# Patient Record
Sex: MALE | Race: BLACK OR AFRICAN AMERICAN | NOT HISPANIC OR LATINO | Employment: UNEMPLOYED | URBAN - METROPOLITAN AREA
[De-identification: names, ages, dates, MRNs, and addresses within clinical notes are randomized per-mention and may not be internally consistent; named-entity substitution may affect disease eponyms.]

---

## 2021-04-07 ENCOUNTER — OFFICE VISIT (OUTPATIENT)
Dept: UROLOGY | Facility: CLINIC | Age: 61
End: 2021-04-07
Payer: MEDICAID

## 2021-04-07 VITALS
BODY MASS INDEX: 30.98 KG/M2 | SYSTOLIC BLOOD PRESSURE: 156 MMHG | HEIGHT: 69 IN | HEART RATE: 92 BPM | WEIGHT: 209.19 LBS | DIASTOLIC BLOOD PRESSURE: 91 MMHG

## 2021-04-07 DIAGNOSIS — R97.20 ELEVATED PROSTATE SPECIFIC ANTIGEN (PSA): Primary | ICD-10-CM

## 2021-04-07 LAB
BILIRUB SERPL-MCNC: NORMAL MG/DL
BLOOD URINE, POC: NORMAL
CLARITY, POC UA: CLEAR
COLOR, POC UA: YELLOW
GLUCOSE UR QL STRIP: NORMAL
KETONES UR QL STRIP: NORMAL
LEUKOCYTE ESTERASE URINE, POC: NORMAL
NITRITE, POC UA: NORMAL
PH, POC UA: 5
POC RESIDUAL URINE VOLUME: 15 ML (ref 0–100)
PROTEIN, POC: NORMAL
SPECIFIC GRAVITY, POC UA: 1.01
UROBILINOGEN, POC UA: NORMAL

## 2021-04-07 PROCEDURE — 99204 OFFICE O/P NEW MOD 45 MIN: CPT | Mod: S$PBB,,, | Performed by: NURSE PRACTITIONER

## 2021-04-07 PROCEDURE — 99204 PR OFFICE/OUTPT VISIT, NEW, LEVL IV, 45-59 MIN: ICD-10-PCS | Mod: S$PBB,,, | Performed by: NURSE PRACTITIONER

## 2021-04-07 PROCEDURE — 87086 URINE CULTURE/COLONY COUNT: CPT | Performed by: NURSE PRACTITIONER

## 2021-04-07 PROCEDURE — 99999 PR PBB SHADOW E&M-NEW PATIENT-LVL III: ICD-10-PCS | Mod: PBBFAC,,, | Performed by: NURSE PRACTITIONER

## 2021-04-07 PROCEDURE — 99999 PR PBB SHADOW E&M-NEW PATIENT-LVL III: CPT | Mod: PBBFAC,,, | Performed by: NURSE PRACTITIONER

## 2021-04-07 PROCEDURE — 81002 URINALYSIS NONAUTO W/O SCOPE: CPT | Mod: PBBFAC,PN | Performed by: NURSE PRACTITIONER

## 2021-04-07 PROCEDURE — 51798 US URINE CAPACITY MEASURE: CPT | Mod: PBBFAC,PN | Performed by: NURSE PRACTITIONER

## 2021-04-07 PROCEDURE — 99203 OFFICE O/P NEW LOW 30 MIN: CPT | Mod: PBBFAC,PN | Performed by: NURSE PRACTITIONER

## 2021-04-07 RX ORDER — ATORVASTATIN CALCIUM 40 MG/1
40 TABLET, FILM COATED ORAL DAILY
COMMUNITY
Start: 2021-03-04

## 2021-04-07 RX ORDER — METFORMIN HYDROCHLORIDE 1000 MG/1
1000 TABLET ORAL
COMMUNITY

## 2021-04-09 LAB — BACTERIA UR CULT: NO GROWTH

## 2021-04-14 ENCOUNTER — OFFICE VISIT (OUTPATIENT)
Dept: UROLOGY | Facility: CLINIC | Age: 61
End: 2021-04-14
Payer: MEDICAID

## 2021-04-14 DIAGNOSIS — R97.20 ELEVATED PROSTATE SPECIFIC ANTIGEN (PSA): Primary | ICD-10-CM

## 2021-04-14 PROCEDURE — 99214 OFFICE O/P EST MOD 30 MIN: CPT | Mod: 95,,, | Performed by: NURSE PRACTITIONER

## 2021-04-14 PROCEDURE — 99214 PR OFFICE/OUTPT VISIT, EST, LEVL IV, 30-39 MIN: ICD-10-PCS | Mod: 95,,, | Performed by: NURSE PRACTITIONER

## 2021-09-15 ENCOUNTER — DOCUMENTATION ONLY (OUTPATIENT)
Dept: REHABILITATION | Facility: HOSPITAL | Age: 61
End: 2021-09-15

## 2022-01-23 ENCOUNTER — HOSPITAL ENCOUNTER (INPATIENT)
Facility: HOSPITAL | Age: 62
LOS: 10 days | Discharge: NURSING, REHAB OR LONG-TERM CARE | DRG: 025 | End: 2022-02-02
Attending: EMERGENCY MEDICINE | Admitting: PSYCHIATRY & NEUROLOGY
Payer: MEDICAID

## 2022-01-23 DIAGNOSIS — S06.5XAA SUBDURAL HEMATOMA, ACUTE: ICD-10-CM

## 2022-01-23 DIAGNOSIS — G93.6 CEREBRAL EDEMA: ICD-10-CM

## 2022-01-23 DIAGNOSIS — S06.5XAA SDH (SUBDURAL HEMATOMA): ICD-10-CM

## 2022-01-23 DIAGNOSIS — I63.50 CEREBROVASCULAR ACCIDENT (CVA) OF RIGHT PONTINE STRUCTURE: ICD-10-CM

## 2022-01-23 DIAGNOSIS — R51.9 HEADACHE IN FRONT OF HEAD: ICD-10-CM

## 2022-01-23 DIAGNOSIS — S06.5XAA SUBDURAL HEMATOMA: Primary | ICD-10-CM

## 2022-01-23 PROBLEM — E78.5 HLD (HYPERLIPIDEMIA): Status: ACTIVE | Noted: 2022-01-23

## 2022-01-23 PROBLEM — E11.9 TYPE 2 DIABETES MELLITUS: Status: ACTIVE | Noted: 2022-01-23

## 2022-01-23 LAB
ALBUMIN SERPL BCP-MCNC: 3.5 G/DL (ref 3.5–5.2)
ALP SERPL-CCNC: 67 U/L (ref 55–135)
ALT SERPL W/O P-5'-P-CCNC: 24 U/L (ref 10–44)
ANION GAP SERPL CALC-SCNC: 8 MMOL/L (ref 8–16)
APTT BLDCRRT: 26.2 SEC (ref 21–32)
AST SERPL-CCNC: 10 U/L (ref 10–40)
BASOPHILS # BLD AUTO: 0.02 K/UL (ref 0–0.2)
BASOPHILS NFR BLD: 0.2 % (ref 0–1.9)
BILIRUB SERPL-MCNC: 0.6 MG/DL (ref 0.1–1)
BUN SERPL-MCNC: 13 MG/DL (ref 6–20)
CALCIUM SERPL-MCNC: 8.5 MG/DL (ref 8.7–10.5)
CHLORIDE SERPL-SCNC: 107 MMOL/L (ref 95–110)
CO2 SERPL-SCNC: 21 MMOL/L (ref 23–29)
CREAT SERPL-MCNC: 1.1 MG/DL (ref 0.5–1.4)
CTP QC/QA: YES
DIFFERENTIAL METHOD: ABNORMAL
EOSINOPHIL # BLD AUTO: 0 K/UL (ref 0–0.5)
EOSINOPHIL NFR BLD: 0 % (ref 0–8)
ERYTHROCYTE [DISTWIDTH] IN BLOOD BY AUTOMATED COUNT: 12.4 % (ref 11.5–14.5)
EST. GFR  (AFRICAN AMERICAN): >60 ML/MIN/1.73 M^2
EST. GFR  (NON AFRICAN AMERICAN): >60 ML/MIN/1.73 M^2
GLUCOSE SERPL-MCNC: 305 MG/DL (ref 70–110)
HCT VFR BLD AUTO: 37.7 % (ref 40–54)
HGB BLD-MCNC: 13.5 G/DL (ref 14–18)
IMM GRANULOCYTES # BLD AUTO: 0.03 K/UL (ref 0–0.04)
IMM GRANULOCYTES NFR BLD AUTO: 0.3 % (ref 0–0.5)
INR PPP: 1.1 (ref 0.8–1.2)
LYMPHOCYTES # BLD AUTO: 1 K/UL (ref 1–4.8)
LYMPHOCYTES NFR BLD: 8.9 % (ref 18–48)
MCH RBC QN AUTO: 30.3 PG (ref 27–31)
MCHC RBC AUTO-ENTMCNC: 35.8 G/DL (ref 32–36)
MCV RBC AUTO: 85 FL (ref 82–98)
MONOCYTES # BLD AUTO: 0.5 K/UL (ref 0.3–1)
MONOCYTES NFR BLD: 4.2 % (ref 4–15)
NEUTROPHILS # BLD AUTO: 9.4 K/UL (ref 1.8–7.7)
NEUTROPHILS NFR BLD: 86.4 % (ref 38–73)
NRBC BLD-RTO: 0 /100 WBC
PLATELET # BLD AUTO: 147 K/UL (ref 150–450)
PMV BLD AUTO: 10.4 FL (ref 9.2–12.9)
POCT GLUCOSE: 139 MG/DL (ref 70–110)
POCT GLUCOSE: 228 MG/DL (ref 70–110)
POTASSIUM SERPL-SCNC: 3.9 MMOL/L (ref 3.5–5.1)
PROT SERPL-MCNC: 6.8 G/DL (ref 6–8.4)
PROTHROMBIN TIME: 11.5 SEC (ref 9–12.5)
RBC # BLD AUTO: 4.46 M/UL (ref 4.6–6.2)
SARS-COV-2 RDRP RESP QL NAA+PROBE: NEGATIVE
SODIUM SERPL-SCNC: 136 MMOL/L (ref 136–145)
WBC # BLD AUTO: 10.82 K/UL (ref 3.9–12.7)

## 2022-01-23 PROCEDURE — 93005 ELECTROCARDIOGRAM TRACING: CPT

## 2022-01-23 PROCEDURE — 85610 PROTHROMBIN TIME: CPT | Performed by: EMERGENCY MEDICINE

## 2022-01-23 PROCEDURE — 99291 CRITICAL CARE FIRST HOUR: CPT | Mod: ,,, | Performed by: NURSE PRACTITIONER

## 2022-01-23 PROCEDURE — 25000003 PHARM REV CODE 250: Performed by: NURSE PRACTITIONER

## 2022-01-23 PROCEDURE — 80053 COMPREHEN METABOLIC PANEL: CPT | Performed by: EMERGENCY MEDICINE

## 2022-01-23 PROCEDURE — 63600175 PHARM REV CODE 636 W HCPCS: Performed by: PHYSICIAN ASSISTANT

## 2022-01-23 PROCEDURE — 99291 PR CRITICAL CARE, E/M 30-74 MINUTES: ICD-10-PCS | Mod: ,,, | Performed by: NURSE PRACTITIONER

## 2022-01-23 PROCEDURE — 85025 COMPLETE CBC W/AUTO DIFF WBC: CPT | Performed by: EMERGENCY MEDICINE

## 2022-01-23 PROCEDURE — 96375 TX/PRO/DX INJ NEW DRUG ADDON: CPT

## 2022-01-23 PROCEDURE — 93010 ELECTROCARDIOGRAM REPORT: CPT | Mod: ,,, | Performed by: INTERNAL MEDICINE

## 2022-01-23 PROCEDURE — 94761 N-INVAS EAR/PLS OXIMETRY MLT: CPT

## 2022-01-23 PROCEDURE — 99223 1ST HOSP IP/OBS HIGH 75: CPT | Mod: ,,, | Performed by: NEUROLOGICAL SURGERY

## 2022-01-23 PROCEDURE — 25000003 PHARM REV CODE 250: Performed by: PHYSICIAN ASSISTANT

## 2022-01-23 PROCEDURE — 20000000 HC ICU ROOM

## 2022-01-23 PROCEDURE — 96374 THER/PROPH/DIAG INJ IV PUSH: CPT

## 2022-01-23 PROCEDURE — 99223 PR INITIAL HOSPITAL CARE,LEVL III: ICD-10-PCS | Mod: ,,, | Performed by: NEUROLOGICAL SURGERY

## 2022-01-23 PROCEDURE — 63600175 PHARM REV CODE 636 W HCPCS: Performed by: EMERGENCY MEDICINE

## 2022-01-23 PROCEDURE — 99291 CRITICAL CARE FIRST HOUR: CPT | Mod: 25

## 2022-01-23 PROCEDURE — 85730 THROMBOPLASTIN TIME PARTIAL: CPT | Performed by: EMERGENCY MEDICINE

## 2022-01-23 PROCEDURE — U0002 COVID-19 LAB TEST NON-CDC: HCPCS | Performed by: EMERGENCY MEDICINE

## 2022-01-23 PROCEDURE — 93010 EKG 12-LEAD: ICD-10-PCS | Mod: ,,, | Performed by: INTERNAL MEDICINE

## 2022-01-23 RX ORDER — ATORVASTATIN CALCIUM 20 MG/1
40 TABLET, FILM COATED ORAL DAILY
Status: DISCONTINUED | OUTPATIENT
Start: 2022-01-24 | End: 2022-01-28

## 2022-01-23 RX ORDER — GLUCAGON 1 MG
1 KIT INJECTION
Status: DISCONTINUED | OUTPATIENT
Start: 2022-01-23 | End: 2022-01-28

## 2022-01-23 RX ORDER — SODIUM CHLORIDE 0.9 % (FLUSH) 0.9 %
10 SYRINGE (ML) INJECTION
Status: DISCONTINUED | OUTPATIENT
Start: 2022-01-23 | End: 2022-02-02 | Stop reason: HOSPADM

## 2022-01-23 RX ORDER — BUTALBITAL, ACETAMINOPHEN AND CAFFEINE 50; 325; 40 MG/1; MG/1; MG/1
1 TABLET ORAL EVERY 4 HOURS PRN
COMMUNITY

## 2022-01-23 RX ORDER — KETOROLAC TROMETHAMINE 30 MG/ML
30 INJECTION, SOLUTION INTRAMUSCULAR; INTRAVENOUS
Status: COMPLETED | OUTPATIENT
Start: 2022-01-23 | End: 2022-01-23

## 2022-01-23 RX ORDER — SODIUM,POTASSIUM PHOSPHATES 280-250MG
2 POWDER IN PACKET (EA) ORAL
Status: DISCONTINUED | OUTPATIENT
Start: 2022-01-23 | End: 2022-02-01

## 2022-01-23 RX ORDER — MUPIROCIN 20 MG/G
OINTMENT TOPICAL 2 TIMES DAILY
Status: COMPLETED | OUTPATIENT
Start: 2022-01-23 | End: 2022-01-28

## 2022-01-23 RX ORDER — DIPHENHYDRAMINE HYDROCHLORIDE 50 MG/ML
50 INJECTION INTRAMUSCULAR; INTRAVENOUS
Status: COMPLETED | OUTPATIENT
Start: 2022-01-23 | End: 2022-01-23

## 2022-01-23 RX ORDER — LABETALOL HCL 20 MG/4 ML
10 SYRINGE (ML) INTRAVENOUS EVERY 4 HOURS PRN
Status: DISCONTINUED | OUTPATIENT
Start: 2022-01-23 | End: 2022-01-24

## 2022-01-23 RX ORDER — INSULIN ASPART 100 [IU]/ML
0-5 INJECTION, SOLUTION INTRAVENOUS; SUBCUTANEOUS EVERY 6 HOURS PRN
Status: DISCONTINUED | OUTPATIENT
Start: 2022-01-23 | End: 2022-01-24

## 2022-01-23 RX ORDER — LEVETIRACETAM 500 MG/5ML
500 INJECTION, SOLUTION, CONCENTRATE INTRAVENOUS EVERY 12 HOURS
Status: DISCONTINUED | OUTPATIENT
Start: 2022-01-23 | End: 2022-01-25

## 2022-01-23 RX ORDER — PROCHLORPERAZINE EDISYLATE 5 MG/ML
2.5 INJECTION INTRAMUSCULAR; INTRAVENOUS
Status: COMPLETED | OUTPATIENT
Start: 2022-01-23 | End: 2022-01-23

## 2022-01-23 RX ORDER — AMOXICILLIN 250 MG
1 CAPSULE ORAL 2 TIMES DAILY
Status: DISCONTINUED | OUTPATIENT
Start: 2022-01-23 | End: 2022-01-28

## 2022-01-23 RX ORDER — ONDANSETRON 2 MG/ML
4 INJECTION INTRAMUSCULAR; INTRAVENOUS EVERY 8 HOURS PRN
Status: DISCONTINUED | OUTPATIENT
Start: 2022-01-23 | End: 2022-02-02 | Stop reason: HOSPADM

## 2022-01-23 RX ORDER — LANOLIN ALCOHOL/MO/W.PET/CERES
800 CREAM (GRAM) TOPICAL
Status: DISCONTINUED | OUTPATIENT
Start: 2022-01-23 | End: 2022-01-30

## 2022-01-23 RX ORDER — METOCLOPRAMIDE HYDROCHLORIDE 5 MG/ML
5 INJECTION INTRAMUSCULAR; INTRAVENOUS ONCE
Status: COMPLETED | OUTPATIENT
Start: 2022-01-23 | End: 2022-01-23

## 2022-01-23 RX ADMIN — SENNOSIDES AND DOCUSATE SODIUM 1 TABLET: 50; 8.6 TABLET ORAL at 09:01

## 2022-01-23 RX ADMIN — MUPIROCIN: 20 OINTMENT TOPICAL at 10:01

## 2022-01-23 RX ADMIN — METOCLOPRAMIDE 5 MG: 5 INJECTION, SOLUTION INTRAMUSCULAR; INTRAVENOUS at 10:01

## 2022-01-23 RX ADMIN — KETOROLAC TROMETHAMINE 30 MG: 30 INJECTION, SOLUTION INTRAMUSCULAR at 02:01

## 2022-01-23 RX ADMIN — LEVETIRACETAM 500 MG: 100 INJECTION, SOLUTION, CONCENTRATE INTRAVENOUS at 10:01

## 2022-01-23 RX ADMIN — DIPHENHYDRAMINE HYDROCHLORIDE 50 MG: 50 INJECTION INTRAMUSCULAR; INTRAVENOUS at 02:01

## 2022-01-23 RX ADMIN — PROCHLORPERAZINE EDISYLATE 2.5 MG: 5 INJECTION INTRAMUSCULAR; INTRAVENOUS at 02:01

## 2022-01-23 NOTE — ED PROVIDER NOTES
Encounter Date: 1/23/2022       History     Chief Complaint   Patient presents with    Headache     Patient reports reports an intermittent frontal and parietal headache x 10 days. Patient states that he does get nausea, but has not vomited. He reports that he was recently seen in the ED for same symptoms and was prescribed medication  which is not working. Denies auras, vision changes, dizziness.      HPI   This 62-year-old white male presents to the emergency room complaining of a frontal headache over the course of the last 10 days.  The patient denies neck pain head trauma fever.  The patient does have nausea.  He was seen at [facility] yesterday and was prescribed Fioricet it is not working.  He denies neurologic deficits.  He is otherwise well he has never had a headache like this before.  There is no history of fever.  Review of patient's allergies indicates:  No Known Allergies  Past Medical History:   Diagnosis Date    Diabetes mellitus      History reviewed. No pertinent surgical history.  History reviewed. No pertinent family history.  Social History     Tobacco Use    Smoking status: Never Smoker    Smokeless tobacco: Never Used   Substance Use Topics    Alcohol use: Never     Review of Systems  The patient was questioned specifically with regard to the following.  General: Fever, chills, sweats. Neuro: Headache. Eyes: eye problems. ENT: Ear pain, sore throat. Cardiovascular: Chest pain. Respiratory: Cough, shortness of breath. Gastrointestinal: Abdominal pain, vomiting, diarrhea. Genitourinary: Painful urination.  Musculoskeletal: Arm and leg problems. Skin: Rash.  The review of systems was negative except for the following:  Headache and nausea  Physical Exam     Initial Vitals [01/23/22 1317]   BP Pulse Resp Temp SpO2   (!) 158/91 83 16 98.6 °F (37 °C) 98 %      MAP       --         Physical Exam  The patient was examined specifically for the following:   General:No significant distress,  Good color, Warm and dry. Head and neck:Scalp atraumatic, Neck supple. Neurological:Appropriate conversation, Gross motor deficits. Eyes:Conjugate gaze, Clear corneas. ENT: No epistaxis. Cardiac: Regular rate and rhythm, Grossly normal heart tones. Pulmonary: Wheezing, Rales. Gastrointestinal: Abdominal tenderness, Abdominal distention. Musculoskeletal: Extremity deformity, Apparent pain with range of motion of the joints. Skin: Rash.   The findings on examination were normal except for the following:  The neck is supple.  Mental status examination, cranial nerves, motor and sensory examination are normal.   ED Course   Critical Care    Date/Time: 1/23/2022 4:13 PM  Performed by: Gage Saunders MD  Authorized by: Gage Saunders MD   Direct patient critical care time: 22 minutes  Additional history critical care time: 11 minutes  Ordering / reviewing critical care time: 11 minutes  Documentation critical care time: 11 minutes  Consulting other physicians critical care time: 11 minutes  Total critical care time (exclusive of procedural time) : 66 minutes  Critical care time was exclusive of separately billable procedures and treating other patients and teaching time.  Critical care was time spent personally by me on the following activities: development of treatment plan with patient or surrogate, discussions with consultants, evaluation of patient's response to treatment, examination of patient, obtaining history from patient or surrogate, ordering and performing treatments and interventions, ordering and review of laboratory studies, ordering and review of radiographic studies, pulse oximetry, re-evaluation of patient's condition and review of old charts.        Labs Reviewed   COMPREHENSIVE METABOLIC PANEL   APTT   PROTIME-INR   CBC W/ AUTO DIFFERENTIAL   SARS-COV-2 RDRP GENE          Imaging Results          CT Head Without Contrast (Final result)  Result time 01/23/22 16:00:14    Final result by Eric Moran  MD (01/23/22 16:00:14)                 Impression:      Multiple bilateral extra-axial fluid collections overlying the cerebral convexities with the largest overlying the right frontal convexity measuring 1.4 cm.    Overall component of the collections are composed of both acute and subacute bilateral subdural hematomas.    Diffuse cerebral edema with associated 8 mm right to left midline shift and associated right to left subfalcine herniation.  Neurosurgical consultation is recommended.    Paranasal sinus disease.    Case discussed with Dr. Saunders on 01/23/2022 at 15:50      Electronically signed by: Eric Moran MD  Date:    01/23/2022  Time:    16:00             Narrative:    EXAMINATION:  CT HEAD WITHOUT CONTRAST    CLINICAL HISTORY:  Headache, new or worsening (Age >= 50y); Headache, unspecified    TECHNIQUE:  Low dose axial images were obtained through the head.  Coronal and sagittal reformations were also performed. Contrast was not administered.    COMPARISON:  None.    FINDINGS:  The subcutaneous tissues are unremarkable.  The bony calvarium is intact.  There is trace mucosal thickening within the ethmoid and maxillary sinuses.  There are probable small bilateral mastoid effusions.  There are postoperative changes in the globes.    The craniocervical junction is within normal limits.  There are bilateral extra-axial fluid collections of mixed attenuation along with scattered hyperdense components within the collections.  The collection on the right measures 1.4 cm along the right frontal convexity.  There is also a 5.2 mm collection overlying the left frontal convexity.  There is a left parafalcine subdural collection of mixed attenuation measuring 9.5 mm.  There also subdural collections overlying the tentorial leaflets.  There is high attenuation collection overlying the right aspect of the interhemispheric fissure measuring 6.3 mm.    There is diffuse cerebral edema with effacement of the CSF spaces.   There is compression of both lateral ventricles with an 8 mm right to left midline shift.  There is early right to left subfalcine herniation.  The cerebellum appears relatively preserved.                              Medical decision making:  Given the above this patient has acute on chronic subdural hematomas.  He is here for headache.  He is neurologically intact.  His blood pressures less than 150 at the time of this dictation.  The patient has been accepted at [facility] by neuro surgery.  He will be admitted to the neurocritical care unit.  I discussed the case with .       Medications   prochlorperazine injection Soln 2.5 mg (2.5 mg Intravenous Given 1/23/22 1410)   diphenhydrAMINE injection 50 mg (50 mg Intravenous Given 1/23/22 1408)   ketorolac injection 30 mg (30 mg Intravenous Given 1/23/22 1407)                          Clinical Impression:   Final diagnoses:  [R51.9] Headache in front of head  [S06.5X9A] Subdural hematoma (Primary)          ED Disposition Condition    Transfer to Another Facility Stable              Gage Saunders MD  01/23/22 1610       Gage Saunders MD  01/23/22 1610

## 2022-01-23 NOTE — ED TRIAGE NOTES
Pt reports frontal and parietal headaches x10 days with nausea. Reports takes fioricet as prescribed but not helping today.

## 2022-01-23 NOTE — HPI
Patient is a 62 year old male with PMHx T2DM and HLD who presented as a direct admit from OSH to [facility] unit for multiple acute on subacute subdural hematomas with R to L midline shift.     Per chart review, patient with ongoing frontal headache for the last 10 days prior to admission. No recent trauma reported. On 1/22, he was seen at [facility] for his headaches and was prescribed Fioricet without relief. Due to worsening headache, patient presented to [facility] on 1/23. He was neuro intact with complaint of nausea with no vomiting. Head CT w/o contrast revealed multiple bilateral acute and subacute SDHs overlying the cerebral convexities with the largest overlying the right frontal convexity measuring 1.4 cm. Also noted was diffuse cerebral edema with 8mm right to left midline shift and associated right to left subfalcine herniation.     He was admitted to [facility] for close neurological monitoring and Neurosurgery evaluation.

## 2022-01-24 LAB
ABO + RH BLD: NORMAL
ALBUMIN SERPL BCP-MCNC: 3.7 G/DL (ref 3.5–5.2)
ALP SERPL-CCNC: 70 U/L (ref 55–135)
ALT SERPL W/O P-5'-P-CCNC: 22 U/L (ref 10–44)
ANION GAP SERPL CALC-SCNC: 13 MMOL/L (ref 8–16)
AST SERPL-CCNC: 13 U/L (ref 10–40)
BACTERIA #/AREA URNS AUTO: NORMAL /HPF
BASOPHILS # BLD AUTO: 0.03 K/UL (ref 0–0.2)
BASOPHILS NFR BLD: 0.3 % (ref 0–1.9)
BILIRUB SERPL-MCNC: 0.9 MG/DL (ref 0.1–1)
BILIRUB UR QL STRIP: NEGATIVE
BLD GP AB SCN CELLS X3 SERPL QL: NORMAL
BUN SERPL-MCNC: 14 MG/DL (ref 6–20)
CALCIUM SERPL-MCNC: 9.5 MG/DL (ref 8.7–10.5)
CHLORIDE SERPL-SCNC: 107 MMOL/L (ref 95–110)
CHOLEST SERPL-MCNC: 137 MG/DL (ref 120–199)
CHOLEST/HDLC SERPL: 4.3 {RATIO} (ref 2–5)
CLARITY UR REFRACT.AUTO: CLEAR
CO2 SERPL-SCNC: 20 MMOL/L (ref 23–29)
COLOR UR AUTO: YELLOW
CREAT SERPL-MCNC: 0.9 MG/DL (ref 0.5–1.4)
DIFFERENTIAL METHOD: ABNORMAL
EOSINOPHIL # BLD AUTO: 0 K/UL (ref 0–0.5)
EOSINOPHIL NFR BLD: 0.2 % (ref 0–8)
ERYTHROCYTE [DISTWIDTH] IN BLOOD BY AUTOMATED COUNT: 12.6 % (ref 11.5–14.5)
EST. GFR  (AFRICAN AMERICAN): >60 ML/MIN/1.73 M^2
EST. GFR  (NON AFRICAN AMERICAN): >60 ML/MIN/1.73 M^2
ESTIMATED AVG GLUCOSE: 160 MG/DL (ref 68–131)
GLUCOSE SERPL-MCNC: 120 MG/DL (ref 70–110)
GLUCOSE UR QL STRIP: ABNORMAL
HBA1C MFR BLD: 7.2 % (ref 4–5.6)
HCT VFR BLD AUTO: 39.6 % (ref 40–54)
HDLC SERPL-MCNC: 32 MG/DL (ref 40–75)
HDLC SERPL: 23.4 % (ref 20–50)
HGB BLD-MCNC: 13.9 G/DL (ref 14–18)
HGB UR QL STRIP: ABNORMAL
IMM GRANULOCYTES # BLD AUTO: 0.03 K/UL (ref 0–0.04)
IMM GRANULOCYTES NFR BLD AUTO: 0.3 % (ref 0–0.5)
KETONES UR QL STRIP: ABNORMAL
LDLC SERPL CALC-MCNC: 84.8 MG/DL (ref 63–159)
LEUKOCYTE ESTERASE UR QL STRIP: NEGATIVE
LYMPHOCYTES # BLD AUTO: 2.1 K/UL (ref 1–4.8)
LYMPHOCYTES NFR BLD: 18.4 % (ref 18–48)
MAGNESIUM SERPL-MCNC: 2 MG/DL (ref 1.6–2.6)
MCH RBC QN AUTO: 29.8 PG (ref 27–31)
MCHC RBC AUTO-ENTMCNC: 35.1 G/DL (ref 32–36)
MCV RBC AUTO: 85 FL (ref 82–98)
MICROSCOPIC COMMENT: NORMAL
MONOCYTES # BLD AUTO: 1 K/UL (ref 0.3–1)
MONOCYTES NFR BLD: 8.9 % (ref 4–15)
NEUTROPHILS # BLD AUTO: 8.3 K/UL (ref 1.8–7.7)
NEUTROPHILS NFR BLD: 71.9 % (ref 38–73)
NITRITE UR QL STRIP: NEGATIVE
NONHDLC SERPL-MCNC: 105 MG/DL
NRBC BLD-RTO: 0 /100 WBC
PH UR STRIP: 6 [PH] (ref 5–8)
PHOSPHATE SERPL-MCNC: 2.8 MG/DL (ref 2.7–4.5)
PLATELET # BLD AUTO: 157 K/UL (ref 150–450)
PMV BLD AUTO: 10.2 FL (ref 9.2–12.9)
POCT GLUCOSE: 127 MG/DL (ref 70–110)
POCT GLUCOSE: 175 MG/DL (ref 70–110)
POCT GLUCOSE: 207 MG/DL (ref 70–110)
POCT GLUCOSE: 208 MG/DL (ref 70–110)
POTASSIUM SERPL-SCNC: 3.7 MMOL/L (ref 3.5–5.1)
PROT SERPL-MCNC: 7.5 G/DL (ref 6–8.4)
PROT UR QL STRIP: NEGATIVE
RBC # BLD AUTO: 4.66 M/UL (ref 4.6–6.2)
RBC #/AREA URNS AUTO: 4 /HPF (ref 0–4)
SODIUM SERPL-SCNC: 140 MMOL/L (ref 136–145)
SP GR UR STRIP: 1.01 (ref 1–1.03)
TRIGL SERPL-MCNC: 101 MG/DL (ref 30–150)
URN SPEC COLLECT METH UR: ABNORMAL
WBC # BLD AUTO: 11.54 K/UL (ref 3.9–12.7)
WBC #/AREA URNS AUTO: 1 /HPF (ref 0–5)
YEAST UR QL AUTO: NORMAL

## 2022-01-24 PROCEDURE — 63600175 PHARM REV CODE 636 W HCPCS: Performed by: PHYSICIAN ASSISTANT

## 2022-01-24 PROCEDURE — 20000000 HC ICU ROOM

## 2022-01-24 PROCEDURE — 63600175 PHARM REV CODE 636 W HCPCS: Performed by: NURSE PRACTITIONER

## 2022-01-24 PROCEDURE — 25500020 PHARM REV CODE 255: Performed by: NURSE PRACTITIONER

## 2022-01-24 PROCEDURE — 25000003 PHARM REV CODE 250: Performed by: NURSE PRACTITIONER

## 2022-01-24 PROCEDURE — 83735 ASSAY OF MAGNESIUM: CPT | Performed by: NURSE PRACTITIONER

## 2022-01-24 PROCEDURE — 86901 BLOOD TYPING SEROLOGIC RH(D): CPT | Performed by: NURSE PRACTITIONER

## 2022-01-24 PROCEDURE — 99233 PR SUBSEQUENT HOSPITAL CARE,LEVL III: ICD-10-PCS | Mod: ,,, | Performed by: NEUROLOGICAL SURGERY

## 2022-01-24 PROCEDURE — 36415 COLL VENOUS BLD VENIPUNCTURE: CPT | Performed by: EMERGENCY MEDICINE

## 2022-01-24 PROCEDURE — 63600175 PHARM REV CODE 636 W HCPCS: Performed by: PSYCHIATRY & NEUROLOGY

## 2022-01-24 PROCEDURE — 25000003 PHARM REV CODE 250: Performed by: PHYSICIAN ASSISTANT

## 2022-01-24 PROCEDURE — 99233 SBSQ HOSP IP/OBS HIGH 50: CPT | Mod: ,,, | Performed by: NEUROLOGICAL SURGERY

## 2022-01-24 PROCEDURE — 80053 COMPREHEN METABOLIC PANEL: CPT | Performed by: NURSE PRACTITIONER

## 2022-01-24 PROCEDURE — 84100 ASSAY OF PHOSPHORUS: CPT | Performed by: NURSE PRACTITIONER

## 2022-01-24 PROCEDURE — 80061 LIPID PANEL: CPT | Performed by: NURSE PRACTITIONER

## 2022-01-24 PROCEDURE — 81001 URINALYSIS AUTO W/SCOPE: CPT | Performed by: NURSE PRACTITIONER

## 2022-01-24 PROCEDURE — 99233 SBSQ HOSP IP/OBS HIGH 50: CPT | Mod: ,,, | Performed by: PSYCHIATRY & NEUROLOGY

## 2022-01-24 PROCEDURE — 94761 N-INVAS EAR/PLS OXIMETRY MLT: CPT

## 2022-01-24 PROCEDURE — 85025 COMPLETE CBC W/AUTO DIFF WBC: CPT | Performed by: NURSE PRACTITIONER

## 2022-01-24 PROCEDURE — 99233 PR SUBSEQUENT HOSPITAL CARE,LEVL III: ICD-10-PCS | Mod: ,,, | Performed by: PSYCHIATRY & NEUROLOGY

## 2022-01-24 PROCEDURE — 83036 HEMOGLOBIN GLYCOSYLATED A1C: CPT | Performed by: NURSE PRACTITIONER

## 2022-01-24 RX ORDER — FENTANYL CITRATE 50 UG/ML
25 INJECTION, SOLUTION INTRAMUSCULAR; INTRAVENOUS ONCE
Status: COMPLETED | OUTPATIENT
Start: 2022-01-24 | End: 2022-01-24

## 2022-01-24 RX ORDER — OXYCODONE HYDROCHLORIDE 5 MG/1
5 TABLET ORAL EVERY 6 HOURS PRN
Status: DISCONTINUED | OUTPATIENT
Start: 2022-01-24 | End: 2022-02-02 | Stop reason: HOSPADM

## 2022-01-24 RX ORDER — HEPARIN SODIUM 5000 [USP'U]/ML
5000 INJECTION, SOLUTION INTRAVENOUS; SUBCUTANEOUS EVERY 8 HOURS
Status: DISCONTINUED | OUTPATIENT
Start: 2022-01-24 | End: 2022-01-27

## 2022-01-24 RX ORDER — AMLODIPINE BESYLATE 10 MG/1
10 TABLET ORAL DAILY
Status: DISCONTINUED | OUTPATIENT
Start: 2022-01-24 | End: 2022-01-27

## 2022-01-24 RX ORDER — LABETALOL HCL 20 MG/4 ML
10 SYRINGE (ML) INTRAVENOUS EVERY 4 HOURS PRN
Status: DISCONTINUED | OUTPATIENT
Start: 2022-01-24 | End: 2022-02-02 | Stop reason: HOSPADM

## 2022-01-24 RX ORDER — SILODOSIN 4 MG/1
4 CAPSULE ORAL DAILY
Status: DISCONTINUED | OUTPATIENT
Start: 2022-01-24 | End: 2022-01-28

## 2022-01-24 RX ORDER — OXYCODONE HYDROCHLORIDE 5 MG/1
5 TABLET ORAL ONCE
Status: COMPLETED | OUTPATIENT
Start: 2022-01-24 | End: 2022-01-24

## 2022-01-24 RX ORDER — HYDRALAZINE HYDROCHLORIDE 20 MG/ML
10 INJECTION INTRAMUSCULAR; INTRAVENOUS EVERY 4 HOURS PRN
Status: DISCONTINUED | OUTPATIENT
Start: 2022-01-24 | End: 2022-02-02 | Stop reason: HOSPADM

## 2022-01-24 RX ORDER — LEVETIRACETAM 500 MG/1
500 TABLET ORAL 2 TIMES DAILY
Status: CANCELLED | OUTPATIENT
Start: 2022-01-24 | End: 2022-01-30

## 2022-01-24 RX ORDER — ACETAMINOPHEN 325 MG/1
650 TABLET ORAL EVERY 6 HOURS PRN
Status: DISCONTINUED | OUTPATIENT
Start: 2022-01-24 | End: 2022-02-02 | Stop reason: HOSPADM

## 2022-01-24 RX ORDER — INSULIN ASPART 100 [IU]/ML
1-10 INJECTION, SOLUTION INTRAVENOUS; SUBCUTANEOUS EVERY 6 HOURS PRN
Status: DISCONTINUED | OUTPATIENT
Start: 2022-01-24 | End: 2022-01-25

## 2022-01-24 RX ORDER — DEXAMETHASONE SODIUM PHOSPHATE 100 MG/10ML
10 INJECTION INTRAMUSCULAR; INTRAVENOUS ONCE
Status: COMPLETED | OUTPATIENT
Start: 2022-01-24 | End: 2022-01-24

## 2022-01-24 RX ORDER — POLYETHYLENE GLYCOL 3350 17 G/17G
17 POWDER, FOR SOLUTION ORAL DAILY
Status: DISCONTINUED | OUTPATIENT
Start: 2022-01-25 | End: 2022-01-28

## 2022-01-24 RX ADMIN — SENNOSIDES AND DOCUSATE SODIUM 1 TABLET: 50; 8.6 TABLET ORAL at 08:01

## 2022-01-24 RX ADMIN — OXYCODONE 5 MG: 5 TABLET ORAL at 05:01

## 2022-01-24 RX ADMIN — HEPARIN SODIUM 5000 UNITS: 5000 INJECTION INTRAVENOUS; SUBCUTANEOUS at 03:01

## 2022-01-24 RX ADMIN — FENTANYL CITRATE 25 MCG: 50 INJECTION INTRAMUSCULAR; INTRAVENOUS at 11:01

## 2022-01-24 RX ADMIN — LABETALOL HYDROCHLORIDE 10 MG: 5 INJECTION, SOLUTION INTRAVENOUS at 02:01

## 2022-01-24 RX ADMIN — OXYCODONE 5 MG: 5 TABLET ORAL at 09:01

## 2022-01-24 RX ADMIN — INSULIN ASPART 2 UNITS: 100 INJECTION, SOLUTION INTRAVENOUS; SUBCUTANEOUS at 11:01

## 2022-01-24 RX ADMIN — MUPIROCIN: 20 OINTMENT TOPICAL at 08:01

## 2022-01-24 RX ADMIN — SODIUM CHLORIDE 500 ML: 0.9 INJECTION, SOLUTION INTRAVENOUS at 08:01

## 2022-01-24 RX ADMIN — INSULIN ASPART 4 UNITS: 100 INJECTION, SOLUTION INTRAVENOUS; SUBCUTANEOUS at 06:01

## 2022-01-24 RX ADMIN — DEXAMETHASONE SODIUM PHOSPHATE 10 MG: 10 INJECTION INTRAMUSCULAR; INTRAVENOUS at 06:01

## 2022-01-24 RX ADMIN — HEPARIN SODIUM 5000 UNITS: 5000 INJECTION INTRAVENOUS; SUBCUTANEOUS at 09:01

## 2022-01-24 RX ADMIN — IOHEXOL 100 ML: 350 INJECTION, SOLUTION INTRAVENOUS at 12:01

## 2022-01-24 RX ADMIN — SILODOSIN 4 MG: 4 CAPSULE ORAL at 11:01

## 2022-01-24 RX ADMIN — LABETALOL HYDROCHLORIDE 10 MG: 5 INJECTION, SOLUTION INTRAVENOUS at 01:01

## 2022-01-24 RX ADMIN — LABETALOL HYDROCHLORIDE 10 MG: 5 INJECTION, SOLUTION INTRAVENOUS at 04:01

## 2022-01-24 RX ADMIN — LEVETIRACETAM 500 MG: 100 INJECTION, SOLUTION, CONCENTRATE INTRAVENOUS at 08:01

## 2022-01-24 RX ADMIN — HYDRALAZINE HYDROCHLORIDE 10 MG: 20 INJECTION INTRAMUSCULAR; INTRAVENOUS at 03:01

## 2022-01-24 RX ADMIN — ACETAMINOPHEN 650 MG: 325 TABLET ORAL at 01:01

## 2022-01-24 RX ADMIN — ONDANSETRON 4 MG: 2 INJECTION INTRAMUSCULAR; INTRAVENOUS at 03:01

## 2022-01-24 RX ADMIN — ATORVASTATIN CALCIUM 40 MG: 20 TABLET, FILM COATED ORAL at 08:01

## 2022-01-24 RX ADMIN — AMLODIPINE BESYLATE 10 MG: 10 TABLET ORAL at 03:01

## 2022-01-24 NOTE — SUBJECTIVE & OBJECTIVE
Past Medical History:   Diagnosis Date    Diabetes mellitus      History reviewed. No pertinent surgical history.   No current facility-administered medications on file prior to encounter.     Current Outpatient Medications on File Prior to Encounter   Medication Sig Dispense Refill    atorvastatin (LIPITOR) 40 MG tablet Take 40 mg by mouth once daily.      butalbital-acetaminophen-caffeine -40 mg (FIORICET, ESGIC) -40 mg per tablet Take 1 tablet by mouth every 4 (four) hours as needed for Pain.      metFORMIN (GLUCOPHAGE) 1000 MG tablet Take 1,000 mg by mouth.        Allergies: Patient has no known allergies.    History reviewed. No pertinent family history.  Social History     Tobacco Use    Smoking status: Never Smoker    Smokeless tobacco: Never Used   Substance Use Topics    Alcohol use: Never     Review of Systems   All other systems reviewed and are negative.    Objective:     Vitals:    Temp: 98.7 °F (37.1 °C)  Pulse: 71  Rhythm: normal sinus rhythm  BP: (!) 145/74  Resp: 20  SpO2: 100 %  O2 Device (Oxygen Therapy): room air    Temp  Min: 98.6 °F (37 °C)  Max: 98.7 °F (37.1 °C)  Pulse  Min: 71  Max: 84  BP  Min: 128/71  Max: 158/91  Resp  Min: 16  Max: 20  SpO2  Min: 97 %  Max: 100 %    No intake/output data recorded.           Physical Exam  Vitals and nursing note reviewed.   Constitutional:       Appearance: Normal appearance.   HENT:      Head: Normocephalic.      Right Ear: External ear normal.      Left Ear: External ear normal.      Nose: Nose normal.      Mouth/Throat:      Mouth: Mucous membranes are moist.   Eyes:      Extraocular Movements: Extraocular movements intact.      Conjunctiva/sclera: Conjunctivae normal.      Pupils: Pupils are equal, round, and reactive to light.   Cardiovascular:      Rate and Rhythm: Normal rate and regular rhythm.      Pulses: Normal pulses.      Heart sounds: Normal heart sounds.   Pulmonary:      Effort: Pulmonary effort is normal.      Breath  sounds: Normal breath sounds.   Abdominal:      General: Bowel sounds are normal.      Palpations: Abdomen is soft.   Musculoskeletal:         General: Normal range of motion.      Cervical back: Normal range of motion.   Skin:     General: Skin is warm and dry.   Neurological:      Mental Status: He is alert.      Comments: Alert, awake. Regards. Follows all commands. Speech fluent. Oriented x4 (person, place, time, situation). PERRL, EOMI, visual fields intact, face symmetric, no dysarthria, spontaneous cough. Should shrug intact. BUE & BLE 5/5. Unable to assess gait.    Psychiatric:         Mood and Affect: Mood normal.         Behavior: Behavior normal.         Thought Content: Thought content normal.         Judgment: Judgment normal.       Today I personally reviewed pertinent medications, lines/drains/airways, imaging, laboratory results, notably:    CT Head w/o contrast 1/23/22:  Multiple bilateral extra-axial fluid collections overlying the cerebral convexities with the largest overlying the right frontal convexity measuring 1.4 cm. Overall component of the collections are composed of both acute and subacute bilateral subdural hematomas. Diffuse cerebral edema with associated 8 mm right to left midline shift and associated right to left subfalcine herniation. Neurosurgical consultation is recommended. Paranasal sinus disease.

## 2022-01-24 NOTE — ASSESSMENT & PLAN NOTE
Pt is 62M pmh DM who presents to outside hospital with cc of constant headaches for 10 days. Denies trauma, denies blood thin use, seizure, AMS, visual disturbances. CTH on workup showed bilateral R>L  subacute subdural hematomas. NSGY consulted for evaluation.    Plan:  Admit to NCC service, Q1h neurochecks  SBP<140, HOB>30, Na 135-145  CBC, CMP, coags, T&S, COVID, NPO midnight  Followup repeat CTH 6 hour interval  Keppra/AED per NCC if cocnern for seizure  Further recs pending, Please contact NSGY on call provider for any further questions.

## 2022-01-24 NOTE — ASSESSMENT & PLAN NOTE
63 yo M with headache x10 days. CT at OSH with multiple bilateral acute & subacute SDHs overlying the cerebral convexities (largest overlying the right frontal convexity measuring 1.4 cm). Also noted was diffuse cerebral edema with 8mm right to left midline shift and associated right to left subfalcine herniation. No trauma noted per patient.     -Admit to NCC  -NSGY consult -> no surgical intervention tonight, but make NPO at midnight for possible elective evacuation on 1/24 (coags obtained, T&S to be sent)  -repeat head CT at 6 hour syl from last scan (at 2100) to assess for stability   -SBP <140, PRN labetalol; utilize nicardipine if needed   -q1h neuro checks with vital signs  -hold SQH for now   -PT/OT

## 2022-01-24 NOTE — H&P
[facility]  Neurocritical Care  History & Physical    Admit Date: 1/23/2022  Service Date: 01/23/2022  Length of Stay: 0    Subjective:     Chief Complaint: Subdural hematoma, acute    History of Present Illness: Patient is a 62 year old male with PMHx T2DM and HLD who presented as a direct admit from OSH to [facility] unit for multiple acute on subacute subdural hematomas with R to L midline shift.     Per chart review, patient with ongoing frontal headache for the last 10 days prior to admission. No recent trauma reported. On 1/22, he was seen at [facility] for his headaches and was prescribed Fioricet without relief. Due to worsening headache, patient presented to [facility] on 1/23. He was neuro intact with complaint of nausea with no vomiting. Head CT w/o contrast revealed multiple bilateral acute and subacute SDHs overlying the cerebral convexities with the largest overlying the right frontal convexity measuring 1.4 cm. Also noted was diffuse cerebral edema with 8mm right to left midline shift and associated right to left subfalcine herniation.     He was admitted to [facility] for close neurological monitoring and Neurosurgery evaluation.       Past Medical History:   Diagnosis Date    Diabetes mellitus      History reviewed. No pertinent surgical history.   No current facility-administered medications on file prior to encounter.     Current Outpatient Medications on File Prior to Encounter   Medication Sig Dispense Refill    atorvastatin (LIPITOR) 40 MG tablet Take 40 mg by mouth once daily.      butalbital-acetaminophen-caffeine -40 mg (FIORICET, ESGIC) -40 mg per tablet Take 1 tablet by mouth every 4 (four) hours as needed for Pain.      metFORMIN (GLUCOPHAGE) 1000 MG tablet Take 1,000 mg by mouth.        Allergies: Patient has no known allergies.    History reviewed. No pertinent family history.  Social History     Tobacco Use    Smoking status: Never Smoker     Smokeless tobacco: Never Used   Substance Use Topics    Alcohol use: Never     Review of Systems   All other systems reviewed and are negative.    Objective:     Vitals:    Temp: 98.7 °F (37.1 °C)  Pulse: 71  Rhythm: normal sinus rhythm  BP: (!) 145/74  Resp: 20  SpO2: 100 %  O2 Device (Oxygen Therapy): room air    Temp  Min: 98.6 °F (37 °C)  Max: 98.7 °F (37.1 °C)  Pulse  Min: 71  Max: 84  BP  Min: 128/71  Max: 158/91  Resp  Min: 16  Max: 20  SpO2  Min: 97 %  Max: 100 %    No intake/output data recorded.           Physical Exam  Vitals and nursing note reviewed.   Constitutional:       Appearance: Normal appearance.   HENT:      Head: Normocephalic.      Right Ear: External ear normal.      Left Ear: External ear normal.      Nose: Nose normal.      Mouth/Throat:      Mouth: Mucous membranes are moist.   Eyes:      Extraocular Movements: Extraocular movements intact.      Conjunctiva/sclera: Conjunctivae normal.      Pupils: Pupils are equal, round, and reactive to light.   Cardiovascular:      Rate and Rhythm: Normal rate and regular rhythm.      Pulses: Normal pulses.      Heart sounds: Normal heart sounds.   Pulmonary:      Effort: Pulmonary effort is normal.      Breath sounds: Normal breath sounds.   Abdominal:      General: Bowel sounds are normal.      Palpations: Abdomen is soft.   Musculoskeletal:         General: Normal range of motion.      Cervical back: Normal range of motion.   Skin:     General: Skin is warm and dry.   Neurological:      Mental Status: He is alert.      Comments: Alert, awake. Regards. Follows all commands. Speech fluent. Oriented x4 (person, place, time, situation). PERRL, EOMI, visual fields intact, face symmetric, no dysarthria, spontaneous cough. Should shrug intact. BUE & BLE 5/5. Unable to assess gait.    Psychiatric:         Mood and Affect: Mood normal.         Behavior: Behavior normal.         Thought Content: Thought content normal.         Judgment: Judgment normal.  "      Today I personally reviewed pertinent medications, lines/drains/airways, imaging, laboratory results, notably:    CT Head w/o contrast 1/23/22:  Multiple bilateral extra-axial fluid collections overlying the cerebral convexities with the largest overlying the right frontal convexity measuring 1.4 cm. Overall component of the collections are composed of both acute and subacute bilateral subdural hematomas. Diffuse cerebral edema with associated 8 mm right to left midline shift and associated right to left subfalcine herniation. Neurosurgical consultation is recommended. Paranasal sinus disease.    Assessment/Plan:     Neuro  * Subdural hematoma, acute  63 yo M with headache x10 days. CT at OSH with multiple bilateral acute & subacute SDHs overlying the cerebral convexities (largest overlying the right frontal convexity measuring 1.4 cm). Also noted was diffuse cerebral edema with 8mm right to left midline shift and associated right to left subfalcine herniation. No trauma noted per patient.     -Admit to NCC  -NSGY consult -> no surgical intervention tonight, but make NPO at midnight for possible elective evacuation on 1/24 (coags obtained, T&S to be sent)  -repeat head CT at 6 hour syl from last scan (at 2100) to assess for stability   -SBP <140, PRN labetalol; utilize nicardipine if needed   -q1h neuro checks with vital signs  -hold SQH for now   -PT/OT      Cerebral edema  Imaging at OSH revealed diffuse cerebral edema in setting of known SDHs    -close neuro monitoring with frequent neuro checks  -see "Subdural hematoma, acute" for further plan     Cardiac/Vascular  HLD (hyperlipidemia)  -continue home atorvastatin 40 mg daily     Endocrine  Type 2 diabetes mellitus  Takes metformin at home    -f/u A1c   -while NPO, accu checks with PRN SSI q6h           The patient is being Prophylaxed for:  Venous Thromboembolism with: Mechanical  Stress Ulcer with: Not Applicable   Ventilator Pneumonia with: not " applicable    Activity Orders          Turn patient starting at 01/23 2000    Diet NPO Except for: Sips with Medication: NPO starting at 01/23 1848    Elevate HOB starting at 01/23 1821        Full Code     Critical condition in that Patient has a condition that poses threat to life and bodily function: Multiple acute & subacute SDHs     30 minutes of Critical care time was spent personally by me on the following activities: development of treatment plan with patient or surrogate and bedside caregivers, discussions with consultants, evaluation of patient's response to treatment, examination of patient, ordering and performing treatments and interventions, ordering and review of laboratory studies, ordering and review of radiographic studies, pulse oximetry, antibiotic titration if applicable, vasopressor titration if applicable, re-evaluation of patient's condition. This critical care time did not overlap with that of any other provider or involve time for any procedures. There is high probability for acute neurological change leading to clinical and possibly life-threatening deterioration requiring highest level of physician preparedness for urgent intervention.    Tere Jordan, NP  Neurocritical Care  [facility]

## 2022-01-24 NOTE — PROGRESS NOTES
[facility]  Neurosurgery  Progress Note    Subjective:     History of Present Illness: Pt is 62M pmh DM who presents to outside hospital with cc of constant headaches for 10 days. Denies trauma, denies blood thin use, seizure, AMS, visual disturbances. CTH on workup showed bilateral R>L  subacute subdural hematomas. NSGY consulted for evaluation.      Post-Op Info:  Procedure(s) (LRB):  CRANIOTOMY, FOR SUBDURAL HEMATOMA EVACUATION (Left)         Medications Prior to Admission   Medication Sig Dispense Refill Last Dose    atorvastatin (LIPITOR) 40 MG tablet Take 40 mg by mouth once daily.   1/22/2022    butalbital-acetaminophen-caffeine -40 mg (FIORICET, ESGIC) -40 mg per tablet Take 1 tablet by mouth every 4 (four) hours as needed for Pain.   1/23/2022    metFORMIN (GLUCOPHAGE) 1000 MG tablet Take 1,000 mg by mouth.   1/22/2022       Review of patient's allergies indicates:  No Known Allergies    Past Medical History:   Diagnosis Date    Diabetes mellitus      History reviewed. No pertinent surgical history.  Family History    None       Tobacco Use    Smoking status: Never Smoker    Smokeless tobacco: Never Used   Substance and Sexual Activity    Alcohol use: Never    Drug use: Not on file    Sexual activity: Yes     Partners: Female     Review of Systems   Constitutional: Negative for activity change and fatigue.   HENT: Negative for hearing loss and trouble swallowing.    Eyes: Negative for photophobia and visual disturbance.   Respiratory: Negative for shortness of breath.    Cardiovascular: Negative for chest pain.   Gastrointestinal: Negative for nausea and vomiting.   Genitourinary: Negative for difficulty urinating.   Musculoskeletal: Negative for back pain, neck pain and neck stiffness.   Neurological: Positive for headaches.   Psychiatric/Behavioral: Negative for confusion.     Objective:     Weight: 93 kg (205 lb 0.4 oz)  Body mass index is 30.28 kg/m².  Vital  Signs (Most Recent):  Temp: 98.5 °F (36.9 °C) (01/24/22 0305)  Pulse: 80 (01/24/22 0505)  Resp: 15 (01/24/22 0505)  BP: (!) 141/63 (01/24/22 0505)  SpO2: 97 % (01/24/22 0505) Vital Signs (24h Range):  Temp:  [98.1 °F (36.7 °C)-100.4 °F (38 °C)] 98.5 °F (36.9 °C)  Pulse:  [71-96] 80  Resp:  [13-23] 15  SpO2:  [97 %-100 %] 97 %  BP: (120-160)/(63-91) 141/63                 Neurosurgery Physical Exam    General: well developed, well nourished, no distress.   Head: normocephalic, atraumatic  CV: RRR, pulses equal bilateral  Pulmonary: normal respirations, no signs of respiratory distress  Abdomen: soft, non-distended  Skin: Skin is warm, dry and intact.    Neuro:  E4V5M6  Awake, alert, Ox4  PERRL, EOMI  CNII-XII grossly intact  Motor: Follows commands full strength x4  SILT    Significant Labs:  Recent Labs   Lab 01/23/22  1616 01/24/22  0124   * 120*    140   K 3.9 3.7    107   CO2 21* 20*   BUN 13 14   CREATININE 1.1 0.9   CALCIUM 8.5* 9.5   MG  --  2.0     Recent Labs   Lab 01/23/22  1616 01/24/22  0124   WBC 10.82 11.54   HGB 13.5* 13.9*   HCT 37.7* 39.6*   * 157     Recent Labs   Lab 01/23/22  1616   INR 1.1   APTT 26.2     Microbiology Results (last 7 days)     ** No results found for the last 168 hours. **        All pertinent labs from the last 24 hours have been reviewed.    Significant Diagnostics:  I have reviewed all pertinent imaging results/findings within the past 24 hours.  I have reviewed and interpreted all pertinent imaging results/findings within the past 24 hours.    Assessment/Plan:     * Subdural hematoma, acute  Pt is 62M pmh DM who presents to outside hospital with cc of constant headaches for 10 days. Denies trauma, denies blood thin use, seizure, AMS, visual disturbances. CTH on workup showed bilateral R>L  subacute subdural hematomas. NSGY consulted for evaluation.    Plan:  Admit to NCC service, Q1h neurochecks  SBP<140, HOB>30, Na 135-145  CBC, CMP, coags, T&S,  COVID,   Keppra/AED per NCC if cocnern for seizure  Repeat CTH stable.     Further recs pending, Please contact NSGY on call provider for any further questions.        Sami Gomez MD  Neurosurgery  [facility]

## 2022-01-24 NOTE — HOSPITAL COURSE
01/24/2022 NAEO  01/25/2022 still complaining of headache. Having N/V  01/26/2022 NAEO. H/A is under better control   01/27/2022 Fluctuating mental status, getting EEG  01/28/2022 s/p R craniotomy for aSDH evacuation (increased MLS o/n); [facility] vent: orally intubated into nsicu postop; sedation on hold; postop CTH reviewed w/ expected surgical decompression and reduction of MLS; increased hemovac drainage immediately postop; replete lytes; ok for TF via ogt;  01/29/2022 propofol switched to fent drip o/n for low bp; plan extubation today; ivf bolus o/n  01/30/2022 extubated to room yesterday; subdural drain in place  01/31/2022 EOM impaired (right eye adduction and superior vertical gaze, pupils equal and reactive). Per family this is new since his hemicrani. Will do a MRI brain to investigate. He also has hiccups. Given constipation will avoid reglan.

## 2022-01-24 NOTE — CONSULTS
[facility]  Adult Nutrition  Consult Note    SUMMARY     Recommendations    1. ADAT and medically able to Diabetic, texture per SLP     2. If PO intake <50%, add Boost Glucose Control     3. RD to monitor    Goals: Pt will receive nutrition by RD f/u  Nutrition Goal Status: new    Communication of RD Recs:  (POC)    Assessment and Plan    Nutrition Problem  Inadequate energy intake    Related to (etiology):   Inability to consume sufficient energy    Signs and Symptoms (as evidenced by):   NPO with no alternate means of nutrition     Interventions (treatment strategy):  Collaboration with other providers  CHO-modified diet    Nutrition Diagnosis Status:   New    Malnutrition Assessment       Orbital Region (Subcutaneous Fat Loss): well nourished  Upper Arm Region (Subcutaneous Fat Loss): well nourished   [place] Region (Muscle Loss): well nourished  Clavicle Bone Region (Muscle Loss): well nourished  Clavicle and Acromion Bone Region (Muscle Loss): well nourished  Scapular Bone Region (Muscle Loss): well nourished       Subcutaneous Fat Loss (Final Summary): well nourished  Muscle Loss Evaluation (Final Summary): well nourished         Reason for Assessment    Reason For Assessment: consult (Assess dietary needs)  Diagnosis:  (SDH)  Relevant Medical History: DM  Interdisciplinary Rounds: did not attend    General Information Comments: Pt presents with SDH and HA x 10 days. Pt soundly sleeping at time of visit with no family at bedside. Unsure of appetite or intake PTA. NPO this am. Unsure of UBW; per chart, pt weighed 209# x 1 year ago; no significant wt changes. Visual NFPE completed 1/24; pt appears nourished with no physical s/s of malnutrition.    Nutrition Discharge Planning: Pending clinical course    Nutrition Risk Screen    Nutrition Risk Screen: no indicators present    Nutrition/Diet History    Patient Reported Diet/Restrictions/Preferences:  (JARAD)  Spiritual, Cultural Beliefs,  "Jew Practices, Values that Affect Care: no  Food Allergies: NKFA  Factors Affecting Nutritional Intake: NPO    Anthropometrics    Temp: 98 °F (36.7 °C)  Height Method: Stated  Height: 5' 9" (175.3 cm)  Height (inches): 69 in  Weight Method: Bed Scale  Weight: 93 kg (205 lb 0.4 oz)  Weight (lb): 205.03 lb  Ideal Body Weight (IBW), Male: 160 lb  % Ideal Body Weight, Male (lb): 128.14 %  BMI (Calculated): 30.3  BMI Grade: 30 - 34.9- obesity - grade I       Lab/Procedures/Meds    Pertinent Labs Reviewed: reviewed  Pertinent Labs Comments: Labs pending  Pertinent Medications Reviewed: reviewed  Pertinent Medications Comments: senna    Estimated/Assessed Needs    Weight Used For Calorie Calculations: 93 kg (205 lb)  Energy Calorie Requirements (kcal): 1730 kcal/day  Energy Need Method: [place]-St Jeor (No PAL)  Protein Requirements:  g/day (1.0-1.2 g/kg)  Weight Used For Protein Calculations: 93 kg (205 lb)  Fluid Requirements (mL): 1 mL/kcal or per MD  Estimated Fluid Requirement Method: RDA Method  RDA Method (mL): 1730  CHO Requirement: 215 g/day    Nutrition Prescription Ordered    Current Diet Order: NPO    Evaluation of Received Nutrient/Fluid Intake    I/O: -180 mL since admit  Energy Calories Required: not meeting needs  Protein Required: not meeting needs  Comments: LBM 1/23  Tolerance:  (NPO)  % Intake of Estimated Energy Needs: 0 - 25 %  % Meal Intake: NPO    Nutrition Risk    Level of Risk/Frequency of Follow-up: low     Monitor and Evaluation    Food and Nutrient Intake: energy intake,food and beverage intake  Food and Nutrient Adminstration: diet order  Knowledge/Beliefs/Attitudes: food and nutrition knowledge/skill  Physical Activity and Function: nutrition-related ADLs and IADLs  Anthropometric Measurements: weight,weight change  Biochemical Data, Medical Tests and Procedures: gastrointestinal profile,glucose/endocrine profile,electrolyte and renal panel,inflammatory profile,lipid " profile  Nutrition-Focused Physical Findings: overall appearance,extremities, muscles and bones     Nutrition Follow-Up    RD Follow-up?: Yes

## 2022-01-24 NOTE — PT/OT/SLP PROGRESS
Physical Therapy      Patient Name:  Priyank Whittington   MRN:  [id number]    Patient not seen today secondary to Pain (RN holding 2/2 10/10 HA pain. PT to follow up tomorrow as appropriate.).

## 2022-01-24 NOTE — PROGRESS NOTES
[facility]  Neurocritical Care  Progress Note    Admit Date: 1/23/2022  Service Date: 01/24/2022  Length of Stay: 1    Subjective:     Chief Complaint: Subdural hematoma, acute    History of Present Illness: Patient is a 62 year old male with PMHx T2DM and HLD who presented as a direct admit from OSH to NCC unit for multiple acute on subacute subdural hematomas with R to L midline shift.     Per chart review, patient with ongoing frontal headache for the last 10 days prior to admission. No recent trauma reported. On 1/22, he was seen at [facility] for his headaches and was prescribed Fioricet without relief. Due to worsening headache, patient presented to [facility] on 1/23. He was neuro intact with complaint of nausea with no vomiting. Head CT w/o contrast revealed multiple bilateral acute and subacute SDHs overlying the cerebral convexities with the largest overlying the right frontal convexity measuring 1.4 cm. Also noted was diffuse cerebral edema with 8mm right to left midline shift and associated right to left subfalcine herniation.     He was admitted to [facility] for close neurological monitoring and Neurosurgery evaluation.       Hospital Course: 01/24/2022 NAEO      Interval History:  >4 elements OR status of 3 inpatient conditions    Review of Systems   Constitutional: Positive for activity change.   HENT: Negative.    Eyes: Negative.    Respiratory: Negative.    Cardiovascular: Negative.    Gastrointestinal: Negative.    Endocrine: Negative.    Genitourinary: Positive for difficulty urinating.   Musculoskeletal: Negative.    Skin: Negative.    Allergic/Immunologic: Negative.    Neurological: Positive for headaches.   Hematological: Negative.    Psychiatric/Behavioral: The patient is nervous/anxious.      2 systems OR Unable to obtain a complete ROS due to level of consciousness.  Objective:     Vitals:  Temp: 98.1 °F (36.7 °C)  Pulse: 73  Rhythm: normal sinus rhythm  BP:  (!) 140/71  MAP (mmHg): 98  Resp: 19  SpO2: 98 %  O2 Device (Oxygen Therapy): room air    Temp  Min: 98.1 °F (36.7 °C)  Max: 100.4 °F (38 °C)  Pulse  Min: 71  Max: 96  BP  Min: 120/67  Max: 187/79  MAP (mmHg)  Min: 88  Max: 124  Resp  Min: 13  Max: 23  SpO2  Min: 97 %  Max: 100 %    01/23 0701 - 01/24 0700  In: 120 [P.O.:120]  Out: 300 [Urine:300]           Physical Exam  Constitutional: No apparent distress.   Eyes: Conjunctiva clear, anicteric. Lids no lesions.  Head/Ears/Nose/Mouth/Throat/Neck: Moist mucous membranes. External ears, nose atraumatic.   Cardiovascular: Regular rhythm. No murmurs. No leg edema.  Respiratory: Comfortable respirations. Clear to auscultation.  Gastrointestinal: No hernia. Soft, nondistended, nontender. + bowel sounds.  Skin: No rashes, ulcers, lesions. On palpation no induration, nodules, tightening.    Neurologic Examination:    -Mental Status: Alert. Oriented to person, place, and time. Speech fluent. Follows commands.   -Cranial nerves:  Pupils equal, round, and reactive bilateral. Extraocular movements intact. Facial sensation intact. Facial strength symmetric. Hears finger rub bilateral. Palate elevation symmetric. Uvula midline. Shoulder shrug symmetric. Tongue protrusion midline.  -Motor: 5/5 and symmetric in arms, legs. Tone normal.   -Reflexes: 2 and symmetric at biceps, brachioradialis, knees. Plantar responses down.  -Sensation: Intact to touch in arms, legs.  -Coordination: Finger-nose-finger, rapid alternating movements, heel-knee-shin normal.      Medications:  Continuous Scheduledatorvastatin, 40 mg, Daily  levetiracetam IV, 500 mg, Q12H  mupirocin, , BID  senna-docusate 8.6-50 mg, 1 tablet, BID  silodosin, 4 mg, Daily    PRNacetaminophen, 650 mg, Q6H PRN  dextrose 50%, 12.5 g, PRN  glucagon (human recombinant), 1 mg, PRN  insulin aspart U-100, 0-5 Units, Q6H PRN  labetalol, 10 mg, Q4H PRN  magnesium oxide, 800 mg, PRN  magnesium oxide, 800 mg, PRN  ondansetron, 4 mg,  Q8H PRN  oxyCODONE, 5 mg, Q6H PRN  potassium bicarbonate, 35 mEq, PRN  potassium bicarbonate, 50 mEq, PRN  potassium bicarbonate, 60 mEq, PRN  potassium, sodium phosphates, 2 packet, PRN  potassium, sodium phosphates, 2 packet, PRN  potassium, sodium phosphates, 2 packet, PRN  sodium chloride 0.9%, 10 mL, PRN      Today I personally reviewed pertinent medications, imaging, laboratory results, notably:    Diet  Diet NPO Except for: Sips with Medication  Diet NPO Except for: Sips with Medication        Assessment/Plan:     Neuro  * Subdural hematoma, acute  Neurological exam is unchanged  Con keppra prophylaxis  Denying hx of trauma, will get a CTA head to rule out vascula malformations  - OR plans per nsy    Cardiac/Vascular  HLD (hyperlipidemia)  -continue home atorvastatin 40 mg daily     Endocrine  Type 2 diabetes mellitus  SSI          The patient is being Prophylaxed for:  Venous Thromboembolism with: Mechanical  Stress Ulcer with: Not Applicable   Ventilator Pneumonia with: not applicable    Activity Orders          Turn patient starting at 01/23 2000    Diet NPO Except for: Sips with Medication: NPO starting at 01/23 1848    Elevate HOB starting at 01/23 1821        Full Code    Level 3    Danelle Arguelles MD  Neurocritical Care  [facility]

## 2022-01-24 NOTE — PLAN OF CARE
[facility] Neuro Critical Care  Initial Discharge Assessment       Primary Care Provider: Atul Peter MD    Admission Diagnosis: Subdural hematoma [S06.5X9A]  Headache in front of head [R51.9]    Admission Date: 1/23/2022  Expected Discharge Date: 1/31/2022    Discharge Barriers Identified: None    Payor: MEDICAID / Plan: [facility] COMMUNITY PLAN [facility] [employer] ([place] MEDICAID) / Product Type: Managed Medicaid /     Extended Emergency Contact Information  Primary Emergency Contact: Christie Payne  Address: [address] [place] [address] [facility]  Home Phone: [phone]  Mobile Phone: [phone]  Relation: Spouse  Preferred language: Spanish   needed? Yes  Secondary Emergency Contact: Ale, (Cousin)  Mobile Phone: [phone]  Relation: Relative    Discharge Plan A: Rehab  Discharge Plan B: Home with family      [id number] - [place], [place] - [address] [facility]  [address] [place] [address]  Phone: [phone] Fax: [phone]      Transferred from:  home    Past Medical History:   Diagnosis Date    Diabetes mellitus        12:50 pm     CM attempted to meet with patient in room for Discharge Planning Assessment.  Patient is in CT Scan and unable to answer questions.  Christie Payne (Wife) [phone] is at bedside.   Per wife, the patient lives with wife in a single story house with 0 step(s) to enter.   Per wife, the patient was independent with ADLS and used no dme for ambulation.  Patient will have assistance from his wife upon discharge.   Discharge Planning Booklet given to patient/family and discussed.  All questions addressed.  CM will follow for needs.      Initial Assessment (most recent)     Adult Discharge Assessment - 01/24/22 1454        Discharge Assessment    Assessment Type Discharge Planning Assessment     Confirmed/corrected address, phone number and insurance Yes     Confirmed Demographics Correct on Facesheet     Source of  Information family     If unable to respond/provide information was family/caregiver contacted? Yes     Contact Name/Number Christie Payne (Wife) [phone] (at bedside)     Communicated LAN with patient/caregiver Date not available/Unable to determine     Reason For Admission SDH     Lives With spouse     Facility Arrived From: Home     Do you expect to return to your current living situation? Yes     Do you have help at home or someone to help you manage your care at home? Yes     Who are your caregiver(s) and their phone number(s)? Christie Payne (Wife) [phone]     Prior to hospitilization cognitive status: Alert/Oriented     Current cognitive status: Unable to Assess     Walking or Climbing Stairs Difficulty none     Dressing/Bathing Difficulty none     Home Accessibility stairs to enter home     Number of Stairs, Main Entrance none     Home Layout Able to live on 1st floor     Equipment Currently Used at Home none     Readmission within 30 days? Yes     Patient currently being followed by outpatient case management? No     Do you currently have service(s) that help you manage your care at home? No     Do you take prescription medications? Yes     Do you have prescription coverage? Yes     Coverage [facility] Medicaid     Do you have any problems affording any of your prescribed medications? No     Is the patient taking medications as prescribed? yes     Who is going to help you get home at discharge? Christie Payne (Wife) [phone]     How do you get to doctors appointments? family or friend will provide     Are you on dialysis? No     Do you take coumadin? No     Discharge Plan A Rehab     Discharge Plan B Home with family     DME Needed Upon Discharge  none     Discharge Plan discussed with: Spouse/sig other     Name(s) and Number(s) Christie Payne (Wife) [phone] (at bedside     Discharge Barriers Identified None        Relationship/Environment    Name(s) of Who Lives With Patient Christie Payne (Wife)  [phone]                  Odette Mckinney RN, CCRN-K, [facility]  Neuro-Critical Care   [phone]

## 2022-01-24 NOTE — ASSESSMENT & PLAN NOTE
Neurological exam is unchanged  Con keppra prophylaxis  Denying hx of trauma, will get a CTA head to rule out vascula malformations  - OR plans per nsy

## 2022-01-24 NOTE — PT/OT/SLP PROGRESS
Occupational Therapy      Patient Name:  Priyank Whittington   MRN:  [id number]    Patient not seen today secondary to nurse requesting hold during AM and PM attempts, patient rating headache pain 10/10. Will follow-up on next available date as medically appropriate.    1/24/2022

## 2022-01-24 NOTE — SUBJECTIVE & OBJECTIVE
Interval History:  >4 elements OR status of 3 inpatient conditions    Review of Systems   Constitutional: Positive for activity change.   HENT: Negative.    Eyes: Negative.    Respiratory: Negative.    Cardiovascular: Negative.    Gastrointestinal: Negative.    Endocrine: Negative.    Genitourinary: Positive for difficulty urinating.   Musculoskeletal: Negative.    Skin: Negative.    Allergic/Immunologic: Negative.    Neurological: Positive for headaches.   Hematological: Negative.    Psychiatric/Behavioral: The patient is nervous/anxious.      2 systems OR Unable to obtain a complete ROS due to level of consciousness.  Objective:     Vitals:  Temp: 98.1 °F (36.7 °C)  Pulse: 73  Rhythm: normal sinus rhythm  BP: (!) 140/71  MAP (mmHg): 98  Resp: 19  SpO2: 98 %  O2 Device (Oxygen Therapy): room air    Temp  Min: 98.1 °F (36.7 °C)  Max: 100.4 °F (38 °C)  Pulse  Min: 71  Max: 96  BP  Min: 120/67  Max: 187/79  MAP (mmHg)  Min: 88  Max: 124  Resp  Min: 13  Max: 23  SpO2  Min: 97 %  Max: 100 %    01/23 0701 - 01/24 0700  In: 120 [P.O.:120]  Out: 300 [Urine:300]           Physical Exam  Constitutional: No apparent distress.   Eyes: Conjunctiva clear, anicteric. Lids no lesions.  Head/Ears/Nose/Mouth/Throat/Neck: Moist mucous membranes. External ears, nose atraumatic.   Cardiovascular: Regular rhythm. No murmurs. No leg edema.  Respiratory: Comfortable respirations. Clear to auscultation.  Gastrointestinal: No hernia. Soft, nondistended, nontender. + bowel sounds.  Skin: No rashes, ulcers, lesions. On palpation no induration, nodules, tightening.    Neurologic Examination:    -Mental Status: Alert. Oriented to person, place, and time. Speech fluent. Follows commands.   -Cranial nerves:  Pupils equal, round, and reactive bilateral. Extraocular movements intact. Facial sensation intact. Facial strength symmetric. Hears finger rub bilateral. Palate elevation symmetric. Uvula midline. Shoulder shrug symmetric. Tongue protrusion  midline.  -Motor: 5/5 and symmetric in arms, legs. Tone normal.   -Reflexes: 2 and symmetric at biceps, brachioradialis, knees. Plantar responses down.  -Sensation: Intact to touch in arms, legs.  -Coordination: Finger-nose-finger, rapid alternating movements, heel-knee-shin normal.      Medications:  Continuous Scheduledatorvastatin, 40 mg, Daily  levetiracetam IV, 500 mg, Q12H  mupirocin, , BID  senna-docusate 8.6-50 mg, 1 tablet, BID  silodosin, 4 mg, Daily    PRNacetaminophen, 650 mg, Q6H PRN  dextrose 50%, 12.5 g, PRN  glucagon (human recombinant), 1 mg, PRN  insulin aspart U-100, 0-5 Units, Q6H PRN  labetalol, 10 mg, Q4H PRN  magnesium oxide, 800 mg, PRN  magnesium oxide, 800 mg, PRN  ondansetron, 4 mg, Q8H PRN  oxyCODONE, 5 mg, Q6H PRN  potassium bicarbonate, 35 mEq, PRN  potassium bicarbonate, 50 mEq, PRN  potassium bicarbonate, 60 mEq, PRN  potassium, sodium phosphates, 2 packet, PRN  potassium, sodium phosphates, 2 packet, PRN  potassium, sodium phosphates, 2 packet, PRN  sodium chloride 0.9%, 10 mL, PRN      Today I personally reviewed pertinent medications, imaging, laboratory results, notably:    Diet  Diet NPO Except for: Sips with Medication  Diet NPO Except for: Sips with Medication

## 2022-01-24 NOTE — PLAN OF CARE
Recommendations    1. ADAT and medically able to Diabetic, texture per SLP     2. If PO intake <50%, add Boost Glucose Control     3. RD to monitor    Goals: Pt will receive nutrition by RD f/u  Nutrition Goal Status: new    Communication of RD Recs:  (POC)    Lola Amador MS, RD, LDN  Ext: [phone]

## 2022-01-24 NOTE — PROGRESS NOTES
Patient arrived to [facility] from [facility] by [address]    Type of stroke/diagnosis: frontal SDH    Current symptoms: headache    Skin assessment done: Y    Wounds noted: none    [place] Completed? Y    Patient Belongings on Admit: sweatpants, sweatshirt, brown shoes    NCC notified: Alaina SOSA

## 2022-01-24 NOTE — NURSING
Pt came back to the room.  Neuro status  And VS were stabled. Bed in lowest position and locked. Call light with in reach. Continue to monitor

## 2022-01-24 NOTE — ASSESSMENT & PLAN NOTE
Pt is 62M pmh DM who presents to outside hospital with cc of constant headaches for 10 days. Denies trauma, denies blood thin use, seizure, AMS, visual disturbances. CTH on workup showed bilateral R>L  subacute subdural hematomas. NSGY consulted for evaluation.    Plan:  Admit to NCC service, Q1h neurochecks  SBP<140, HOB>30, Na 135-145  CBC, CMP, coags, T&S, COVID,   Keppra/AED per NCC if cocnern for seizure  Repeat CTH stable.     Further recs pending, Please contact NSGY on call provider for any further questions.

## 2022-01-24 NOTE — SUBJECTIVE & OBJECTIVE
Medications Prior to Admission   Medication Sig Dispense Refill Last Dose    atorvastatin (LIPITOR) 40 MG tablet Take 40 mg by mouth once daily.   1/22/2022    butalbital-acetaminophen-caffeine -40 mg (FIORICET, ESGIC) -40 mg per tablet Take 1 tablet by mouth every 4 (four) hours as needed for Pain.   1/23/2022    metFORMIN (GLUCOPHAGE) 1000 MG tablet Take 1,000 mg by mouth.   1/22/2022       Review of patient's allergies indicates:  No Known Allergies    Past Medical History:   Diagnosis Date    Diabetes mellitus      History reviewed. No pertinent surgical history.  Family History    None       Tobacco Use    Smoking status: Never Smoker    Smokeless tobacco: Never Used   Substance and Sexual Activity    Alcohol use: Never    Drug use: Not on file    Sexual activity: Yes     Partners: Female     Review of Systems   Constitutional: Negative for activity change and fatigue.   HENT: Negative for hearing loss and trouble swallowing.    Eyes: Negative for photophobia and visual disturbance.   Respiratory: Negative for shortness of breath.    Cardiovascular: Negative for chest pain.   Gastrointestinal: Negative for nausea and vomiting.   Genitourinary: Negative for difficulty urinating.   Musculoskeletal: Negative for back pain, neck pain and neck stiffness.   Neurological: Positive for headaches.   Psychiatric/Behavioral: Negative for confusion.     Objective:     Weight: 93 kg (205 lb 0.4 oz)  Body mass index is 30.28 kg/m².  Vital Signs (Most Recent):  Temp: 98.1 °F (36.7 °C) (01/23/22 1905)  Pulse: 75 (01/23/22 2005)  Resp: 20 (01/23/22 2005)  BP: 124/64 (01/23/22 2005)  SpO2: 99 % (01/23/22 2005) Vital Signs (24h Range):  Temp:  [98.1 °F (36.7 °C)-98.7 °F (37.1 °C)] 98.1 °F (36.7 °C)  Pulse:  [71-84] 75  Resp:  [16-20] 20  SpO2:  [97 %-100 %] 99 %  BP: (124-158)/(64-91) 124/64     Date 01/23/22 0700 - 01/24/22 0659   Shift 5903-2984 6903-5846 5254-3180 24 Hour Total   INTAKE   P.O.  60  60    Shift Total(mL/kg)  60(0.6)  60(0.6)   OUTPUT   Shift Total(mL/kg)       Weight (kg) 90.7 93 93 93                        Neurosurgery Physical Exam  General: well developed, well nourished, no distress.   Head: normocephalic, atraumatic  CV: RRR, pulses equal bilateral  Pulmonary: normal respirations, no signs of respiratory distress  Abdomen: soft, non-distended  Skin: Skin is warm, dry and intact.    Neuro:  E4V5M6  Awake, alert, Ox4  PERRL, EOMI  CNII-XII grossly intact  Motor: Follows commands full strength x4  SILT          Significant Labs:  Recent Labs   Lab 01/23/22  1616   *      K 3.9      CO2 21*   BUN 13   CREATININE 1.1   CALCIUM 8.5*     Recent Labs   Lab 01/23/22  1616   WBC 10.82   HGB 13.5*   HCT 37.7*   *     Recent Labs   Lab 01/23/22  1616   INR 1.1   APTT 26.2     Microbiology Results (last 7 days)     ** No results found for the last 168 hours. **        All pertinent labs from the last 24 hours have been reviewed.    Significant Diagnostics:  I have reviewed all pertinent imaging results/findings within the past 24 hours.  I have reviewed and interpreted all pertinent imaging results/findings within the past 24 hours.

## 2022-01-24 NOTE — HPI
Pt is 62M pmh DM who presents to outside hospital with cc of constant headaches for 10 days. Denies trauma, denies blood thin use, seizure, AMS, visual disturbances. CTH on workup showed bilateral R>L  subacute subdural hematomas. NSGY consulted for evaluation.

## 2022-01-24 NOTE — PLAN OF CARE
[facility] Care Plan    POC reviewed with Priyank Whittington  at 0300. Pt verbalized understanding. Questions and concerns addressed. Neuro status was stabled. Gave labetalol 10 mg  IVP twice to maintain SBP goals. Gave PRN pain med. As need for headache. Pt progressing toward goals. Will continue to monitor. See below and flowsheets for full assessment and VS info.       Neuro:  Kaylah Coma Scale  Best Eye Response: 4-->(E4) spontaneous  Best Motor Response: 6-->(M6) obeys commands  Best Verbal Response: 5-->(V5) oriented  [place] Coma Scale Score: 15  Assessment Qualifiers: no eye obstruction present,patient not sedated/intubated  Pupil PERRLA: yes     24hr Temp:  [98.1 °F (36.7 °C)-100.4 °F (38 °C)]     CV:   Rhythm: normal sinus rhythm  BP goals:   SBP < 160  MAP > 65    Resp:   O2 Device (Oxygen Therapy): room air         GI/:     Diet/Nutrition Received: NPO  Last Bowel Movement: 01/23/22       Intake/Output Summary (Last 24 hours) at 1/24/2022 0757  Last data filed at 1/24/2022 0305  Gross per 24 hour   Intake 120 ml   Output 300 ml   Net -180 ml          Labs/Accuchecks:  Recent Labs   Lab 01/24/22  0124   WBC 11.54   RBC 4.66   HGB 13.9*   HCT 39.6*         Recent Labs   Lab 01/24/22  0124      K 3.7   CO2 20*      BUN 14   CREATININE 0.9   ALKPHOS 70   ALT 22   AST 13   BILITOT 0.9      Recent Labs   Lab 01/23/22  1616   INR 1.1   APTT 26.2    No results for input(s): CPK, CPKMB, TROPONINI, MB in the last 168 hours.    Electrolytes: N/A - electrolytes WDL  Accuchecks: Q6H    Gtts:       LDA/Wounds:  Lines/Drains/Airways       Peripheral Intravenous Line                   Peripheral IV - Single Lumen 01/23/22 1403 20 G Left Hand <1 day         Peripheral IV - Single Lumen 01/23/22 1831 20 G Right Wrist <1 day                  Wounds: No  Wound care consulted: No

## 2022-01-24 NOTE — SUBJECTIVE & OBJECTIVE
Medications Prior to Admission   Medication Sig Dispense Refill Last Dose    atorvastatin (LIPITOR) 40 MG tablet Take 40 mg by mouth once daily.   1/22/2022    butalbital-acetaminophen-caffeine -40 mg (FIORICET, ESGIC) -40 mg per tablet Take 1 tablet by mouth every 4 (four) hours as needed for Pain.   1/23/2022    metFORMIN (GLUCOPHAGE) 1000 MG tablet Take 1,000 mg by mouth.   1/22/2022       Review of patient's allergies indicates:  No Known Allergies    Past Medical History:   Diagnosis Date    Diabetes mellitus      History reviewed. No pertinent surgical history.  Family History    None       Tobacco Use    Smoking status: Never Smoker    Smokeless tobacco: Never Used   Substance and Sexual Activity    Alcohol use: Never    Drug use: Not on file    Sexual activity: Yes     Partners: Female     Review of Systems   Constitutional: Negative for activity change and fatigue.   HENT: Negative for hearing loss and trouble swallowing.    Eyes: Negative for photophobia and visual disturbance.   Respiratory: Negative for shortness of breath.    Cardiovascular: Negative for chest pain.   Gastrointestinal: Negative for nausea and vomiting.   Genitourinary: Negative for difficulty urinating.   Musculoskeletal: Negative for back pain, neck pain and neck stiffness.   Neurological: Positive for headaches.   Psychiatric/Behavioral: Negative for confusion.     Objective:     Weight: 93 kg (205 lb 0.4 oz)  Body mass index is 30.28 kg/m².  Vital Signs (Most Recent):  Temp: 98.5 °F (36.9 °C) (01/24/22 0305)  Pulse: 80 (01/24/22 0505)  Resp: 15 (01/24/22 0505)  BP: (!) 141/63 (01/24/22 0505)  SpO2: 97 % (01/24/22 0505) Vital Signs (24h Range):  Temp:  [98.1 °F (36.7 °C)-100.4 °F (38 °C)] 98.5 °F (36.9 °C)  Pulse:  [71-96] 80  Resp:  [13-23] 15  SpO2:  [97 %-100 %] 97 %  BP: (120-160)/(63-91) 141/63                 Neurosurgery Physical Exam    General: well developed, well nourished, no distress.   Head:  normocephalic, atraumatic  CV: RRR, pulses equal bilateral  Pulmonary: normal respirations, no signs of respiratory distress  Abdomen: soft, non-distended  Skin: Skin is warm, dry and intact.    Neuro:  E4V5M6  Awake, alert, Ox4  PERRL, EOMI  CNII-XII grossly intact  Motor: Follows commands full strength x4  SILT    Significant Labs:  Recent Labs   Lab 01/23/22  1616 01/24/22  0124   * 120*    140   K 3.9 3.7    107   CO2 21* 20*   BUN 13 14   CREATININE 1.1 0.9   CALCIUM 8.5* 9.5   MG  --  2.0     Recent Labs   Lab 01/23/22  1616 01/24/22  0124   WBC 10.82 11.54   HGB 13.5* 13.9*   HCT 37.7* 39.6*   * 157     Recent Labs   Lab 01/23/22  1616   INR 1.1   APTT 26.2     Microbiology Results (last 7 days)     ** No results found for the last 168 hours. **        All pertinent labs from the last 24 hours have been reviewed.    Significant Diagnostics:  I have reviewed all pertinent imaging results/findings within the past 24 hours.  I have reviewed and interpreted all pertinent imaging results/findings within the past 24 hours.

## 2022-01-24 NOTE — NURSING
Pt transported to CT via bed, on room air. Ambu bag at the bedside. On continues/portable monitor.   Transported by RN VQ.

## 2022-01-24 NOTE — CONSULTS
[facility]  Neurosurgery  Consult Note    Consults  Subjective:     Chief Complaint/Reason for Admission: headache    History of Present Illness: Pt is 62M pmh DM who presents to outside hospital with cc of constant headaches for 10 days. Denies trauma, denies blood thin use, seizure, AMS, visual disturbances. CTH on workup showed bilateral R>L  subacute subdural hematomas. NSGY consulted for evaluation.      Medications Prior to Admission   Medication Sig Dispense Refill Last Dose    atorvastatin (LIPITOR) 40 MG tablet Take 40 mg by mouth once daily.   1/22/2022    butalbital-acetaminophen-caffeine -40 mg (FIORICET, ESGIC) -40 mg per tablet Take 1 tablet by mouth every 4 (four) hours as needed for Pain.   1/23/2022    metFORMIN (GLUCOPHAGE) 1000 MG tablet Take 1,000 mg by mouth.   1/22/2022       Review of patient's allergies indicates:  No Known Allergies    Past Medical History:   Diagnosis Date    Diabetes mellitus      History reviewed. No pertinent surgical history.  Family History    None       Tobacco Use    Smoking status: Never Smoker    Smokeless tobacco: Never Used   Substance and Sexual Activity    Alcohol use: Never    Drug use: Not on file    Sexual activity: Yes     Partners: Female     Review of Systems   Constitutional: Negative for activity change and fatigue.   HENT: Negative for hearing loss and trouble swallowing.    Eyes: Negative for photophobia and visual disturbance.   Respiratory: Negative for shortness of breath.    Cardiovascular: Negative for chest pain.   Gastrointestinal: Negative for nausea and vomiting.   Genitourinary: Negative for difficulty urinating.   Musculoskeletal: Negative for back pain, neck pain and neck stiffness.   Neurological: Positive for headaches.   Psychiatric/Behavioral: Negative for confusion.     Objective:     Weight: 93 kg (205 lb 0.4 oz)  Body mass index is 30.28 kg/m².  Vital Signs (Most Recent):  Temp: 98.1 °F (36.7  °C) (01/23/22 1905)  Pulse: 75 (01/23/22 2005)  Resp: 20 (01/23/22 2005)  BP: 124/64 (01/23/22 2005)  SpO2: 99 % (01/23/22 2005) Vital Signs (24h Range):  Temp:  [98.1 °F (36.7 °C)-98.7 °F (37.1 °C)] 98.1 °F (36.7 °C)  Pulse:  [71-84] 75  Resp:  [16-20] 20  SpO2:  [97 %-100 %] 99 %  BP: (124-158)/(64-91) 124/64     Date 01/23/22 0700 - 01/24/22 0659   Shift 4818-4819 1037-5798 0785-5159 24 Hour Total   INTAKE   P.O.  60  60   Shift Total(mL/kg)  60(0.6)  60(0.6)   OUTPUT   Shift Total(mL/kg)       Weight (kg) 90.7 93 93 93                        Neurosurgery Physical Exam  General: well developed, well nourished, no distress.   Head: normocephalic, atraumatic  CV: RRR, pulses equal bilateral  Pulmonary: normal respirations, no signs of respiratory distress  Abdomen: soft, non-distended  Skin: Skin is warm, dry and intact.    Neuro:  E4V5M6  Awake, alert, Ox4  PERRL, EOMI  CNII-XII grossly intact  Motor: Follows commands full strength x4  SILT          Significant Labs:  Recent Labs   Lab 01/23/22  1616   *      K 3.9      CO2 21*   BUN 13   CREATININE 1.1   CALCIUM 8.5*     Recent Labs   Lab 01/23/22  1616   WBC 10.82   HGB 13.5*   HCT 37.7*   *     Recent Labs   Lab 01/23/22  1616   INR 1.1   APTT 26.2     Microbiology Results (last 7 days)     ** No results found for the last 168 hours. **        All pertinent labs from the last 24 hours have been reviewed.    Significant Diagnostics:  I have reviewed all pertinent imaging results/findings within the past 24 hours.  I have reviewed and interpreted all pertinent imaging results/findings within the past 24 hours.    Assessment/Plan:     * Subdural hematoma, acute  Pt is 62M pmh DM who presents to outside hospital with cc of constant headaches for 10 days. Denies trauma, denies blood thin use, seizure, AMS, visual disturbances. CTH on workup showed bilateral R>L  subacute subdural hematomas. NSGY consulted for evaluation.    Plan:  Admit to  NCC service, Q1h neurochecks  SBP<140, HOB>30, Na 135-145  CBC, CMP, coags, T&S, COVID, NPO midnight  Followup repeat CTH 6 hour interval  Keppra/AED per NCC if cocnern for seizure  Further recs pending, Please contact NSGY on call provider for any further questions.        Thank you for your consult. I will follow-up with patient. Please contact us if you have any additional questions.    Rakan Brown MD  Neurosurgery  [facility]

## 2022-01-24 NOTE — ASSESSMENT & PLAN NOTE
"Imaging at OSH revealed diffuse cerebral edema in setting of known SDHs    -close neuro monitoring with frequent neuro checks  -see "Subdural hematoma, acute" for further plan   "

## 2022-01-25 PROBLEM — R11.10 VOMITING: Status: ACTIVE | Noted: 2022-01-25

## 2022-01-25 PROBLEM — I10 HTN (HYPERTENSION): Status: ACTIVE | Noted: 2022-01-25

## 2022-01-25 LAB
ALBUMIN SERPL BCP-MCNC: 3.5 G/DL (ref 3.5–5.2)
ALP SERPL-CCNC: 72 U/L (ref 55–135)
ALT SERPL W/O P-5'-P-CCNC: 18 U/L (ref 10–44)
AMYLASE SERPL-CCNC: 30 U/L (ref 20–110)
ANION GAP SERPL CALC-SCNC: 11 MMOL/L (ref 8–16)
AST SERPL-CCNC: 10 U/L (ref 10–40)
BILIRUB SERPL-MCNC: 0.6 MG/DL (ref 0.1–1)
BUN SERPL-MCNC: 17 MG/DL (ref 6–20)
CALCIUM SERPL-MCNC: 9.7 MG/DL (ref 8.7–10.5)
CHLORIDE SERPL-SCNC: 108 MMOL/L (ref 95–110)
CO2 SERPL-SCNC: 21 MMOL/L (ref 23–29)
CREAT SERPL-MCNC: 0.9 MG/DL (ref 0.5–1.4)
EST. GFR  (AFRICAN AMERICAN): >60 ML/MIN/1.73 M^2
EST. GFR  (NON AFRICAN AMERICAN): >60 ML/MIN/1.73 M^2
GLUCOSE SERPL-MCNC: 208 MG/DL (ref 70–110)
LIPASE SERPL-CCNC: 9 U/L (ref 4–60)
MAGNESIUM SERPL-MCNC: 2.2 MG/DL (ref 1.6–2.6)
PHOSPHATE SERPL-MCNC: 2.8 MG/DL (ref 2.7–4.5)
POCT GLUCOSE: 150 MG/DL (ref 70–110)
POCT GLUCOSE: 167 MG/DL (ref 70–110)
POCT GLUCOSE: 175 MG/DL (ref 70–110)
POCT GLUCOSE: 206 MG/DL (ref 70–110)
POCT GLUCOSE: 208 MG/DL (ref 70–110)
POTASSIUM SERPL-SCNC: 4.2 MMOL/L (ref 3.5–5.1)
PROT SERPL-MCNC: 7.6 G/DL (ref 6–8.4)
SODIUM SERPL-SCNC: 140 MMOL/L (ref 136–145)

## 2022-01-25 PROCEDURE — 94761 N-INVAS EAR/PLS OXIMETRY MLT: CPT

## 2022-01-25 PROCEDURE — 51798 US URINE CAPACITY MEASURE: CPT

## 2022-01-25 PROCEDURE — 25000003 PHARM REV CODE 250: Performed by: NURSE PRACTITIONER

## 2022-01-25 PROCEDURE — 84100 ASSAY OF PHOSPHORUS: CPT | Performed by: PHYSICIAN ASSISTANT

## 2022-01-25 PROCEDURE — 80053 COMPREHEN METABOLIC PANEL: CPT | Performed by: NURSE PRACTITIONER

## 2022-01-25 PROCEDURE — 99291 PR CRITICAL CARE, E/M 30-74 MINUTES: ICD-10-PCS | Mod: ,,, | Performed by: PSYCHIATRY & NEUROLOGY

## 2022-01-25 PROCEDURE — 99223 1ST HOSP IP/OBS HIGH 75: CPT | Mod: ,,, | Performed by: NEUROLOGICAL SURGERY

## 2022-01-25 PROCEDURE — 25000003 PHARM REV CODE 250: Performed by: PHYSICIAN ASSISTANT

## 2022-01-25 PROCEDURE — 25000003 PHARM REV CODE 250: Performed by: PSYCHIATRY & NEUROLOGY

## 2022-01-25 PROCEDURE — 99291 CRITICAL CARE FIRST HOUR: CPT | Mod: ,,, | Performed by: PSYCHIATRY & NEUROLOGY

## 2022-01-25 PROCEDURE — 82150 ASSAY OF AMYLASE: CPT | Performed by: NURSE PRACTITIONER

## 2022-01-25 PROCEDURE — 83690 ASSAY OF LIPASE: CPT | Performed by: NURSE PRACTITIONER

## 2022-01-25 PROCEDURE — 25000003 PHARM REV CODE 250

## 2022-01-25 PROCEDURE — 83735 ASSAY OF MAGNESIUM: CPT | Performed by: PHYSICIAN ASSISTANT

## 2022-01-25 PROCEDURE — 63600175 PHARM REV CODE 636 W HCPCS: Performed by: NURSE PRACTITIONER

## 2022-01-25 PROCEDURE — 99223 PR INITIAL HOSPITAL CARE,LEVL III: ICD-10-PCS | Mod: ,,, | Performed by: NEUROLOGICAL SURGERY

## 2022-01-25 PROCEDURE — 63600175 PHARM REV CODE 636 W HCPCS: Performed by: PHYSICIAN ASSISTANT

## 2022-01-25 PROCEDURE — 63600175 PHARM REV CODE 636 W HCPCS: Performed by: PSYCHIATRY & NEUROLOGY

## 2022-01-25 PROCEDURE — 20000000 HC ICU ROOM

## 2022-01-25 RX ORDER — HYDROMORPHONE HYDROCHLORIDE 1 MG/ML
0.5 INJECTION, SOLUTION INTRAMUSCULAR; INTRAVENOUS; SUBCUTANEOUS ONCE
Status: DISCONTINUED | OUTPATIENT
Start: 2022-01-25 | End: 2022-01-25

## 2022-01-25 RX ORDER — LEVETIRACETAM 500 MG/1
500 TABLET ORAL 2 TIMES DAILY
Status: DISCONTINUED | OUTPATIENT
Start: 2022-01-25 | End: 2022-01-28

## 2022-01-25 RX ORDER — SODIUM CHLORIDE 9 MG/ML
INJECTION, SOLUTION INTRAVENOUS CONTINUOUS
Status: DISCONTINUED | OUTPATIENT
Start: 2022-01-25 | End: 2022-01-27

## 2022-01-25 RX ORDER — HYDROMORPHONE HYDROCHLORIDE 1 MG/ML
0.5 INJECTION, SOLUTION INTRAMUSCULAR; INTRAVENOUS; SUBCUTANEOUS ONCE AS NEEDED
Status: COMPLETED | OUTPATIENT
Start: 2022-01-25 | End: 2022-01-25

## 2022-01-25 RX ORDER — DEXAMETHASONE SODIUM PHOSPHATE 4 MG/ML
4 INJECTION, SOLUTION INTRA-ARTICULAR; INTRALESIONAL; INTRAMUSCULAR; INTRAVENOUS; SOFT TISSUE ONCE
Status: COMPLETED | OUTPATIENT
Start: 2022-01-25 | End: 2022-01-25

## 2022-01-25 RX ORDER — INSULIN ASPART 100 [IU]/ML
1-10 INJECTION, SOLUTION INTRAVENOUS; SUBCUTANEOUS
Status: DISCONTINUED | OUTPATIENT
Start: 2022-01-25 | End: 2022-01-28

## 2022-01-25 RX ORDER — DEXAMETHASONE SODIUM PHOSPHATE 4 MG/ML
4 INJECTION, SOLUTION INTRA-ARTICULAR; INTRALESIONAL; INTRAMUSCULAR; INTRAVENOUS; SOFT TISSUE 2 TIMES DAILY PRN
Status: DISCONTINUED | OUTPATIENT
Start: 2022-01-25 | End: 2022-01-25

## 2022-01-25 RX ORDER — DEXAMETHASONE SODIUM PHOSPHATE 4 MG/ML
10 INJECTION, SOLUTION INTRA-ARTICULAR; INTRALESIONAL; INTRAMUSCULAR; INTRAVENOUS; SOFT TISSUE 2 TIMES DAILY PRN
Status: DISCONTINUED | OUTPATIENT
Start: 2022-01-25 | End: 2022-01-29

## 2022-01-25 RX ADMIN — SILODOSIN 4 MG: 4 CAPSULE ORAL at 08:01

## 2022-01-25 RX ADMIN — OXYCODONE 5 MG: 5 TABLET ORAL at 05:01

## 2022-01-25 RX ADMIN — HEPARIN SODIUM 5000 UNITS: 5000 INJECTION INTRAVENOUS; SUBCUTANEOUS at 01:01

## 2022-01-25 RX ADMIN — DEXAMETHASONE SODIUM PHOSPHATE 10 MG: 4 INJECTION INTRA-ARTICULAR; INTRALESIONAL; INTRAMUSCULAR; INTRAVENOUS; SOFT TISSUE at 10:01

## 2022-01-25 RX ADMIN — SENNOSIDES AND DOCUSATE SODIUM 1 TABLET: 50; 8.6 TABLET ORAL at 08:01

## 2022-01-25 RX ADMIN — DEXAMETHASONE SODIUM PHOSPHATE 4 MG: 4 INJECTION INTRA-ARTICULAR; INTRALESIONAL; INTRAMUSCULAR; INTRAVENOUS; SOFT TISSUE at 10:01

## 2022-01-25 RX ADMIN — ATORVASTATIN CALCIUM 40 MG: 20 TABLET, FILM COATED ORAL at 08:01

## 2022-01-25 RX ADMIN — ACETAMINOPHEN 650 MG: 325 TABLET ORAL at 09:01

## 2022-01-25 RX ADMIN — SODIUM CHLORIDE: 0.9 INJECTION, SOLUTION INTRAVENOUS at 05:01

## 2022-01-25 RX ADMIN — AMLODIPINE BESYLATE 10 MG: 10 TABLET ORAL at 08:01

## 2022-01-25 RX ADMIN — HEPARIN SODIUM 5000 UNITS: 5000 INJECTION INTRAVENOUS; SUBCUTANEOUS at 05:01

## 2022-01-25 RX ADMIN — MUPIROCIN: 20 OINTMENT TOPICAL at 08:01

## 2022-01-25 RX ADMIN — INSULIN ASPART 2 UNITS: 100 INJECTION, SOLUTION INTRAVENOUS; SUBCUTANEOUS at 12:01

## 2022-01-25 RX ADMIN — INSULIN ASPART 1 UNITS: 100 INJECTION, SOLUTION INTRAVENOUS; SUBCUTANEOUS at 09:01

## 2022-01-25 RX ADMIN — ONDANSETRON 4 MG: 2 INJECTION INTRAMUSCULAR; INTRAVENOUS at 10:01

## 2022-01-25 RX ADMIN — SODIUM CHLORIDE: 0.9 INJECTION, SOLUTION INTRAVENOUS at 10:01

## 2022-01-25 RX ADMIN — INSULIN ASPART 2 UNITS: 100 INJECTION, SOLUTION INTRAVENOUS; SUBCUTANEOUS at 06:01

## 2022-01-25 RX ADMIN — DEXAMETHASONE SODIUM PHOSPHATE 4 MG: 4 INJECTION INTRA-ARTICULAR; INTRALESIONAL; INTRAMUSCULAR; INTRAVENOUS; SOFT TISSUE at 01:01

## 2022-01-25 RX ADMIN — LEVETIRACETAM 500 MG: 100 INJECTION, SOLUTION, CONCENTRATE INTRAVENOUS at 08:01

## 2022-01-25 RX ADMIN — HYDROMORPHONE HYDROCHLORIDE 0.5 MG: 1 INJECTION, SOLUTION INTRAMUSCULAR; INTRAVENOUS; SUBCUTANEOUS at 10:01

## 2022-01-25 RX ADMIN — LABETALOL HYDROCHLORIDE 10 MG: 5 INJECTION, SOLUTION INTRAVENOUS at 09:01

## 2022-01-25 RX ADMIN — ONDANSETRON 4 MG: 2 INJECTION INTRAMUSCULAR; INTRAVENOUS at 08:01

## 2022-01-25 RX ADMIN — INSULIN ASPART 4 UNITS: 100 INJECTION, SOLUTION INTRAVENOUS; SUBCUTANEOUS at 12:01

## 2022-01-25 RX ADMIN — HYDRALAZINE HYDROCHLORIDE 10 MG: 20 INJECTION INTRAMUSCULAR; INTRAVENOUS at 10:01

## 2022-01-25 RX ADMIN — POLYETHYLENE GLYCOL 3350 17 G: 17 POWDER, FOR SOLUTION ORAL at 08:01

## 2022-01-25 RX ADMIN — HEPARIN SODIUM 5000 UNITS: 5000 INJECTION INTRAVENOUS; SUBCUTANEOUS at 09:01

## 2022-01-25 RX ADMIN — LEVETIRACETAM 500 MG: 500 TABLET, FILM COATED ORAL at 08:01

## 2022-01-25 NOTE — PLAN OF CARE
[facility] Care Plan    POC reviewed with Priyank Whittington and family at 0330. Pt verbalized understanding. Questions and concerns addressed. No acute events overnight. Pt progressing toward goals.     500 mL bolus given due to pt vomiting yesterday afternoon.  KUB obtained.   Straight cath x1.   SBP maintained <160 w/o intervention.   Oxy x1    Will continue to monitor. See below and flowsheets for full assessment and VS info.       Neuro:  Kaylah Coma Scale  Best Eye Response: 4-->(E4) spontaneous  Best Motor Response: 6-->(M6) obeys commands  Best Verbal Response: 5-->(V5) oriented  [place] Coma Scale Score: 15  Assessment Qualifiers: patient not sedated/intubated,no eye obstruction present  Pupil PERRLA: yes     24hr Temp:  [98 °F (36.7 °C)-98.6 °F (37 °C)]     CV:   Rhythm: normal sinus rhythm  BP goals:   SBP < 160  MAP > 65    Resp:   O2 Device (Oxygen Therapy): room air       Plan: N/A    GI/:     Diet/Nutrition Received: regular  Last Bowel Movement: 01/23/22  Voiding Characteristics: unable to void    Intake/Output Summary (Last 24 hours) at 1/25/2022 0342  Last data filed at 1/25/2022 0202  Gross per 24 hour   Intake 951.67 ml   Output 1725 ml   Net -773.33 ml     Unmeasured Output  Urine Occurrence: 0  Stool Occurrence: 0    Labs/Accuchecks:  Recent Labs   Lab 01/24/22  0124   WBC 11.54   RBC 4.66   HGB 13.9*   HCT 39.6*         Recent Labs   Lab 01/25/22  0019      K 4.2   CO2 21*      BUN 17   CREATININE 0.9   ALKPHOS 72   ALT 18   AST 10   BILITOT 0.6      Recent Labs   Lab 01/23/22  1616   INR 1.1   APTT 26.2    No results for input(s): CPK, CPKMB, TROPONINI, MB in the last 168 hours.    Electrolytes: N/A - electrolytes WDL  Accuchecks: Q6H    Gtts:       LDA/Wounds:  Lines/Drains/Airways       Peripheral Intravenous Line                   Peripheral IV - Single Lumen 01/23/22 1403 20 G Left Hand 1 day         Peripheral IV - Single Lumen 01/23/22 1831 20 G Right Wrist 1 day                   Wounds: No  Wound care consulted: No

## 2022-01-25 NOTE — ASSESSMENT & PLAN NOTE
Neurological exam is unchanged  Con keppra prophylaxis  Repeating CTH due to worsening headache   - OR plans per nsy

## 2022-01-25 NOTE — PROGRESS NOTES
[facility]  Neurocritical Care  Progress Note    Admit Date: 1/23/2022  Service Date: 01/25/2022  Length of Stay: 2    Subjective:     Chief Complaint: Subdural hematoma, acute    History of Present Illness: Patient is a 62 year old male with PMHx T2DM and HLD who presented as a direct admit from OSH to NCC unit for multiple acute on subacute subdural hematomas with R to L midline shift.     Per chart review, patient with ongoing frontal headache for the last 10 days prior to admission. No recent trauma reported. On 1/22, he was seen at [facility] for his headaches and was prescribed Fioricet without relief. Due to worsening headache, patient presented to [facility] on 1/23. He was neuro intact with complaint of nausea with no vomiting. Head CT w/o contrast revealed multiple bilateral acute and subacute SDHs overlying the cerebral convexities with the largest overlying the right frontal convexity measuring 1.4 cm. Also noted was diffuse cerebral edema with 8mm right to left midline shift and associated right to left subfalcine herniation.     He was admitted to [facility] for close neurological monitoring and Neurosurgery evaluation.       Hospital Course: 01/24/2022 NAEO  01/25/2022 still complaining of headache. Having N/V      Interval History:  >4 elements OR status of 3 inpatient conditions    Review of Systems   Constitutional: Positive for activity change.   HENT: Negative.    Eyes: Negative.    Respiratory: Negative.    Cardiovascular: Negative.    Gastrointestinal: Positive for nausea and vomiting.   Endocrine: Negative.    Genitourinary: Positive for difficulty urinating.   Musculoskeletal: Negative.    Skin: Negative.    Allergic/Immunologic: Negative.    Neurological: Positive for headaches.   Hematological: Negative.    Psychiatric/Behavioral: The patient is nervous/anxious.      2 systems OR Unable to obtain a complete ROS due to level of consciousness.  Objective:      Vitals:  Temp: 98.2 °F (36.8 °C)  Pulse: 67  Rhythm: normal sinus rhythm  BP: 138/65  MAP (mmHg): 93  Resp: 20  SpO2: 99 %  O2 Device (Oxygen Therapy): room air    Temp  Min: 98 °F (36.7 °C)  Max: 98.6 °F (37 °C)  Pulse  Min: 64  Max: 93  BP  Min: 111/67  Max: 169/78  MAP (mmHg)  Min: 78  Max: 123  Resp  Min: 11  Max: 29  SpO2  Min: 97 %  Max: 100 %    01/24 0701 - 01/25 0700  In: 1056.7 [P.O.:105; I.V.:560]  Out: 1725 [Urine:1725]   Unmeasured Output  Urine Occurrence: 0  Stool Occurrence: 0       Physical Exam    Constitutional: No apparent distress.   Eyes: Conjunctiva clear, anicteric. Lids no lesions.  Head/Ears/Nose/Mouth/Throat/Neck: Moist mucous membranes. External ears, nose atraumatic.   Cardiovascular: Regular rhythm. No murmurs. No leg edema.  Respiratory: Comfortable respirations. Clear to auscultation.  Gastrointestinal: No hernia. Soft, nondistended, nontender. + bowel sounds.  Skin: No rashes, ulcers, lesions. On palpation no induration, nodules, tightening.    Neurologic Examination:    -Mental Status: Alert. Oriented to person, place, and time. Speech fluent. Follows commands.   -Cranial nerves:  Pupils equal, round, and reactive bilateral. Extraocular movements intact. Facial sensation intact. Facial strength symmetric. Hears finger rub bilateral. Palate elevation symmetric. Uvula midline. Shoulder shrug symmetric. Tongue protrusion midline.  -Motor: 5/5 and symmetric in arms, legs. Tone normal.   -Reflexes: 2 and symmetric at biceps, brachioradialis, knees. Plantar responses down.  -Sensation: Intact to touch in arms, legs.  -Coordination: Finger-nose-finger, rapid alternating movements, heel-knee-shin normal.      Medications:  Continuoussodium chloride 0.9%, Last Rate: 75 mL/hr at 01/25/22 1013    ScheduledamLODIPine, 10 mg, Daily  atorvastatin, 40 mg, Daily  heparin (porcine), 5,000 Units, Q8H  levETIRAcetam, 500 mg, BID  mupirocin, , BID  polyethylene glycol, 17 g, Daily  senna-docusate  8.6-50 mg, 1 tablet, BID  silodosin, 4 mg, Daily    PRNacetaminophen, 650 mg, Q6H PRN  dexamethasone, 4 mg, BID PRN  dextrose 50%, 12.5 g, PRN  glucagon (human recombinant), 1 mg, PRN  hydrALAZINE, 10 mg, Q4H PRN  insulin aspart U-100, 1-10 Units, QID (AC + HS) PRN  labetalol, 10 mg, Q4H PRN  magnesium oxide, 800 mg, PRN  magnesium oxide, 800 mg, PRN  ondansetron, 4 mg, Q8H PRN  oxyCODONE, 5 mg, Q6H PRN  potassium bicarbonate, 35 mEq, PRN  potassium bicarbonate, 50 mEq, PRN  potassium bicarbonate, 60 mEq, PRN  potassium, sodium phosphates, 2 packet, PRN  potassium, sodium phosphates, 2 packet, PRN  potassium, sodium phosphates, 2 packet, PRN  promethazine (PHENERGAN) IVPB, 12.5 mg, Q6H PRN  sodium chloride 0.9%, 10 mL, PRN      Today I personally reviewed pertinent medications, imaging, laboratory results, notably:    Diet  Diet diabetic [facility]; 2000 Calorie  Diet diabetic [facility]; 2000 Calorie        Assessment/Plan:     Neuro  * Subdural hematoma, acute  Neurological exam is unchanged  Con keppra prophylaxis  Repeating CTH due to worsening headache   - OR plans per nsy    Cardiac/Vascular  HTN (hypertension)  Sbp<160  Likely related to pain  Con current regimen     HLD (hyperlipidemia)  -continue home atorvastatin 40 mg daily     Endocrine  Type 2 diabetes mellitus  SSI    GI  Vomiting  Likely sec to pain vs meds. Less likely to be sec to elevated ICP since the rest of his exam is unchanged. F/u CTH  F/U amylase and lipase  reglan           The patient is being Prophylaxed for:  Venous Thromboembolism with: Mechanical or Chemical  Stress Ulcer with: None  Ventilator Pneumonia with: not applicable    Activity Orders          Diet diabetic [facility]; 2000 Calorie: Diabetic starting at 01/24 1420    Turn patient starting at 01/23 2000    Elevate HOB starting at 01/23 1821        Full Code    Uninterrupted Critical Care/Counseling Time (not including procedures): 35 minutes     Danelle MAGAÑA  MD [place]  Neurocritical Care  [facility]

## 2022-01-25 NOTE — SUBJECTIVE & OBJECTIVE
Interval History: vomited x1 yesterday, headache 10/10 refractory to opiates, but improving with dexamethasone. HA tolerable this morning, neuro intact, CTH was bleed stable.     Medications:  Continuous Infusions:  Scheduled Meds:   amLODIPine  10 mg Oral Daily    atorvastatin  40 mg Oral Daily    heparin (porcine)  5,000 Units Subcutaneous Q8H    levetiracetam IV  500 mg Intravenous Q12H    mupirocin   Nasal BID    polyethylene glycol  17 g Oral Daily    senna-docusate 8.6-50 mg  1 tablet Oral BID    silodosin  4 mg Oral Daily     PRN Meds:acetaminophen, dextrose 50%, glucagon (human recombinant), hydrALAZINE, insulin aspart U-100, labetalol, magnesium oxide, magnesium oxide, ondansetron, oxyCODONE, potassium bicarbonate, potassium bicarbonate, potassium bicarbonate, potassium, sodium phosphates, potassium, sodium phosphates, potassium, sodium phosphates, sodium chloride 0.9%     Review of Systems  Objective:     Weight: 93 kg (205 lb 0.4 oz)  Body mass index is 30.28 kg/m².  Vital Signs (Most Recent):  Temp: 98.2 °F (36.8 °C) (01/25/22 0701)  Pulse: 67 (01/25/22 0801)  Resp: 19 (01/25/22 0801)  BP: 138/65 (01/25/22 0801)  SpO2: 99 % (01/25/22 0801) Vital Signs (24h Range):  Temp:  [98 °F (36.7 °C)-98.6 °F (37 °C)] 98.2 °F (36.8 °C)  Pulse:  [64-93] 67  Resp:  [11-29] 19  SpO2:  [97 %-100 %] 99 %  BP: (111-169)/(56-92) 138/65                          Physical Exam:    Constitutional: No distress.     Eyes: Pupils are equal, round, and reactive to light. EOM are normal.     Cardiovascular: Normal rate and regular rhythm.     Abdominal: Soft.     Psych/Behavior: He is alert.     E4V5M6  AOx3  PERRL  EOMI  Face Symmetric  Tongue midline  BUE 5/5  BLE 5/5  No drift      Significant Labs:  Recent Labs   Lab 01/23/22  1616 01/24/22  0124 01/25/22  0019   * 120* 208*    140 140   K 3.9 3.7 4.2    107 108   CO2 21* 20* 21*   BUN 13 14 17   CREATININE 1.1 0.9 0.9   CALCIUM 8.5* 9.5 9.7   MG  --   2.0 2.2     Recent Labs   Lab 01/23/22  1616 01/24/22  0124   WBC 10.82 11.54   HGB 13.5* 13.9*   HCT 37.7* 39.6*   * 157     Recent Labs   Lab 01/23/22  1616   INR 1.1   APTT 26.2     Microbiology Results (last 7 days)     ** No results found for the last 168 hours. **        All pertinent labs from the last 24 hours have been reviewed.    Significant Diagnostics:  I have reviewed and interpreted all pertinent imaging results/findings within the past 24 hours.

## 2022-01-25 NOTE — PLAN OF CARE
[facility] Care Plan    POC reviewed with Priyank Whittington and family at 1400. Pt verbalized understanding. Questions and concerns addressed. No acute events today. Pt progressing toward goals. Will continue to monitor. See below and flowsheets for full assessment and VS info.     -Family updated at the bedside.   -Pt C/O HA 10/10. Oxy PRN admin per order with mild to no relief.   -Colette, NP notified concerning pts projectile vomiting, zofran 4mg admin per order.   -CHG bath completed.          Neuro:  [place] Coma Scale  Best Eye Response: 3-->(E3) to speech  Best Motor Response: 6-->(M6) obeys commands  Best Verbal Response: 5-->(V5) oriented  [place] Coma Scale Score: 14  Assessment Qualifiers: no eye obstruction present,patient not sedated/intubated  Pupil PERRLA: yes     24 hr Temp:  [98 °F (36.7 °C)-100.4 °F (38 °C)]     CV:   Rhythm: normal sinus rhythm  BP goals:   SBP < 160  MAP > 65    Resp:   O2 Device (Oxygen Therapy): room air       Plan: N/A    GI/:     Diet/Nutrition Received: regular  Last Bowel Movement: 01/23/22  Voiding Characteristics: oliguria,unable to void,urgency    Intake/Output Summary (Last 24 hours) at 1/24/2022 1907  Last data filed at 1/24/2022 1501  Gross per 24 hour   Intake 60 ml   Output 1575 ml   Net -1515 ml          Labs/Accuchecks:  Recent Labs   Lab 01/24/22  0124   WBC 11.54   RBC 4.66   HGB 13.9*   HCT 39.6*         Recent Labs   Lab 01/24/22  0124      K 3.7   CO2 20*      BUN 14   CREATININE 0.9   ALKPHOS 70   ALT 22   AST 13   BILITOT 0.9      Recent Labs   Lab 01/23/22  1616   INR 1.1   APTT 26.2    No results for input(s): CPK, CPKMB, TROPONINI, MB in the last 168 hours.    Electrolytes: N/A - electrolytes WDL  Accuchecks: Q6H    Gtts:       LDA/Wounds:  Lines/Drains/Airways       Peripheral Intravenous Line                   Peripheral IV - Single Lumen 01/23/22 1403 20 G Left Hand 1 day         Peripheral IV - Single Lumen 01/23/22 1831 20 G Right  Wrist 1 day                  Wounds: No  Wound care consulted: No

## 2022-01-25 NOTE — PROGRESS NOTES
[facility]  Neurosurgery  Progress Note    Subjective:     History of Present Illness: Pt is 62M pmh DM who presents to outside hospital with cc of constant headaches for 10 days. Denies trauma, denies blood thin use, seizure, AMS, visual disturbances. CTH on workup showed bilateral R>L  subacute subdural hematomas. NSGY consulted for evaluation.      Post-Op Info:  Procedure(s) (LRB):  CRANIOTOMY, FOR SUBDURAL HEMATOMA EVACUATION (Left)         Interval History: vomited x1 yesterday, headache 10/10 refractory to opiates, but improving with dexamethasone. HA tolerable this morning, neuro intact, CTH was bleed stable.     Medications:  Continuous Infusions:  Scheduled Meds:   amLODIPine  10 mg Oral Daily    atorvastatin  40 mg Oral Daily    heparin (porcine)  5,000 Units Subcutaneous Q8H    levetiracetam IV  500 mg Intravenous Q12H    mupirocin   Nasal BID    polyethylene glycol  17 g Oral Daily    senna-docusate 8.6-50 mg  1 tablet Oral BID    silodosin  4 mg Oral Daily     PRN Meds:acetaminophen, dextrose 50%, glucagon (human recombinant), hydrALAZINE, insulin aspart U-100, labetalol, magnesium oxide, magnesium oxide, ondansetron, oxyCODONE, potassium bicarbonate, potassium bicarbonate, potassium bicarbonate, potassium, sodium phosphates, potassium, sodium phosphates, potassium, sodium phosphates, sodium chloride 0.9%     Review of Systems  Objective:     Weight: 93 kg (205 lb 0.4 oz)  Body mass index is 30.28 kg/m².  Vital Signs (Most Recent):  Temp: 98.2 °F (36.8 °C) (01/25/22 0701)  Pulse: 67 (01/25/22 0801)  Resp: 19 (01/25/22 0801)  BP: 138/65 (01/25/22 0801)  SpO2: 99 % (01/25/22 0801) Vital Signs (24h Range):  Temp:  [98 °F (36.7 °C)-98.6 °F (37 °C)] 98.2 °F (36.8 °C)  Pulse:  [64-93] 67  Resp:  [11-29] 19  SpO2:  [97 %-100 %] 99 %  BP: (111-169)/(56-92) 138/65                          Physical Exam:    Constitutional: No distress.     Eyes: Pupils are equal, round, and reactive  to light. EOM are normal.     Cardiovascular: Normal rate and regular rhythm.     Abdominal: Soft.     Psych/Behavior: He is alert.     E4V5M6  AOx3  PERRL  EOMI  Face Symmetric  Tongue midline  BUE 5/5  BLE 5/5  No drift      Significant Labs:  Recent Labs   Lab 01/23/22  1616 01/24/22  0124 01/25/22  0019   * 120* 208*    140 140   K 3.9 3.7 4.2    107 108   CO2 21* 20* 21*   BUN 13 14 17   CREATININE 1.1 0.9 0.9   CALCIUM 8.5* 9.5 9.7   MG  --  2.0 2.2     Recent Labs   Lab 01/23/22  1616 01/24/22  0124   WBC 10.82 11.54   HGB 13.5* 13.9*   HCT 37.7* 39.6*   * 157     Recent Labs   Lab 01/23/22  1616   INR 1.1   APTT 26.2     Microbiology Results (last 7 days)     ** No results found for the last 168 hours. **        All pertinent labs from the last 24 hours have been reviewed.    Significant Diagnostics:  I have reviewed and interpreted all pertinent imaging results/findings within the past 24 hours.    Assessment/Plan:     * Subdural hematoma, acute  Pt is 62M pmh DM who presents to outside hospital with cc of constant headaches for 10 days. Denies trauma, denies blood thin use, seizure, AMS, visual disturbances. CTH on workup showed bilateral R>L  subacute subdural hematomas. NSGY consulted for evaluation.    Plan:  Admit to NCC service, Q1h neurochecks  SBP<160, HOB>30, Na 135-145  Keppra  Recommend dex 4mg BID for headaches  All imaging reviewed:    -- CTH 1/23: bilat R> L SDH, worst at R convexity at 1.4 cm, 8 mm MLS   -- CTH 1/24: stable   -- CTA 1/24: no underlying vascular lesion, 2 cm planum sphenoidale mass, likely meningioma  -- No surgical intervention at this time as pt remains neurologically stable and bleed is stable on scan, continue to monitor        Anjelica Donato MD  Neurosurgery  [facility]

## 2022-01-25 NOTE — ASSESSMENT & PLAN NOTE
Pt is 62M pmh DM who presents to outside hospital with cc of constant headaches for 10 days. Denies trauma, denies blood thin use, seizure, AMS, visual disturbances. CTH on workup showed bilateral R>L  subacute subdural hematomas. NSGY consulted for evaluation.    Plan:  Admit to NCC service, Q1h neurochecks  SBP<160, HOB>30, Na 135-145  Keppra  Recommend dex 4mg BID for headaches  All imaging reviewed:    -- CTH 1/23: bilat R> L SDH, worst at R convexity at 1.4 cm, 8 mm MLS   -- CTH 1/24: stable   -- CTA 1/24: no underlying vascular lesion, 2 cm planum sphenoidale mass, likely meningioma  -- No surgical intervention at this time as pt remains neurologically stable and bleed is stable on scan, continue to monitor

## 2022-01-25 NOTE — SUBJECTIVE & OBJECTIVE
Interval History:  >4 elements OR status of 3 inpatient conditions    Review of Systems   Constitutional: Positive for activity change.   HENT: Negative.    Eyes: Negative.    Respiratory: Negative.    Cardiovascular: Negative.    Gastrointestinal: Positive for nausea and vomiting.   Endocrine: Negative.    Genitourinary: Positive for difficulty urinating.   Musculoskeletal: Negative.    Skin: Negative.    Allergic/Immunologic: Negative.    Neurological: Positive for headaches.   Hematological: Negative.    Psychiatric/Behavioral: The patient is nervous/anxious.      2 systems OR Unable to obtain a complete ROS due to level of consciousness.  Objective:     Vitals:  Temp: 98.2 °F (36.8 °C)  Pulse: 67  Rhythm: normal sinus rhythm  BP: 138/65  MAP (mmHg): 93  Resp: 20  SpO2: 99 %  O2 Device (Oxygen Therapy): room air    Temp  Min: 98 °F (36.7 °C)  Max: 98.6 °F (37 °C)  Pulse  Min: 64  Max: 93  BP  Min: 111/67  Max: 169/78  MAP (mmHg)  Min: 78  Max: 123  Resp  Min: 11  Max: 29  SpO2  Min: 97 %  Max: 100 %    01/24 0701 - 01/25 0700  In: 1056.7 [P.O.:105; I.V.:560]  Out: 1725 [Urine:1725]   Unmeasured Output  Urine Occurrence: 0  Stool Occurrence: 0       Physical Exam    Constitutional: No apparent distress.   Eyes: Conjunctiva clear, anicteric. Lids no lesions.  Head/Ears/Nose/Mouth/Throat/Neck: Moist mucous membranes. External ears, nose atraumatic.   Cardiovascular: Regular rhythm. No murmurs. No leg edema.  Respiratory: Comfortable respirations. Clear to auscultation.  Gastrointestinal: No hernia. Soft, nondistended, nontender. + bowel sounds.  Skin: No rashes, ulcers, lesions. On palpation no induration, nodules, tightening.    Neurologic Examination:    -Mental Status: Alert. Oriented to person, place, and time. Speech fluent. Follows commands.   -Cranial nerves:  Pupils equal, round, and reactive bilateral. Extraocular movements intact. Facial sensation intact. Facial strength symmetric. Hears finger rub  bilateral. Palate elevation symmetric. Uvula midline. Shoulder shrug symmetric. Tongue protrusion midline.  -Motor: 5/5 and symmetric in arms, legs. Tone normal.   -Reflexes: 2 and symmetric at biceps, brachioradialis, knees. Plantar responses down.  -Sensation: Intact to touch in arms, legs.  -Coordination: Finger-nose-finger, rapid alternating movements, heel-knee-shin normal.      Medications:  Continuoussodium chloride 0.9%, Last Rate: 75 mL/hr at 01/25/22 1013    ScheduledamLODIPine, 10 mg, Daily  atorvastatin, 40 mg, Daily  heparin (porcine), 5,000 Units, Q8H  levETIRAcetam, 500 mg, BID  mupirocin, , BID  polyethylene glycol, 17 g, Daily  senna-docusate 8.6-50 mg, 1 tablet, BID  silodosin, 4 mg, Daily    PRNacetaminophen, 650 mg, Q6H PRN  dexamethasone, 4 mg, BID PRN  dextrose 50%, 12.5 g, PRN  glucagon (human recombinant), 1 mg, PRN  hydrALAZINE, 10 mg, Q4H PRN  insulin aspart U-100, 1-10 Units, QID (AC + HS) PRN  labetalol, 10 mg, Q4H PRN  magnesium oxide, 800 mg, PRN  magnesium oxide, 800 mg, PRN  ondansetron, 4 mg, Q8H PRN  oxyCODONE, 5 mg, Q6H PRN  potassium bicarbonate, 35 mEq, PRN  potassium bicarbonate, 50 mEq, PRN  potassium bicarbonate, 60 mEq, PRN  potassium, sodium phosphates, 2 packet, PRN  potassium, sodium phosphates, 2 packet, PRN  potassium, sodium phosphates, 2 packet, PRN  promethazine (PHENERGAN) IVPB, 12.5 mg, Q6H PRN  sodium chloride 0.9%, 10 mL, PRN      Today I personally reviewed pertinent medications, imaging, laboratory results, notably:    Diet  Diet diabetic [facility]; 2000 Calorie  Diet diabetic [facility]; 2000 Calorie

## 2022-01-25 NOTE — ASSESSMENT & PLAN NOTE
Likely sec to pain vs meds. Less likely to be sec to elevated ICP since the rest of his exam is unchanged. F/u CTH  F/U amylase and lipase  reglan

## 2022-01-25 NOTE — PT/OT/SLP PROGRESS
Occupational Therapy      Patient Name:  Priyank Whittington   MRN:  [id number]    Patient not seen today secondary to  (MD hold for today due to severe nausea & vomiting.). Will follow-up 1/26/2022.    1/25/2022

## 2022-01-25 NOTE — PT/OT/SLP PROGRESS
Physical Therapy      Patient Name:  Priyank Whittington   MRN:  [id number]    Patient not seen today secondary to  (Hold per MD 2* severe nausea and vomiting). Will follow-up for PT evaluation and treatment at next scheduled session as pt appropriate.    1/25/2022

## 2022-01-26 LAB
POCT GLUCOSE: 156 MG/DL (ref 70–110)
POCT GLUCOSE: 176 MG/DL (ref 70–110)
POCT GLUCOSE: 211 MG/DL (ref 70–110)
POCT GLUCOSE: 222 MG/DL (ref 70–110)
POCT GLUCOSE: 254 MG/DL (ref 70–110)

## 2022-01-26 PROCEDURE — 20000000 HC ICU ROOM

## 2022-01-26 PROCEDURE — 25000003 PHARM REV CODE 250: Performed by: NURSE PRACTITIONER

## 2022-01-26 PROCEDURE — 97161 PT EVAL LOW COMPLEX 20 MIN: CPT

## 2022-01-26 PROCEDURE — 94761 N-INVAS EAR/PLS OXIMETRY MLT: CPT

## 2022-01-26 PROCEDURE — 25000003 PHARM REV CODE 250

## 2022-01-26 PROCEDURE — 99233 SBSQ HOSP IP/OBS HIGH 50: CPT | Mod: ,,, | Performed by: PSYCHIATRY & NEUROLOGY

## 2022-01-26 PROCEDURE — 63600175 PHARM REV CODE 636 W HCPCS: Performed by: NURSE PRACTITIONER

## 2022-01-26 PROCEDURE — 99233 PR SUBSEQUENT HOSPITAL CARE,LEVL III: ICD-10-PCS | Mod: ,,, | Performed by: PSYCHIATRY & NEUROLOGY

## 2022-01-26 PROCEDURE — 51798 US URINE CAPACITY MEASURE: CPT

## 2022-01-26 PROCEDURE — 25000003 PHARM REV CODE 250: Performed by: PHYSICIAN ASSISTANT

## 2022-01-26 PROCEDURE — 51701 INSERT BLADDER CATHETER: CPT

## 2022-01-26 PROCEDURE — 25000003 PHARM REV CODE 250: Performed by: PSYCHIATRY & NEUROLOGY

## 2022-01-26 RX ORDER — GABAPENTIN 100 MG/1
100 CAPSULE ORAL 3 TIMES DAILY
Status: DISCONTINUED | OUTPATIENT
Start: 2022-01-26 | End: 2022-01-26

## 2022-01-26 RX ORDER — GABAPENTIN 100 MG/1
100 CAPSULE ORAL 2 TIMES DAILY
Status: DISCONTINUED | OUTPATIENT
Start: 2022-01-27 | End: 2022-01-27

## 2022-01-26 RX ADMIN — ONDANSETRON 4 MG: 2 INJECTION INTRAMUSCULAR; INTRAVENOUS at 08:01

## 2022-01-26 RX ADMIN — SILODOSIN 4 MG: 4 CAPSULE ORAL at 09:01

## 2022-01-26 RX ADMIN — MUPIROCIN: 20 OINTMENT TOPICAL at 09:01

## 2022-01-26 RX ADMIN — HEPARIN SODIUM 5000 UNITS: 5000 INJECTION INTRAVENOUS; SUBCUTANEOUS at 05:01

## 2022-01-26 RX ADMIN — ATORVASTATIN CALCIUM 40 MG: 20 TABLET, FILM COATED ORAL at 09:01

## 2022-01-26 RX ADMIN — SODIUM CHLORIDE: 0.9 INJECTION, SOLUTION INTRAVENOUS at 07:01

## 2022-01-26 RX ADMIN — DEXAMETHASONE SODIUM PHOSPHATE 10 MG: 4 INJECTION INTRA-ARTICULAR; INTRALESIONAL; INTRAMUSCULAR; INTRAVENOUS; SOFT TISSUE at 07:01

## 2022-01-26 RX ADMIN — ACETAMINOPHEN 650 MG: 325 TABLET ORAL at 08:01

## 2022-01-26 RX ADMIN — SENNOSIDES AND DOCUSATE SODIUM 1 TABLET: 50; 8.6 TABLET ORAL at 09:01

## 2022-01-26 RX ADMIN — LEVETIRACETAM 500 MG: 500 TABLET, FILM COATED ORAL at 09:01

## 2022-01-26 RX ADMIN — AMLODIPINE BESYLATE 10 MG: 10 TABLET ORAL at 09:01

## 2022-01-26 RX ADMIN — INSULIN ASPART 2 UNITS: 100 INJECTION, SOLUTION INTRAVENOUS; SUBCUTANEOUS at 09:01

## 2022-01-26 RX ADMIN — POLYETHYLENE GLYCOL 3350 17 G: 17 POWDER, FOR SOLUTION ORAL at 09:01

## 2022-01-26 RX ADMIN — INSULIN ASPART 6 UNITS: 100 INJECTION, SOLUTION INTRAVENOUS; SUBCUTANEOUS at 05:01

## 2022-01-26 RX ADMIN — Medication 1 TABLET: at 10:01

## 2022-01-26 RX ADMIN — INSULIN ASPART 4 UNITS: 100 INJECTION, SOLUTION INTRAVENOUS; SUBCUTANEOUS at 12:01

## 2022-01-26 RX ADMIN — GABAPENTIN 100 MG: 100 CAPSULE ORAL at 10:01

## 2022-01-26 RX ADMIN — HEPARIN SODIUM 5000 UNITS: 5000 INJECTION INTRAVENOUS; SUBCUTANEOUS at 09:01

## 2022-01-26 RX ADMIN — HEPARIN SODIUM 5000 UNITS: 5000 INJECTION INTRAVENOUS; SUBCUTANEOUS at 02:01

## 2022-01-26 RX ADMIN — DEXAMETHASONE SODIUM PHOSPHATE 10 MG: 4 INJECTION INTRA-ARTICULAR; INTRALESIONAL; INTRAMUSCULAR; INTRAVENOUS; SOFT TISSUE at 08:01

## 2022-01-26 RX ADMIN — SODIUM CHLORIDE: 0.9 INJECTION, SOLUTION INTRAVENOUS at 08:01

## 2022-01-26 RX ADMIN — INSULIN ASPART 2 UNITS: 100 INJECTION, SOLUTION INTRAVENOUS; SUBCUTANEOUS at 08:01

## 2022-01-26 NOTE — PLAN OF CARE
Problem: Physical Therapy Goal  Goal: Physical Therapy Goal  Description: Goals to be met by: 22     Patient will increase functional independence with mobility by performin. Supine to sit with Modified [place]  2. Sit to supine with Modified [place]  3. Rolling to Left and Right with Modified [place].  4. Sit to stand transfer with Stand-by Assistance  5. Bed to chair transfer with Contact Guard Assistance using LRAD  6. Gait  x 50 feet with Contact Guard Assistance using LRAD.   7. Lower extremity exercise program x15 reps per instruction, with supervision    PT Eval: 2022

## 2022-01-26 NOTE — PLAN OF CARE
[facility] Care Plan    POC reviewed with Priyank Whittington and family at 1400. Pt verbalized understanding. Questions and concerns addressed. No acute events today. Pt progressing toward goals. Will continue to monitor. See below and flowsheets for full assessment and VS info.       Neuro:  [place] Coma Scale  Best Eye Response: 3-->(E3) to speech  Best Motor Response: 6-->(M6) obeys commands  Best Verbal Response: 5-->(V5) oriented  [place] Coma Scale Score: 14  Assessment Qualifiers: no eye obstruction present,patient not sedated/intubated  Pupil PERRLA: yes     24 hr Temp:  [97.6 °F (36.4 °C)-98.6 °F (37 °C)]     CV:   Rhythm: normal sinus rhythm  BP goals:   SBP < 160  MAP > 65    Resp:   O2 Device (Oxygen Therapy): room air       Plan: N/A    GI/:     Diet/Nutrition Received: regular  Last Bowel Movement: 01/23/22  Voiding Characteristics: unable to void,due to void    Intake/Output Summary (Last 24 hours) at 1/25/2022 1853  Last data filed at 1/25/2022 1501  Gross per 24 hour   Intake 1359.79 ml   Output 800 ml   Net 559.79 ml     Unmeasured Output  Urine Occurrence: 0  Stool Occurrence: 0    Labs/Accuchecks:  Recent Labs   Lab 01/24/22  0124   WBC 11.54   RBC 4.66   HGB 13.9*   HCT 39.6*         Recent Labs   Lab 01/25/22  0019      K 4.2   CO2 21*      BUN 17   CREATININE 0.9   ALKPHOS 72   ALT 18   AST 10   BILITOT 0.6      Recent Labs   Lab 01/23/22  1616   INR 1.1   APTT 26.2    No results for input(s): CPK, CPKMB, TROPONINI, MB in the last 168 hours.    Electrolytes: N/A - electrolytes WDL  Accuchecks: Q4H    Gtts:   sodium chloride 0.9% 75 mL/hr at 01/25/22 1752       LDA/Wounds:  Lines/Drains/Airways       Peripheral Intravenous Line                   Peripheral IV - Single Lumen 01/23/22 1403 20 G Left Hand 2 days         Peripheral IV - Single Lumen 01/23/22 1831 20 G Right Wrist 2 days                  Wounds: No  Wound care consulted: No

## 2022-01-26 NOTE — SUBJECTIVE & OBJECTIVE
Interval History:  >4 elements OR status of 3 inpatient conditions    Review of Systems   Constitutional: Positive for activity change.   HENT: Negative.    Eyes: Negative.    Respiratory: Negative.    Cardiovascular: Negative.    Gastrointestinal: Positive for nausea.   Endocrine: Negative.    Genitourinary: Positive for difficulty urinating.   Musculoskeletal: Negative.    Skin: Negative.    Allergic/Immunologic: Negative.    Neurological: Positive for headaches.   Hematological: Negative.    Psychiatric/Behavioral: Negative.      2 systems OR Unable to obtain a complete ROS due to level of consciousness.  Objective:     Vitals:  Temp: 97.6 °F (36.4 °C)  Pulse: 82  Rhythm: normal sinus rhythm  BP: (!) 151/70  MAP (mmHg): 100  Resp: 18  SpO2: 98 %  O2 Device (Oxygen Therapy): room air    Temp  Min: 97.6 °F (36.4 °C)  Max: 98.7 °F (37.1 °C)  Pulse  Min: 58  Max: 98  BP  Min: 102/55  Max: 152/66  MAP (mmHg)  Min: 73  Max: 108  Resp  Min: 11  Max: 39  SpO2  Min: 95 %  Max: 100 %    01/25 0701 - 01/26 0700  In: 1665.3 [P.O.:160; I.V.:1505.3]  Out: 1175 [Urine:1175]   Unmeasured Output  Urine Occurrence: 0  Stool Occurrence: 0       Physical Exam    Constitutional: No apparent distress.   Eyes: Conjunctiva clear, anicteric. Lids no lesions.  Head/Ears/Nose/Mouth/Throat/Neck: Moist mucous membranes. External ears, nose atraumatic.   Cardiovascular: Regular rhythm. No murmurs. No leg edema.  Respiratory: Comfortable respirations. Clear to auscultation.  Gastrointestinal: No hernia. Soft, nondistended, nontender. + bowel sounds.  Skin: No rashes, ulcers, lesions. On palpation no induration, nodules, tightening.    Neurologic Examination:    -Mental Status: Alert. Oriented to person, place, and time. Speech fluent. Follows commands.   -Cranial nerves:  Pupils equal, round, and reactive bilateral. Extraocular movements intact. Facial sensation intact. Facial strength symmetric. Hears finger rub bilateral. Palate elevation  symmetric. Uvula midline. Shoulder shrug symmetric. Tongue protrusion midline.  -Motor: 5/5 and symmetric in arms, legs. Tone normal.   -Reflexes: 2 and symmetric at biceps, brachioradialis, knees. Plantar responses down.  -Sensation: Intact to touch in arms, legs.  -Coordination: Finger-nose-finger, rapid alternating movements, heel-knee-shin normal.      Medications:  Continuoussodium chloride 0.9%, Last Rate: 75 mL/hr at 01/26/22 0900    ScheduledamLODIPine, 10 mg, Daily  atorvastatin, 40 mg, Daily  gabapentin, 100 mg, TID  heparin (porcine), 5,000 Units, Q8H  levETIRAcetam, 500 mg, BID  multivitamin, 1 tablet, Daily  mupirocin, , BID  polyethylene glycol, 17 g, Daily  senna-docusate 8.6-50 mg, 1 tablet, BID  silodosin, 4 mg, Daily    PRNacetaminophen, 650 mg, Q6H PRN  dexamethasone, 10 mg, BID PRN  dextrose 50%, 12.5 g, PRN  glucagon (human recombinant), 1 mg, PRN  hydrALAZINE, 10 mg, Q4H PRN  insulin aspart U-100, 1-10 Units, QID (AC + HS) PRN  labetalol, 10 mg, Q4H PRN  magnesium oxide, 800 mg, PRN  magnesium oxide, 800 mg, PRN  ondansetron, 4 mg, Q8H PRN  oxyCODONE, 5 mg, Q6H PRN  potassium bicarbonate, 35 mEq, PRN  potassium bicarbonate, 50 mEq, PRN  potassium bicarbonate, 60 mEq, PRN  potassium, sodium phosphates, 2 packet, PRN  potassium, sodium phosphates, 2 packet, PRN  potassium, sodium phosphates, 2 packet, PRN  promethazine (PHENERGAN) IVPB, 12.5 mg, Q6H PRN  sodium chloride 0.9%, 10 mL, PRN      Today I personally reviewed pertinent medications, imaging, laboratory results, notably:    Diet  Diet diabetic [facility]; 2000 Calorie  Diet diabetic [facility]; 2000 Calorie

## 2022-01-26 NOTE — PLAN OF CARE
01/26/22 1732   Post-Acute Status   Post-Acute Authorization Placement   Post-Acute Placement Status Referrals Sent  (SNF)     SW observed therapy recs for SNF. Pt has Medicaid and therefore not a lot of options for SNF, if any. Sent blast referrals to [address] [place] [address] options to see if any can accept before presenting options to family.    SW received call from Rosaura with [facility] ([phone]) regarding this Pt referral. They do not have contracts for SNF but would be interested in him for senior living care.     Raina Perez, QUAN  Neurocritical Care   [facility]  [id number]

## 2022-01-26 NOTE — PLAN OF CARE
[facility] Care Plan    POC reviewed with Priyank Whittington and family at 0315. Pt verbalized understanding. Questions and concerns addressed. No acute events overnight.     Dexamethasone x1 for HA.  Zofran x1 for vomiting.  Straight cath x2.   NS cont. @ 75 mL/hr.     Pt progressing toward goals. Will continue to monitor. See below and flowsheets for full assessment and VS info.       Neuro:  [place] Coma Scale  Best Eye Response: 4-->(E4) spontaneous  Best Motor Response: 6-->(M6) obeys commands  Best Verbal Response: 5-->(V5) oriented  [place] Coma Scale Score: 15  Assessment Qualifiers: patient not sedated/intubated,no eye obstruction present  Pupil PERRLA: yes     24hr Temp:  [97.6 °F (36.4 °C)-98.7 °F (37.1 °C)]     CV:   Rhythm: normal sinus rhythm  BP goals:   SBP < 160  MAP > 65    Resp:   O2 Device (Oxygen Therapy): room air       Plan: N/A    GI/:     Diet/Nutrition Received: consistent carb/diabetic diet  Last Bowel Movement: 01/23/22  Voiding Characteristics: unable to void    Intake/Output Summary (Last 24 hours) at 1/26/2022 0311  Last data filed at 1/26/2022 0302  Gross per 24 hour   Intake 1496.92 ml   Output 825 ml   Net 671.92 ml     Unmeasured Output  Urine Occurrence: 0  Stool Occurrence: 0    Labs/Accuchecks:  Recent Labs   Lab 01/24/22  0124   WBC 11.54   RBC 4.66   HGB 13.9*   HCT 39.6*         Recent Labs   Lab 01/25/22  0019      K 4.2   CO2 21*      BUN 17   CREATININE 0.9   ALKPHOS 72   ALT 18   AST 10   BILITOT 0.6      Recent Labs   Lab 01/23/22  1616   INR 1.1   APTT 26.2    No results for input(s): CPK, CPKMB, TROPONINI, MB in the last 168 hours.    Electrolytes: N/A - electrolytes WDL  Accuchecks: ACHS    Gtts:   sodium chloride 0.9% 75 mL/hr at 01/26/22 0302       LDA/Wounds:  Lines/Drains/Airways       Peripheral Intravenous Line                   Peripheral IV - Single Lumen 01/23/22 1831 20 G Right Wrist 2 days         Peripheral IV - Single Lumen 01/25/22  1400 20 G Right Antecubital <1 day                  Wounds: No  Wound care consulted: No   Problem: Adult Inpatient Plan of Care  Goal: Patient-Specific Goal (Individualized)  Description: Admit Date: 1/23/2022    Admit Dx: Subdural     Past Medical History:  No date: Diabetes mellitus    History reviewed. No pertinent surgical history.    Individualization:   1. Keep family updated     Restraints: N/A          Outcome: Ongoing, Progressing     Problem: Adult Inpatient Plan of Care  Goal: Readiness for Transition of Care  Outcome: Ongoing, Progressing     Problem: Pain (Stroke, Hemorrhagic)  Goal: Acceptable Pain Control  Outcome: Ongoing, Progressing     Problem: Urinary Elimination Impaired (Stroke, Hemorrhagic)  Goal: Effective Urinary Elimination  Outcome: Ongoing, Progressing

## 2022-01-26 NOTE — PLAN OF CARE
[facility] Care Plan    POC reviewed with Priyank Whittington and family at 1400. Pt verbalizes understanding. Questions and concerns addressed. No acute events today. Pt progressing toward goals. Will continue to monitor. See below and flowsheets for full assessment and VS info.       Neuro:  [place] Coma Scale  Best Eye Response: 4-->(E4) spontaneous  Best Motor Response: 6-->(M6) obeys commands  Best Verbal Response: 5-->(V5) oriented  [place] Coma Scale Score: 15  Assessment Qualifiers: patient not sedated/intubated,no eye obstruction present  Pupil PERRLA: yes     24 hr Temp:  [97.6 °F (36.4 °C)-98.7 °F (37.1 °C)]     CV:   Rhythm: sinus bradycardia  BP goals:   SBP < 160  MAP > 65    Resp:   O2 Device (Oxygen Therapy): room air       Plan: N/A    GI/:     Diet/Nutrition Received: consistent carb/diabetic diet  Last Bowel Movement: 01/23/22  Voiding Characteristics: unable to void    Intake/Output Summary (Last 24 hours) at 1/26/2022 1714  Last data filed at 1/26/2022 1600  Gross per 24 hour   Intake 2888.07 ml   Output 1697 ml   Net 1191.07 ml     Unmeasured Output  Urine Occurrence: 0  Stool Occurrence: 0    Labs/Accuchecks:  Recent Labs   Lab 01/24/22  0124   WBC 11.54   RBC 4.66   HGB 13.9*   HCT 39.6*         Recent Labs   Lab 01/25/22  0019      K 4.2   CO2 21*      BUN 17   CREATININE 0.9   ALKPHOS 72   ALT 18   AST 10   BILITOT 0.6      Recent Labs   Lab 01/23/22  1616   INR 1.1   APTT 26.2    No results for input(s): CPK, CPKMB, TROPONINI, MB in the last 168 hours.    Electrolytes: N/A - electrolytes WDL  Accuchecks: AC & HS    Gtts:   sodium chloride 0.9% 75 mL/hr at 01/26/22 1600       LDA/Wounds:  Lines/Drains/Airways       Peripheral Intravenous Line                   Peripheral IV - Single Lumen 01/23/22 1831 20 G Right Wrist 2 days         Peripheral IV - Single Lumen 01/25/22 1400 20 G Right Antecubital 1 day                  Wounds: No  Wound care consulted: No

## 2022-01-26 NOTE — PT/OT/SLP EVAL
Physical Therapy Co-Evaluation  Co-evaluation with OT for maximal pt participation, safety, and activity tolerance      Patient Name:  Priyank Whittington   MRN:  [id number]  Admit Date: 1/23/2022  Admitting Diagnosis:  Subdural hematoma, acute   Length of Stay: 3 days  Recent Surgery: Procedure(s) (LRB):  CRANIOTOMY, FOR SUBDURAL HEMATOMA EVACUATION (Left)      Recommendations:     Discharge Recommendations:  nursing facility, skilled   Discharge Equipment Recommendations:  (TBD)   Barriers to discharge: Evolving Clinical Presentation    Assessment:     Priyank Whittington is a 62 y.o. male admitted with a medical diagnosis of Subdural hematoma, acute.  He presents with the following impairments/functional limitations: weakness,impaired balance,impaired self care skills,impaired endurance,impaired functional mobilty,gait instability,impaired cognition,decreased lower extremity function,pain,decreased safety awareness,decreased coordination,decreased ROM,impaired cardiopulmonary response to activity. Pt very lethargic today, nurse reports he has been receiving varying levels of pain medication to try and control severe headache pain. Pt very minimally responsive to commands, moved LEs to commands <50% of the time. Deferred functional mobility with pt this tx due to severe lethargy.    Rehab Prognosis: Fair; patient would benefit from acute skilled PT services to address these deficits and reach maximum level of function.      Plan:     During this hospitalization, patient to be seen 4 x/week to address the identified rehab impairments via gait training,therapeutic exercises,therapeutic activities,neuromuscular re-education and progress towards the established goals.    · Plan of Care Expires:  02/25/22    Subjective     ROBB Melvin notified prior to session. Pt's wife and friend present upon PT entrance into room.    Chief Complaint: none verbalized  Patient/Family Comments/goals: family wants to see him move  "more  Pain/Comfort:  · Pain Rating 1:  (unable to verbalize)    Social History:  Residence: lives with their family (wife and 2 teenage children) in a 1-story house with 1 MAGI and 0 HR.   Support available: family and friend  Equipment Used: none  Equipment Owned (not using): None  Prior level of function: independent  Work: Employed.   Drive: yes.   Managing Medicines/Managing Home: yes.     Patient will have assistance from wife and children upon discharge.    Objective:     Additional staff present: CJ Torrez    Patient found supine with: telemetry,peripheral IV,loja catheter     General Precautions: Standard, Cardiac fall,aspiration   Orthopedic Precautions:N/A   Braces: N/A   Body mass index is 30.28 kg/m².  Oxygen Device: Room Air      Vitals: BP (!) 107/52   Pulse 60   Temp 98.2 °F (36.8 °C) (Oral)   Resp 20   Ht 5' 9" (1.753 m)   Wt 93 kg (205 lb 0.4 oz)   SpO2 100%   BMI 30.28 kg/m²     Exams:  · Cognition:   · Lethargic  · Command following: occasionally following repeated 1-step commands  · Fluency: non-vocal  · Skin Integrity: Visible skin intact    Physical Exam:   · Edema - none noted  · ROM - LEs able to passively move through full range  · Strength - when following commands patient displays at leas 3+/5 LE strength bilaterally     Outcome Measures:    AM-PAC 6 CLICK MOBILITY  Turning over in bed (including adjusting bedclothes, sheets and blankets)?: 1  Sitting down on and standing up from a chair with arms (e.g., wheelchair, bedside commode, etc.): 1  Moving from lying on back to sitting on the side of the bed?: 1  Moving to and from a bed to a chair (including a wheelchair)?: 1  Need to walk in hospital room?: 1  Climbing 3-5 steps with a railing?: 1  Basic Mobility Total Score: 6     Therapeutic Exercises:    Supine: Knee and ankle ROM passively x 5 repetitions in all planes through available ROM. Exercises performed to develop and maintain pt's ROM and flexibility. "     Education:   Time provided for education, counseling and discussion of health disposition in regards to patient's current status   PT role in POC to address current functional deficits    Patient left supine with all lines intact, call button in reach, RN Ngozi notified and wife and friend present.    GOALS:   Multidisciplinary Problems     Physical Therapy Goals        Problem: Physical Therapy Goal    Goal Priority Disciplines Outcome Goal Variances Interventions   Physical Therapy Goal     PT, PT/OT Ongoing, Progressing     Description: Goals to be met by: 22     Patient will increase functional independence with mobility by performin. Supine to sit with Modified [place]  2. Sit to supine with Modified [place]  3. Rolling to Left and Right with Modified [place].  4. Sit to stand transfer with Stand-by Assistance  5. Bed to chair transfer with Contact Guard Assistance using LRAD  6. Gait  x 50 feet with Contact Guard Assistance using LRAD.   7. Lower extremity exercise program x15 reps per instruction, with supervision                     History:     Past Medical History:   Diagnosis Date    Diabetes mellitus        History reviewed. No pertinent surgical history.    Time Tracking:     PT Received On: 22  PT Start Time: 1424     PT Stop Time: 1436  PT Total Time (min): 12 min     Billable Minutes: Evaluation 10 min    Kana Riley, PT, DPT  2022

## 2022-01-26 NOTE — PROGRESS NOTES
[facility]  Neurocritical Care  Progress Note    Admit Date: 1/23/2022  Service Date: 01/26/2022  Length of Stay: 3    Subjective:     Chief Complaint: Subdural hematoma, acute    History of Present Illness: Patient is a 62 year old male with PMHx T2DM and HLD who presented as a direct admit from OSH to NCC unit for multiple acute on subacute subdural hematomas with R to L midline shift.     Per chart review, patient with ongoing frontal headache for the last 10 days prior to admission. No recent trauma reported. On 1/22, he was seen at [facility] for his headaches and was prescribed Fioricet without relief. Due to worsening headache, patient presented to [facility] on 1/23. He was neuro intact with complaint of nausea with no vomiting. Head CT w/o contrast revealed multiple bilateral acute and subacute SDHs overlying the cerebral convexities with the largest overlying the right frontal convexity measuring 1.4 cm. Also noted was diffuse cerebral edema with 8mm right to left midline shift and associated right to left subfalcine herniation.     He was admitted to [facility] for close neurological monitoring and Neurosurgery evaluation.       Hospital Course: 01/24/2022 NAEO  01/25/2022 still complaining of headache. Having N/V  01/26/2022 NAEO. H/A is under better control       Interval History:  >4 elements OR status of 3 inpatient conditions    Review of Systems   Constitutional: Positive for activity change.   HENT: Negative.    Eyes: Negative.    Respiratory: Negative.    Cardiovascular: Negative.    Gastrointestinal: Positive for nausea.   Endocrine: Negative.    Genitourinary: Positive for difficulty urinating.   Musculoskeletal: Negative.    Skin: Negative.    Allergic/Immunologic: Negative.    Neurological: Positive for headaches.   Hematological: Negative.    Psychiatric/Behavioral: Negative.      2 systems OR Unable to obtain a complete ROS due to level of  consciousness.  Objective:     Vitals:  Temp: 97.6 °F (36.4 °C)  Pulse: 82  Rhythm: normal sinus rhythm  BP: (!) 151/70  MAP (mmHg): 100  Resp: 18  SpO2: 98 %  O2 Device (Oxygen Therapy): room air    Temp  Min: 97.6 °F (36.4 °C)  Max: 98.7 °F (37.1 °C)  Pulse  Min: 58  Max: 98  BP  Min: 102/55  Max: 152/66  MAP (mmHg)  Min: 73  Max: 108  Resp  Min: 11  Max: 39  SpO2  Min: 95 %  Max: 100 %    01/25 0701 - 01/26 0700  In: 1665.3 [P.O.:160; I.V.:1505.3]  Out: 1175 [Urine:1175]   Unmeasured Output  Urine Occurrence: 0  Stool Occurrence: 0       Physical Exam    Constitutional: No apparent distress.   Eyes: Conjunctiva clear, anicteric. Lids no lesions.  Head/Ears/Nose/Mouth/Throat/Neck: Moist mucous membranes. External ears, nose atraumatic.   Cardiovascular: Regular rhythm. No murmurs. No leg edema.  Respiratory: Comfortable respirations. Clear to auscultation.  Gastrointestinal: No hernia. Soft, nondistended, nontender. + bowel sounds.  Skin: No rashes, ulcers, lesions. On palpation no induration, nodules, tightening.    Neurologic Examination:    -Mental Status: Alert. Oriented to person, place, and time. Speech fluent. Follows commands.   -Cranial nerves:  Pupils equal, round, and reactive bilateral. Extraocular movements intact. Facial sensation intact. Facial strength symmetric. Hears finger rub bilateral. Palate elevation symmetric. Uvula midline. Shoulder shrug symmetric. Tongue protrusion midline.  -Motor: 5/5 and symmetric in arms, legs. Tone normal.   -Reflexes: 2 and symmetric at biceps, brachioradialis, knees. Plantar responses down.  -Sensation: Intact to touch in arms, legs.  -Coordination: Finger-nose-finger, rapid alternating movements, heel-knee-shin normal.      Medications:  Continuoussodium chloride 0.9%, Last Rate: 75 mL/hr at 01/26/22 0900    ScheduledamLODIPine, 10 mg, Daily  atorvastatin, 40 mg, Daily  gabapentin, 100 mg, TID  heparin (porcine), 5,000 Units, Q8H  levETIRAcetam, 500 mg,  BID  multivitamin, 1 tablet, Daily  mupirocin, , BID  polyethylene glycol, 17 g, Daily  senna-docusate 8.6-50 mg, 1 tablet, BID  silodosin, 4 mg, Daily    PRNacetaminophen, 650 mg, Q6H PRN  dexamethasone, 10 mg, BID PRN  dextrose 50%, 12.5 g, PRN  glucagon (human recombinant), 1 mg, PRN  hydrALAZINE, 10 mg, Q4H PRN  insulin aspart U-100, 1-10 Units, QID (AC + HS) PRN  labetalol, 10 mg, Q4H PRN  magnesium oxide, 800 mg, PRN  magnesium oxide, 800 mg, PRN  ondansetron, 4 mg, Q8H PRN  oxyCODONE, 5 mg, Q6H PRN  potassium bicarbonate, 35 mEq, PRN  potassium bicarbonate, 50 mEq, PRN  potassium bicarbonate, 60 mEq, PRN  potassium, sodium phosphates, 2 packet, PRN  potassium, sodium phosphates, 2 packet, PRN  potassium, sodium phosphates, 2 packet, PRN  promethazine (PHENERGAN) IVPB, 12.5 mg, Q6H PRN  sodium chloride 0.9%, 10 mL, PRN      Today I personally reviewed pertinent medications, imaging, laboratory results, notably:    Diet  Diet diabetic [facility]; 2000 Calorie  Diet diabetic [facility]; 2000 Calorie        Assessment/Plan:     Neuro  * Subdural hematoma, acute  Neurological exam is unchanged  Con keppra prophylaxis  OR plans per nsy  H/A is under better control. Con current regimen     Cardiac/Vascular  HTN (hypertension)  Sbp<160  Con current regimen     HLD (hyperlipidemia)  -continue home atorvastatin 40 mg daily     Endocrine  Type 2 diabetes mellitus  SSI          The patient is being Prophylaxed for:  Venous Thromboembolism with: Mechanical or Chemical  Stress Ulcer with: Not Applicable   Ventilator Pneumonia with: not applicable    Activity Orders          Straight Cath starting at 01/26 1200    Diet diabetic [facility]; 2000 Calorie: Diabetic starting at 01/24 1420    Turn patient starting at 01/23 2000    Elevate HOB starting at 01/23 1821        Full Code    Level 3    Danelle Arguelles MD  Neurocritical Care  [facility] - Neuro Critical Care

## 2022-01-26 NOTE — ASSESSMENT & PLAN NOTE
Neurological exam is unchanged  Con keppra prophylaxis  OR plans per nsy  H/A is under better control. Con current regimen

## 2022-01-26 NOTE — SUBJECTIVE & OBJECTIVE
Interval History: HA overnight responsive to steroids .    Medications:  Continuous Infusions:   sodium chloride 0.9% 75 mL/hr at 01/26/22 0721     Scheduled Meds:   amLODIPine  10 mg Oral Daily    atorvastatin  40 mg Oral Daily    heparin (porcine)  5,000 Units Subcutaneous Q8H    levETIRAcetam  500 mg Oral BID    mupirocin   Nasal BID    polyethylene glycol  17 g Oral Daily    senna-docusate 8.6-50 mg  1 tablet Oral BID    silodosin  4 mg Oral Daily     PRN Meds:acetaminophen, dexamethasone, dextrose 50%, glucagon (human recombinant), hydrALAZINE, insulin aspart U-100, labetalol, magnesium oxide, magnesium oxide, ondansetron, oxyCODONE, potassium bicarbonate, potassium bicarbonate, potassium bicarbonate, potassium, sodium phosphates, potassium, sodium phosphates, potassium, sodium phosphates, promethazine (PHENERGAN) IVPB, sodium chloride 0.9%     Review of Systems    Objective:     Weight: 93 kg (205 lb 0.4 oz)  Body mass index is 30.28 kg/m².  Vital Signs (Most Recent):  Temp: 98.2 °F (36.8 °C) (01/26/22 0302)  Pulse: 66 (01/26/22 0602)  Resp: (!) 22 (01/26/22 0602)  BP: 135/85 (01/26/22 0602)  SpO2: 97 % (01/26/22 0602) Vital Signs (24h Range):  Temp:  [97.6 °F (36.4 °C)-98.7 °F (37.1 °C)] 98.2 °F (36.8 °C)  Pulse:  [58-98] 66  Resp:  [16-39] 22  SpO2:  [95 %-100 %] 97 %  BP: (107-178)/(57-85) 135/85                          Physical Exam:    Constitutional: No distress.     Eyes: Pupils are equal, round, and reactive to light. EOM are normal.     Cardiovascular: Normal rate and regular rhythm.     Abdominal: Soft.     Psych/Behavior: He is alert.     E4V5M6  AOx3  PERRL  EOMI  Face Symmetric  Tongue midline  BUE 5/5  BLE 5/5  No drift      Significant Labs:  Recent Labs   Lab 01/25/22  0019   *      K 4.2      CO2 21*   BUN 17   CREATININE 0.9   CALCIUM 9.7   MG 2.2     No results for input(s): WBC, HGB, HCT, PLT in the last 48 hours.  No results for input(s): LABPT, INR, APTT in  the last 48 hours.  Microbiology Results (last 7 days)     ** No results found for the last 168 hours. **        All pertinent labs from the last 24 hours have been reviewed.    Significant Diagnostics:  I have reviewed and interpreted all pertinent imaging results/findings within the past 24 hours.

## 2022-01-27 LAB
ANION GAP SERPL CALC-SCNC: 5 MMOL/L (ref 8–16)
BASOPHILS # BLD AUTO: 0.01 K/UL (ref 0–0.2)
BASOPHILS NFR BLD: 0.1 % (ref 0–1.9)
BUN SERPL-MCNC: 33 MG/DL (ref 6–20)
CALCIUM SERPL-MCNC: 8.9 MG/DL (ref 8.7–10.5)
CHLORIDE SERPL-SCNC: 110 MMOL/L (ref 95–110)
CO2 SERPL-SCNC: 23 MMOL/L (ref 23–29)
CREAT SERPL-MCNC: 1 MG/DL (ref 0.5–1.4)
DIFFERENTIAL METHOD: ABNORMAL
EOSINOPHIL # BLD AUTO: 0 K/UL (ref 0–0.5)
EOSINOPHIL NFR BLD: 0 % (ref 0–8)
ERYTHROCYTE [DISTWIDTH] IN BLOOD BY AUTOMATED COUNT: 12.6 % (ref 11.5–14.5)
EST. GFR  (AFRICAN AMERICAN): >60 ML/MIN/1.73 M^2
EST. GFR  (NON AFRICAN AMERICAN): >60 ML/MIN/1.73 M^2
GLUCOSE SERPL-MCNC: 235 MG/DL (ref 70–110)
HCT VFR BLD AUTO: 35.6 % (ref 40–54)
HGB BLD-MCNC: 12.4 G/DL (ref 14–18)
IMM GRANULOCYTES # BLD AUTO: 0.08 K/UL (ref 0–0.04)
IMM GRANULOCYTES NFR BLD AUTO: 0.8 % (ref 0–0.5)
LYMPHOCYTES # BLD AUTO: 1.3 K/UL (ref 1–4.8)
LYMPHOCYTES NFR BLD: 12.1 % (ref 18–48)
MAGNESIUM SERPL-MCNC: 2.2 MG/DL (ref 1.6–2.6)
MCH RBC QN AUTO: 30 PG (ref 27–31)
MCHC RBC AUTO-ENTMCNC: 34.8 G/DL (ref 32–36)
MCV RBC AUTO: 86 FL (ref 82–98)
MONOCYTES # BLD AUTO: 0.8 K/UL (ref 0.3–1)
MONOCYTES NFR BLD: 7.5 % (ref 4–15)
NEUTROPHILS # BLD AUTO: 8.4 K/UL (ref 1.8–7.7)
NEUTROPHILS NFR BLD: 79.5 % (ref 38–73)
NRBC BLD-RTO: 0 /100 WBC
PHOSPHATE SERPL-MCNC: 3.4 MG/DL (ref 2.7–4.5)
PLATELET # BLD AUTO: 196 K/UL (ref 150–450)
PMV BLD AUTO: 10.4 FL (ref 9.2–12.9)
POCT GLUCOSE: 197 MG/DL (ref 70–110)
POCT GLUCOSE: 233 MG/DL (ref 70–110)
POCT GLUCOSE: 246 MG/DL (ref 70–110)
POCT GLUCOSE: 256 MG/DL (ref 70–110)
POTASSIUM SERPL-SCNC: 4.3 MMOL/L (ref 3.5–5.1)
RBC # BLD AUTO: 4.13 M/UL (ref 4.6–6.2)
SODIUM SERPL-SCNC: 138 MMOL/L (ref 136–145)
WBC # BLD AUTO: 10.52 K/UL (ref 3.9–12.7)

## 2022-01-27 PROCEDURE — 25000003 PHARM REV CODE 250: Performed by: NURSE PRACTITIONER

## 2022-01-27 PROCEDURE — 25000003 PHARM REV CODE 250: Performed by: PHYSICIAN ASSISTANT

## 2022-01-27 PROCEDURE — 94761 N-INVAS EAR/PLS OXIMETRY MLT: CPT

## 2022-01-27 PROCEDURE — 51798 US URINE CAPACITY MEASURE: CPT

## 2022-01-27 PROCEDURE — 25000003 PHARM REV CODE 250: Performed by: PSYCHIATRY & NEUROLOGY

## 2022-01-27 PROCEDURE — 20000000 HC ICU ROOM

## 2022-01-27 PROCEDURE — 95700 EEG CONT REC W/VID EEG TECH: CPT

## 2022-01-27 PROCEDURE — 83735 ASSAY OF MAGNESIUM: CPT | Performed by: PSYCHIATRY & NEUROLOGY

## 2022-01-27 PROCEDURE — 97530 THERAPEUTIC ACTIVITIES: CPT

## 2022-01-27 PROCEDURE — 95714 VEEG EA 12-26 HR UNMNTR: CPT

## 2022-01-27 PROCEDURE — 25000003 PHARM REV CODE 250

## 2022-01-27 PROCEDURE — 84100 ASSAY OF PHOSPHORUS: CPT | Performed by: PSYCHIATRY & NEUROLOGY

## 2022-01-27 PROCEDURE — 80048 BASIC METABOLIC PNL TOTAL CA: CPT | Performed by: PSYCHIATRY & NEUROLOGY

## 2022-01-27 PROCEDURE — 97166 OT EVAL MOD COMPLEX 45 MIN: CPT

## 2022-01-27 PROCEDURE — 63600175 PHARM REV CODE 636 W HCPCS: Performed by: NURSE PRACTITIONER

## 2022-01-27 PROCEDURE — 99233 PR SUBSEQUENT HOSPITAL CARE,LEVL III: ICD-10-PCS | Mod: ,,, | Performed by: PSYCHIATRY & NEUROLOGY

## 2022-01-27 PROCEDURE — 85025 COMPLETE CBC W/AUTO DIFF WBC: CPT | Performed by: PSYCHIATRY & NEUROLOGY

## 2022-01-27 PROCEDURE — 95720 EEG PHY/QHP EA INCR W/VEEG: CPT | Mod: ,,, | Performed by: PSYCHIATRY & NEUROLOGY

## 2022-01-27 PROCEDURE — 99233 SBSQ HOSP IP/OBS HIGH 50: CPT | Mod: ,,, | Performed by: PSYCHIATRY & NEUROLOGY

## 2022-01-27 PROCEDURE — 51701 INSERT BLADDER CATHETER: CPT

## 2022-01-27 PROCEDURE — 95720 PR EEG, W/VIDEO, CONT RECORD, I&R, >12<26 HRS: ICD-10-PCS | Mod: ,,, | Performed by: PSYCHIATRY & NEUROLOGY

## 2022-01-27 RX ORDER — SODIUM CHLORIDE 9 MG/ML
INJECTION, SOLUTION INTRAVENOUS CONTINUOUS
Status: DISCONTINUED | OUTPATIENT
Start: 2022-01-27 | End: 2022-01-31

## 2022-01-27 RX ORDER — INSULIN ASPART 100 [IU]/ML
3 INJECTION, SOLUTION INTRAVENOUS; SUBCUTANEOUS
Status: DISCONTINUED | OUTPATIENT
Start: 2022-01-27 | End: 2022-01-28

## 2022-01-27 RX ADMIN — HEPARIN SODIUM 5000 UNITS: 5000 INJECTION INTRAVENOUS; SUBCUTANEOUS at 06:01

## 2022-01-27 RX ADMIN — INSULIN ASPART 2 UNITS: 100 INJECTION, SOLUTION INTRAVENOUS; SUBCUTANEOUS at 09:01

## 2022-01-27 RX ADMIN — MUPIROCIN: 20 OINTMENT TOPICAL at 10:01

## 2022-01-27 RX ADMIN — SODIUM CHLORIDE: 0.9 INJECTION, SOLUTION INTRAVENOUS at 12:01

## 2022-01-27 RX ADMIN — SODIUM CHLORIDE: 0.9 INJECTION, SOLUTION INTRAVENOUS at 10:01

## 2022-01-27 RX ADMIN — ATORVASTATIN CALCIUM 40 MG: 20 TABLET, FILM COATED ORAL at 10:01

## 2022-01-27 RX ADMIN — SILODOSIN 4 MG: 4 CAPSULE ORAL at 10:01

## 2022-01-27 RX ADMIN — DEXAMETHASONE SODIUM PHOSPHATE 10 MG: 4 INJECTION INTRA-ARTICULAR; INTRALESIONAL; INTRAMUSCULAR; INTRAVENOUS; SOFT TISSUE at 10:01

## 2022-01-27 RX ADMIN — LEVETIRACETAM 500 MG: 500 TABLET, FILM COATED ORAL at 09:01

## 2022-01-27 RX ADMIN — INSULIN ASPART 6 UNITS: 100 INJECTION, SOLUTION INTRAVENOUS; SUBCUTANEOUS at 04:01

## 2022-01-27 RX ADMIN — INSULIN ASPART 2 UNITS: 100 INJECTION, SOLUTION INTRAVENOUS; SUBCUTANEOUS at 08:01

## 2022-01-27 RX ADMIN — SENNOSIDES AND DOCUSATE SODIUM 1 TABLET: 50; 8.6 TABLET ORAL at 10:01

## 2022-01-27 RX ADMIN — ACETAMINOPHEN 650 MG: 325 TABLET ORAL at 10:01

## 2022-01-27 RX ADMIN — Medication 1 TABLET: at 10:01

## 2022-01-27 RX ADMIN — MUPIROCIN: 20 OINTMENT TOPICAL at 09:01

## 2022-01-27 RX ADMIN — LEVETIRACETAM 500 MG: 500 TABLET, FILM COATED ORAL at 10:01

## 2022-01-27 RX ADMIN — INSULIN ASPART 3 UNITS: 100 INJECTION, SOLUTION INTRAVENOUS; SUBCUTANEOUS at 09:01

## 2022-01-27 RX ADMIN — INSULIN ASPART 4 UNITS: 100 INJECTION, SOLUTION INTRAVENOUS; SUBCUTANEOUS at 11:01

## 2022-01-27 NOTE — PLAN OF CARE
[facility] Care Plan    POC reviewed with Priyank Whittington and family at 0300. Pt verbalized understanding. Questions and concerns addressed. No acute events overnight. Pt progressing toward goals. Will continue to monitor. See below and flowsheets for full assessment and VS info.     Pt more somnolent. Head Ct obtained.   Dexamethazone administered x 1 for headache.   Straight cath x 2 1400 mL out.       Neuro:  [place] Coma Scale  Best Eye Response: 2-->(E2) to pain  Best Motor Response: 6-->(M6) obeys commands  Best Verbal Response: 5-->(V5) oriented  [place] Coma Scale Score: 13  Assessment Qualifiers: patient not sedated/intubated  Pupil PERRLA: yes     24hr Temp:  [98 °F (36.7 °C)-99.5 °F (37.5 °C)]     CV:   Rhythm: normal sinus rhythm  BP goals:   SBP < 160  MAP > 65    Resp:   O2 Device (Oxygen Therapy): room air       Plan: N/A    GI/:     Diet/Nutrition Received: consistent carb/diabetic diet  Last Bowel Movement: 01/23/22  Voiding Characteristics: unable to void    Intake/Output Summary (Last 24 hours) at 1/27/2022 0800  Last data filed at 1/27/2022 0602  Gross per 24 hour   Intake 2333.48 ml   Output 2300 ml   Net 33.48 ml     Unmeasured Output  Urine Occurrence: 0  Stool Occurrence: 0    Labs/Accuchecks:  Recent Labs   Lab 01/27/22  0457   WBC 10.52   RBC 4.13*   HGB 12.4*   HCT 35.6*         Recent Labs   Lab 01/25/22  0019 01/25/22  0019 01/27/22  0457      < > 138   K 4.2   < > 4.3   CO2 21*   < > 23      < > 110   BUN 17   < > 33*   CREATININE 0.9   < > 1.0   ALKPHOS 72  --   --    ALT 18  --   --    AST 10  --   --    BILITOT 0.6  --   --     < > = values in this interval not displayed.      Recent Labs   Lab 01/23/22  1616   INR 1.1   APTT 26.2    No results for input(s): CPK, CPKMB, TROPONINI, MB in the last 168 hours.    Electrolytes: N/A - electrolytes WDL  Accuchecks: ACHS    Gtts:   sodium chloride 0.9% 75 mL/hr at 01/26/22 8964       LDA/Wounds:  Lines/Drains/Airways        Peripheral Intravenous Line                   Peripheral IV - Single Lumen 01/23/22 1831 20 G Right Wrist 3 days         Peripheral IV - Single Lumen 01/25/22 1400 20 G Right Antecubital 1 day                  Wounds: No  Wound care consulted: No

## 2022-01-27 NOTE — PROGRESS NOTES
[facility]  Neurosurgery  Progress Note    Subjective:     History of Present Illness: Pt is 62M pmh DM who presents to outside hospital with cc of constant headaches for 10 days. Denies trauma, denies blood thin use, seizure, AMS, visual disturbances. CTH on workup showed bilateral R>L  subacute subdural hematomas. NSGY consulted for evaluation.      Post-Op Info:  Procedure(s) (LRB):  CRANIOTOMY, FOR SUBDURAL HEMATOMA EVACUATION (Left)         Interval History: No acute overnight events     Medications:  Continuous Infusions:   sodium chloride 0.9% 75 mL/hr at 01/26/22 2349     Scheduled Meds:   amLODIPine  10 mg Oral Daily    atorvastatin  40 mg Oral Daily    gabapentin  100 mg Oral BID    heparin (porcine)  5,000 Units Subcutaneous Q8H    levETIRAcetam  500 mg Oral BID    multivitamin  1 tablet Oral Daily    mupirocin   Nasal BID    polyethylene glycol  17 g Oral Daily    senna-docusate 8.6-50 mg  1 tablet Oral BID    silodosin  4 mg Oral Daily     PRN Meds:acetaminophen, dexamethasone, dextrose 50%, glucagon (human recombinant), hydrALAZINE, insulin aspart U-100, labetalol, magnesium oxide, magnesium oxide, ondansetron, oxyCODONE, potassium bicarbonate, potassium bicarbonate, potassium bicarbonate, potassium, sodium phosphates, potassium, sodium phosphates, potassium, sodium phosphates, promethazine (PHENERGAN) IVPB, sodium chloride 0.9%     Review of Systems    Objective:     Weight: 93 kg (205 lb 0.4 oz)  Body mass index is 30.28 kg/m².  Vital Signs (Most Recent):  Temp: 99.1 °F (37.3 °C) (01/27/22 0302)  Pulse: 76 (01/27/22 0812)  Resp: (!) 21 (01/27/22 0812)  BP: (!) 117/56 (01/27/22 0602)  SpO2: 100 % (01/27/22 0812) Vital Signs (24h Range):  Temp:  [98 °F (36.7 °C)-99.5 °F (37.5 °C)] 99.1 °F (37.3 °C)  Pulse:  [51-87] 76  Resp:  [10-31] 21  SpO2:  [93 %-100 %] 100 %  BP: (104-151)/(52-72) 117/56                 Physical Exam:    Constitutional: No distress.     Eyes: Pupils  are equal, round, and reactive to light. EOM are normal.     Cardiovascular: Normal rate and regular rhythm.     Abdominal: Soft.     Psych/Behavior: He is alert.     E4V5M6  AOx3  PERRL  EOMI  Face Symmetric  Tongue midline  BUE 5/5  BLE 5/5  No drift    Significant Labs:  Recent Labs   Lab 01/27/22  0457   *      K 4.3      CO2 23   BUN 33*   CREATININE 1.0   CALCIUM 8.9   MG 2.2     Recent Labs   Lab 01/27/22  0457   WBC 10.52   HGB 12.4*   HCT 35.6*        No results for input(s): LABPT, INR, APTT in the last 48 hours.  Microbiology Results (last 7 days)     ** No results found for the last 168 hours. **        All pertinent labs from the last 24 hours have been reviewed.    Significant Diagnostics:  I have reviewed and interpreted all pertinent imaging results/findings within the past 24 hours.    Assessment/Plan:     * Subdural hematoma, acute  Pt is 62M pmh DM who presents to outside hospital with cc of constant headaches for 10 days. Denies trauma, denies blood thin use, seizure, AMS, visual disturbances. CTH on workup showed bilateral R>L  subacute subdural hematomas. NSGY consulted for evaluation.    Plan:  Admit to NCC service, Q1h neurochecks  SBP<160, HOB>30, Na 135-145  Keppra  Recommend dex 4mg BID for headaches  All imaging reviewed:    -- CTH 1/23: bilat R> L SDH, worst at R convexity at 1.4 cm, 8 mm MLS   -- CTH 1/24: stable   -- CTA 1/24: no underlying vascular lesion, 2 cm planum sphenoidale mass, likely meningioma  -- No surgical intervention at this time as pt remains neurologically stable and bleed is stable on scan, continue to monitor.     Will discuss possibility of MMA embo.             Sami Gomez MD  Neurosurgery  [facility]

## 2022-01-27 NOTE — PT/OT/SLP PROGRESS
Physical Therapy Treatment    Patient Name:  Priyank Whittington   MRN:  [id number]  Admitting Diagnosis:  Subdural hematoma, acute   Recent Surgery: Procedure(s) (LRB):  CRANIOTOMY, FOR SUBDURAL HEMATOMA EVACUATION (Left)    Admit Date: 1/23/2022  Length of Stay: 4 days    Recommendations:     Discharge Recommendations:  rehabilitation facility   Discharge Equipment Recommendations:  (TBD)   Barriers to discharge: Evolving Clinical Presentation    Assessment:     Priyank Whittington is a 62 y.o. male admitted with a medical diagnosis of Subdural hematoma, acute.  He presents with the following impairments/functional limitations:  weakness,impaired self care skills,impaired endurance,impaired functional mobilty,gait instability,impaired cognition,decreased lower extremity function,impaired balance,decreased safety awareness,impaired coordination,decreased ROM. Pt much more alert today, responds appropriately to questions and commands and is agreeable to therapy. Pt sits EOB with moderate assistance and stands EOB mod A. Deferred further mobility due to fatigue. Pt tolerated well.    Rehab Prognosis: Good; patient would benefit from acute skilled PT services to address these deficits and reach maximum level of function.    Recent Surgery: Procedure(s) (LRB):  CRANIOTOMY, FOR SUBDURAL HEMATOMA EVACUATION (Left)      Plan:     During this hospitalization, patient to be seen 4 x/week to address the identified rehab impairments via gait training,therapeutic exercises,therapeutic activities,neuromuscular re-education and progress toward the following goals:    · Plan of Care Expires:  02/25/22    Subjective     RN notified prior to session. Pt's wife present upon PT entrance into room.    Chief Complaint: fatigue  Patient/Family Comments/goals: sit up  Pain/Comfort:  · Pain Rating 1: 0/10      Objective:     Additional staff present: RN into room to assist with boost    Patient found supine with: telemetry,peripheral  "IV,EEG   Cognition:   · Oriented X 4, Alert and Cooperative  · Command following: Follows two-step verbal commands  · Fluency: clear/fluent  General Precautions: Standard, Cardiac fall,aspiration   Orthopedic Precautions:N/A   Braces: N/A   Body mass index is 30.28 kg/m².  Oxygen Device: Room Air    Vitals: BP (!) 116/58   Pulse 85   Temp 97.9 °F (36.6 °C) (Oral)   Resp (!) 28   Ht 5' 9" (1.753 m)   Wt 93 kg (205 lb 0.4 oz)   SpO2 98%   BMI 30.28 kg/m²     Outcome Measures:  AM-PAC 6 CLICK MOBILITY  Turning over in bed (including adjusting bedclothes, sheets and blankets)?: 3  Sitting down on and standing up from a chair with arms (e.g., wheelchair, bedside commode, etc.): 2  Moving from lying on back to sitting on the side of the bed?: 3  Moving to and from a bed to a chair (including a wheelchair)?: 1  Need to walk in hospital room?: 1  Climbing 3-5 steps with a railing?: 1  Basic Mobility Total Score: 11     Functional Mobility:    Bed Mobility:   · Rolling/Turning to Left: moderate assistance  · Rolling/Turning to Right: moderate assistance  · Scooting to HOB via supine bridge: moderate assistance  · Supine to Sit: moderate assistance; from L side of bed  · Scooting anteriorly to EOB to have both feet planted on floor: moderate assistance  · Sit to Supine: moderate assistance; to L side of bed    Sitting Balance at Edge of Bed:   Static Sitting Balance: Good : able to maintain balance against moderate resistance   Dynamic Sitting Balance: Good- : able to sit unsupported and weight shift across midline minimally   Assistance Level Required: Contact Guard Assistance   Time: 10 min   Postural deviations noted: rounded shoulders   Encouraged: upright sitting    Transfers:   · Sit <> Stand Transfer: moderate assistance with no assistive device   · Stand <> Sit Transfer: minimum assistance with no assistive device   · x8ulbrne from EOB    Standing Balance:   Static Standing Balance: Fair- : requires " minimal assistance or UE support in order to stand without LOB   Assistance Level Required: Minimal Assistance   Patient used: no assistive device    Time: 3 min   Encouraged: knee extension    Education:   Time provided for education, counseling and discussion of health disposition in regards to patient's current status   All questions answered within PT scope of practice and to patient's satisfaction   PT role in POC to address current functional deficits   Pt educated on proper body mechanics, safety techniques, and energy conservation with PT facilitation and cueing throughout session    Patient left supine with all lines intact, call button in reach and RN notified.    GOALS:   Multidisciplinary Problems     Physical Therapy Goals        Problem: Physical Therapy Goal    Goal Priority Disciplines Outcome Goal Variances Interventions   Physical Therapy Goal     PT, PT/OT Ongoing, Progressing     Description: Goals to be met by: 22     Patient will increase functional independence with mobility by performin. Supine to sit with Modified [place]  2. Sit to supine with Modified [place]  3. Rolling to Left and Right with Modified [place].  4. Sit to stand transfer with Stand-by Assistance  5. Bed to chair transfer with Contact Guard Assistance using LRAD  6. Gait  x 50 feet with Contact Guard Assistance using LRAD.   7. Lower extremity exercise program x15 reps per instruction, with supervision                   Time Tracking:     PT Received On: 22  PT Start Time: 1346     PT Stop Time: 1406  PT Total Time (min): 20 min       Billable Minutes:   · Therapeutic Activity 15 min    Treatment Type: Treatment  PT/PTA: PT       Kana Riley, PT, DPT  2022

## 2022-01-27 NOTE — SUBJECTIVE & OBJECTIVE
Interval History:  >4 elements OR status of 3 inpatient conditions    Review of Systems   Constitutional: Positive for activity change.   HENT: Negative.    Eyes: Negative.    Respiratory: Negative.    Cardiovascular: Negative.    Gastrointestinal: Positive for nausea.   Endocrine: Negative.    Genitourinary: Positive for difficulty urinating.   Musculoskeletal: Negative.    Skin: Negative.    Allergic/Immunologic: Negative.    Neurological: Positive for headaches.   Hematological: Negative.    Psychiatric/Behavioral: Negative.      2 systems OR Unable to obtain a complete ROS due to level of consciousness.  Objective:     Vitals:  Temp: 97.9 °F (36.6 °C)  Pulse: 85  Rhythm: normal sinus rhythm  BP: (!) 116/58  MAP (mmHg): 79  Resp: (!) 28  SpO2: 98 %  O2 Device (Oxygen Therapy): room air    Temp  Min: 97.9 °F (36.6 °C)  Max: 99.5 °F (37.5 °C)  Pulse  Min: 54  Max: 87  BP  Min: 104/54  Max: 151/70  MAP (mmHg)  Min: 75  Max: 103  Resp  Min: 10  Max: 31  SpO2  Min: 93 %  Max: 100 %    01/26 0701 - 01/27 0700  In: 2426 [P.O.:1190; I.V.:1236]  Out: 2300 [Urine:2300]   Unmeasured Output  Urine Occurrence: 0  Stool Occurrence: 0       Physical Exam    Constitutional: No apparent distress.   Eyes: Conjunctiva clear, anicteric. Lids no lesions.  Head/Ears/Nose/Mouth/Throat/Neck: Moist mucous membranes. External ears, nose atraumatic.   Cardiovascular: Regular rhythm. No murmurs. No leg edema.  Respiratory: Comfortable respirations. Clear to auscultation.  Gastrointestinal: No hernia. Soft, nondistended, nontender. + bowel sounds.  Skin: No rashes, ulcers, lesions. On palpation no induration, nodules, tightening.    Neurologic Examination:    -Mental Status: Open eyes to voice. Oriented to person, place, and time. Speech fluent. Follows commands.   -Cranial nerves:  Pupils equal, round, and reactive bilateral. Extraocular movements intact. Facial sensation intact. Facial strength symmetric. Hears finger rub bilateral. Palate  elevation symmetric. Uvula midline. Shoulder shrug symmetric. Tongue protrusion midline.  -Motor: 5/5 and symmetric in arms, legs. Tone normal.   -Reflexes: 2 and symmetric at biceps, brachioradialis, knees. Plantar responses down.  -Sensation: Intact to touch in arms, legs.  -Coordination: Finger-nose-finger, rapid alternating movements, heel-knee-shin normal.      Medications:  Continuoussodium chloride 0.9%, Last Rate: 75 mL/hr at 01/27/22 1006    Scheduledatorvastatin, 40 mg, Daily  insulin aspart U-100, 3 Units, QID (AC & HS)  levETIRAcetam, 500 mg, BID  multivitamin, 1 tablet, Daily  mupirocin, , BID  polyethylene glycol, 17 g, Daily  senna-docusate 8.6-50 mg, 1 tablet, BID  silodosin, 4 mg, Daily    PRNacetaminophen, 650 mg, Q6H PRN  dexamethasone, 10 mg, BID PRN  dextrose 50%, 12.5 g, PRN  glucagon (human recombinant), 1 mg, PRN  hydrALAZINE, 10 mg, Q4H PRN  insulin aspart U-100, 1-10 Units, QID (AC + HS) PRN  labetalol, 10 mg, Q4H PRN  magnesium oxide, 800 mg, PRN  magnesium oxide, 800 mg, PRN  ondansetron, 4 mg, Q8H PRN  oxyCODONE, 5 mg, Q6H PRN  potassium bicarbonate, 35 mEq, PRN  potassium bicarbonate, 50 mEq, PRN  potassium bicarbonate, 60 mEq, PRN  potassium, sodium phosphates, 2 packet, PRN  potassium, sodium phosphates, 2 packet, PRN  potassium, sodium phosphates, 2 packet, PRN  promethazine (PHENERGAN) IVPB, 12.5 mg, Q6H PRN  sodium chloride 0.9%, 10 mL, PRN      Today I personally reviewed pertinent medications, imaging, laboratory results, notably:    Diet  Diet NPO Except for: Sips with Medication  Diet NPO Except for: Sips with Medication

## 2022-01-27 NOTE — ASSESSMENT & PLAN NOTE
Neurological exam is fluctuating, get an EEG  Con keppra prophylaxis  OR vs MMA embolization plans per nsy

## 2022-01-27 NOTE — PROGRESS NOTES
[facility] - [facility] Critical [facility]  Neurocritical Care  Progress Note    Admit Date: 1/23/2022  Service Date: 01/27/2022  Length of Stay: 4    Subjective:     Chief Complaint: Subdural hematoma, acute    History of Present Illness: Patient is a 62 year old male with PMHx T2DM and HLD who presented as a direct admit from OSH to NCC unit for multiple acute on subacute subdural hematomas with R to L midline shift.     Per chart review, patient with ongoing frontal headache for the last 10 days prior to admission. No recent trauma reported. On 1/22, he was seen at [facility] for his headaches and was prescribed Fioricet without relief. Due to worsening headache, patient presented to [facility] on 1/23. He was neuro intact with complaint of nausea with no vomiting. Head CT w/o contrast revealed multiple bilateral acute and subacute SDHs overlying the cerebral convexities with the largest overlying the right frontal convexity measuring 1.4 cm. Also noted was diffuse cerebral edema with 8mm right to left midline shift and associated right to left subfalcine herniation.     He was admitted to [facility] for close neurological monitoring and Neurosurgery evaluation.       Hospital Course: 01/24/2022 NAEO  01/25/2022 still complaining of headache. Having N/V  01/26/2022 NAEO. H/A is under better control   01/27/2022 Fluctuating mental status, getting EEG      Interval History:  >4 elements OR status of 3 inpatient conditions    Review of Systems   Constitutional: Positive for activity change.   HENT: Negative.    Eyes: Negative.    Respiratory: Negative.    Cardiovascular: Negative.    Gastrointestinal: Positive for nausea.   Endocrine: Negative.    Genitourinary: Positive for difficulty urinating.   Musculoskeletal: Negative.    Skin: Negative.    Allergic/Immunologic: Negative.    Neurological: Positive for headaches.   Hematological: Negative.    Psychiatric/Behavioral: Negative.      2 systems OR Unable to obtain a  complete ROS due to level of consciousness.  Objective:     Vitals:  Temp: 97.9 °F (36.6 °C)  Pulse: 85  Rhythm: normal sinus rhythm  BP: (!) 116/58  MAP (mmHg): 79  Resp: (!) 28  SpO2: 98 %  O2 Device (Oxygen Therapy): room air    Temp  Min: 97.9 °F (36.6 °C)  Max: 99.5 °F (37.5 °C)  Pulse  Min: 54  Max: 87  BP  Min: 104/54  Max: 151/70  MAP (mmHg)  Min: 75  Max: 103  Resp  Min: 10  Max: 31  SpO2  Min: 93 %  Max: 100 %    01/26 0701 - 01/27 0700  In: 2426 [P.O.:1190; I.V.:1236]  Out: 2300 [Urine:2300]   Unmeasured Output  Urine Occurrence: 0  Stool Occurrence: 0       Physical Exam    Constitutional: No apparent distress.   Eyes: Conjunctiva clear, anicteric. Lids no lesions.  Head/Ears/Nose/Mouth/Throat/Neck: Moist mucous membranes. External ears, nose atraumatic.   Cardiovascular: Regular rhythm. No murmurs. No leg edema.  Respiratory: Comfortable respirations. Clear to auscultation.  Gastrointestinal: No hernia. Soft, nondistended, nontender. + bowel sounds.  Skin: No rashes, ulcers, lesions. On palpation no induration, nodules, tightening.    Neurologic Examination:    -Mental Status: Open eyes to voice. Oriented to person, place, and time. Speech fluent. Follows commands.   -Cranial nerves:  Pupils equal, round, and reactive bilateral. Extraocular movements intact. Facial sensation intact. Facial strength symmetric. Hears finger rub bilateral. Palate elevation symmetric. Uvula midline. Shoulder shrug symmetric. Tongue protrusion midline.  -Motor: 5/5 and symmetric in arms, legs. Tone normal.   -Reflexes: 2 and symmetric at biceps, brachioradialis, knees. Plantar responses down.  -Sensation: Intact to touch in arms, legs.  -Coordination: Finger-nose-finger, rapid alternating movements, heel-knee-shin normal.      Medications:  Continuoussodium chloride 0.9%, Last Rate: 75 mL/hr at 01/27/22 1006    Scheduledatorvastatin, 40 mg, Daily  insulin aspart U-100, 3 Units, QID (AC & HS)  levETIRAcetam, 500 mg,  BID  multivitamin, 1 tablet, Daily  mupirocin, , BID  polyethylene glycol, 17 g, Daily  senna-docusate 8.6-50 mg, 1 tablet, BID  silodosin, 4 mg, Daily    PRNacetaminophen, 650 mg, Q6H PRN  dexamethasone, 10 mg, BID PRN  dextrose 50%, 12.5 g, PRN  glucagon (human recombinant), 1 mg, PRN  hydrALAZINE, 10 mg, Q4H PRN  insulin aspart U-100, 1-10 Units, QID (AC + HS) PRN  labetalol, 10 mg, Q4H PRN  magnesium oxide, 800 mg, PRN  magnesium oxide, 800 mg, PRN  ondansetron, 4 mg, Q8H PRN  oxyCODONE, 5 mg, Q6H PRN  potassium bicarbonate, 35 mEq, PRN  potassium bicarbonate, 50 mEq, PRN  potassium bicarbonate, 60 mEq, PRN  potassium, sodium phosphates, 2 packet, PRN  potassium, sodium phosphates, 2 packet, PRN  potassium, sodium phosphates, 2 packet, PRN  promethazine (PHENERGAN) IVPB, 12.5 mg, Q6H PRN  sodium chloride 0.9%, 10 mL, PRN      Today I personally reviewed pertinent medications, imaging, laboratory results, notably:    Diet  Diet NPO Except for: Sips with Medication  Diet NPO Except for: Sips with Medication        Assessment/Plan:     Neuro  * Subdural hematoma, acute  Neurological exam is fluctuating, get an EEG  Con keppra prophylaxis  OR vs MMA embolization plans per nsy      Cardiac/Vascular  HTN (hypertension)  Sbp<160  Dc amlodipine     HLD (hyperlipidemia)  -continue home atorvastatin 40 mg daily     Endocrine  Type 2 diabetes mellitus  SSI          The patient is being Prophylaxed for:  Venous Thromboembolism with: Mechanical or Chemical  Stress Ulcer with: Not Applicable   Ventilator Pneumonia with: not applicable    Activity Orders          Diet NPO Except for: Sips with Medication: NPO starting at 01/27 1002    Straight Cath starting at 01/26 1200    Elevate HOB starting at 01/23 1821        Full Code    Level 3    Danelle Arguelles MD  Neurocritical Care  [facility] - Neuro Critical Care

## 2022-01-27 NOTE — PLAN OF CARE
Problem: Occupational Therapy Goal  Goal: Occupational Therapy Goal  Description: Goals to be met by: 2/17     Patient will increase functional independence with ADLs by performing:    UE Dressing with Minimal Assistance.  LE Dressing with Minimal Assistance.  Grooming while standing with Stand-by Assistance.  Toileting from bedside commode with Minimal Assistance for hygiene and clothing management.   Supine to sit with Minimal Assistance.  Step transfer with Stand-by Assistance    Outcome: Ongoing, Progressing     OT eval completed.

## 2022-01-27 NOTE — PT/OT/SLP EVAL
Occupational Therapy   Evaluation    Name: Priyank Whittington  MRN: [id number]  Admitting Diagnosis:  Subdural hematoma, acute  Recent Surgery: Procedure(s) (LRB):  CRANIOTOMY, FOR SUBDURAL HEMATOMA EVACUATION (Left)      Recommendations:     Discharge Recommendations: rehabilitation facility  Discharge Equipment Recommendations:   (TBD at rehab)  Barriers to discharge:   (increased (A) required)    Assessment:     Priyank Whittington is a 62 y.o. male with a medical diagnosis of Subdural hematoma, acute.  He presents with decreased independence with ADL's.  Pt with significant balance deficits during all activities & is at very high fall risk during activities. Performance deficits affecting function: weakness,impaired endurance,impaired self care skills,impaired functional mobilty,impaired balance,decreased upper extremity function,decreased lower extremity function,decreased safety awareness,impaired coordination,abnormal tone,decreased coordination,gait instability.      Rehab Prognosis: Fair; patient would benefit from acute skilled OT services to address these deficits and reach maximum level of function.       Plan:     Patient to be seen 4 x/week to address the above listed problems via self-care/home management,therapeutic activities,therapeutic exercises,neuromuscular re-education  · Plan of Care Expires: 02/26/22  · Plan of Care Reviewed with: patient    Subjective   Pt was agreeable to therapy evaluation on this date.  Chief Complaint: none stated  Patient/Family Comments/goals: none stated    Occupational Profile:  Living Environment: Pt resides with spouse & 2 teenage children in 1 story house with no steps to enter.  Pt was independent with ADL's & ambulation PTA.  Pt has a bathtub for bathing.  Pt is an active  & works as a .  Pt has no reported hobbies.  Equipment Used at Home:  none  Assistance upon Discharge: unknown    Pain/Comfort:  · Pain Rating 1: 0/10  · Pain Rating  Post-Intervention 1: 0/10    Patients cultural, spiritual, Taoist conflicts given the current situation: no    Objective:     Communicated with: RN prior to session.  Patient found supine with telemetry,blood pressure cuff,peripheral IV,pulse ox (continuous) (no family present) upon OT entry to room.    General Precautions: Standard, fall,aspiration,seizure   Orthopedic Precautions:N/A   Braces: N/A    Occupational Performance:    Bed Mobility:    · Patient completed Supine to Sit with maximal assistance  · Patient completed Sit to Supine with moderate assistance    Functional Mobility/Transfers:  · Patient completed Sit <> Stand Transfer with minimum assistance  with  no assistive device from EOB  · Functional Mobility: Min (A) with taking steps to the left along EOB    Activities of Daily Living:  · Upper Body Dressing: maximal assistance with donning gown around back while seated EOB with (A) for parts of activity as well as balance  · Lower Body Dressing: total assistance with donning socks seated EOB    Cognitive/Visual Perceptual:  Cognitive/Psychosocial Skills:     -       Oriented to: Person, Place, Time and Situation   -       Follows Commands/attention:Follows multistep  commands  -       Communication: clear/fluent  -       Safety awareness/insight to disability: intact     Physical Exam:  Sensation:    -       Intact  Dominant hand:    -       right  Upper Extremity Range of Motion:  BUE WFL  Upper Extremity Strength: BUE WFL   Strength: BUE WFL    AMPAC 6 Click ADL:  AMPAC Total Score: 12    Treatment & Education:  Pt required CGA-Max (A) for postural control while seated EOB with increased (A) during activities due to LOB/sway in all directions depending on which part of body was moving (for example when his left arm was in movement pt would have LOB to the left & posterior).  Provided verbal & physical cues to facilitate postural control. Pt required Min (A) with postural control while  standing.  Provided education on safety & need for (A) from staff for all OOB activities.  Provided education regarding role of OT, POC, & discharge recommendations with pt verbalizing understanding.  Pt had no further questions & when asked whether there were any concerns pt reported none.        Education:    Patient left supine with all lines intact, call button in reach, bed alarm on and RN notified    GOALS:   Multidisciplinary Problems     Occupational Therapy Goals        Problem: Occupational Therapy Goal    Goal Priority Disciplines Outcome Interventions   Occupational Therapy Goal     OT, PT/OT Ongoing, Progressing    Description: Goals to be met by: 2/17     Patient will increase functional independence with ADLs by performing:    UE Dressing with Minimal Assistance.  LE Dressing with Minimal Assistance.  Grooming while standing with Stand-by Assistance.  Toileting from bedside commode with Minimal Assistance for hygiene and clothing management.   Supine to sit with Minimal Assistance.  Step transfer with Stand-by Assistance                     History:     Past Medical History:   Diagnosis Date    Diabetes mellitus        History reviewed. No pertinent surgical history.    Time Tracking:     OT Date of Treatment: 01/27/22  OT Start Time: 0904  OT Stop Time: 0920  OT Total Time (min): 16 min    Billable Minutes:Evaluation 8  Therapeutic Activity 8    1/27/2022

## 2022-01-27 NOTE — ASSESSMENT & PLAN NOTE
Pt is 62M pmh DM who presents to outside hospital with cc of constant headaches for 10 days. Denies trauma, denies blood thin use, seizure, AMS, visual disturbances. CTH on workup showed bilateral R>L  subacute subdural hematomas. NSGY consulted for evaluation.    Plan:  Admit to NCC service, Q1h neurochecks  SBP<160, HOB>30, Na 135-145  Keppra  Recommend dex 4mg BID for headaches  All imaging reviewed:    -- CTH 1/23: bilat R> L SDH, worst at R convexity at 1.4 cm, 8 mm MLS   -- CTH 1/24: stable   -- CTA 1/24: no underlying vascular lesion, 2 cm planum sphenoidale mass, likely meningioma  -- No surgical intervention at this time as pt remains neurologically stable and bleed is stable on scan, continue to monitor.     Will discuss possibility of MMA embo.

## 2022-01-27 NOTE — NURSING
Pt noted to be more drowsy, trouble swallowing water and awakening only to vigorous repeated stimuli. CARLOS mobley, NP at bedside to assess. Able to get pt to swallow pills with repeated stimuli with MARK Mobley. STAT head CT obtained. LES, JEANETTE

## 2022-01-27 NOTE — PLAN OF CARE
01/27/22 1207   Post-Acute Status   Post-Acute Authorization Placement   Post-Acute Placement Status Referrals Sent  (rehab)     PRUDENCE advised by OT that Pt did well in his session today, recs are being changed to rehab.    PRUDENCE met with Pt, Pt wife, and Pt cousin at bedside. Discussed therapy recs for rehab and provided list. Addressed questions and reported need to send referrals to obtain accepting options. Pt agreeable and will review the list for preferences asap.     PRUDENCE sent referrals via [facility] to [facility], [facility], and [facility].    PRUDENCE met with Raina with R rehab at [facility]. She will review the referral and introduce herself to the Pt.     Raina Perez, QUAN  Neurocritical Care   [facility]  [id number]

## 2022-01-27 NOTE — PLAN OF CARE
[facility] - Neuro Critical Care  Discharge Reassessment    Primary Care Provider: Atul Peter MD    Expected Discharge Date: 2/1/2022     Per Neurosurgery: possibility of MMA embo.     Not medically ready for discharge.     Reassessment (most recent)     Discharge Reassessment - 01/27/22 1248        Discharge Reassessment    Assessment Type Discharge Planning Reassessment     Did the patient's condition or plan change since previous assessment? No     Communicated LAN with patient/caregiver Date not available/Unable to determine     Discharge Plan A Rehab     Discharge Plan B Home with family     DME Needed Upon Discharge  none     Discharge Barriers Identified None     Why the patient remains in the hospital Requires continued medical care               Odette Mckinney RN, CCRN-K, [facility]  Neuro-Critical Care   X [id number]

## 2022-01-28 ENCOUNTER — ANESTHESIA (OUTPATIENT)
Dept: NEUROLOGY | Facility: HOSPITAL | Age: 62
DRG: 025 | End: 2022-01-28
Payer: MEDICAID

## 2022-01-28 ENCOUNTER — ANESTHESIA (OUTPATIENT)
Dept: SURGERY | Facility: HOSPITAL | Age: 62
DRG: 025 | End: 2022-01-28
Payer: MEDICAID

## 2022-01-28 ENCOUNTER — ANESTHESIA EVENT (OUTPATIENT)
Dept: NEUROLOGY | Facility: HOSPITAL | Age: 62
DRG: 025 | End: 2022-01-28
Payer: MEDICAID

## 2022-01-28 ENCOUNTER — ANESTHESIA EVENT (OUTPATIENT)
Dept: SURGERY | Facility: HOSPITAL | Age: 62
DRG: 025 | End: 2022-01-28
Payer: MEDICAID

## 2022-01-28 LAB
ABO + RH BLD: NORMAL
APTT BLDCRRT: 24.8 SEC (ref 21–32)
APTT BLDCRRT: 24.8 SEC (ref 21–32)
BASOPHILS # BLD AUTO: 0.03 K/UL (ref 0–0.2)
BASOPHILS NFR BLD: 0.3 % (ref 0–1.9)
BLD GP AB SCN CELLS X3 SERPL QL: NORMAL
DIFFERENTIAL METHOD: ABNORMAL
EOSINOPHIL # BLD AUTO: 0 K/UL (ref 0–0.5)
EOSINOPHIL NFR BLD: 0 % (ref 0–8)
ERYTHROCYTE [DISTWIDTH] IN BLOOD BY AUTOMATED COUNT: 12.4 % (ref 11.5–14.5)
HCT VFR BLD AUTO: 40 % (ref 40–54)
HGB BLD-MCNC: 13.7 G/DL (ref 14–18)
IMM GRANULOCYTES # BLD AUTO: 0.18 K/UL (ref 0–0.04)
IMM GRANULOCYTES NFR BLD AUTO: 1.5 % (ref 0–0.5)
INR PPP: 1 (ref 0.8–1.2)
INR PPP: 1 (ref 0.8–1.2)
LYMPHOCYTES # BLD AUTO: 0.9 K/UL (ref 1–4.8)
LYMPHOCYTES NFR BLD: 7.8 % (ref 18–48)
MCH RBC QN AUTO: 29.3 PG (ref 27–31)
MCHC RBC AUTO-ENTMCNC: 34.3 G/DL (ref 32–36)
MCV RBC AUTO: 86 FL (ref 82–98)
MONOCYTES # BLD AUTO: 1 K/UL (ref 0.3–1)
MONOCYTES NFR BLD: 8.3 % (ref 4–15)
NEUTROPHILS # BLD AUTO: 9.7 K/UL (ref 1.8–7.7)
NEUTROPHILS NFR BLD: 82.1 % (ref 38–73)
NRBC BLD-RTO: 0 /100 WBC
PLATELET # BLD AUTO: 178 K/UL (ref 150–450)
PMV BLD AUTO: 10.5 FL (ref 9.2–12.9)
POCT GLUCOSE: 194 MG/DL (ref 70–110)
POCT GLUCOSE: 199 MG/DL (ref 70–110)
POCT GLUCOSE: 215 MG/DL (ref 70–110)
POCT GLUCOSE: 247 MG/DL (ref 70–110)
PROTHROMBIN TIME: 11.4 SEC (ref 9–12.5)
PROTHROMBIN TIME: 11.4 SEC (ref 9–12.5)
RBC # BLD AUTO: 4.67 M/UL (ref 4.6–6.2)
SARS-COV-2 RDRP RESP QL NAA+PROBE: NEGATIVE
WBC # BLD AUTO: 11.85 K/UL (ref 3.9–12.7)

## 2022-01-28 PROCEDURE — 31500 INSERT EMERGENCY AIRWAY: CPT | Mod: ,,, | Performed by: ANESTHESIOLOGY

## 2022-01-28 PROCEDURE — 94761 N-INVAS EAR/PLS OXIMETRY MLT: CPT

## 2022-01-28 PROCEDURE — 85025 COMPLETE CBC W/AUTO DIFF WBC: CPT | Performed by: NURSE PRACTITIONER

## 2022-01-28 PROCEDURE — 63600175 PHARM REV CODE 636 W HCPCS

## 2022-01-28 PROCEDURE — 31500 AD HOC INTUBATION: ICD-10-PCS | Mod: ,,, | Performed by: ANESTHESIOLOGY

## 2022-01-28 PROCEDURE — 25000003 PHARM REV CODE 250: Performed by: NURSE PRACTITIONER

## 2022-01-28 PROCEDURE — 36000710: Performed by: NEUROLOGICAL SURGERY

## 2022-01-28 PROCEDURE — 27201423 OPTIME MED/SURG SUP & DEVICES STERILE SUPPLY: Performed by: NEUROLOGICAL SURGERY

## 2022-01-28 PROCEDURE — 25000003 PHARM REV CODE 250: Performed by: NEUROLOGICAL SURGERY

## 2022-01-28 PROCEDURE — 94003 VENT MGMT INPAT SUBQ DAY: CPT

## 2022-01-28 PROCEDURE — 25000003 PHARM REV CODE 250

## 2022-01-28 PROCEDURE — U0002 COVID-19 LAB TEST NON-CDC: HCPCS | Performed by: STUDENT IN AN ORGANIZED HEALTH CARE EDUCATION/TRAINING PROGRAM

## 2022-01-28 PROCEDURE — 25000003 PHARM REV CODE 250: Performed by: PHYSICIAN ASSISTANT

## 2022-01-28 PROCEDURE — 61312 PR OPEN SKULL EVAC HEMATOMA, SUPRATENTORIAL, SUB/ EXTRADURAL: ICD-10-PCS | Mod: ,,, | Performed by: NEUROLOGICAL SURGERY

## 2022-01-28 PROCEDURE — 99900035 HC TECH TIME PER 15 MIN (STAT)

## 2022-01-28 PROCEDURE — 63600175 PHARM REV CODE 636 W HCPCS: Performed by: PHYSICIAN ASSISTANT

## 2022-01-28 PROCEDURE — 85610 PROTHROMBIN TIME: CPT | Performed by: STUDENT IN AN ORGANIZED HEALTH CARE EDUCATION/TRAINING PROGRAM

## 2022-01-28 PROCEDURE — 25000003 PHARM REV CODE 250: Performed by: INTERNAL MEDICINE

## 2022-01-28 PROCEDURE — 27201037 HC PRESSURE MONITORING SET UP

## 2022-01-28 PROCEDURE — 63600175 PHARM REV CODE 636 W HCPCS: Performed by: NURSE ANESTHETIST, CERTIFIED REGISTERED

## 2022-01-28 PROCEDURE — 61312 CRNEC/CRNOT STTL XDRL/SDRL: CPT | Mod: ,,, | Performed by: NEUROLOGICAL SURGERY

## 2022-01-28 PROCEDURE — C1713 ANCHOR/SCREW BN/BN,TIS/BN: HCPCS | Performed by: NEUROLOGICAL SURGERY

## 2022-01-28 PROCEDURE — 85730 THROMBOPLASTIN TIME PARTIAL: CPT | Performed by: STUDENT IN AN ORGANIZED HEALTH CARE EDUCATION/TRAINING PROGRAM

## 2022-01-28 PROCEDURE — 94002 VENT MGMT INPAT INIT DAY: CPT

## 2022-01-28 PROCEDURE — C1729 CATH, DRAINAGE: HCPCS

## 2022-01-28 PROCEDURE — 99900026 HC AIRWAY MAINTENANCE (STAT)

## 2022-01-28 PROCEDURE — D9220A PRA ANESTHESIA: Mod: CRNA,,, | Performed by: NURSE ANESTHETIST, CERTIFIED REGISTERED

## 2022-01-28 PROCEDURE — 51702 INSERT TEMP BLADDER CATH: CPT

## 2022-01-28 PROCEDURE — 99291 PR CRITICAL CARE, E/M 30-74 MINUTES: ICD-10-PCS | Mod: ,,, | Performed by: INTERNAL MEDICINE

## 2022-01-28 PROCEDURE — D9220A PRA ANESTHESIA: Mod: ANES,,, | Performed by: ANESTHESIOLOGY

## 2022-01-28 PROCEDURE — 37000008 HC ANESTHESIA 1ST 15 MINUTES: Performed by: NEUROLOGICAL SURGERY

## 2022-01-28 PROCEDURE — 86920 COMPATIBILITY TEST SPIN: CPT | Performed by: PSYCHIATRY & NEUROLOGY

## 2022-01-28 PROCEDURE — 51798 US URINE CAPACITY MEASURE: CPT

## 2022-01-28 PROCEDURE — 86901 BLOOD TYPING SEROLOGIC RH(D): CPT | Performed by: NURSE PRACTITIONER

## 2022-01-28 PROCEDURE — 86900 BLOOD TYPING SEROLOGIC ABO: CPT | Performed by: NURSE PRACTITIONER

## 2022-01-28 PROCEDURE — 25000003 PHARM REV CODE 250: Performed by: NURSE ANESTHETIST, CERTIFIED REGISTERED

## 2022-01-28 PROCEDURE — D9220A PRA ANESTHESIA: ICD-10-PCS | Mod: ANES,,, | Performed by: ANESTHESIOLOGY

## 2022-01-28 PROCEDURE — 63600175 PHARM REV CODE 636 W HCPCS: Performed by: NURSE PRACTITIONER

## 2022-01-28 PROCEDURE — 20000000 HC ICU ROOM

## 2022-01-28 PROCEDURE — 27200966 HC CLOSED SUCTION SYSTEM

## 2022-01-28 PROCEDURE — 31500 INSERT EMERGENCY AIRWAY: CPT

## 2022-01-28 PROCEDURE — C1729 CATH, DRAINAGE: HCPCS | Performed by: NEUROLOGICAL SURGERY

## 2022-01-28 PROCEDURE — 27000221 HC OXYGEN, UP TO 24 HOURS

## 2022-01-28 PROCEDURE — 99291 CRITICAL CARE FIRST HOUR: CPT | Mod: ,,, | Performed by: INTERNAL MEDICINE

## 2022-01-28 PROCEDURE — 51701 INSERT BLADDER CATHETER: CPT

## 2022-01-28 PROCEDURE — D9220A PRA ANESTHESIA: ICD-10-PCS | Mod: CRNA,,, | Performed by: NURSE ANESTHETIST, CERTIFIED REGISTERED

## 2022-01-28 PROCEDURE — C1751 CATH, INF, PER/CENT/MIDLINE: HCPCS

## 2022-01-28 PROCEDURE — 36000711: Performed by: NEUROLOGICAL SURGERY

## 2022-01-28 PROCEDURE — 37000009 HC ANESTHESIA EA ADD 15 MINS: Performed by: NEUROLOGICAL SURGERY

## 2022-01-28 DEVICE — IMPLANTABLE DEVICE: Type: IMPLANTABLE DEVICE | Site: CRANIAL | Status: FUNCTIONAL

## 2022-01-28 DEVICE — PLATE BONE 2 HOLE TAB: Type: IMPLANTABLE DEVICE | Site: CRANIAL | Status: FUNCTIONAL

## 2022-01-28 RX ORDER — POLYETHYLENE GLYCOL 3350 17 G/17G
17 POWDER, FOR SOLUTION ORAL DAILY
Status: DISCONTINUED | OUTPATIENT
Start: 2022-01-28 | End: 2022-01-30

## 2022-01-28 RX ORDER — SILODOSIN 4 MG/1
4 CAPSULE ORAL DAILY
Status: DISCONTINUED | OUTPATIENT
Start: 2022-01-29 | End: 2022-01-30

## 2022-01-28 RX ORDER — GLUCAGON 1 MG
1 KIT INJECTION
Status: DISCONTINUED | OUTPATIENT
Start: 2022-01-28 | End: 2022-02-02 | Stop reason: HOSPADM

## 2022-01-28 RX ORDER — MANNITOL 250 MG/ML
100 INJECTION, SOLUTION INTRAVENOUS ONCE
Status: DISCONTINUED | OUTPATIENT
Start: 2022-01-28 | End: 2022-01-28

## 2022-01-28 RX ORDER — LIDOCAINE HYDROCHLORIDE AND EPINEPHRINE 10; 10 MG/ML; UG/ML
INJECTION, SOLUTION INFILTRATION; PERINEURAL
Status: DISCONTINUED | OUTPATIENT
Start: 2022-01-28 | End: 2022-01-28 | Stop reason: HOSPADM

## 2022-01-28 RX ORDER — CEFAZOLIN SODIUM 1 G/3ML
INJECTION, POWDER, FOR SOLUTION INTRAMUSCULAR; INTRAVENOUS
Status: DISCONTINUED | OUTPATIENT
Start: 2022-01-28 | End: 2022-01-28

## 2022-01-28 RX ORDER — VASOPRESSIN 20 [USP'U]/ML
INJECTION, SOLUTION INTRAMUSCULAR; SUBCUTANEOUS
Status: DISCONTINUED | OUTPATIENT
Start: 2022-01-28 | End: 2022-01-28

## 2022-01-28 RX ORDER — INSULIN ASPART 100 [IU]/ML
1-10 INJECTION, SOLUTION INTRAVENOUS; SUBCUTANEOUS EVERY 4 HOURS PRN
Status: DISCONTINUED | OUTPATIENT
Start: 2022-01-28 | End: 2022-01-31

## 2022-01-28 RX ORDER — FENTANYL CITRATE-0.9 % NACL/PF 10 MCG/ML
0-100 PLASTIC BAG, INJECTION (ML) INTRAVENOUS CONTINUOUS
Status: DISCONTINUED | OUTPATIENT
Start: 2022-01-28 | End: 2022-01-30

## 2022-01-28 RX ORDER — SUCCINYLCHOLINE CHLORIDE 20 MG/ML
INJECTION INTRAMUSCULAR; INTRAVENOUS
Status: DISCONTINUED
Start: 2022-01-28 | End: 2022-01-28 | Stop reason: WASHOUT

## 2022-01-28 RX ORDER — MANNITOL 250 MG/ML
100 INJECTION, SOLUTION INTRAVENOUS ONCE
Status: COMPLETED | OUTPATIENT
Start: 2022-01-28 | End: 2022-01-28

## 2022-01-28 RX ORDER — ATROPINE SULFATE 0.1 MG/ML
INJECTION INTRAVENOUS
Status: DISPENSED
Start: 2022-01-28 | End: 2022-01-28

## 2022-01-28 RX ORDER — DEXTROSE MONOHYDRATE 100 MG/ML
INJECTION, SOLUTION INTRAVENOUS CONTINUOUS PRN
Status: DISCONTINUED | OUTPATIENT
Start: 2022-01-28 | End: 2022-02-02 | Stop reason: HOSPADM

## 2022-01-28 RX ORDER — NICARDIPINE HYDROCHLORIDE 0.2 MG/ML
0-15 INJECTION INTRAVENOUS CONTINUOUS PRN
Status: DISCONTINUED | OUTPATIENT
Start: 2022-01-28 | End: 2022-01-28

## 2022-01-28 RX ORDER — ATORVASTATIN CALCIUM 20 MG/1
40 TABLET, FILM COATED ORAL DAILY
Status: DISCONTINUED | OUTPATIENT
Start: 2022-01-29 | End: 2022-01-30

## 2022-01-28 RX ORDER — ETOMIDATE 2 MG/ML
INJECTION INTRAVENOUS
Status: DISCONTINUED
Start: 2022-01-28 | End: 2022-01-28 | Stop reason: WASHOUT

## 2022-01-28 RX ORDER — PROPOFOL 10 MG/ML
0-50 INJECTION, EMULSION INTRAVENOUS CONTINUOUS
Status: DISCONTINUED | OUTPATIENT
Start: 2022-01-28 | End: 2022-01-28

## 2022-01-28 RX ORDER — FENTANYL CITRATE 50 UG/ML
INJECTION, SOLUTION INTRAMUSCULAR; INTRAVENOUS
Status: DISCONTINUED | OUTPATIENT
Start: 2022-01-28 | End: 2022-01-28

## 2022-01-28 RX ORDER — PROPOFOL 10 MG/ML
VIAL (ML) INTRAVENOUS
Status: COMPLETED
Start: 2022-01-28 | End: 2022-01-28

## 2022-01-28 RX ORDER — PROPOFOL 10 MG/ML
0-20 INJECTION, EMULSION INTRAVENOUS CONTINUOUS
Status: DISCONTINUED | OUTPATIENT
Start: 2022-01-28 | End: 2022-01-28

## 2022-01-28 RX ORDER — LEVETIRACETAM 500 MG/5ML
INJECTION, SOLUTION, CONCENTRATE INTRAVENOUS
Status: DISCONTINUED | OUTPATIENT
Start: 2022-01-28 | End: 2022-01-28

## 2022-01-28 RX ORDER — ROCURONIUM BROMIDE 10 MG/ML
INJECTION, SOLUTION INTRAVENOUS
Status: DISCONTINUED | OUTPATIENT
Start: 2022-01-28 | End: 2022-01-28

## 2022-01-28 RX ORDER — ROCURONIUM BROMIDE 10 MG/ML
INJECTION, SOLUTION INTRAVENOUS
Status: COMPLETED
Start: 2022-01-28 | End: 2022-01-28

## 2022-01-28 RX ORDER — NICARDIPINE HYDROCHLORIDE 0.2 MG/ML
0-15 INJECTION INTRAVENOUS CONTINUOUS
Status: DISCONTINUED | OUTPATIENT
Start: 2022-01-28 | End: 2022-01-28

## 2022-01-28 RX ORDER — BACITRACIN ZINC 500 UNIT/G
OINTMENT (GRAM) TOPICAL
Status: DISCONTINUED | OUTPATIENT
Start: 2022-01-28 | End: 2022-01-28 | Stop reason: HOSPADM

## 2022-01-28 RX ORDER — LEVETIRACETAM 100 MG/ML
500 SOLUTION ORAL 2 TIMES DAILY
Status: COMPLETED | OUTPATIENT
Start: 2022-01-28 | End: 2022-01-29

## 2022-01-28 RX ORDER — AMOXICILLIN 250 MG
1 CAPSULE ORAL 2 TIMES DAILY
Status: DISCONTINUED | OUTPATIENT
Start: 2022-01-28 | End: 2022-01-30

## 2022-01-28 RX ADMIN — LEVETIRACETAM 500 MG: 100 SOLUTION ORAL at 08:01

## 2022-01-28 RX ADMIN — MANNITOL 100 G: 250 INJECTION, SOLUTION INTRAVENOUS at 05:01

## 2022-01-28 RX ADMIN — INSULIN ASPART 4 UNITS: 100 INJECTION, SOLUTION INTRAVENOUS; SUBCUTANEOUS at 01:01

## 2022-01-28 RX ADMIN — Medication 25 MCG/HR: at 10:01

## 2022-01-28 RX ADMIN — SODIUM CHLORIDE 250 ML: 0.9 INJECTION, SOLUTION INTRAVENOUS at 10:01

## 2022-01-28 RX ADMIN — PROPOFOL 50 MCG/KG/MIN: 10 INJECTION, EMULSION INTRAVENOUS at 11:01

## 2022-01-28 RX ADMIN — SODIUM CHLORIDE 75 ML/HR: 0.9 INJECTION, SOLUTION INTRAVENOUS at 11:01

## 2022-01-28 RX ADMIN — FENTANYL CITRATE 25 MCG: 50 INJECTION, SOLUTION INTRAMUSCULAR; INTRAVENOUS at 07:01

## 2022-01-28 RX ADMIN — ROCURONIUM BROMIDE 50 MG: 10 INJECTION, SOLUTION INTRAVENOUS at 06:01

## 2022-01-28 RX ADMIN — ROCURONIUM BROMIDE 30 MG: 10 INJECTION, SOLUTION INTRAVENOUS at 07:01

## 2022-01-28 RX ADMIN — HYDRALAZINE HYDROCHLORIDE 10 MG: 20 INJECTION INTRAMUSCULAR; INTRAVENOUS at 08:01

## 2022-01-28 RX ADMIN — VASOPRESSIN 1 UNITS: 20 INJECTION, SOLUTION INTRAMUSCULAR; SUBCUTANEOUS at 07:01

## 2022-01-28 RX ADMIN — LEVETIRACETAM 500 MG: 100 SOLUTION ORAL at 11:01

## 2022-01-28 RX ADMIN — FENTANYL CITRATE 50 MCG: 50 INJECTION, SOLUTION INTRAMUSCULAR; INTRAVENOUS at 06:01

## 2022-01-28 RX ADMIN — SENNOSIDES AND DOCUSATE SODIUM 1 TABLET: 50; 8.6 TABLET ORAL at 08:01

## 2022-01-28 RX ADMIN — PROPOFOL 80 MG: 10 INJECTION, EMULSION INTRAVENOUS at 05:01

## 2022-01-28 RX ADMIN — LABETALOL HYDROCHLORIDE 10 MG: 5 INJECTION, SOLUTION INTRAVENOUS at 07:01

## 2022-01-28 RX ADMIN — LEVETIRACETAM 1500 MG: 100 INJECTION, SOLUTION, CONCENTRATE INTRAVENOUS at 06:01

## 2022-01-28 RX ADMIN — INSULIN ASPART 2 UNITS: 100 INJECTION, SOLUTION INTRAVENOUS; SUBCUTANEOUS at 05:01

## 2022-01-28 RX ADMIN — ROCURONIUM BROMIDE 100 MG: 10 INJECTION, SOLUTION INTRAVENOUS at 05:01

## 2022-01-28 RX ADMIN — POLYETHYLENE GLYCOL 3350 17 G: 17 POWDER, FOR SOLUTION ORAL at 11:01

## 2022-01-28 RX ADMIN — INSULIN ASPART 4 UNITS: 100 INJECTION, SOLUTION INTRAVENOUS; SUBCUTANEOUS at 11:01

## 2022-01-28 RX ADMIN — MUPIROCIN: 20 OINTMENT TOPICAL at 11:01

## 2022-01-28 RX ADMIN — PROPOFOL 5 MCG/KG/MIN: 10 INJECTION, EMULSION INTRAVENOUS at 03:01

## 2022-01-28 RX ADMIN — INSULIN ASPART 1 UNITS: 100 INJECTION, SOLUTION INTRAVENOUS; SUBCUTANEOUS at 09:01

## 2022-01-28 RX ADMIN — CEFAZOLIN 2 G: 330 INJECTION, POWDER, FOR SOLUTION INTRAMUSCULAR; INTRAVENOUS at 06:01

## 2022-01-28 NOTE — CONSULTS
Inpatient consult to Physical Medicine Rehab  Consult performed by: Sonja Gutierrez NP  Consult ordered by: Danelle Arguelles MD      Consult received.  Reviewed patient history and current admission.  PM&R following.  Sonja Gutierrez NP  Physical Medicine & Rehabilitation   01/28/2022

## 2022-01-28 NOTE — PROGRESS NOTES
[facility]  Neurocritical Care  Progress Note    Admit Date: 1/23/2022  Service Date: 01/28/2022  Length of Stay: 5    Subjective:     Chief Complaint: Subdural hematoma, acute    History of Present Illness: Patient is a 62 year old male with PMHx T2DM and HLD who presented as a direct admit from OSH to NCC unit for multiple acute on subacute subdural hematomas with R to L midline shift.     Per chart review, patient with ongoing frontal headache for the last 10 days prior to admission. No recent trauma reported. On 1/22, he was seen at [facility] for his headaches and was prescribed Fioricet without relief. Due to worsening headache, patient presented to [facility] on 1/23. He was neuro intact with complaint of nausea with no vomiting. Head CT w/o contrast revealed multiple bilateral acute and subacute SDHs overlying the cerebral convexities with the largest overlying the right frontal convexity measuring 1.4 cm. Also noted was diffuse cerebral edema with 8mm right to left midline shift and associated right to left subfalcine herniation.     He was admitted to [facility] for close neurological monitoring and Neurosurgery evaluation.       Hospital Course: 01/24/2022 NAEO  01/25/2022 still complaining of headache. Having N/V  01/26/2022 NAEO. H/A is under better control   01/27/2022 Fluctuating mental status, getting EEG  01/28/2022 s/p R craniotomy for aSDH evacuation (increased MLS o/n); mech vent: orally intubated into nsicu postop; sedation on hold; postop CTH reviewed w/ expected surgical decompression and reduction of MLS; increased hemovac drainage immediately postop; replete lytes; ok for TF via ogt; hold at mn for possible extubation in am        Interval History:  As above    Review of Systems   Constitutional: Positive for activity change.   HENT: Negative.    Eyes: Negative.    Respiratory: Negative.    Cardiovascular: Negative.    Gastrointestinal: Positive for nausea.    Endocrine: Negative.    Genitourinary: Positive for difficulty urinating.   Musculoskeletal: Negative.    Skin: Negative.    Allergic/Immunologic: Negative.    Neurological: Positive for headaches.   Hematological: Negative.    Psychiatric/Behavioral: Negative.      2 systems OR Unable to obtain a complete ROS due to level of consciousness.  Objective:     Vitals:  Temp: 98.6 °F (37 °C)  Pulse: 78  Rhythm: normal sinus rhythm  BP: (!) 91/56  MAP (mmHg): 67  Resp: 18  SpO2: 100 %  Oxygen Concentration (%): 70  O2 Device (Oxygen Therapy): ventilator  Vent Mode: A/C  Set Rate: 18 BPM  Vt Set: 400 mL  PEEP/CPAP: 5 cmH20  Peak Airway Pressure: 18 cmH2O  Mean Airway Pressure: 8.5 cmH20  Plateau Pressure: 0 cmH20    Temp  Min: 98.6 °F (37 °C)  Max: 99 °F (37.2 °C)  Pulse  Min: 51  Max: 120  BP  Min: 91/56  Max: 173/94  MAP (mmHg)  Min: 67  Max: 117  Resp  Min: 18  Max: 31  SpO2  Min: 93 %  Max: 100 %  Oxygen Concentration (%)  Min: 70  Max: 80    01/27 0701 - 01/28 0700  In: 1135.2 [P.O.:235; I.V.:900.2]  Out: 2625 [Urine:2625]   Unmeasured Output  Urine Occurrence: 1  Stool Occurrence: 0  Pad Count: 1       Physical Exam    Constitutional: No apparent distress.   Eyes: Conjunctiva clear, anicteric. Lids no lesions.  Head/Ears/Nose/Mouth/Throat/Neck: Moist mucous membranes. External ears, nose atraumatic.   Cardiovascular: Regular rhythm. No murmurs. No leg edema.  Respiratory: Comfortable respirations. Clear to auscultation.  Gastrointestinal: No hernia. Soft, nondistended, nontender. + bowel sounds.  Skin: No rashes, ulcers, lesions. On palpation no induration, nodules, tightening.    Neurologic Examination:    -Mental Status: eyes closed; orally intubated postop; obtunded   -Cranial nerves:  Pupils equal, round, and reactive bilateral.   -Motor: no spont movements  -Reflexes: Plantar responses down.        Medications:  Continuoussodium chloride 0.9%, Last Rate: 75 mL/hr (01/28/22 1136)  dextrose 10 % in water  "(D10W)  nicardipine  propofoL, Last Rate: 50 mcg/kg/min (01/28/22 1134)    Scheduled[START ON 1/29/2022] atorvastatin, 40 mg, Daily  atropine, ,   levetiracetam, 500 mg, BID  multivitamin, 1 tablet, Daily  polyethylene glycol, 17 g, Daily  senna-docusate 8.6-50 mg, 1 tablet, BID  [START ON 1/29/2022] silodosin, 4 mg, Daily    PRNacetaminophen, 650 mg, Q6H PRN  dexamethasone, 10 mg, BID PRN  dextrose 10 % in water (D10W), , Continuous PRN  dextrose 50%, 12.5 g, PRN  glucagon (human recombinant), 1 mg, PRN  hydrALAZINE, 10 mg, Q4H PRN  insulin aspart U-100, 1-10 Units, Q4H PRN  labetalol, 10 mg, Q4H PRN  magnesium oxide, 800 mg, PRN  magnesium oxide, 800 mg, PRN  ondansetron, 4 mg, Q8H PRN  oxyCODONE, 5 mg, Q6H PRN  potassium bicarbonate, 35 mEq, PRN  potassium bicarbonate, 50 mEq, PRN  potassium bicarbonate, 60 mEq, PRN  potassium, sodium phosphates, 2 packet, PRN  potassium, sodium phosphates, 2 packet, PRN  potassium, sodium phosphates, 2 packet, PRN  promethazine (PHENERGAN) IVPB, 12.5 mg, Q6H PRN  sodium chloride 0.9%, 10 mL, PRN      Today I personally reviewed pertinent medications, imaging, laboratory results, notably: cth, cxr  Diet  Diet NPO Except for: Sips with Medication  Diet NPO Except for: Sips with Medication        Assessment/Plan:     Neuro  * Subdural hematoma, acute  Neurological exam is fluctuating, get an EEG  keppra prophylaxis  1/28 s/p R craniotomy for aSDH evacuation w/ mls and brain compresssion  -sbp 100-160  -euNa++  -hob 20  -HV drain care   1/28 -vEEG grossly negative          Cerebral edema  Imaging at OSH revealed diffuse cerebral edema in setting of known SDHs    -close neuro monitoring with frequent neuro checks  -see "Subdural hematoma, acute" for further plan     Cardiac/Vascular  HTN (hypertension)  Hold amlodipine   Postop sbp goal 100-160      HLD (hyperlipidemia)  -continue home atorvastatin 40 mg daily     Endocrine  Type 2 diabetes mellitus  SSI          The patient is being " Prophylaxed for:  Venous Thromboembolism with: Mechanical  Stress Ulcer with: H2B  Ventilator Pneumonia with: chlorhexidine oral care    Activity Orders          Bed rest starting at 01/28 0908    Diet NPO Except for: Sips with Medication: NPO starting at 01/28 0001    Turn patient every 2 hours starting at 01/27 2000    Straight Cath starting at 01/26 1200    Elevate HOB starting at 01/23 1821        Full Code    Silverio De Luna MD  Neurocritical Care  [facility] - Neuro Critical Care    Time spent with this critically ill patient and care team at the bedside: 35min

## 2022-01-28 NOTE — NURSING
Patient noted to be more lethargic at 0300 neuro exam similar to the previous night. MARK Cruz notified and at bedside to examine patient, STAT head CT ordered and obtained. On the way to CT, the patient began to desat into high 80s. Oxygen tank and nasal cannula placed on patient, O2 sats stable throughout after. Neurosurgery/other treatment team members notified by MARK Cruz, NP. 100 mg of Mannitol ordered and administered at 0522 through right 18G PIV. Anesthesia notified and patient was intubated for airway protection at 0542. 80 mg of Propofol and 100 mg of Oj administered.   Benson placed.  Patient went for class A crani, left room at 0618 with OR team.

## 2022-01-28 NOTE — ANESTHESIA PROCEDURE NOTES
Ad Hoc Intubation    Date/Time: 1/28/2022 5:43 AM  Performed by: Rubén Ro MD  Authorized by: Trevor Miguel MD     Indications:  Respiratory distress, respiratory failure, hypoxemia, airway protection and hypercapnia  Diagnosis:  Subdural hematoma  Patient Location:  ICU  Timeout:  1/28/2022 5:40 AM  Procedure Start Time:  1/28/2022 5:40 AM  Procedure End Time:  1/28/2022 5:43 AM  Staff:     Anesthesiologist Present: Yes    Intubation:     Induction:  Intravenous    Intubated:  Postinduction    Mask Ventilation:  Easy mask    Attempts:  1    Attempted By:  Resident anesthesiologist    Method of Intubation:  Video laryngoscopy    Blade:  Glidescope 3    Laryngeal View Grade: Grade I - full view of chords      Difficult Airway Encountered?: No      Complications:  None    Airway Device:  Oral endotracheal tube    Airway Device Size:  8.0    Style/Cuff Inflation:  Cuffed (inflated to minimal occlusive pressure)    Inflation Amount (mL):  7    Tube secured:  22    Secured at:  The lips    Placement Verified By:  Capnometry and Colorimetric ETCO2 device    Complicating Factors:  None    Findings Post-Intubation:  BS equal bilateral and atraumatic/condition of teeth unchanged

## 2022-01-28 NOTE — PLAN OF CARE
[facility] Care Plan    POC reviewed with Priyank Whittington and family at 0100. Pt verbalized understanding. Questions and concerns addressed. No acute events overnight. Pt progressing toward goals. Will continue to monitor. See below and flowsheets for full assessment and VS info.     Acute neuro change during shift. See previous notes.  Patient emergently intubated w/ 8.0 ETT, see previous note.  STAT head CT obtained.   100 mg Mannitol administered.   Benson placed.      Neuro:  Kaylah Coma Scale  Best Eye Response: 1-->(E1) none  Best Motor Response: 1-->(M1) none  Best Verbal Response: 1-->(V1) none  [place] Coma Scale Score: 3  Assessment Qualifiers: patient chemically sedated or paralyzed,patient intubated  Pupil PERRLA: yes     24hr Temp:  [97.9 °F (36.6 °C)-99 °F (37.2 °C)]     CV:   Rhythm: normal sinus rhythm  BP goals:   SBP < 160  MAP > 65    Resp:   O2 Device (Oxygen Therapy): ventilator  Vent Mode: A/C  Set Rate: 18 BPM  Oxygen Concentration (%): 80  Vt Set: 400 mL  PEEP/CPAP: 5 cmH20    Plan: wean to extubate    GI/:     Diet/Nutrition Received: consistent carb/diabetic diet  Last Bowel Movement: 01/23/22  Voiding Characteristics: unable to void,dribbling    Intake/Output Summary (Last 24 hours) at 1/28/2022 0717  Last data filed at 1/28/2022 0639  Gross per 24 hour   Intake 1135.23 ml   Output 2625 ml   Net -1489.77 ml     Unmeasured Output  Urine Occurrence: 1  Stool Occurrence: 0  Pad Count: 1    Labs/Accuchecks:  Recent Labs   Lab 01/28/22  0604   WBC 11.85   RBC 4.67   HGB 13.7*   HCT 40.0         Recent Labs   Lab 01/25/22  0019 01/25/22  0019 01/27/22  0457      < > 138   K 4.2   < > 4.3   CO2 21*   < > 23      < > 110   BUN 17   < > 33*   CREATININE 0.9   < > 1.0   ALKPHOS 72  --   --    ALT 18  --   --    AST 10  --   --    BILITOT 0.6  --   --     < > = values in this interval not displayed.      Recent Labs   Lab 01/28/22  0604   INR 1.0  1.0   APTT 24.8  24.8    No  results for input(s): CPK, CPKMB, TROPONINI, MB in the last 168 hours.    Electrolytes: N/A - electrolytes WDL  Accuchecks: ACHS    Gtts:   sodium chloride 0.9% 75 mL/hr at 01/27/22 1952    niCARdipine      propofoL         LDA/Wounds:  Lines/Drains/Airways       Drain                   Urethral Catheter 01/28/22 0600 <1 day              Airway                   Airway - Non-Surgical 01/28/22 0542 Endotracheal Tube <1 day       Airway Anesthesia 01/28/22 <1 day              Peripheral Intravenous Line                   Peripheral IV - Single Lumen 20 G Left Hand -- days         Peripheral IV - Single Lumen 01/25/22 1400 20 G Right Antecubital 2 days                  Wounds: No  Wound care consulted: No

## 2022-01-28 NOTE — NURSING
Pt noted to be more lethargic and not verbalizing. MARK Cruz NP notified and aware. No new orders at this time, will reassess pt and continue to monitor.

## 2022-01-28 NOTE — NURSING
Paged [facility] on call via  to inform that MARIANNA to thumb suction has put out total of 90mL bloody drainage since arrival to unit after surgery (2 hours). Drain continue to be to thumb print suction and HOB restrictions are no more then 20 degrees are being maintain. Will continue to monitor and will update as needed.

## 2022-01-28 NOTE — ASSESSMENT & PLAN NOTE
Neurological exam is fluctuating, get an EEG  keppra prophylaxis  1/28 s/p R craniotomy for aSDH evacuation w/ mls and brain compresssion  -sbp 100-160  -euNa++  -hob 20  -HV drain care   1/28 -vEEG grossly negative

## 2022-01-28 NOTE — TRANSFER OF CARE
"Anesthesia Transfer of Care Note    Patient: Priyank Whittington    Procedure(s) Performed: Procedure(s) (LRB):  CRANIOTOMY, FOR SUBDURAL HEMATOMA EVACUATION (Right)    Patient location: PACU    Anesthesia Type: general    Transport from OR: Transported from OR intubated on 100% O2 by AMBU with adequate controlled ventilation. Continuous ECG monitoring in transport. Continuous SpO2 monitoring in transport. Continuos invasive BP monitoring in transport    Post pain: adequate analgesia    Post assessment: no apparent anesthetic complications and tolerated procedure well    Post vital signs: stable    Level of consciousness: sedated    Nausea/Vomiting: no nausea/vomiting    Complications: none          Last vitals:   Visit Vitals  BP (!) 100/56   Pulse 99   Temp 37 °C (98.6 °F) (Axillary)   Resp (!) 28   Ht 5' 9" (1.753 m)   Wt 93 kg (205 lb 0.4 oz)   SpO2 99%   BMI 30.28 kg/m²     "

## 2022-01-28 NOTE — PT/OT/SLP DISCHARGE
Physical Therapy Discharge Summary    Name: Priyank Whittington  MRN: [id number]   Principal Problem: Subdural hematoma, acute     Patient Discharged from acute Physical Therapy on 2022.  Please refer to prior PT noted date on 2022 for functional status.     Assessment:     Pt discharged 2/2 surgery. New orders needed when medically approrpiate.     Objective:     GOALS:   Multidisciplinary Problems     Physical Therapy Goals        Problem: Physical Therapy Goal    Goal Priority Disciplines Outcome Goal Variances Interventions   Physical Therapy Goal     PT, PT/OT Ongoing, Progressing     Description: Goals to be met by: 22     Patient will increase functional independence with mobility by performin. Supine to sit with Modified [place]  2. Sit to supine with Modified [place]  3. Rolling to Left and Right with Modified [place].  4. Sit to stand transfer with Stand-by Assistance  5. Bed to chair transfer with Contact Guard Assistance using LRAD  6. Gait  x 50 feet with Contact Guard Assistance using LRAD.   7. Lower extremity exercise program x15 reps per instruction, with supervision                     Reasons for Discontinuation of Therapy Services  Sx for Crani      Plan:     Patient Discharged to: in house.      2022

## 2022-01-28 NOTE — RESPIRATORY THERAPY
Pt intubated with 8.0 ETT @ 22cm @ teeth. Pt placed on pb980 vent with settings documented in flowsheet.

## 2022-01-28 NOTE — PT/OT/SLP DISCHARGE
Occupational Therapy Discharge Summary    Priyank Whittington  MRN: [id number]   Principal Problem: Subdural hematoma, acute      Patient Discharged from acute Occupational Therapy on 1/28/2022.  Please refer to prior OT note dated 1/27/2022 for functional status.    Assessment:      Patient has not met goals. Pt in OR this morning.    Objective:     GOALS:   Multidisciplinary Problems     Occupational Therapy Goals        Problem: Occupational Therapy Goal    Goal Priority Disciplines Outcome Interventions   Occupational Therapy Goal     OT, PT/OT Ongoing, Progressing    Description: Goals to be met by: 2/17     Patient will increase functional independence with ADLs by performing:    UE Dressing with Minimal Assistance.  LE Dressing with Minimal Assistance.  Grooming while standing with Stand-by Assistance.  Toileting from bedside commode with Minimal Assistance for hygiene and clothing management.   Supine to sit with Minimal Assistance.  Step transfer with Stand-by Assistance                     Reasons for Discontinuation of Therapy Services  Pt in OR for surgery.      Plan:     Patient Discharged to: pt to remain in hospital.  Will need new OT orders post surgery when pt is appropriate to resume OT.    1/28/2022

## 2022-01-28 NOTE — PLAN OF CARE
[facility] Care Plan    POC reviewed with Priyank Whittington and family at 1400. Pt verbalized understanding. Questions and concerns addressed. No acute events today. Pt progressing toward goals. Will continue to monitor. See below and flowsheets for full assessment and VS info.       Neuro:  Kaylah Coma Scale  Best Eye Response: 4-->(E4) spontaneous  Best Motor Response: 6-->(M6) obeys commands  Best Verbal Response: 5-->(V5) oriented  Kaylha Coma Scale Score: 15  Assessment Qualifiers: patient not sedated/intubated  Pupil PERRLA: yes     24 hr Temp:  [97.9 °F (36.6 °C)-99.5 °F (37.5 °C)]     CV:   Rhythm: normal sinus rhythm  BP goals:   SBP < 160  MAP > 65    Resp:   O2 Device (Oxygen Therapy): room air       Plan: N/A    GI/:     Diet/Nutrition Received: NPO  Last Bowel Movement: 01/23/22  Voiding Characteristics: unable to void    Intake/Output Summary (Last 24 hours) at 1/27/2022 1815  Last data filed at 1/27/2022 1800  Gross per 24 hour   Intake 1888.11 ml   Output 2350 ml   Net -461.89 ml     Unmeasured Output  Urine Occurrence: 0  Stool Occurrence: 0    Labs/Accuchecks:  Recent Labs   Lab 01/27/22  0457   WBC 10.52   RBC 4.13*   HGB 12.4*   HCT 35.6*         Recent Labs   Lab 01/25/22  0019 01/25/22  0019 01/27/22  0457      < > 138   K 4.2   < > 4.3   CO2 21*   < > 23      < > 110   BUN 17   < > 33*   CREATININE 0.9   < > 1.0   ALKPHOS 72  --   --    ALT 18  --   --    AST 10  --   --    BILITOT 0.6  --   --     < > = values in this interval not displayed.      Recent Labs   Lab 01/23/22  1616   INR 1.1   APTT 26.2    No results for input(s): CPK, CPKMB, TROPONINI, MB in the last 168 hours.    Electrolytes: N/A - electrolytes WDL  Accuchecks: ACHS    Gtts:   sodium chloride 0.9% 75 mL/hr at 01/27/22 1800       LDA/Wounds:  Lines/Drains/Airways       Peripheral Intravenous Line                   Peripheral IV - Single Lumen 20 G Left Hand -- days         Peripheral IV - Single Lumen  01/23/22 1831 20 G Right Wrist 3 days         Peripheral IV - Single Lumen 01/25/22 1400 20 G Right Antecubital 2 days                  Wounds: No  Wound care consulted: No

## 2022-01-28 NOTE — PROGRESS NOTES
[facility] Neuro Critical Care  Neurosurgery  Progress Note    Subjective:     History of Present Illness: Pt is 62M pmh DM who presents to outside hospital with cc of constant headaches for 10 days. Denies trauma, denies blood thin use, seizure, AMS, visual disturbances. CTH on workup showed bilateral R>L  subacute subdural hematomas. NSGY consulted for evaluation.      Post-Op Info:  Procedure(s) (LRB):  CRANIOTOMY, FOR SUBDURAL HEMATOMA EVACUATION (Right)   Day of Surgery     Interval History: at 4am, pt had exam change, was unresponsive, grunting, localizing upper extremities and triple flexing lower. CTH with mild increase in MLS 8 >> 10 ml. Respiratory failure, was emergently intubated. Taken to OR class A for SDH evac    Medications:  Continuous Infusions:   sodium chloride 0.9% Stopped (01/28/22 0521)    dextrose 10 % in water (D10W)      nicardipine      propofoL       Scheduled Meds:   [START ON 1/29/2022] atorvastatin  40 mg Per NG tube Daily    atropine        levetiracetam  500 mg Per NG tube BID    multivitamin  1 tablet Oral Daily    mupirocin   Nasal BID    polyethylene glycol  17 g Per NG tube Daily    senna-docusate 8.6-50 mg  1 tablet Per NG tube BID    [START ON 1/29/2022] silodosin  4 mg Per NG tube Daily     PRN Meds:acetaminophen, dexamethasone, dextrose 10 % in water (D10W), dextrose 50%, glucagon (human recombinant), hydrALAZINE, insulin aspart U-100, labetalol, magnesium oxide, magnesium oxide, ondansetron, oxyCODONE, potassium bicarbonate, potassium bicarbonate, potassium bicarbonate, potassium, sodium phosphates, potassium, sodium phosphates, potassium, sodium phosphates, promethazine (PHENERGAN) IVPB, sodium chloride 0.9%     Review of Systems   Reason unable to perform ROS: AMS.     Objective:     Weight: 93 kg (205 lb 0.4 oz)  Body mass index is 30.28 kg/m².  Vital Signs (Most Recent):  Temp: 98.6 °F (37 °C) (01/28/22 0302)  Pulse: 84 (01/28/22 1016)  Resp: 20 (01/28/22  1016)  BP: (!) 100/56 (01/28/22 0900)  SpO2: 99 % (01/28/22 1016) Vital Signs (24h Range):  Temp:  [97.9 °F (36.6 °C)-99 °F (37.2 °C)] 98.6 °F (37 °C)  Pulse:  [] 84  Resp:  [18-31] 20  SpO2:  [93 %-100 %] 99 %  BP: (100-173)/(56-94) 100/56     Date 01/28/22 0700 - 01/29/22 0659   Shift 4887-8601 9657-5169 4352-1241 24 Hour Total   INTAKE   I.V.(mL/kg) 1173.2(12.6)   1173.2(12.6)   IV Piggyback 1000   1000   Shift Total(mL/kg) 2173.2(23.4)   2173.2(23.4)   OUTPUT   Shift Total(mL/kg)       Weight (kg) 93 93 93 93              Vent Mode: A/C  Oxygen Concentration (%):  [70-80] 70  Resp Rate Total:  [19 br/min-21 br/min] 20 br/min  Vt Set:  [400 mL] 400 mL  PEEP/CPAP:  [5 cmH20] 5 cmH20  Mean Airway Pressure:  [8.5 cmH20-10 cmH20] 8.5 cmH20         Closed/Suction Drain 01/28/22 0739 Right Other (Comment) Bulb 7 Fr. (Active)            Urethral Catheter 01/28/22 0600 (Active)       Physical Exam:    Constitutional:   Minimally responsive     Eyes: Pupils are equal, round, and reactive to light.     Cardiovascular: Regular rhythm.     Abdominal: Soft.     Psych/Behavior:   comatose        E2VtM5  PERRL  + OC/corneals/cough/gag  BUE localize  BLE tf      Significant Labs:  Recent Labs   Lab 01/27/22 0457   *      K 4.3      CO2 23   BUN 33*   CREATININE 1.0   CALCIUM 8.9   MG 2.2     Recent Labs   Lab 01/27/22 0457 01/28/22  0604   WBC 10.52 11.85   HGB 12.4* 13.7*   HCT 35.6* 40.0    178     Recent Labs   Lab 01/28/22  0604   INR 1.0  1.0   APTT 24.8  24.8     Microbiology Results (last 7 days)     ** No results found for the last 168 hours. **        All pertinent labs from the last 24 hours have been reviewed.    Significant Diagnostics:  I have reviewed and interpreted all pertinent imaging results/findings within the past 24 hours.    Assessment/Plan:     * Subdural hematoma, acute  Pt is 62M pmh DM who presents to outside hospital with cc of constant headaches for 10 days. Denies  trauma, denies blood thin use, seizure, AMS, visual disturbances. CTH on workup showed bilateral R>L  subacute subdural hematomas. NSGY consulted for evaluation.    1/28: exam change at 4 am, lethargic, not speaking, localizing uppers, intubated for respiratory failure  -- Taken class A for R ASHLEY evac on 1/28/2022    Plan:  Admit to NCC service, Q1h neurochecks  SBP<160, HOB flat, Na 135-145  Keppra  Subdural MARIANNA in place, thumbprint suction, monitor output  All imaging reviewed:    -- CTH 1/23: bilat R> L SDH, worst at R convexity at 1.4 cm, 8 mm MLS   -- CTH 1/24: stable   -- CTA 1/24: no underlying vascular lesion, 2 cm planum sphenoidale mass, likely meningioma   -- CTH 1/28 post op: good evacuation, some residual dependent fluid, improvement in MLS             Anjelica Donato MD  Neurosurgery  [facility] - [facility]

## 2022-01-28 NOTE — SUBJECTIVE & OBJECTIVE
Interval History: at 4am, pt had exam change, was unresponsive, grunting, localizing upper extremities and triple flexing lower. CTH with mild increase in MLS 8 >> 10 ml. Respiratory failure, was emergently intubated. Taken to OR class A for SDH evac    Medications:  Continuous Infusions:   sodium chloride 0.9% Stopped (01/28/22 0521)    dextrose 10 % in water (D10W)      nicardipine      propofoL       Scheduled Meds:   [START ON 1/29/2022] atorvastatin  40 mg Per NG tube Daily    atropine        levetiracetam  500 mg Per NG tube BID    multivitamin  1 tablet Oral Daily    mupirocin   Nasal BID    polyethylene glycol  17 g Per NG tube Daily    senna-docusate 8.6-50 mg  1 tablet Per NG tube BID    [START ON 1/29/2022] silodosin  4 mg Per NG tube Daily     PRN Meds:acetaminophen, dexamethasone, dextrose 10 % in water (D10W), dextrose 50%, glucagon (human recombinant), hydrALAZINE, insulin aspart U-100, labetalol, magnesium oxide, magnesium oxide, ondansetron, oxyCODONE, potassium bicarbonate, potassium bicarbonate, potassium bicarbonate, potassium, sodium phosphates, potassium, sodium phosphates, potassium, sodium phosphates, promethazine (PHENERGAN) IVPB, sodium chloride 0.9%     Review of Systems   Reason unable to perform ROS: AMS.     Objective:     Weight: 93 kg (205 lb 0.4 oz)  Body mass index is 30.28 kg/m².  Vital Signs (Most Recent):  Temp: 98.6 °F (37 °C) (01/28/22 0302)  Pulse: 84 (01/28/22 1016)  Resp: 20 (01/28/22 1016)  BP: (!) 100/56 (01/28/22 0900)  SpO2: 99 % (01/28/22 1016) Vital Signs (24h Range):  Temp:  [97.9 °F (36.6 °C)-99 °F (37.2 °C)] 98.6 °F (37 °C)  Pulse:  [] 84  Resp:  [18-31] 20  SpO2:  [93 %-100 %] 99 %  BP: (100-173)/(56-94) 100/56     Date 01/28/22 0700 - 01/29/22 0659   Shift 2874-8860 0886-6071 0847-3342 24 Hour Total   INTAKE   I.V.(mL/kg) 1173.2(12.6)   1173.2(12.6)   IV Piggyback 1000   1000   Shift Total(mL/kg) 2173.2(23.4)   2173.2(23.4)   OUTPUT   Shift  Total(mL/kg)       Weight (kg) 93 93 93 93              Vent Mode: A/C  Oxygen Concentration (%):  [70-80] 70  Resp Rate Total:  [19 br/min-21 br/min] 20 br/min  Vt Set:  [400 mL] 400 mL  PEEP/CPAP:  [5 cmH20] 5 cmH20  Mean Airway Pressure:  [8.5 cmH20-10 cmH20] 8.5 cmH20         Closed/Suction Drain 01/28/22 0739 Right Other (Comment) Bulb 7 Fr. (Active)            Urethral Catheter 01/28/22 0600 (Active)       Physical Exam:    Constitutional:   Minimally responsive     Eyes: Pupils are equal, round, and reactive to light.     Cardiovascular: Regular rhythm.     Abdominal: Soft.     Psych/Behavior:   comatose        E2VtM5  PERRL  + OC/corneals/cough/gag  BUE localize  BLE tf      Significant Labs:  Recent Labs   Lab 01/27/22  0457   *      K 4.3      CO2 23   BUN 33*   CREATININE 1.0   CALCIUM 8.9   MG 2.2     Recent Labs   Lab 01/27/22  0457 01/28/22  0604   WBC 10.52 11.85   HGB 12.4* 13.7*   HCT 35.6* 40.0    178     Recent Labs   Lab 01/28/22  0604   INR 1.0  1.0   APTT 24.8  24.8     Microbiology Results (last 7 days)     ** No results found for the last 168 hours. **        All pertinent labs from the last 24 hours have been reviewed.    Significant Diagnostics:  I have reviewed and interpreted all pertinent imaging results/findings within the past 24 hours.

## 2022-01-28 NOTE — SUBJECTIVE & OBJECTIVE
Interval History:  As above    Review of Systems   Constitutional: Positive for activity change.   HENT: Negative.    Eyes: Negative.    Respiratory: Negative.    Cardiovascular: Negative.    Gastrointestinal: Positive for nausea.   Endocrine: Negative.    Genitourinary: Positive for difficulty urinating.   Musculoskeletal: Negative.    Skin: Negative.    Allergic/Immunologic: Negative.    Neurological: Positive for headaches.   Hematological: Negative.    Psychiatric/Behavioral: Negative.      2 systems OR Unable to obtain a complete ROS due to level of consciousness.  Objective:     Vitals:  Temp: 98.6 °F (37 °C)  Pulse: 78  Rhythm: normal sinus rhythm  BP: (!) 91/56  MAP (mmHg): 67  Resp: 18  SpO2: 100 %  Oxygen Concentration (%): 70  O2 Device (Oxygen Therapy): ventilator  Vent Mode: A/C  Set Rate: 18 BPM  Vt Set: 400 mL  PEEP/CPAP: 5 cmH20  Peak Airway Pressure: 18 cmH2O  Mean Airway Pressure: 8.5 cmH20  Plateau Pressure: 0 cmH20    Temp  Min: 98.6 °F (37 °C)  Max: 99 °F (37.2 °C)  Pulse  Min: 51  Max: 120  BP  Min: 91/56  Max: 173/94  MAP (mmHg)  Min: 67  Max: 117  Resp  Min: 18  Max: 31  SpO2  Min: 93 %  Max: 100 %  Oxygen Concentration (%)  Min: 70  Max: 80    01/27 0701 - 01/28 0700  In: 1135.2 [P.O.:235; I.V.:900.2]  Out: 2625 [Urine:2625]   Unmeasured Output  Urine Occurrence: 1  Stool Occurrence: 0  Pad Count: 1       Physical Exam    Constitutional: No apparent distress.   Eyes: Conjunctiva clear, anicteric. Lids no lesions.  Head/Ears/Nose/Mouth/Throat/Neck: Moist mucous membranes. External ears, nose atraumatic.   Cardiovascular: Regular rhythm. No murmurs. No leg edema.  Respiratory: Comfortable respirations. Clear to auscultation.  Gastrointestinal: No hernia. Soft, nondistended, nontender. + bowel sounds.  Skin: No rashes, ulcers, lesions. On palpation no induration, nodules, tightening.    Neurologic Examination:    -Mental Status: eyes closed; orally intubated postop; obtunded   -Cranial nerves:   Pupils equal, round, and reactive bilateral.   -Motor: no spont movements  -Reflexes: Plantar responses down.        Medications:  Continuoussodium chloride 0.9%, Last Rate: 75 mL/hr (01/28/22 1136)  dextrose 10 % in water (D10W)  nicardipine  propofoL, Last Rate: 50 mcg/kg/min (01/28/22 1134)    Scheduled[START ON 1/29/2022] atorvastatin, 40 mg, Daily  atropine, ,   levetiracetam, 500 mg, BID  multivitamin, 1 tablet, Daily  polyethylene glycol, 17 g, Daily  senna-docusate 8.6-50 mg, 1 tablet, BID  [START ON 1/29/2022] silodosin, 4 mg, Daily    PRNacetaminophen, 650 mg, Q6H PRN  dexamethasone, 10 mg, BID PRN  dextrose 10 % in water (D10W), , Continuous PRN  dextrose 50%, 12.5 g, PRN  glucagon (human recombinant), 1 mg, PRN  hydrALAZINE, 10 mg, Q4H PRN  insulin aspart U-100, 1-10 Units, Q4H PRN  labetalol, 10 mg, Q4H PRN  magnesium oxide, 800 mg, PRN  magnesium oxide, 800 mg, PRN  ondansetron, 4 mg, Q8H PRN  oxyCODONE, 5 mg, Q6H PRN  potassium bicarbonate, 35 mEq, PRN  potassium bicarbonate, 50 mEq, PRN  potassium bicarbonate, 60 mEq, PRN  potassium, sodium phosphates, 2 packet, PRN  potassium, sodium phosphates, 2 packet, PRN  potassium, sodium phosphates, 2 packet, PRN  promethazine (PHENERGAN) IVPB, 12.5 mg, Q6H PRN  sodium chloride 0.9%, 10 mL, PRN      Today I personally reviewed pertinent medications, imaging, laboratory results, notably: cth, cxr  Diet  Diet NPO Except for: Sips with Medication  Diet NPO Except for: Sips with Medication

## 2022-01-28 NOTE — ANESTHESIA PREPROCEDURE EVALUATION
[facility]  Anesthesia Pre-Operative Evaluation         Patient Name: Priyank Whittington  YOB: 1960  MRN: [id number]    SUBJECTIVE:     Pre-operative evaluation for Procedure(s) (LRB):  CRANIOTOMY, FOR SUBDURAL HEMATOMA EVACUATION (Right)     01/28/2022    Priyank Whittington is a 62 y.o. male w/ a significant PMHx of HTN, HLD, and T2DM who presented to an OSH with c/o headache x 10 days. CTH demonstrating bilateral R > L subacute subdural hematomas. Patient decompensated overnight with AMS requiring intubation. Going to OR as class A for craniotomy and SDH evacuation.        Patient now presents for the above procedure(s).      LDA:        Peripheral IV - Single Lumen 01/23/22 1831 20 G Right Wrist (Active)   Site Assessment Clean;Dry;Intact;No redness;No swelling 01/27/22 1501   Extremity Assessment Distal to IV No abnormal discoloration;No redness;No swelling;No warmth 01/27/22 1501   Line Status Saline locked 01/27/22 1501   Dressing Status Clean;Dry;Intact 01/27/22 1501   Dressing Intervention Integrity maintained 01/27/22 1501   Dressing Change Due 01/27/22 01/27/22 1501   Site Change Due 01/27/22 01/26/22 1902   Reason Not Rotated Not due 01/27/22 1501   Number of days: 4            Peripheral IV - Single Lumen 01/25/22 1400 20 G Right Antecubital (Active)   Site Assessment Clean;Dry;Intact;No redness;No swelling;Bleeding 01/28/22 0302   Extremity Assessment Distal to IV No abnormal discoloration;No redness 01/28/22 0302   Line Status Infusing 01/28/22 0302   Dressing Status Clean;Dry;Intact 01/28/22 0302   Dressing Intervention Integrity maintained 01/28/22 0302   Dressing Change Due 01/29/22 01/28/22 0302   Site Change Due 01/29/22 01/27/22 1902   Reason Not Rotated Not due 01/28/22 0302   Number of days: 2            Peripheral IV - Single Lumen 20 G Left Hand  (Active)   Site Assessment Clean;Dry;Intact;No redness;No swelling 01/28/22 0302   Extremity Assessment Distal to IV No abnormal discoloration;No redness 01/28/22 0302   Line Status Flushed;Saline locked 01/28/22 0302   Dressing Status Clean;Dry;Intact 01/28/22 0302   Dressing Intervention Integrity maintained 01/28/22 0302   Dressing Change Due 01/31/22 01/28/22 0302   Site Change Due 01/31/22 01/27/22 1902   Reason Not Rotated Not due 01/28/22 0302   Number of days:        Male External Urinary Catheter 01/28/22 0002 Medium (Active)   Output (mL) 225 mL 01/28/22 0300   Number of days: 0        Prev airway:   Date/Time: 1/28/2022 5:43 AM  Performed by: Rubén Ro MD  Authorized by: Trevor Miguel MD      Indications:  Respiratory distress, respiratory failure, hypoxemia, airway protection and hypercapnia  Diagnosis:  Subdural hematoma  Patient Location:  ICU  Timeout:  1/28/2022 5:40 AM  Procedure Start Time:  1/28/2022 5:40 AM  Procedure End Time:  1/28/2022 5:43 AM  Staff:     Anesthesiologist Present: Yes    Intubation:     Induction:  Intravenous    Intubated:  Postinduction    Mask Ventilation:  Easy mask    Attempts:  1    Attempted By:  Resident anesthesiologist    Method of Intubation:  Video laryngoscopy    Blade:  Glidescope 3    Laryngeal View Grade: Grade I - full view of chords      Difficult Airway Encountered?: No      Complications:  None    Airway Device:  Oral endotracheal tube    Airway Device Size:  8.0    Style/Cuff Inflation:  Cuffed (inflated to minimal occlusive pressure)    Inflation Amount (mL):  7    Tube secured:  22    Secured at:  The lips    Placement Verified By:  Capnometry and Colorimetric ETCO2 device    Complicating Factors:  None    Findings Post-Intubation:  BS equal bilateral and atraumatic/condition of teeth unchanged       Drips:   sodium chloride 0.9% 75 mL/hr at 01/27/22 1952    niCARdipine      propofoL          Patient Active Problem List   Diagnosis    Subdural  hematoma, acute    Cerebral edema    HLD (hyperlipidemia)    Type 2 diabetes mellitus    HTN (hypertension)    Vomiting       Review of patient's allergies indicates:  No Known Allergies    Current Outpatient Medications:    Current Facility-Administered Medications:     0.9%  NaCl infusion, , Intravenous, Continuous, Eryn Hoyt NP, Last Rate: 75 mL/hr at 01/27/22 1952, Rate Verify at 01/27/22 1952    acetaminophen tablet 650 mg, 650 mg, Oral, Q6H PRN, Tere Hector PA-C, 650 mg at 01/27/22 1011    atorvastatin tablet 40 mg, 40 mg, Oral, Daily, Tere Jordan NP, 40 mg at 01/27/22 1000    atropine 0.1 mg/mL injection, , , ,     dexamethasone injection 10 mg, 10 mg, Intravenous, BID PRN, Colette Interiano NP, 10 mg at 01/27/22 1015    dextrose 50% injection 12.5 g, 12.5 g, Intravenous, PRN, eTre Jordan NP    glucagon (human recombinant) injection 1 mg, 1 mg, Intramuscular, PRN, Tere Jordan NP    hydrALAZINE injection 10 mg, 10 mg, Intravenous, Q4H PRN, Colette Interiano NP, 10 mg at 01/25/22 1010    insulin aspart U-100 pen 1-10 Units, 1-10 Units, Subcutaneous, QID (AC + HS) PRN, Danelle Arguelles MD, 2 Units at 01/27/22 2100    insulin aspart U-100 pen 3 Units, 3 Units, Subcutaneous, QID (AC & HS), Eryn Hoyt NP, 3 Units at 01/27/22 2100    labetalol 20 mg/4 mL (5 mg/mL) IV syring, 10 mg, Intravenous, Q4H PRN, Colette Interiano NP, 10 mg at 01/25/22 0940    levETIRAcetam tablet 500 mg, 500 mg, Oral, BID, Danelle Arguelles MD, 500 mg at 01/27/22 2102    magnesium oxide tablet 800 mg, 800 mg, Oral, PRN, eTre Jordan NP    magnesium oxide tablet 800 mg, 800 mg, Oral, PRN, Tere Jordan NP    mannitol 25% injection 100 g, 100 g, Intravenous, Once, Danelle Arguelles MD, Last Rate: 800 mL/hr at 01/28/22 0545, 100 g at 01/28/22 0545    multivitamin tablet, 1 tablet, Oral, Daily, Colette Interiano NP, 1 tablet at 01/27/22 1000    mupirocin 2 % ointment, , Nasal, BID, Tere Hector PA-C, Given at  01/27/22 2100    niCARdipine 40 mg/200 mL (0.2 mg/mL) infusion, 0-15 mg/hr, Intravenous, Continuous PRN, Trent Cruz NP    ondansetron injection 4 mg, 4 mg, Intravenous, Q8H PRN, Tere Jordan NP, 4 mg at 01/26/22 0827    oxyCODONE immediate release tablet 5 mg, 5 mg, Oral, Q6H PRN, Colette Interiano NP, 5 mg at 01/25/22 0534    polyethylene glycol packet 17 g, 17 g, Oral, Daily, Edelmira Chilel, NP, 17 g at 01/26/22 0931    potassium bicarbonate disintegrating tablet 35 mEq, 35 mEq, Oral, PRN, Tere Jordan NP    potassium bicarbonate disintegrating tablet 50 mEq, 50 mEq, Oral, PRN, Tere Jordan, ELIF    potassium bicarbonate disintegrating tablet 60 mEq, 60 mEq, Oral, PRN, Tere Jordan, ELIF    potassium, sodium phosphates 280-160-250 mg packet 2 packet, 2 packet, Oral, PRN, Tere Jordan, ELIF    potassium, sodium phosphates 280-160-250 mg packet 2 packet, 2 packet, Oral, PRN, Tere Jordan, ELIF    potassium, sodium phosphates 280-160-250 mg packet 2 packet, 2 packet, Oral, PRN, Tere Jordan NP    promethazine (PHENERGAN) 12.5 mg in dextrose 5 % 50 mL IVPB, 12.5 mg, Intravenous, Q6H PRN, Colette Interiano NP    propofol (DIPRIVAN) 10 mg/mL infusion, 0-50 mcg/kg/min, Intravenous, Continuous, Trent Cruz NP    rocuronium 10 mg/mL injection, , , ,     senna-docusate 8.6-50 mg per tablet 1 tablet, 1 tablet, Oral, BID, Tere Jordan NP, 1 tablet at 01/27/22 1000    silodosin capsule 4 mg, 4 mg, Oral, Daily, Colette Interiano NP, 4 mg at 01/27/22 1000    sodium chloride 0.9% flush 10 mL, 10 mL, Intravenous, PRN, Tere Jordan NP    History reviewed. No pertinent surgical history.    Social History     Socioeconomic History    Marital status:    Tobacco Use    Smoking status: Never Smoker    Smokeless tobacco: Never Used   Substance and Sexual Activity    Alcohol use: Never    Sexual activity: Yes     Partners: Female       OBJECTIVE:     Vital Signs Range (Last 24H):  Temp:  [36.6 °C (97.9 °F)-37.2 °C (99 °F)]    Pulse:  [51-87]   Resp:  [16-31]   BP: (101-169)/(50-81)   SpO2:  [95 %-100 %]       Significant Labs:  Lab Results   Component Value Date    WBC 10.52 01/27/2022    HGB 12.4 (L) 01/27/2022    HCT 35.6 (L) 01/27/2022     01/27/2022    CHOL 137 01/24/2022    TRIG 101 01/24/2022    HDL 32 (L) 01/24/2022    ALT 18 01/25/2022    AST 10 01/25/2022     01/27/2022    K 4.3 01/27/2022     01/27/2022    CREATININE 1.0 01/27/2022    BUN 33 (H) 01/27/2022    CO2 23 01/27/2022    INR 1.1 01/23/2022    HGBA1C 7.2 (H) 01/24/2022       Microbiology Results (last 7 days)     ** No results found for the last 168 hours. **          Diagnostic Studies:    EKG:   Results for orders placed or performed during the hospital encounter of 01/23/22   EKG, 12 - Lead    Collection Time: 01/23/22  8:01 PM    Narrative    Test Reason : S06.5X9A,    Vent. Rate : 068 BPM     Atrial Rate : 068 BPM     P-R Int : 168 ms          QRS Dur : 084 ms      QT Int : 364 ms       P-R-T Axes : 067 -01 002 degrees     QTc Int : 387 ms    Normal sinus rhythm  Normal ECG  No previous ECGs available  Confirmed by Kirby Rocha MD ([id number]) on 1/24/2022 12:43:25 PM    Referred By: [email or web address]   SELF           Confirmed By:Kirby Rocha MD       2D ECHO:  TTE:  No results found for this or any previous visit.    CINTHYA:  No results found for this or any previous visit.    ASSESSMENT/PLAN:         Anesthesia Evaluation    I have reviewed the Patient Summary Reports.    I have reviewed the Nursing Notes.    I have reviewed the Medications.     Review of Systems  Anesthesia Hx:  No problems with previous Anesthesia Denies Hx of Anesthetic complications  History of prior surgery of interest to airway management or planning: Denies Family Hx of Anesthesia complications.   Denies Personal Hx of Anesthesia complications.   Hematology/Oncology:     Oncology Normal     Cardiovascular:   Hypertension  Denies Angina. hyperlipidemia ECG has been reviewed.     Pulmonary:   Denies Shortness of breath.  Denies Recent URI.    Renal/:  Renal/ Normal     Hepatic/GI:   Denies GERD. Denies Liver Disease.    Neurological:   CVA Denies Seizures.    Endocrine:   Diabetes, type 2        Physical Exam  General:  Well nourished    Airway/Jaw/Neck:  Airway Findings: Mouth Opening: Normal Tongue: Large  Pre-Existing Airway Tube(s): Oral Endotracheal tube  Size: 8.0  General Airway Assessment: Adult  Mallampati: III  TM Distance: Normal, at least 6 cm  Jaw/Neck Findings:  Neck ROM: Normal ROM  Neck Findings: Normal    Eyes/Ears/Nose:  EYES/EARS/NOSE FINDINGS: Normal   Dental:  Dental Findings: Periodontal disease, Mild   Chest/Lungs:  Chest/Lungs Findings: Normal Respiratory Rate     Heart/Vascular:  Heart Findings: Rate: Normal  Rhythm: Regular Rhythm        Mental Status:  Mental Status Findings:  Somnolent, Lethargic         Anesthesia Plan  Type of Anesthesia, risks & benefits discussed:  Anesthesia Type:  general    Patient's Preference:   Plan Factors:          Intra-op Monitoring Plan: standard ASA monitors  Intra-op Monitoring Plan Comments:   Post Op Pain Control Plan: multimodal analgesia, IV/PO Opioids PRN and per primary service following discharge from PACU  Post Op Pain Control Plan Comments:     Induction:   IV  Beta Blocker:  Patient is not currently on a Beta-Blocker (No further documentation required).       Informed Consent: Patient representative understands risks and agrees with Anesthesia plan.  Questions answered. Anesthesia consent signed with patient representative.  ASA Score: 4  emergent   Day of Surgery Review of History & Physical:    H&P update referred to the surgeon.         Ready For Surgery From Anesthesia Perspective.

## 2022-01-28 NOTE — NURSING TRANSFER
Nursing Transfer Note      1/28/2022     Reason patient is being transferred: CT in ED    Transfer From: 9073 to CT    Transfer via bed    Transfer with  to O2, cardiac monitoring    Transported by RN and resp tech    Medicines sent: propofol running    Any special needs or follow-up needed: Neuro checks completed upon arrival back to unit, please see neuro assessments.     Chart send with patient: No    Notified: family members at bedsie    Patient reassessed at: RN with pt at all time during testing. Please see neuro checks in assessments    Upon arrival to floor: cardiac monitor applied, patient oriented to room, call bell in reach and bed in lowest position

## 2022-01-28 NOTE — PLAN OF CARE
[facility] Care Plan    POC reviewed with Priyank Whittington and family at 1400. Pt unable to verbalized understanding due to intubation and sedation. Plan of care reviewed with various family members throughout the shift. Pt taken this morning for class A decompression for SDH evac with MARIANNA to thumbprint suction placed. Post op CT scan stable. Post intubated CXR stable. Post placement of OGT completed with order ok to use. Pt not to be hooked back up to EEG at this time due to fresh incision dressing in place where key EEG leads need to be placed and dressing is not to be removed at this time. When dressing can be removed will re-eval if EEG needs to be placed back on.  MARIANNA has put put little over 200mL this shift. Pt neuro status has improved since post op over the shift and with decreasing sedation. Propofol currently running at 5. NS running at 75. ETT, OGT, Benson, Left radial kiersten and two PIV remain in place and in good working order at this time. Family at bedside have been update as to pt current improved neuro status. SBP < 160 pt has gotten up to 160 but not sustained on the kiersten, cuff has not read over 140. All reflexes are intact at this time. NSR with PVCs noted. No tube feeds have been started at this time in plan for possible extubation tomorrow.  Questions and concerns addressed. No acute events today. Pt progressing toward goals. Will continue to monitor. See below and flowsheets for full assessment and VS info.       Neuro:  [place] Coma Scale  Best Eye Response: 1-->(E1) none  Best Motor Response: 6-->(M6) obeys commands  Best Verbal Response: 1-->(V1) none  [place] Coma Scale Score: 8  Assessment Qualifiers: patient intubated,patient chemically sedated or paralyzed  Pupil PERRLA: yes     24 hr Temp:  [97 °F (36.1 °C)-99 °F (37.2 °C)]     CV:   Rhythm: normal sinus rhythm  BP goals:   SBP < 160  MAP > 65    Resp:   O2 Device (Oxygen Therapy): ventilator  Vent Mode: A/C  Set Rate: 18 BPM  Oxygen  Concentration (%): 70  Vt Set: 400 mL  PEEP/CPAP: 5 cmH20    Plan: wean to extubate    GI/:     Diet/Nutrition Received: NPO  Last Bowel Movement: 01/23/22  Voiding Characteristics: ureteral catheter    Intake/Output Summary (Last 24 hours) at 1/28/2022 1743  Last data filed at 1/28/2022 1700  Gross per 24 hour   Intake 2870.85 ml   Output 3003 ml   Net -132.15 ml     Unmeasured Output  Urine Occurrence: 1  Stool Occurrence: 0  Pad Count: 1    Labs/Accuchecks:  Recent Labs   Lab 01/28/22  0604   WBC 11.85   RBC 4.67   HGB 13.7*   HCT 40.0         Recent Labs   Lab 01/25/22  0019 01/25/22  0019 01/27/22  0457      < > 138   K 4.2   < > 4.3   CO2 21*   < > 23      < > 110   BUN 17   < > 33*   CREATININE 0.9   < > 1.0   ALKPHOS 72  --   --    ALT 18  --   --    AST 10  --   --    BILITOT 0.6  --   --     < > = values in this interval not displayed.      Recent Labs   Lab 01/28/22  0604   INR 1.0  1.0   APTT 24.8  24.8    No results for input(s): CPK, CPKMB, TROPONINI, MB in the last 168 hours.    Electrolytes: No replacement orders  Accuchecks: Q4H    Gtts:   sodium chloride 0.9% 75 mL/hr at 01/28/22 1500    dextrose 10 % in water (D10W)      propofoL 5 mcg/kg/min (01/28/22 1700)       LDA/Wounds:  Lines/Drains/Airways       Drain                   Closed/Suction Drain 01/28/22 0739 Right Other (Comment) Bulb 7 Fr. <1 day         NG/OG Tube 01/28/22 1000 nasogastric Center mouth <1 day         Urethral Catheter 01/28/22 0600 <1 day              Airway                   Airway - Non-Surgical 01/28/22 0542 Endotracheal Tube <1 day       Airway Anesthesia 01/28/22 <1 day              Arterial Line              Arterial Line 01/28/22 0700 Left Radial <1 day              Peripheral Intravenous Line                   Peripheral IV - Single Lumen 20 G Left Hand -- days         Peripheral IV - Single Lumen 01/25/22 1400 20 G Right Antecubital 3 days                  Wounds: No  Wound care consulted: No    Problem: Adult Inpatient Plan of Care  Goal: Plan of Care Review  Outcome: Ongoing, Progressing  Goal: Patient-Specific Goal (Individualized)  Description: Admit Date: 1/23/2022    Admit Dx: Subdural     Past Medical History:  No date: Diabetes mellitus    History reviewed. No pertinent surgical history.    Individualization:   1. Keep family updated     Restraints: N/A          Outcome: Ongoing, Progressing  Goal: Absence of Hospital-Acquired Illness or Injury  Outcome: Ongoing, Progressing  Goal: Optimal Comfort and Wellbeing  Outcome: Ongoing, Progressing     Problem: Adjustment to Illness (Stroke, Hemorrhagic)  Goal: Optimal Coping  Outcome: Ongoing, Progressing     Problem: Respiratory Compromise (Stroke, Hemorrhagic)  Goal: Effective Oxygenation and Ventilation  Outcome: Ongoing, Progressing     Problem: Urinary Elimination Impaired (Stroke, Hemorrhagic)  Goal: Effective Urinary Elimination  Outcome: Ongoing, Progressing     Problem: Skin Injury Risk Increased  Goal: Skin Health and Integrity  Outcome: Ongoing, Progressing

## 2022-01-28 NOTE — SIGNIFICANT EVENT
0355 At bedside to assess LOC. Pt responds to tactile stimuli, localizes and incomprehensible sounds. CTH ordered.     0450 CTH shows increased shift. NSGY notified of exam change and new CTH. Mannitol 100g ordered. Hr fluctuating 40's- 80's, BP stable 140's systolic.  Awaiting possible surgical plan per NSGY. Discussed with Staff. Will cont to closely monitor.     0515 sonorus resp,  sop2 86%, O2 placed and Anesthesia called for intubation. Spoke with NSGy to OR class A          Critical condition in that Patient has a condition that poses threat to life and bodily function: SDH, Brain compression, hypoxic resp failure     55 minutes of Critical care time was spent personally by me on the following activities: development of treatment plan with patient or surrogate and bedside caregivers, discussions with consultants, evaluation of patient's response to treatment, examination of patient, ordering and performing treatments and interventions, ordering and review of laboratory studies, ordering and review of radiographic studies, pulse oximetry, antibiotic titration if applicable, vasopressor titration if applicable, re-evaluation of patient's condition. This critical care time did not overlap with that of any other provider or involve time for any procedures. There is high probability for acute neurological change leading to clinical and possibly life-threatening deterioration requiring highest level of physician preparedness for urgent intervention.

## 2022-01-28 NOTE — ASSESSMENT & PLAN NOTE
Pt is 62M pmh DM who presents to outside hospital with cc of constant headaches for 10 days. Denies trauma, denies blood thin use, seizure, AMS, visual disturbances. CTH on workup showed bilateral R>L  subacute subdural hematomas. NSGY consulted for evaluation.    1/28: exam change at 4 am, lethargic, not speaking, localizing uppers, intubated for respiratory failure  -- Taken class A for R ASHLEY evac on 1/28/2022    Plan:  Admit to NCC service, Q1h neurochecks  SBP<160, HOB flat, Na 135-145  Keppra  Subdural MARIANNA in place, thumbprint suction, monitor output  All imaging reviewed:    -- CTH 1/23: bilat R> L SDH, worst at R convexity at 1.4 cm, 8 mm MLS   -- CTH 1/24: stable   -- CTA 1/24: no underlying vascular lesion, 2 cm planum sphenoidale mass, likely meningioma   -- CTH 1/28 post op: good evacuation, some residual dependent fluid, improvement in MLS

## 2022-01-28 NOTE — PROCEDURES
24 hr. Video EEG Monitoring    Date/Time: 1/23/2022 1:30 PM  Performed by: Scout Quezada MD  Authorized by: Trent Cruz NP       ICU EEG/VIDEO MONITORING REPORT    DATE OF SERVICE: 1/27/22-1/28/22  EEG NUMBER: [id number]  REQUESTED BY: Anthony  LOCATION OF SERVICE: [facility] [place]    METHODOLOGY   Electroencephalographic (EEG) recording is with electrodes placed according to the International 10-20 placement system.  Thirty two (32) channels of digital signal are simultaneously recorded from the scalp and may include EKG, EMG, and/or eye monitors.   Recording band pass was 0.1 to 512 hz.  Digital video recording of the patient is simultaneously recorded with the EEG.  The nursing staff report clinical symptoms and may press an event button when the patient has symptoms of clinical interest to the treating physicians.  EEG and video recording is stored and archived in digital format.  The entire recording is visually reviewed and the times identified by computer analysis as being spikes or seizures are reviewed again.  Activation procedures which include photic stimulation, hyperventilation and instructing patients to perform simple task are done in selected patients.   Compresses spectral analysis (CSA) is also performed on the activity recorded from each individual channel.  This is displayed as a power display of frequencies from 0 to 30 Hz over time.   The CSA analysis is done and displayed continuously.  This is reviewed for asymmetries in power between homologous areas of the scalp and for presence of changes in power which canbe seen when seizures occur.  Sections of suspected abnormalities on the CSA is then compared with the original EEG recording.     [employer] software was also utilized in the review of this study.  This software suite analyzes the EEG recording in multiple domains.  Coherence and rhythmicity is computed to identify EEG sections which may contain organized seizures.  Each channel undergoes analysis  to detect presence of spike and sharp waves which have special and morphological characteristic of epileptic activity.  The routine EEG recording is converted from spacial into frequency domain.  This is then displayed comparing homologous areas to identify areas of significant asymmetry.  Algorithm to identify non-cortically generated artifact is used to separate eye movement, EMG and other artifact from the EEG.      Recording Times  Start on 1/27/22 at 11:58:28  Stop on 1/28/22 at 04:17:038  A total of 16 hours of EEG was recorded.    EEG FINDINGS  The record shows a good  organization at rest, consisting of a  11 Hz posterior dominant rhythm with good  reactivity. There is mild bilateral beta activity. There is frequent theta and delta range background slowing    Drowsiness is characterized by attenuation of the background, vertex waves, and bilateral theta slowing. Stage II sleep is characterized by slowing, vertex waves, and symmetric sleep spindles.     Provocative maneuvers including hyperventilation and photic stimulation were not performed.     EKG recording shows a regular rhythm.    There is no push button or clinical event.    IMPRESSION:  Abnormal study due to mild  diffuse background slowing consistent with diffuse cerebral dysfunction and encephalopathy which may be on the basis of toxic, metabolic, or primary neuronal disorder.     Scout Quezada MD  Department of Neurology  [facility]

## 2022-01-29 LAB
ALBUMIN SERPL BCP-MCNC: 2.8 G/DL (ref 3.5–5.2)
ALLENS TEST: ABNORMAL
ALLENS TEST: ABNORMAL
ALP SERPL-CCNC: 54 U/L (ref 55–135)
ALT SERPL W/O P-5'-P-CCNC: 29 U/L (ref 10–44)
ANION GAP SERPL CALC-SCNC: 6 MMOL/L (ref 8–16)
ASCENDING AORTA: 3 CM
AST SERPL-CCNC: 11 U/L (ref 10–40)
AV INDEX (PROSTH): 1.06
AV MEAN GRADIENT: 4 MMHG
AV PEAK GRADIENT: 6 MMHG
AV VALVE AREA: 3.29 CM2
AV VELOCITY RATIO: 0.81
BASOPHILS # BLD AUTO: 0.02 K/UL (ref 0–0.2)
BASOPHILS NFR BLD: 0.2 % (ref 0–1.9)
BILIRUB SERPL-MCNC: 0.6 MG/DL (ref 0.1–1)
BSA FOR ECHO PROCEDURE: 2.13 M2
BUN SERPL-MCNC: 24 MG/DL (ref 6–20)
CALCIUM SERPL-MCNC: 8.2 MG/DL (ref 8.7–10.5)
CHLORIDE SERPL-SCNC: 107 MMOL/L (ref 95–110)
CO2 SERPL-SCNC: 25 MMOL/L (ref 23–29)
CREAT SERPL-MCNC: 0.7 MG/DL (ref 0.5–1.4)
CV ECHO LV RWT: 0.36 CM
DELSYS: ABNORMAL
DELSYS: ABNORMAL
DIFFERENTIAL METHOD: ABNORMAL
DOP CALC AO PEAK VEL: 1.27 M/S
DOP CALC AO VTI: 21.75 CM
DOP CALC LVOT AREA: 3.1 CM2
DOP CALC LVOT DIAMETER: 1.99 CM
DOP CALC LVOT PEAK VEL: 1.03 M/S
DOP CALC LVOT STROKE VOLUME: 71.59 CM3
DOP CALCLVOT PEAK VEL VTI: 23.03 CM
E WAVE DECELERATION TIME: 212.63 MSEC
E/A RATIO: 0.89
E/E' RATIO: 9.29 M/S
ECHO LV POSTERIOR WALL: 0.87 CM (ref 0.6–1.1)
EJECTION FRACTION: 55 %
EOSINOPHIL # BLD AUTO: 0 K/UL (ref 0–0.5)
EOSINOPHIL NFR BLD: 0 % (ref 0–8)
ERYTHROCYTE [DISTWIDTH] IN BLOOD BY AUTOMATED COUNT: 12.5 % (ref 11.5–14.5)
ERYTHROCYTE [SEDIMENTATION RATE] IN BLOOD BY WESTERGREN METHOD: 18 MM/H
EST. GFR  (AFRICAN AMERICAN): >60 ML/MIN/1.73 M^2
EST. GFR  (NON AFRICAN AMERICAN): >60 ML/MIN/1.73 M^2
FIO2: 40
FIO2: 70
FRACTIONAL SHORTENING: 33 % (ref 28–44)
GLUCOSE SERPL-MCNC: 217 MG/DL (ref 70–110)
HCO3 UR-SCNC: 26 MMOL/L (ref 24–28)
HCO3 UR-SCNC: 27.9 MMOL/L (ref 24–28)
HCT VFR BLD AUTO: 36.1 % (ref 40–54)
HGB BLD-MCNC: 12.2 G/DL (ref 14–18)
IMM GRANULOCYTES # BLD AUTO: 0.11 K/UL (ref 0–0.04)
IMM GRANULOCYTES NFR BLD AUTO: 0.8 % (ref 0–0.5)
INTERVENTRICULAR SEPTUM: 0.89 CM (ref 0.6–1.1)
IVRT: 71.36 MSEC
LA MAJOR: 5.66 CM
LA MINOR: 5.62 CM
LA WIDTH: 4.06 CM
LEFT ATRIUM SIZE: 3.03 CM
LEFT ATRIUM VOLUME INDEX MOD: 21.6 ML/M2
LEFT ATRIUM VOLUME INDEX: 28.2 ML/M2
LEFT ATRIUM VOLUME MOD: 45.08 CM3
LEFT ATRIUM VOLUME: 58.97 CM3
LEFT INTERNAL DIMENSION IN SYSTOLE: 3.29 CM (ref 2.1–4)
LEFT VENTRICLE DIASTOLIC VOLUME INDEX: 54.09 ML/M2
LEFT VENTRICLE DIASTOLIC VOLUME: 113.05 ML
LEFT VENTRICLE MASS INDEX: 71 G/M2
LEFT VENTRICLE SYSTOLIC VOLUME INDEX: 20.9 ML/M2
LEFT VENTRICLE SYSTOLIC VOLUME: 43.67 ML
LEFT VENTRICULAR INTERNAL DIMENSION IN DIASTOLE: 4.9 CM (ref 3.5–6)
LEFT VENTRICULAR MASS: 148.5 G
LV LATERAL E/E' RATIO: 8.78 M/S
LV SEPTAL E/E' RATIO: 9.88 M/S
LYMPHOCYTES # BLD AUTO: 1.1 K/UL (ref 1–4.8)
LYMPHOCYTES NFR BLD: 8 % (ref 18–48)
MAGNESIUM SERPL-MCNC: 2.2 MG/DL (ref 1.6–2.6)
MCH RBC QN AUTO: 29.2 PG (ref 27–31)
MCHC RBC AUTO-ENTMCNC: 33.8 G/DL (ref 32–36)
MCV RBC AUTO: 86 FL (ref 82–98)
MIN VOL: 8.75
MODE: ABNORMAL
MODE: ABNORMAL
MONOCYTES # BLD AUTO: 1.1 K/UL (ref 0.3–1)
MONOCYTES NFR BLD: 8.1 % (ref 4–15)
MV PEAK A VEL: 0.89 M/S
MV PEAK E VEL: 0.79 M/S
MV STENOSIS PRESSURE HALF TIME: 61.66 MS
MV VALVE AREA P 1/2 METHOD: 3.57 CM2
NEUTROPHILS # BLD AUTO: 11 K/UL (ref 1.8–7.7)
NEUTROPHILS NFR BLD: 82.9 % (ref 38–73)
NRBC BLD-RTO: 0 /100 WBC
PCO2 BLDA: 41.1 MMHG (ref 35–45)
PCO2 BLDA: 43.3 MMHG (ref 35–45)
PEEP: 5
PEEP: 5
PH SMN: 7.39 [PH] (ref 7.35–7.45)
PH SMN: 7.44 [PH] (ref 7.35–7.45)
PHOSPHATE SERPL-MCNC: 3.4 MG/DL (ref 2.7–4.5)
PIP: 18
PISA TR MAX VEL: 2.51 M/S
PLATELET # BLD AUTO: 178 K/UL (ref 150–450)
PMV BLD AUTO: 11.1 FL (ref 9.2–12.9)
PO2 BLDA: 111 MMHG (ref 80–100)
PO2 BLDA: 198 MMHG (ref 80–100)
POC BE: 1 MMOL/L
POC BE: 4 MMOL/L
POC SATURATED O2: 100 % (ref 95–100)
POC SATURATED O2: 98 % (ref 95–100)
POC TCO2: 27 MMOL/L (ref 23–27)
POC TCO2: 29 MMOL/L (ref 23–27)
POCT GLUCOSE: 163 MG/DL (ref 70–110)
POCT GLUCOSE: 163 MG/DL (ref 70–110)
POCT GLUCOSE: 179 MG/DL (ref 70–110)
POCT GLUCOSE: 194 MG/DL (ref 70–110)
POCT GLUCOSE: 201 MG/DL (ref 70–110)
POCT GLUCOSE: 205 MG/DL (ref 70–110)
POTASSIUM SERPL-SCNC: 3.5 MMOL/L (ref 3.5–5.1)
POTASSIUM SERPL-SCNC: 4.5 MMOL/L (ref 3.5–5.1)
PROT SERPL-MCNC: 5.2 G/DL (ref 6–8.4)
PS: 5
RA MAJOR: 4.95 CM
RA WIDTH: 2.95 CM
RBC # BLD AUTO: 4.18 M/UL (ref 4.6–6.2)
RV TISSUE DOPPLER FREE WALL SYSTOLIC VELOCITY 1 (APICAL 4 CHAMBER VIEW): 16.05 CM/S
SAMPLE: ABNORMAL
SAMPLE: ABNORMAL
SINUS: 2.94 CM
SITE: ABNORMAL
SITE: ABNORMAL
SODIUM SERPL-SCNC: 138 MMOL/L (ref 136–145)
SP02: 100
SPONT RATE: 21
STJ: 2.4 CM
TDI LATERAL: 0.09 M/S
TDI SEPTAL: 0.08 M/S
TDI: 0.09 M/S
TR MAX PG: 25 MMHG
TRICUSPID ANNULAR PLANE SYSTOLIC EXCURSION: 2.82 CM
VERBAL RESULT READBACK PERFORMED: YES
VT: 400
WBC # BLD AUTO: 13.27 K/UL (ref 3.9–12.7)

## 2022-01-29 PROCEDURE — 20000000 HC ICU ROOM

## 2022-01-29 PROCEDURE — 99900026 HC AIRWAY MAINTENANCE (STAT)

## 2022-01-29 PROCEDURE — 27200966 HC CLOSED SUCTION SYSTEM

## 2022-01-29 PROCEDURE — 84132 ASSAY OF SERUM POTASSIUM: CPT | Performed by: PHYSICIAN ASSISTANT

## 2022-01-29 PROCEDURE — 51702 INSERT TEMP BLADDER CATH: CPT

## 2022-01-29 PROCEDURE — 27000221 HC OXYGEN, UP TO 24 HOURS

## 2022-01-29 PROCEDURE — 25000003 PHARM REV CODE 250: Performed by: NURSE PRACTITIONER

## 2022-01-29 PROCEDURE — 99291 PR CRITICAL CARE, E/M 30-74 MINUTES: ICD-10-PCS | Mod: ,,, | Performed by: INTERNAL MEDICINE

## 2022-01-29 PROCEDURE — 84100 ASSAY OF PHOSPHORUS: CPT | Performed by: PHYSICIAN ASSISTANT

## 2022-01-29 PROCEDURE — 80053 COMPREHEN METABOLIC PANEL: CPT | Performed by: PHYSICIAN ASSISTANT

## 2022-01-29 PROCEDURE — 94003 VENT MGMT INPAT SUBQ DAY: CPT

## 2022-01-29 PROCEDURE — 94150 VITAL CAPACITY TEST: CPT

## 2022-01-29 PROCEDURE — 94761 N-INVAS EAR/PLS OXIMETRY MLT: CPT

## 2022-01-29 PROCEDURE — 99291 CRITICAL CARE FIRST HOUR: CPT | Mod: ,,, | Performed by: INTERNAL MEDICINE

## 2022-01-29 PROCEDURE — 94010 BREATHING CAPACITY TEST: CPT

## 2022-01-29 PROCEDURE — 99900035 HC TECH TIME PER 15 MIN (STAT)

## 2022-01-29 PROCEDURE — 99900017 HC EXTUBATION W/PARAMETERS (STAT)

## 2022-01-29 PROCEDURE — 82803 BLOOD GASES ANY COMBINATION: CPT

## 2022-01-29 PROCEDURE — 83735 ASSAY OF MAGNESIUM: CPT | Performed by: PHYSICIAN ASSISTANT

## 2022-01-29 PROCEDURE — 85025 COMPLETE CBC W/AUTO DIFF WBC: CPT | Performed by: PHYSICIAN ASSISTANT

## 2022-01-29 PROCEDURE — 37799 UNLISTED PX VASCULAR SURGERY: CPT

## 2022-01-29 PROCEDURE — 25000003 PHARM REV CODE 250: Performed by: INTERNAL MEDICINE

## 2022-01-29 RX ADMIN — INSULIN ASPART 2 UNITS: 100 INJECTION, SOLUTION INTRAVENOUS; SUBCUTANEOUS at 05:01

## 2022-01-29 RX ADMIN — SILODOSIN 4 MG: 4 CAPSULE ORAL at 08:01

## 2022-01-29 RX ADMIN — INSULIN ASPART 2 UNITS: 100 INJECTION, SOLUTION INTRAVENOUS; SUBCUTANEOUS at 02:01

## 2022-01-29 RX ADMIN — POLYETHYLENE GLYCOL 3350 17 G: 17 POWDER, FOR SOLUTION ORAL at 08:01

## 2022-01-29 RX ADMIN — LEVETIRACETAM 500 MG: 100 SOLUTION ORAL at 08:01

## 2022-01-29 RX ADMIN — ATORVASTATIN CALCIUM 40 MG: 20 TABLET, FILM COATED ORAL at 08:01

## 2022-01-29 RX ADMIN — POTASSIUM BICARBONATE 50 MEQ: 978 TABLET, EFFERVESCENT ORAL at 02:01

## 2022-01-29 RX ADMIN — SENNOSIDES AND DOCUSATE SODIUM 1 TABLET: 50; 8.6 TABLET ORAL at 08:01

## 2022-01-29 RX ADMIN — INSULIN ASPART 2 UNITS: 100 INJECTION, SOLUTION INTRAVENOUS; SUBCUTANEOUS at 12:01

## 2022-01-29 RX ADMIN — INSULIN ASPART 1 UNITS: 100 INJECTION, SOLUTION INTRAVENOUS; SUBCUTANEOUS at 08:01

## 2022-01-29 RX ADMIN — Medication 1 TABLET: at 08:01

## 2022-01-29 RX ADMIN — INSULIN ASPART 4 UNITS: 100 INJECTION, SOLUTION INTRAVENOUS; SUBCUTANEOUS at 08:01

## 2022-01-29 NOTE — PROGRESS NOTES
[facility]  Neurosurgery  Progress Note    Subjective:     History of Present Illness: Pt is 62M pmh DM who presents to outside hospital with cc of constant headaches for 10 days. Denies trauma, denies blood thin use, seizure, AMS, visual disturbances. CTH on workup showed bilateral R>L  subacute subdural hematomas. NSGY consulted for evaluation.      Post-Op Info:  Procedure(s) (LRB):  CRANIOTOMY, FOR SUBDURAL HEMATOMA EVACUATION (Right)   1 Day Post-Op     Interval History: Now s/p evacuation 1/28    Medications:  Continuous Infusions:   sodium chloride 0.9% 75 mL/hr at 01/29/22 0900    dextrose 10 % in water (D10W)      fentanyl 50 mcg/hr (01/29/22 0900)     Scheduled Meds:   atorvastatin  40 mg Per NG tube Daily    levetiracetam  500 mg Per NG tube BID    multivitamin  1 tablet Oral Daily    polyethylene glycol  17 g Per NG tube Daily    senna-docusate 8.6-50 mg  1 tablet Per NG tube BID    silodosin  4 mg Per NG tube Daily     PRN Meds:acetaminophen, dexamethasone, dextrose 10 % in water (D10W), dextrose 50%, glucagon (human recombinant), hydrALAZINE, insulin aspart U-100, labetalol, magnesium oxide, magnesium oxide, ondansetron, oxyCODONE, potassium bicarbonate, potassium bicarbonate, potassium bicarbonate, potassium, sodium phosphates, potassium, sodium phosphates, potassium, sodium phosphates, promethazine (PHENERGAN) IVPB, sodium chloride 0.9%     Review of Systems   Reason unable to perform ROS: AMS.     Objective:     Weight: 93 kg (205 lb 0.4 oz)  Body mass index is 30.28 kg/m².  Vital Signs (Most Recent):  Temp: 98.3 °F (36.8 °C) (01/29/22 0700)  Pulse: 66 (01/29/22 0900)  Resp: 18 (01/29/22 0900)  BP: (!) 103/54 (01/29/22 0900)  SpO2: 99 % (01/29/22 0900) Vital Signs (24h Range):  Temp:  [98 °F (36.7 °C)-99.3 °F (37.4 °C)] 98.3 °F (36.8 °C)  Pulse:  [65-96] 66  Resp:  [17-24] 18  SpO2:  [99 %-100 %] 99 %  BP: ()/(52-72) 103/54  Arterial Line BP: ()/(41-75)  113/50     Date 01/29/22 0700 - 01/30/22 0659   Shift 8747-9845 3493-7142 1538-8551 24 Hour Total   INTAKE   P.O. 0   0   I.V.(mL/kg) 233.3(2.5)   233.3(2.5)   NG/GT 60   60   Shift Total(mL/kg) 293.3(3.2)   293.3(3.2)   OUTPUT   Urine(mL/kg/hr) 170   170   Drains 43   43   Shift Total(mL/kg) 213(2.3)   213(2.3)   Weight (kg) 93 93 93 93              Vent Mode: A/C  Oxygen Concentration (%):  [40-70] 40  Resp Rate Total:  [18 br/min-25 br/min] 18 br/min  Vt Set:  [400 mL] 400 mL  PEEP/CPAP:  [5 cmH20] 5 cmH20  Mean Airway Pressure:  [7.7 fbJ07-74 cmH20] 8.6 cmH20         Closed/Suction Drain 01/28/22 0739 Right Other (Comment) Bulb 7 Fr. (Active)            Urethral Catheter 01/28/22 0600 (Active)       Physical Exam:    Constitutional:   Minimally responsive     Eyes: Pupils are equal, round, and reactive to light.     Cardiovascular: Regular rhythm.     Abdominal: Soft.     Psych/Behavior:   comatose        [id number]  PERRL  + OC/corneals/cough/gag  BUE localize  BLE tf      Significant Labs:  Recent Labs   Lab 01/29/22  0020 01/29/22  0603   *  --      --    K 3.5 4.5     --    CO2 25  --    BUN 24*  --    CREATININE 0.7  --    CALCIUM 8.2*  --    MG 2.2  --      Recent Labs   Lab 01/28/22  0604 01/29/22  0020   WBC 11.85 13.27*   HGB 13.7* 12.2*   HCT 40.0 36.1*    178     Recent Labs   Lab 01/28/22  0604   INR 1.0  1.0   APTT 24.8  24.8     Microbiology Results (last 7 days)     ** No results found for the last 168 hours. **        All pertinent labs from the last 24 hours have been reviewed.    Significant Diagnostics:  I have reviewed and interpreted all pertinent imaging results/findings within the past 24 hours.    Assessment/Plan:     * Subdural hematoma  Pt is 62M pmh DM who presents to outside hospital with cc of constant headaches for 10 days. Denies trauma, denies blood thin use, seizure, AMS, visual disturbances. CTH on workup showed bilateral R>L  subacute subdural hematomas.  NSGY consulted for evaluation.    No acute events post-operative.     Plan:  Admit to NCC service, Q1h neurochecks  SBP<160, HOB flat, Na 135-145  Keppra  Subdural MARIANNA in place, thumbprint suction, monitor output  All imaging reviewed:    -- CTH 1/23: bilat R> L SDH, worst at R convexity at 1.4 cm, 8 mm MLS   -- CTH 1/24: stable   -- CTA 1/24: no underlying vascular lesion, 2 cm planum sphenoidale mass, likely meningioma   -- CTH 1/28 post op: good evacuation, some residual dependent fluid, improvement in MLS             Rene Santos MD  Neurosurgery  [facility]

## 2022-01-29 NOTE — NURSING
Pt's diastolic BP in the 40's by art-line, 55's by cuff; measured in the same  (L) arm. MAPs in the high 50s by art-line and 70's by pressure cuff. SBP correlating between cuff and art line ~115. IAN Gaitan notified by phone of low DBP and MAP (by art-line) and discrepancy between cuff and line; new orders: titrate down and stop propofol gtt; start fentanyl gtt; 250 cc bolus. Will continue to monitor

## 2022-01-29 NOTE — SUBJECTIVE & OBJECTIVE
Interval History:  As above    Review of Systems   Constitutional: Positive for activity change.   HENT: Negative.    Eyes: Negative.    Respiratory: Negative.    Cardiovascular: Negative.    Gastrointestinal: Positive for nausea.   Endocrine: Negative.    Genitourinary: Positive for difficulty urinating.   Musculoskeletal: Negative.    Skin: Negative.    Allergic/Immunologic: Negative.    Neurological: Positive for headaches.   Hematological: Negative.    Psychiatric/Behavioral: Negative.      2 systems OR Unable to obtain a complete ROS due to level of consciousness.  Objective:     Vitals:  Temp: 98.3 °F (36.8 °C)  Pulse: 66  Rhythm: normal sinus rhythm  BP: (!) 103/54  MAP (mmHg): 74  Resp: 18  SpO2: 99 %  Oxygen Concentration (%): 40  O2 Device (Oxygen Therapy): ventilator  Vent Mode: A/C  Set Rate: 18 BPM  Vt Set: 400 mL  PEEP/CPAP: 5 cmH20  Peak Airway Pressure: 19 cmH2O  Mean Airway Pressure: 8.6 cmH20  Plateau Pressure: 0 cmH20    Temp  Min: 98 °F (36.7 °C)  Max: 99.3 °F (37.4 °C)  Pulse  Min: 65  Max: 96  BP  Min: 88/52  Max: 165/65  MAP (mmHg)  Min: 63  Max: 101  Resp  Min: 17  Max: 24  SpO2  Min: 99 %  Max: 100 %  Oxygen Concentration (%)  Min: 40  Max: 70    01/28 0701 - 01/29 0700  In: 4226.8 [I.V.:2775.9]  Out: 2522 [Urine:2035; Drains:487]   Unmeasured Output  Urine Occurrence: 1  Stool Occurrence: 0  Pad Count: 1       Physical Exam    Constitutional: No apparent distress.   Eyes: Conjunctiva clear, anicteric. Lids no lesions.  Head/Ears/Nose/Mouth/Throat/Neck: Moist mucous membranes. External ears, nose atraumatic.   Cardiovascular: Regular rhythm. No murmurs. No leg edema.  Respiratory: Comfortable respirations. Clear to auscultation.  Gastrointestinal: No hernia. Soft, nondistended, nontender. + bowel sounds.  Skin: No rashes, ulcers, lesions. On palpation no induration, nodules, tightening.    Neurologic Examination:    -Mental Status: eyes open; orally intubated; following commands on fentanyl  drip  -Cranial nerves:  Pupils equal, round, and reactive bilateral.   -Motor: BUE spont; BLE spont  -Reflexes: Plantar responses down.        Medications:  Continuoussodium chloride 0.9%, Last Rate: 75 mL/hr at 01/29/22 0900  dextrose 10 % in water (D10W)  fentanyl, Last Rate: 50 mcg/hr (01/29/22 0900)    Scheduledatorvastatin, 40 mg, Daily  levetiracetam, 500 mg, BID  multivitamin, 1 tablet, Daily  polyethylene glycol, 17 g, Daily  senna-docusate 8.6-50 mg, 1 tablet, BID  silodosin, 4 mg, Daily    PRNacetaminophen, 650 mg, Q6H PRN  dexamethasone, 10 mg, BID PRN  dextrose 10 % in water (D10W), , Continuous PRN  dextrose 50%, 12.5 g, PRN  glucagon (human recombinant), 1 mg, PRN  hydrALAZINE, 10 mg, Q4H PRN  insulin aspart U-100, 1-10 Units, Q4H PRN  labetalol, 10 mg, Q4H PRN  magnesium oxide, 800 mg, PRN  magnesium oxide, 800 mg, PRN  ondansetron, 4 mg, Q8H PRN  oxyCODONE, 5 mg, Q6H PRN  potassium bicarbonate, 35 mEq, PRN  potassium bicarbonate, 50 mEq, PRN  potassium bicarbonate, 60 mEq, PRN  potassium, sodium phosphates, 2 packet, PRN  potassium, sodium phosphates, 2 packet, PRN  potassium, sodium phosphates, 2 packet, PRN  promethazine (PHENERGAN) IVPB, 12.5 mg, Q6H PRN  sodium chloride 0.9%, 10 mL, PRN      Today I personally reviewed pertinent medications, imaging, laboratory results, notably: cth w/ dec in mls  Diet  Diet NPO Except for: Sips with Medication  Diet NPO Except for: Sips with Medication

## 2022-01-29 NOTE — CARE UPDATE
Pt extubated per MD order. Placed pt on 3 LPM NC. Pt tolerating well. Team at bedside. Will continue to monitor.

## 2022-01-29 NOTE — PLAN OF CARE
[facility] Care Plan    POC reviewed with Priyank Whittington and pt's cousin at 0300. Pt unable to verbalize understanding; cousin at bedside verbalized understanding; Questions and concerns addressed. No acute events overnight. Pt progressing toward goals. Will continue to monitor. See below and flowsheets for full assessment and VS info.     -250 cc NS bolus given to correct low diastolic BP and MAP (see nursing note)  - Propofol gtt stopped; pt switched to Fentanyl gtt; no issues with BP after fentanyl gtt started  -morning potassium value of 3.5; replaced per orders set; recheck value 4.5  -MARIANNA drain to thumbprint suction; total output overnight: 231 cc  -Pt remains NPO for possible extubation today        Neuro:  Br[place]ett Coma Scale  Best Eye Response: 3-->(E3) to speech  Best Motor Response: 6-->(M6) obeys commands  Best Verbal Response: 1-->(V1) none  Kayl[place] Coma Scale Score: 10  Assessment Qualifiers: patient intubated  Pupil PERRLA: yes     24hr Temp:  [97 °F (36.1 °C)-99.3 °F (37.4 °C)]     CV:   Rhythm: normal sinus rhythm  BP goals:   SBP < 160  MAP > 65    Resp:   O2 Device (Oxygen Therapy): ventilator  Vent Mode: A/C  Set Rate: 18 BPM  Oxygen Concentration (%): 40  Vt Set: 400 mL  PEEP/CPAP: 5 cmH20    Plan: wean to extubate    GI/:     Diet/Nutrition Received: NPO  Last Bowel Movement: 01/23/22  Voiding Characteristics: urethral catheter (bladder)    Intake/Output Summary (Last 24 hours) at 1/29/2022 0642  Last data filed at 1/29/2022 0605  Gross per 24 hour   Intake 4153.48 ml   Output 2452 ml   Net 1701.48 ml     Unmeasured Output  Urine Occurrence: 1  Stool Occurrence: 0  Pad Count: 1    Labs/Accuchecks:  Recent Labs   Lab 01/29/22 0020   WBC 13.27*   RBC 4.18*   HGB 12.2*   HCT 36.1*         Recent Labs   Lab 01/29/22  0020 01/29/22  0020 01/29/22  0603     --   --    K 3.5   < > 4.5   CO2 25  --   --      --   --    BUN 24*  --   --    CREATININE 0.7  --   --    ALKPHOS  54*  --   --    ALT 29  --   --    AST 11  --   --    BILITOT 0.6  --   --     < > = values in this interval not displayed.      Recent Labs   Lab 01/28/22  0604   INR 1.0  1.0   APTT 24.8  24.8    No results for input(s): CPK, CPKMB, TROPONINI, MB in the last 168 hours.    Electrolytes: Electrolytes replaced  Accuchecks: Q4H    Gtts:   sodium chloride 0.9% 75 mL/hr at 01/29/22 0605    dextrose 10 % in water (D10W)      fentanyl 50 mcg/hr (01/29/22 0605)       LDA/Wounds:  Lines/Drains/Airways     Drain                 Urethral Catheter 01/28/22 0600 1 day         Closed/Suction Drain 01/28/22 0739 Right Other (Comment) Bulb 7 Fr. <1 day         NG/OG Tube 01/28/22 1000 nasogastric Center mouth <1 day          Airway                 Airway - Non-Surgical 01/28/22 0542 Endotracheal Tube 1 day       Airway Anesthesia 01/28/22 1 day          Arterial Line            Arterial Line 01/28/22 0700 Left Radial <1 day          Peripheral Intravenous Line                 Peripheral IV - Single Lumen 20 G Left Hand -- days         Peripheral IV - Single Lumen 01/29/22 0500 20 G Right;Anterior Wrist <1 day              Wounds: No  Wound care consulted: No

## 2022-01-29 NOTE — NURSING
250 cc NS bolus given; achieved MAP > 65 by art-line and cuff. Propofol gtt stopped; Fentanyl gtt started, titrating to achieve RASS goal of 0 ; VS WDL, Neuro exam stable; will continue to monitor

## 2022-01-29 NOTE — ASSESSMENT & PLAN NOTE
Neurological exam is fluctuating, get an EEG  keppra prophylaxis  1/28 s/p R craniotomy for aSDH evacuation w/ mls and brain compresssion  -sbp 100-160  -euNa++  -hob 0-20  -HV drain care   1/28 -vEEG grossly negative

## 2022-01-29 NOTE — PLAN OF CARE
[facility] Care Plan    POC reviewed with Priyank Whittington and family at 1400. Pt and family verbalized understanding. Pt extubated today and is doing very well. Pt on 3 liters NC tolerating well. HOB up to 30 degrees to increase best resp efforts and assist with proper swallowing of secretions. Pt seems to be swallow liquids well without issues. Order speech eval as pt was not order for a regular diet prior to surgery. Pt has complaints of numbness in throat immediately after extubation and aaron that numbness at this time. Would like formal eval before placing pt on diet. Family and pt are in agreement at this time. Especially given HOB restrictions. Fentanyl was DC prior to extubation and pt has no complaints of pain at this time. SBP < 160 map >65 has not been an issue during this shift. OGT was Dc'd along with ETT. TTE completed today at bedside. Darlin, and 2 PIVs and loja remain in place and in good working order at this time. Questions and concerns addressed. No acute events today. Pt progressing toward goals. Will continue to monitor. See below and flowsheets for full assessment and VS info.       Neuro:  [place] Coma Scale  Best Eye Response: 4-->(E4) spontaneous  Best Motor Response: 6-->(M6) obeys commands  Best Verbal Response: 5-->(V5) oriented  [place] Coma Scale Score: 15  Assessment Qualifiers: patient intubated  Pupil PERRLA: yes     24 hr Temp:  [98.3 °F (36.8 °C)-99.3 °F (37.4 °C)]     CV:   Rhythm: normal sinus rhythm  BP goals:   SBP < 160  MAP > 65    Resp:   O2 Device (Oxygen Therapy): nasal cannula  Vent Mode: Spont  Set Rate: 18 BPM  Oxygen Concentration (%): 40  Vt Set: 400 mL  PEEP/CPAP: 5 cmH20  Pressure Support: 5 cmH20    Plan: N/A    GI/:     Diet/Nutrition Received: NPO  Last Bowel Movement: 01/23/22  Voiding Characteristics: ureteral catheter    Intake/Output Summary (Last 24 hours) at 1/29/2022 1637  Last data filed at 1/29/2022 1600  Gross per 24 hour   Intake 2428.25 ml    Output 2054 ml   Net 374.25 ml     Unmeasured Output  Urine Occurrence: 1  Stool Occurrence: 0  Pad Count: 1    Labs/Accuchecks:  Recent Labs   Lab 01/29/22  0020   WBC 13.27*   RBC 4.18*   HGB 12.2*   HCT 36.1*         Recent Labs   Lab 01/29/22  0020 01/29/22  0020 01/29/22  0603     --   --    K 3.5   < > 4.5   CO2 25  --   --      --   --    BUN 24*  --   --    CREATININE 0.7  --   --    ALKPHOS 54*  --   --    ALT 29  --   --    AST 11  --   --    BILITOT 0.6  --   --     < > = values in this interval not displayed.      Recent Labs   Lab 01/28/22  0604   INR 1.0  1.0   APTT 24.8  24.8    No results for input(s): CPK, CPKMB, TROPONINI, MB in the last 168 hours.    Electrolytes: N/A - electrolytes WDL  Accuchecks: Q4H    Gtts:   sodium chloride 0.9% 75 mL/hr at 01/29/22 1600    dextrose 10 % in water (D10W)      fentanyl Stopped (01/29/22 1200)       LDA/Wounds:  Lines/Drains/Airways       Drain                   Closed/Suction Drain 01/28/22 0739 Right Other (Comment) Bulb 7 Fr. 1 day         Urethral Catheter 01/28/22 0600 1 day              Arterial Line              Arterial Line 01/28/22 0700 Left Radial 1 day              Peripheral Intravenous Line                   Peripheral IV - Single Lumen 20 G Left Hand -- days         Peripheral IV - Single Lumen 01/29/22 0500 20 G Right;Anterior Wrist <1 day                  Wounds: No  Wound care consulted: No   Problem: Adult Inpatient Plan of Care  Goal: Plan of Care Review  Outcome: Ongoing, Progressing  Goal: Patient-Specific Goal (Individualized)  Description: Admit Date: 1/23/2022    Admit Dx: Subdural     Past Medical History:  No date: Diabetes mellitus    History reviewed. No pertinent surgical history.    Individualization:   1. Keep family updated     Restraints: N/A          Outcome: Ongoing, Progressing  Goal: Absence of Hospital-Acquired Illness or Injury  Outcome: Ongoing, Progressing  Goal: Optimal Comfort and  Wellbeing  Outcome: Ongoing, Progressing     Problem: Adjustment to Illness (Stroke, Hemorrhagic)  Goal: Optimal Coping  Outcome: Ongoing, Progressing     Problem: Cerebral Tissue Perfusion (Stroke, Hemorrhagic)  Goal: Optimal Cerebral Tissue Perfusion  Outcome: Ongoing, Progressing     Problem: Cognitive Impairment (Stroke, Hemorrhagic)  Goal: Optimal Cognitive Function  Outcome: Ongoing, Progressing     Problem: Communication Impairment (Stroke, Hemorrhagic)  Goal: Effective Communication Skills  Outcome: Ongoing, Progressing     Problem: Functional Ability Impaired (Stroke, Hemorrhagic)  Goal: Optimal Functional Ability  Outcome: Ongoing, Progressing     Problem: Pain (Stroke, Hemorrhagic)  Goal: Acceptable Pain Control  Outcome: Ongoing, Progressing     Problem: Respiratory Compromise (Stroke, Hemorrhagic)  Goal: Effective Oxygenation and Ventilation  Outcome: Ongoing, Progressing     Problem: Sensorimotor Impairment (Stroke, Hemorrhagic)  Goal: Improved Sensorimotor Function  Outcome: Ongoing, Progressing     Problem: Urinary Elimination Impaired (Stroke, Hemorrhagic)  Goal: Effective Urinary Elimination  Outcome: Ongoing, Progressing     Problem: Skin Injury Risk Increased  Goal: Skin Health and Integrity  Outcome: Ongoing, Progressing     Problem: Infection  Goal: Absence of Infection Signs and Symptoms  Outcome: Ongoing, Progressing     Problem: Communication Impairment (Mechanical Ventilation, Invasive)  Goal: Effective Communication  Outcome: Met     Problem: Device-Related Complication Risk (Mechanical Ventilation, Invasive)  Goal: Optimal Device Function  Outcome: Met     Problem: Inability to Wean (Mechanical Ventilation, Invasive)  Goal: Mechanical Ventilation Liberation  Outcome: Met     Problem: Nutrition Impairment (Mechanical Ventilation, Invasive)  Goal: Optimal Nutrition Delivery  Outcome: Met     Problem: Skin and Tissue Injury (Mechanical Ventilation, Invasive)  Goal: Absence of Device-Related  Skin and Tissue Injury  Outcome: Met     Problem: Ventilator-Induced Lung Injury (Mechanical Ventilation, Invasive)  Goal: Absence of Ventilator-Induced Lung Injury  Outcome: Met     Problem: Communication Impairment (Artificial Airway)  Goal: Effective Communication  Outcome: Met     Problem: Device-Related Complication Risk (Artificial Airway)  Goal: Optimal Device Function  Outcome: Met     Problem: Skin and Tissue Injury (Artificial Airway)  Goal: Absence of Device-Related Skin or Tissue Injury  Outcome: Met     Problem: Noninvasive Ventilation Acute  Goal: Effective Unassisted Ventilation and Oxygenation  Outcome: Met     Problem: Restraint, Nonbehavioral (Nonviolent)  Goal: Absence of Harm or Injury  Outcome: Met

## 2022-01-29 NOTE — ASSESSMENT & PLAN NOTE
Pt is 62M pmh DM who presents to outside hospital with cc of constant headaches for 10 days. Denies trauma, denies blood thin use, seizure, AMS, visual disturbances. CTH on workup showed bilateral R>L  subacute subdural hematomas. NSGY consulted for evaluation.    No acute events post-operative.     Plan:  Admit to NCC service, Q1h neurochecks  SBP<160, HOB flat, Na 135-145  Keppra  Subdural MARIANNA in place, thumbprint suction, monitor output  All imaging reviewed:    -- CTH 1/23: bilat R> L SDH, worst at R convexity at 1.4 cm, 8 mm MLS   -- CTH 1/24: stable   -- CTA 1/24: no underlying vascular lesion, 2 cm planum sphenoidale mass, likely meningioma   -- CTH 1/28 post op: good evacuation, some residual dependent fluid, improvement in MLS

## 2022-01-29 NOTE — SUBJECTIVE & OBJECTIVE
Interval History: Now s/p evacuation 1/28    Medications:  Continuous Infusions:   sodium chloride 0.9% 75 mL/hr at 01/29/22 0900    dextrose 10 % in water (D10W)      fentanyl 50 mcg/hr (01/29/22 0900)     Scheduled Meds:   atorvastatin  40 mg Per NG tube Daily    levetiracetam  500 mg Per NG tube BID    multivitamin  1 tablet Oral Daily    polyethylene glycol  17 g Per NG tube Daily    senna-docusate 8.6-50 mg  1 tablet Per NG tube BID    silodosin  4 mg Per NG tube Daily     PRN Meds:acetaminophen, dexamethasone, dextrose 10 % in water (D10W), dextrose 50%, glucagon (human recombinant), hydrALAZINE, insulin aspart U-100, labetalol, magnesium oxide, magnesium oxide, ondansetron, oxyCODONE, potassium bicarbonate, potassium bicarbonate, potassium bicarbonate, potassium, sodium phosphates, potassium, sodium phosphates, potassium, sodium phosphates, promethazine (PHENERGAN) IVPB, sodium chloride 0.9%     Review of Systems   Reason unable to perform ROS: AMS.     Objective:     Weight: 93 kg (205 lb 0.4 oz)  Body mass index is 30.28 kg/m².  Vital Signs (Most Recent):  Temp: 98.3 °F (36.8 °C) (01/29/22 0700)  Pulse: 66 (01/29/22 0900)  Resp: 18 (01/29/22 0900)  BP: (!) 103/54 (01/29/22 0900)  SpO2: 99 % (01/29/22 0900) Vital Signs (24h Range):  Temp:  [98 °F (36.7 °C)-99.3 °F (37.4 °C)] 98.3 °F (36.8 °C)  Pulse:  [65-96] 66  Resp:  [17-24] 18  SpO2:  [99 %-100 %] 99 %  BP: ()/(52-72) 103/54  Arterial Line BP: ()/(41-75) 113/50     Date 01/29/22 0700 - 01/30/22 0659   Shift 5385-2289 2671-9706 7059-8995 24 Hour Total   INTAKE   P.O. 0   0   I.V.(mL/kg) 233.3(2.5)   233.3(2.5)   NG/GT 60   60   Shift Total(mL/kg) 293.3(3.2)   293.3(3.2)   OUTPUT   Urine(mL/kg/hr) 170   170   Drains 43   43   Shift Total(mL/kg) 213(2.3)   213(2.3)   Weight (kg) 93 93 93 93              Vent Mode: A/C  Oxygen Concentration (%):  [40-70] 40  Resp Rate Total:  [18 br/min-25 br/min] 18 br/min  Vt Set:  [400 mL] 400  mL  PEEP/CPAP:  [5 cmH20] 5 cmH20  Mean Airway Pressure:  [7.7 beZ81-39 cmH20] 8.6 cmH20         Closed/Suction Drain 01/28/22 0739 Right Other (Comment) Bulb 7 Fr. (Active)            Urethral Catheter 01/28/22 0600 (Active)       Physical Exam:    Constitutional:   Minimally responsive     Eyes: Pupils are equal, round, and reactive to light.     Cardiovascular: Regular rhythm.     Abdominal: Soft.     Psych/Behavior:   comatose        E2VtM5  PERRL  + OC/corneals/cough/gag  BUE localize  BLE tf      Significant Labs:  Recent Labs   Lab 01/29/22  0020 01/29/22  0603   *  --      --    K 3.5 4.5     --    CO2 25  --    BUN 24*  --    CREATININE 0.7  --    CALCIUM 8.2*  --    MG 2.2  --      Recent Labs   Lab 01/28/22  0604 01/29/22  0020   WBC 11.85 13.27*   HGB 13.7* 12.2*   HCT 40.0 36.1*    178     Recent Labs   Lab 01/28/22  0604   INR 1.0  1.0   APTT 24.8  24.8     Microbiology Results (last 7 days)     ** No results found for the last 168 hours. **        All pertinent labs from the last 24 hours have been reviewed.    Significant Diagnostics:  I have reviewed and interpreted all pertinent imaging results/findings within the past 24 hours.

## 2022-01-29 NOTE — PROGRESS NOTES
[facility]  Neurocritical Care  Progress Note    Admit Date: 1/23/2022  Service Date: 01/29/2022  Length of Stay: 6    Subjective:     Chief Complaint: Subdural hematoma    History of Present Illness: Patient is a 62 year old male with PMHx T2DM and HLD who presented as a direct admit from OSH to NCC unit for multiple acute on subacute subdural hematomas with R to L midline shift.     Per chart review, patient with ongoing frontal headache for the last 10 days prior to admission. No recent trauma reported. On 1/22, he was seen at [facility] for his headaches and was prescribed Fioricet without relief. Due to worsening headache, patient presented to [facility] on 1/23. He was neuro intact with complaint of nausea with no vomiting. Head CT w/o contrast revealed multiple bilateral acute and subacute SDHs overlying the cerebral convexities with the largest overlying the right frontal convexity measuring 1.4 cm. Also noted was diffuse cerebral edema with 8mm right to left midline shift and associated right to left subfalcine herniation.     He was admitted to [facility] for close neurological monitoring and Neurosurgery evaluation.       Hospital Course: 01/24/2022 NAEO  01/25/2022 still complaining of headache. Having N/V  01/26/2022 NAEO. H/A is under better control   01/27/2022 Fluctuating mental status, getting EEG  01/28/2022 s/p R craniotomy for aSDH evacuation (increased MLS o/n); [facility] vent: orally intubated into nsicu postop; sedation on hold; postop CTH reviewed w/ expected surgical decompression and reduction of MLS; increased hemovac drainage immediately postop; replete lytes; ok for TF via ogt;  01/29/2022 propofol switched to fent drip o/n for low bp; plan extubation today; ivf bolus o/n        Interval History:  As above    Review of Systems   Constitutional: Positive for activity change.   HENT: Negative.    Eyes: Negative.    Respiratory: Negative.    Cardiovascular:  Negative.    Gastrointestinal: Positive for nausea.   Endocrine: Negative.    Genitourinary: Positive for difficulty urinating.   Musculoskeletal: Negative.    Skin: Negative.    Allergic/Immunologic: Negative.    Neurological: Positive for headaches.   Hematological: Negative.    Psychiatric/Behavioral: Negative.      2 systems OR Unable to obtain a complete ROS due to level of consciousness.  Objective:     Vitals:  Temp: 98.3 °F (36.8 °C)  Pulse: 66  Rhythm: normal sinus rhythm  BP: (!) 103/54  MAP (mmHg): 74  Resp: 18  SpO2: 99 %  Oxygen Concentration (%): 40  O2 Device (Oxygen Therapy): ventilator  Vent Mode: A/C  Set Rate: 18 BPM  Vt Set: 400 mL  PEEP/CPAP: 5 cmH20  Peak Airway Pressure: 19 cmH2O  Mean Airway Pressure: 8.6 cmH20  Plateau Pressure: 0 cmH20    Temp  Min: 98 °F (36.7 °C)  Max: 99.3 °F (37.4 °C)  Pulse  Min: 65  Max: 96  BP  Min: 88/52  Max: 165/65  MAP (mmHg)  Min: 63  Max: 101  Resp  Min: 17  Max: 24  SpO2  Min: 99 %  Max: 100 %  Oxygen Concentration (%)  Min: 40  Max: 70    01/28 0701 - 01/29 0700  In: 4226.8 [I.V.:2775.9]  Out: 2522 [Urine:2035; Drains:487]   Unmeasured Output  Urine Occurrence: 1  Stool Occurrence: 0  Pad Count: 1       Physical Exam    Constitutional: No apparent distress.   Eyes: Conjunctiva clear, anicteric. Lids no lesions.  Head/Ears/Nose/Mouth/Throat/Neck: Moist mucous membranes. External ears, nose atraumatic.   Cardiovascular: Regular rhythm. No murmurs. No leg edema.  Respiratory: Comfortable respirations. Clear to auscultation.  Gastrointestinal: No hernia. Soft, nondistended, nontender. + bowel sounds.  Skin: No rashes, ulcers, lesions. On palpation no induration, nodules, tightening.    Neurologic Examination:    -Mental Status: eyes open; orally intubated; following commands on fentanyl drip  -Cranial nerves:  Pupils equal, round, and reactive bilateral.   -Motor: BUE spont; BLE spont  -Reflexes: Plantar responses down.        Medications:  Continuoussodium  "chloride 0.9%, Last Rate: 75 mL/hr at 01/29/22 0900  dextrose 10 % in water (D10W)  fentanyl, Last Rate: 50 mcg/hr (01/29/22 0900)    Scheduledatorvastatin, 40 mg, Daily  levetiracetam, 500 mg, BID  multivitamin, 1 tablet, Daily  polyethylene glycol, 17 g, Daily  senna-docusate 8.6-50 mg, 1 tablet, BID  silodosin, 4 mg, Daily    PRNacetaminophen, 650 mg, Q6H PRN  dexamethasone, 10 mg, BID PRN  dextrose 10 % in water (D10W), , Continuous PRN  dextrose 50%, 12.5 g, PRN  glucagon (human recombinant), 1 mg, PRN  hydrALAZINE, 10 mg, Q4H PRN  insulin aspart U-100, 1-10 Units, Q4H PRN  labetalol, 10 mg, Q4H PRN  magnesium oxide, 800 mg, PRN  magnesium oxide, 800 mg, PRN  ondansetron, 4 mg, Q8H PRN  oxyCODONE, 5 mg, Q6H PRN  potassium bicarbonate, 35 mEq, PRN  potassium bicarbonate, 50 mEq, PRN  potassium bicarbonate, 60 mEq, PRN  potassium, sodium phosphates, 2 packet, PRN  potassium, sodium phosphates, 2 packet, PRN  potassium, sodium phosphates, 2 packet, PRN  promethazine (PHENERGAN) IVPB, 12.5 mg, Q6H PRN  sodium chloride 0.9%, 10 mL, PRN      Today I personally reviewed pertinent medications, imaging, laboratory results, notably: cth w/ dec in mls  Diet  Diet NPO Except for: Sips with Medication  Diet NPO Except for: Sips with Medication        Assessment/Plan:     Neuro  * Subdural hematoma  Neurological exam is fluctuating, get an EEG  keppra prophylaxis  1/28 s/p R craniotomy for aSDH evacuation w/ mls and brain compresssion  -sbp 100-160  -euNa++  -hob 0-20  -HV drain care   1/28 -vEEG grossly negative          Cerebral edema  Imaging at OSH revealed diffuse cerebral edema in setting of known SDHs    -close neuro monitoring with frequent neuro checks  -see "Subdural hematoma, acute" for further plan     Cardiac/Vascular  HTN (hypertension)  Hold amlodipine   Postop sbp goal 100-160      HLD (hyperlipidemia)  -continue home atorvastatin 40 mg daily     Endocrine  Type 2 diabetes mellitus  SSI          The patient " is being Prophylaxed for:  Venous Thromboembolism with: Mechanical  Stress Ulcer with: H2B  Ventilator Pneumonia with: chlorhexidine oral care    Activity Orders          Diet NPO Except for: Sips with Medication: NPO starting at 01/28 0001    Turn patient every 2 hours starting at 01/27 2000    Straight Cath starting at 01/26 1200    Elevate HOB starting at 01/23 1821        Full Code    Silverio De Luna MD  Neurocritical Care  [facility] - Neuro Critical Care    Time spent with this critically ill patient and care team at the bedside: 35min  Plan to extubate

## 2022-01-29 NOTE — BRIEF OP NOTE
[facility]  Brief Operative Note    SUMMARY     Surgery Date: 1/28/2022     Surgeon(s) and Role:     * Ace Candelaria MD - Primary    Assisting Surgeon: Ronald Bonds MD    Pre-op Diagnosis:  Subdural hematoma [S06.5X9A]    Post-op Diagnosis:  Post-Op Diagnosis Codes:     * Subdural hematoma [S06.5X9A]    Procedure(s) (LRB):  CRANIOTOMY, FOR SUBDURAL HEMATOMA EVACUATION (Right)    Anesthesia: General    Operative Findings: R Craniotomy for SDH    Estimated Blood Loss: 100CC         Specimens:   Specimen (24h ago, onward)            None          [id number]

## 2022-01-30 LAB
ALBUMIN SERPL BCP-MCNC: 2.6 G/DL (ref 3.5–5.2)
ALP SERPL-CCNC: 50 U/L (ref 55–135)
ALT SERPL W/O P-5'-P-CCNC: 23 U/L (ref 10–44)
ANION GAP SERPL CALC-SCNC: 6 MMOL/L (ref 8–16)
AST SERPL-CCNC: 10 U/L (ref 10–40)
BASOPHILS # BLD AUTO: 0.02 K/UL (ref 0–0.2)
BASOPHILS NFR BLD: 0.2 % (ref 0–1.9)
BILIRUB SERPL-MCNC: 0.8 MG/DL (ref 0.1–1)
BUN SERPL-MCNC: 22 MG/DL (ref 6–20)
CALCIUM SERPL-MCNC: 8.1 MG/DL (ref 8.7–10.5)
CHLORIDE SERPL-SCNC: 109 MMOL/L (ref 95–110)
CO2 SERPL-SCNC: 25 MMOL/L (ref 23–29)
CREAT SERPL-MCNC: 0.7 MG/DL (ref 0.5–1.4)
DIFFERENTIAL METHOD: ABNORMAL
EOSINOPHIL # BLD AUTO: 0 K/UL (ref 0–0.5)
EOSINOPHIL NFR BLD: 0.3 % (ref 0–8)
ERYTHROCYTE [DISTWIDTH] IN BLOOD BY AUTOMATED COUNT: 12.3 % (ref 11.5–14.5)
EST. GFR  (AFRICAN AMERICAN): >60 ML/MIN/1.73 M^2
EST. GFR  (NON AFRICAN AMERICAN): >60 ML/MIN/1.73 M^2
GLUCOSE SERPL-MCNC: 165 MG/DL (ref 70–110)
HCT VFR BLD AUTO: 32.9 % (ref 40–54)
HGB BLD-MCNC: 11.4 G/DL (ref 14–18)
IMM GRANULOCYTES # BLD AUTO: 0.15 K/UL (ref 0–0.04)
IMM GRANULOCYTES NFR BLD AUTO: 1.3 % (ref 0–0.5)
LYMPHOCYTES # BLD AUTO: 1.5 K/UL (ref 1–4.8)
LYMPHOCYTES NFR BLD: 12.4 % (ref 18–48)
MAGNESIUM SERPL-MCNC: 2.4 MG/DL (ref 1.6–2.6)
MCH RBC QN AUTO: 29.6 PG (ref 27–31)
MCHC RBC AUTO-ENTMCNC: 34.7 G/DL (ref 32–36)
MCV RBC AUTO: 86 FL (ref 82–98)
MONOCYTES # BLD AUTO: 1.1 K/UL (ref 0.3–1)
MONOCYTES NFR BLD: 9.3 % (ref 4–15)
NEUTROPHILS # BLD AUTO: 9 K/UL (ref 1.8–7.7)
NEUTROPHILS NFR BLD: 76.5 % (ref 38–73)
NRBC BLD-RTO: 0 /100 WBC
PHOSPHATE SERPL-MCNC: 2 MG/DL (ref 2.7–4.5)
PLATELET # BLD AUTO: 163 K/UL (ref 150–450)
PMV BLD AUTO: 10.4 FL (ref 9.2–12.9)
POCT GLUCOSE: 160 MG/DL (ref 70–110)
POCT GLUCOSE: 162 MG/DL (ref 70–110)
POCT GLUCOSE: 168 MG/DL (ref 70–110)
POCT GLUCOSE: 181 MG/DL (ref 70–110)
POCT GLUCOSE: 195 MG/DL (ref 70–110)
POCT GLUCOSE: 257 MG/DL (ref 70–110)
POTASSIUM SERPL-SCNC: 3.5 MMOL/L (ref 3.5–5.1)
POTASSIUM SERPL-SCNC: 3.7 MMOL/L (ref 3.5–5.1)
PROT SERPL-MCNC: 5.1 G/DL (ref 6–8.4)
RBC # BLD AUTO: 3.85 M/UL (ref 4.6–6.2)
SODIUM SERPL-SCNC: 140 MMOL/L (ref 136–145)
WBC # BLD AUTO: 11.7 K/UL (ref 3.9–12.7)

## 2022-01-30 PROCEDURE — 92610 EVALUATE SWALLOWING FUNCTION: CPT

## 2022-01-30 PROCEDURE — 63600175 PHARM REV CODE 636 W HCPCS: Performed by: PHYSICIAN ASSISTANT

## 2022-01-30 PROCEDURE — 25000003 PHARM REV CODE 250: Performed by: NURSE PRACTITIONER

## 2022-01-30 PROCEDURE — 97535 SELF CARE MNGMENT TRAINING: CPT

## 2022-01-30 PROCEDURE — 51798 US URINE CAPACITY MEASURE: CPT

## 2022-01-30 PROCEDURE — 20000000 HC ICU ROOM

## 2022-01-30 PROCEDURE — 80053 COMPREHEN METABOLIC PANEL: CPT | Performed by: PHYSICIAN ASSISTANT

## 2022-01-30 PROCEDURE — 25000003 PHARM REV CODE 250: Performed by: PHYSICIAN ASSISTANT

## 2022-01-30 PROCEDURE — 84132 ASSAY OF SERUM POTASSIUM: CPT | Performed by: INTERNAL MEDICINE

## 2022-01-30 PROCEDURE — 99291 CRITICAL CARE FIRST HOUR: CPT | Mod: ,,, | Performed by: INTERNAL MEDICINE

## 2022-01-30 PROCEDURE — 84100 ASSAY OF PHOSPHORUS: CPT | Performed by: PHYSICIAN ASSISTANT

## 2022-01-30 PROCEDURE — 83735 ASSAY OF MAGNESIUM: CPT | Performed by: PHYSICIAN ASSISTANT

## 2022-01-30 PROCEDURE — 99291 PR CRITICAL CARE, E/M 30-74 MINUTES: ICD-10-PCS | Mod: ,,, | Performed by: INTERNAL MEDICINE

## 2022-01-30 PROCEDURE — 85025 COMPLETE CBC W/AUTO DIFF WBC: CPT | Performed by: PHYSICIAN ASSISTANT

## 2022-01-30 PROCEDURE — 51701 INSERT BLADDER CATHETER: CPT

## 2022-01-30 RX ORDER — POTASSIUM CHLORIDE 7.45 MG/ML
60 INJECTION INTRAVENOUS
Status: DISCONTINUED | OUTPATIENT
Start: 2022-01-30 | End: 2022-02-01

## 2022-01-30 RX ORDER — POTASSIUM CHLORIDE 7.45 MG/ML
80 INJECTION INTRAVENOUS
Status: DISCONTINUED | OUTPATIENT
Start: 2022-01-30 | End: 2022-02-01

## 2022-01-30 RX ORDER — SILODOSIN 4 MG/1
4 CAPSULE ORAL DAILY
Status: DISCONTINUED | OUTPATIENT
Start: 2022-01-31 | End: 2022-01-31

## 2022-01-30 RX ORDER — POTASSIUM CHLORIDE 7.45 MG/ML
40 INJECTION INTRAVENOUS
Status: DISCONTINUED | OUTPATIENT
Start: 2022-01-30 | End: 2022-02-01

## 2022-01-30 RX ORDER — AMOXICILLIN 250 MG
1 CAPSULE ORAL 2 TIMES DAILY
Status: DISCONTINUED | OUTPATIENT
Start: 2022-01-30 | End: 2022-02-02 | Stop reason: HOSPADM

## 2022-01-30 RX ORDER — ATORVASTATIN CALCIUM 20 MG/1
40 TABLET, FILM COATED ORAL DAILY
Status: DISCONTINUED | OUTPATIENT
Start: 2022-01-31 | End: 2022-02-02 | Stop reason: HOSPADM

## 2022-01-30 RX ORDER — MAGNESIUM SULFATE HEPTAHYDRATE 40 MG/ML
2 INJECTION, SOLUTION INTRAVENOUS
Status: DISCONTINUED | OUTPATIENT
Start: 2022-01-30 | End: 2022-02-01

## 2022-01-30 RX ORDER — MAGNESIUM SULFATE HEPTAHYDRATE 40 MG/ML
4 INJECTION, SOLUTION INTRAVENOUS
Status: DISCONTINUED | OUTPATIENT
Start: 2022-01-30 | End: 2022-02-01

## 2022-01-30 RX ORDER — POLYETHYLENE GLYCOL 3350 17 G/17G
17 POWDER, FOR SOLUTION ORAL DAILY
Status: DISCONTINUED | OUTPATIENT
Start: 2022-01-31 | End: 2022-02-02 | Stop reason: HOSPADM

## 2022-01-30 RX ADMIN — ACETAMINOPHEN 650 MG: 325 TABLET ORAL at 08:01

## 2022-01-30 RX ADMIN — SODIUM CHLORIDE 75 ML/HR: 0.9 INJECTION, SOLUTION INTRAVENOUS at 05:01

## 2022-01-30 RX ADMIN — POTASSIUM CHLORIDE 40 MEQ: 7.46 INJECTION, SOLUTION INTRAVENOUS at 02:01

## 2022-01-30 RX ADMIN — INSULIN ASPART 1 UNITS: 100 INJECTION, SOLUTION INTRAVENOUS; SUBCUTANEOUS at 12:01

## 2022-01-30 RX ADMIN — POTASSIUM CHLORIDE 10 MEQ: 7.46 INJECTION, SOLUTION INTRAVENOUS at 04:01

## 2022-01-30 RX ADMIN — ATORVASTATIN CALCIUM 40 MG: 20 TABLET, FILM COATED ORAL at 10:01

## 2022-01-30 RX ADMIN — Medication 1 TABLET: at 09:01

## 2022-01-30 RX ADMIN — POTASSIUM CHLORIDE 10 MEQ: 7.46 INJECTION, SOLUTION INTRAVENOUS at 06:01

## 2022-01-30 RX ADMIN — INSULIN ASPART 6 UNITS: 100 INJECTION, SOLUTION INTRAVENOUS; SUBCUTANEOUS at 11:01

## 2022-01-30 RX ADMIN — POTASSIUM & SODIUM PHOSPHATES POWDER PACK 280-160-250 MG 2 PACKET: 280-160-250 PACK at 08:01

## 2022-01-30 RX ADMIN — INSULIN ASPART 1 UNITS: 100 INJECTION, SOLUTION INTRAVENOUS; SUBCUTANEOUS at 07:01

## 2022-01-30 RX ADMIN — POLYETHYLENE GLYCOL 3350 17 G: 17 POWDER, FOR SOLUTION ORAL at 10:01

## 2022-01-30 RX ADMIN — POTASSIUM & SODIUM PHOSPHATES POWDER PACK 280-160-250 MG 2 PACKET: 280-160-250 PACK at 04:01

## 2022-01-30 RX ADMIN — SENNOSIDES AND DOCUSATE SODIUM 1 TABLET: 50; 8.6 TABLET ORAL at 08:01

## 2022-01-30 RX ADMIN — SILODOSIN 4 MG: 4 CAPSULE ORAL at 10:01

## 2022-01-30 RX ADMIN — SENNOSIDES AND DOCUSATE SODIUM 1 TABLET: 50; 8.6 TABLET ORAL at 10:01

## 2022-01-30 RX ADMIN — POTASSIUM CHLORIDE 10 MEQ: 7.46 INJECTION, SOLUTION INTRAVENOUS at 05:01

## 2022-01-30 RX ADMIN — INSULIN ASPART 2 UNITS: 100 INJECTION, SOLUTION INTRAVENOUS; SUBCUTANEOUS at 04:01

## 2022-01-30 NOTE — PROGRESS NOTES
[facility]  Neurosurgery  Progress Note    Subjective:     History of Present Illness: Pt is 62M pmh DM who presents to outside hospital with cc of constant headaches for 10 days. Denies trauma, denies blood thin use, seizure, AMS, visual disturbances. CTH on workup showed bilateral R>L  subacute subdural hematomas. NSGY consulted for evaluation.      Post-Op Info:  Procedure(s) (LRB):  CRANIOTOMY, FOR SUBDURAL HEMATOMA EVACUATION (Right)   2 Days Post-Op     Interval History: No acute events. Will discontinue and oversew drain today.    Medications:  Continuous Infusions:   sodium chloride 0.9% 75 mL/hr at 01/30/22 1103    dextrose 10 % in water (D10W)       Scheduled Meds:   [START ON 1/31/2022] atorvastatin  40 mg Oral Daily    multivitamin  1 tablet Oral Daily    [START ON 1/31/2022] polyethylene glycol  17 g Oral Daily    senna-docusate 8.6-50 mg  1 tablet Oral BID    [START ON 1/31/2022] silodosin  4 mg Oral Daily     PRN Meds:acetaminophen, calcium gluconate IVPB, calcium gluconate IVPB, calcium gluconate IVPB, dextrose 10 % in water (D10W), dextrose 50%, glucagon (human recombinant), hydrALAZINE, insulin aspart U-100, labetalol, magnesium sulfate IVPB, magnesium sulfate IVPB, ondansetron, oxyCODONE, potassium chloride in water **AND** potassium chloride in water **AND** potassium chloride in water, potassium, sodium phosphates, potassium, sodium phosphates, potassium, sodium phosphates, promethazine (PHENERGAN) IVPB, sodium chloride 0.9%     Review of Systems   Reason unable to perform ROS: AMS.     Objective:     Weight: 93 kg (205 lb)  Body mass index is 30.27 kg/m².  Vital Signs (Most Recent):  Temp: 98.7 °F (37.1 °C) (01/30/22 1100)  Pulse: 83 (01/30/22 1100)  Resp: (!) 30 (01/30/22 1100)  BP: 132/65 (01/30/22 1100)  SpO2: 98 % (01/30/22 1100) Vital Signs (24h Range):  Temp:  [98.3 °F (36.8 °C)-99.1 °F (37.3 °C)] 98.7 °F (37.1 °C)  Pulse:  [65-93] 83  Resp:  [9-30] 30  SpO2:   [95 %-100 %] 98 %  BP: (106-156)/() 132/65  Arterial Line BP: ()/(47-72) 162/64     Date 01/30/22 0700 - 01/31/22 0659   Shift 7185-1530 9205-2739 8268-5559 24 Hour Total   INTAKE   P.O. 150   150   I.V.(mL/kg) 372.3(4)   372.3(4)   IV Piggyback 302.5   302.5   Shift Total(mL/kg) 824.7(8.9)   824.7(8.9)   OUTPUT   Urine(mL/kg/hr) 420   420   Drains 13   13   Shift Total(mL/kg) 433(4.7)   433(4.7)   Weight (kg) 93 93 93 93              Vent Mode: Spont  Oxygen Concentration (%):  [40] 40  Resp Rate Total:  [14 br/min-27 br/min] 21 br/min  PEEP/CPAP:  [5 cmH20] 5 cmH20  Pressure Support:  [5 cmH20] 5 cmH20  Mean Airway Pressure:  [7.5 cmH20-8.3 cmH20] 8.3 cmH20         Closed/Suction Drain 01/28/22 0739 Right Other (Comment) Bulb 7 Fr. (Active)            Urethral Catheter 01/28/22 0600 (Active)       Physical Exam:    Constitutional:   Minimally responsive     Eyes: Pupils are equal, round, and reactive to light.     Cardiovascular: Regular rhythm.     Abdominal: Soft.     Psych/Behavior:   comatose        E2VtM5  PERRL  + OC/corneals/cough/gag  BUE localize  BLE tf      Significant Labs:  Recent Labs   Lab 01/29/22  0020 01/29/22  0603 01/30/22  0009   *  --  165*     --  140   K 3.5 4.5 3.7     --  109   CO2 25  --  25   BUN 24*  --  22*   CREATININE 0.7  --  0.7   CALCIUM 8.2*  --  8.1*   MG 2.2  --  2.4     Recent Labs   Lab 01/29/22  0020 01/30/22  0009   WBC 13.27* 11.70   HGB 12.2* 11.4*   HCT 36.1* 32.9*    163     No results for input(s): LABPT, INR, APTT in the last 48 hours.  Microbiology Results (last 7 days)     ** No results found for the last 168 hours. **        All pertinent labs from the last 24 hours have been reviewed.    Significant Diagnostics:  I have reviewed and interpreted all pertinent imaging results/findings within the past 24 hours.    Assessment/Plan:     * Subdural hematoma  Pt is 62M pmh DM who presents to outside hospital with cc of constant  headaches for 10 days. Denies trauma, denies blood thin use, seizure, AMS, visual disturbances. CTH on workup showed bilateral R>L  subacute subdural hematomas. NSGY consulted for evaluation.    No acute events. Will discontinue and oversew drain today.    Plan:  Admit to NCC service, Q1h neurochecks  SBP<160, HOB flat, Na 135-145  All imaging reviewed:    -- CTH 1/23: bilat R> L SDH, worst at R convexity at 1.4 cm, 8 mm MLS   -- CTH 1/24: stable   -- CTA 1/24: no underlying vascular lesion, 2 cm planum sphenoidale mass, likely meningioma   -- CTH 1/28 post op: good evacuation, some residual dependent fluid, improvement in MLS             Rene Santos MD  Neurosurgery  [facility]

## 2022-01-30 NOTE — ANESTHESIA POSTPROCEDURE EVALUATION
Anesthesia Post Evaluation    Patient: Priyank Whittington    Procedure(s) Performed: Procedure(s) (LRB):  CRANIOTOMY, FOR SUBDURAL HEMATOMA EVACUATION (Right)    Final Anesthesia Type: general      Patient location during evaluation: GI PACU  Patient participation: Yes- Able to Participate  Level of consciousness: awake  Post-procedure vital signs: reviewed and stable  Pain management: adequate  Airway patency: patent    PONV status at discharge: No PONV  Anesthetic complications: no      Cardiovascular status: blood pressure returned to baseline  Respiratory status: unassisted  Hydration status: euvolemic  Follow-up not needed.          Vitals Value Taken Time   /67 01/30/22 0901   Temp 36.8 °C (98.3 °F) 01/30/22 0700   Pulse 85 01/30/22 0915   Resp 28 01/30/22 0915   SpO2 97 % 01/30/22 0915   Vitals shown include unvalidated device data.      No case tracking events are documented in the log.      Pain/Daria Score: Pain Rating Prior to Med Admin: 0 (1/29/2022  1:00 PM)

## 2022-01-30 NOTE — PLAN OF CARE
Problem: SLP Goal  Goal: SLP Goal  Description: Speech Language Pathology Goals  Goals expected to be met by 2/6  1. Pt will participate in ongoing assessment of swallow.     Outcome: Ongoing, Progressing    Bedside swallow assessment initiated.  Rec puree diet with nectar thick liquids with strict aspiration precautions, med crushed in puree.

## 2022-01-30 NOTE — PLAN OF CARE
[facility] Care Plan    POC reviewed with Priyank Whittington and family at 0300. Pt verbalized understanding; family verbalized understanding. Questions and concerns addressed. No acute events overnight. Pt progressing toward goals. Will continue to monitor. See below and flowsheets for full assessment and VS info.     -On NS gtt at 75 mL/hr  -Pt presenting difficulty initiating swallowing, needs oral suction frequently to handle secretions; unable to get pm senna; waiting on speech eval; provider notified of pt's swallowing status   -Replacing K (3.7) per orders set  -Remains NPO, pending speech eval       Neuro:  Kaylah Coma Scale  Best Eye Response: 4-->(E4) spontaneous  Best Motor Response: 6-->(M6) obeys commands  Best Verbal Response: 5-->(V5) oriented  Kaylah Coma Scale Score: 15  Assessment Qualifiers: patient not sedated/intubated,no eye obstruction present  Pupil PERRLA: yes     24hr Temp:  [98.3 °F (36.8 °C)-99.1 °F (37.3 °C)]     CV:   Rhythm: normal sinus rhythm  BP goals:   SBP < 160  MAP > 65    Resp:   O2 Device (Oxygen Therapy): nasal cannula w/ humidification at 2 L/hr    Plan: N/A    GI/:     Diet/Nutrition Received: NPO (pending speech eval)  Last Bowel Movement: 01/23/22  Voiding Characteristics: urethral catheter (bladder)    Intake/Output Summary (Last 24 hours) at 1/30/2022 0549  Last data filed at 1/30/2022 0505  Gross per 24 hour   Intake 2071.97 ml   Output 2150 ml   Net -78.03 ml     Unmeasured Output  Urine Occurrence: 1  Stool Occurrence: 0  Pad Count: 1    Labs/Accuchecks:  Recent Labs   Lab 01/30/22  0009   WBC 11.70   RBC 3.85*   HGB 11.4*   HCT 32.9*         Recent Labs   Lab 01/30/22  0009      K 3.7   CO2 25      BUN 22*   CREATININE 0.7   ALKPHOS 50*   ALT 23   AST 10   BILITOT 0.8      Recent Labs   Lab 01/28/22  0604   INR 1.0  1.0   APTT 24.8  24.8    No results for input(s): CPK, CPKMB, TROPONINI, MB in the last 168 hours.    Electrolytes: Electrolytes  replaced  Accuchecks: Q4H    Gtts:   sodium chloride 0.9% 75 mL/hr (01/30/22 0514)    dextrose 10 % in water (D10W)      fentanyl Stopped (01/29/22 1200)       LDA/Wounds:  Lines/Drains/Airways       Drain                   Closed/Suction Drain 01/28/22 0739 Right Other (Comment) Bulb 7 Fr. 1 day         Urethral Catheter 01/28/22 0600 1 day              Arterial Line              Arterial Line 01/28/22 0700 Left Radial 1 day              Peripheral Intravenous Line                   Peripheral IV - Single Lumen 20 G Left Hand -- days         Peripheral IV - Single Lumen 01/29/22 0500 20 G Right;Anterior Wrist 1 day                  Wounds: No  Wound care consulted: No

## 2022-01-30 NOTE — ASSESSMENT & PLAN NOTE
Pt is 62M pmh DM who presents to outside hospital with cc of constant headaches for 10 days. Denies trauma, denies blood thin use, seizure, AMS, visual disturbances. CTH on workup showed bilateral R>L  subacute subdural hematomas. NSGY consulted for evaluation.    No acute events. Will discontinue and oversew drain today.    Plan:  Admit to NCC service, Q1h neurochecks  SBP<160, HOB flat, Na 135-145  All imaging reviewed:    -- CTH 1/23: bilat R> L SDH, worst at R convexity at 1.4 cm, 8 mm MLS   -- CTH 1/24: stable   -- CTA 1/24: no underlying vascular lesion, 2 cm planum sphenoidale mass, likely meningioma   -- CTH 1/28 post op: good evacuation, some residual dependent fluid, improvement in MLS

## 2022-01-30 NOTE — SUBJECTIVE & OBJECTIVE
Interval History: No acute events. Will discontinue and oversew drain today.    Medications:  Continuous Infusions:   sodium chloride 0.9% 75 mL/hr at 01/30/22 1103    dextrose 10 % in water (D10W)       Scheduled Meds:   [START ON 1/31/2022] atorvastatin  40 mg Oral Daily    multivitamin  1 tablet Oral Daily    [START ON 1/31/2022] polyethylene glycol  17 g Oral Daily    senna-docusate 8.6-50 mg  1 tablet Oral BID    [START ON 1/31/2022] silodosin  4 mg Oral Daily     PRN Meds:acetaminophen, calcium gluconate IVPB, calcium gluconate IVPB, calcium gluconate IVPB, dextrose 10 % in water (D10W), dextrose 50%, glucagon (human recombinant), hydrALAZINE, insulin aspart U-100, labetalol, magnesium sulfate IVPB, magnesium sulfate IVPB, ondansetron, oxyCODONE, potassium chloride in water **AND** potassium chloride in water **AND** potassium chloride in water, potassium, sodium phosphates, potassium, sodium phosphates, potassium, sodium phosphates, promethazine (PHENERGAN) IVPB, sodium chloride 0.9%     Review of Systems   Reason unable to perform ROS: AMS.     Objective:     Weight: 93 kg (205 lb)  Body mass index is 30.27 kg/m².  Vital Signs (Most Recent):  Temp: 98.7 °F (37.1 °C) (01/30/22 1100)  Pulse: 83 (01/30/22 1100)  Resp: (!) 30 (01/30/22 1100)  BP: 132/65 (01/30/22 1100)  SpO2: 98 % (01/30/22 1100) Vital Signs (24h Range):  Temp:  [98.3 °F (36.8 °C)-99.1 °F (37.3 °C)] 98.7 °F (37.1 °C)  Pulse:  [65-93] 83  Resp:  [9-30] 30  SpO2:  [95 %-100 %] 98 %  BP: (106-156)/() 132/65  Arterial Line BP: ()/(47-72) 162/64     Date 01/30/22 0700 - 01/31/22 0659   Shift 2676-5619 4559-7669 0013-3942 24 Hour Total   INTAKE   P.O. 150   150   I.V.(mL/kg) 372.3(4)   372.3(4)   IV Piggyback 302.5   302.5   Shift Total(mL/kg) 824.7(8.9)   824.7(8.9)   OUTPUT   Urine(mL/kg/hr) 420   420   Drains 13   13   Shift Total(mL/kg) 433(4.7)   433(4.7)   Weight (kg) 93 93 93 93              Vent Mode: Spont  Oxygen  Concentration (%):  [40] 40  Resp Rate Total:  [14 br/min-27 br/min] 21 br/min  PEEP/CPAP:  [5 cmH20] 5 cmH20  Pressure Support:  [5 cmH20] 5 cmH20  Mean Airway Pressure:  [7.5 cmH20-8.3 cmH20] 8.3 cmH20         Closed/Suction Drain 01/28/22 0739 Right Other (Comment) Bulb 7 Fr. (Active)            Urethral Catheter 01/28/22 0600 (Active)       Physical Exam:    Constitutional:   Minimally responsive     Eyes: Pupils are equal, round, and reactive to light.     Cardiovascular: Regular rhythm.     Abdominal: Soft.     Psych/Behavior:   comatose        E2VtM5  PERRL  + OC/corneals/cough/gag  BUE localize  BLE tf      Significant Labs:  Recent Labs   Lab 01/29/22  0020 01/29/22  0603 01/30/22  0009   *  --  165*     --  140   K 3.5 4.5 3.7     --  109   CO2 25  --  25   BUN 24*  --  22*   CREATININE 0.7  --  0.7   CALCIUM 8.2*  --  8.1*   MG 2.2  --  2.4     Recent Labs   Lab 01/29/22  0020 01/30/22  0009   WBC 13.27* 11.70   HGB 12.2* 11.4*   HCT 36.1* 32.9*    163     No results for input(s): LABPT, INR, APTT in the last 48 hours.  Microbiology Results (last 7 days)     ** No results found for the last 168 hours. **        All pertinent labs from the last 24 hours have been reviewed.    Significant Diagnostics:  I have reviewed and interpreted all pertinent imaging results/findings within the past 24 hours.

## 2022-01-30 NOTE — NURSING
Neuro surgery at bedside and removed surgical dressing. Also noted to take MARIANNA drain off thumb print suction and place to gravity. Will DC drain later today. Pt and bedside family aware of the plan and in agreement.

## 2022-01-30 NOTE — PROGRESS NOTES
[facility] - Neuro Critical Care  Neurocritical Care  Progress Note    Admit Date: 1/23/2022  Service Date: 01/30/2022  Length of Stay: 7    Subjective:     Chief Complaint: Subdural hematoma    History of Present Illness: Patient is a 62 year old male with PMHx T2DM and HLD who presented as a direct admit from OSH to [facility] unit for multiple acute on subacute subdural hematomas with R to L midline shift.     Per chart review, patient with ongoing frontal headache for the last 10 days prior to admission. No recent trauma reported. On 1/22, he was seen at [facility] for his headaches and was prescribed Fioricet without relief. Due to worsening headache, patient presented to [facility] on 1/23. He was neuro intact with complaint of nausea with no vomiting. Head CT w/o contrast revealed multiple bilateral acute and subacute SDHs overlying the cerebral convexities with the largest overlying the right frontal convexity measuring 1.4 cm. Also noted was diffuse cerebral edema with 8mm right to left midline shift and associated right to left subfalcine herniation.     He was admitted to [facility] for close neurological monitoring and Neurosurgery evaluation.       Hospital Course: 01/24/2022 NAEO  01/25/2022 still complaining of headache. Having N/V  01/26/2022 NAEO. H/A is under better control   01/27/2022 Fluctuating mental status, getting EEG  01/28/2022 s/p R craniotomy for aSDH evacuation (increased MLS o/n); [facility] vent: orally intubated into nsicu postop; sedation on hold; postop CTH reviewed w/ expected surgical decompression and reduction of MLS; increased hemovac drainage immediately postop; replete lytes; ok for TF via ogt;  01/29/2022 propofol switched to fent drip o/n for low bp; plan extubation today; ivf bolus o/n  01/30/2022 extubated to room yesterday; subdural drain in place        Interval History:  As above    Review of Systems   Constitutional: Positive for activity change.   HENT: Negative.     Eyes: Negative.    Respiratory: Negative.    Cardiovascular: Negative.    Gastrointestinal: Positive for nausea.   Endocrine: Negative.    Genitourinary: Positive for difficulty urinating.   Musculoskeletal: Negative.    Skin: Negative.    Allergic/Immunologic: Negative.    Neurological: Positive for headaches.   Hematological: Negative.    Psychiatric/Behavioral: Negative.      2 systems OR Unable to obtain a complete ROS due to level of consciousness.  Objective:     Vitals:  Temp: 98.7 °F (37.1 °C)  Pulse: 83  Rhythm: normal sinus rhythm  BP: 132/65  MAP (mmHg): 92  Resp: (!) 30  SpO2: 98 %  O2 Device (Oxygen Therapy): room air    Temp  Min: 98.3 °F (36.8 °C)  Max: 99.1 °F (37.3 °C)  Pulse  Min: 65  Max: 93  BP  Min: 106/57  Max: 156/70  MAP (mmHg)  Min: 78  Max: 122  Resp  Min: 9  Max: 30  SpO2  Min: 95 %  Max: 100 %  Oxygen Concentration (%)  Min: 40  Max: 40    01/29 0701 - 01/30 0700  In: 2163.1 [P.O.:170; I.V.:1832.3]  Out: 2141 [Urine:1705; Drains:436]   Unmeasured Output  Urine Occurrence: 1  Stool Occurrence: 0  Pad Count: 1       Physical Exam    Constitutional: No apparent distress.   Eyes: Conjunctiva clear, anicteric. Lids no lesions.  Head/Ears/Nose/Mouth/Throat/Neck: Moist mucous membranes. External ears, nose atraumatic.   Cardiovascular: Regular rhythm. No murmurs. No leg edema.  Respiratory: Comfortable respirations. Clear to auscultation.  Gastrointestinal: No hernia. Soft, nondistended, nontender. + bowel sounds.  Skin: No rashes, ulcers, lesions. On palpation no induration, nodules, tightening.    Neurologic Examination:    -Mental Status: aox3; speech clear and fluent; following commands  -Cranial nerves:  Pupils equal, round, and reactive bilateral. eomi  -Motor: BUE 5/5; BLE 5/5  -Reflexes: Plantar responses down.        Medications:  Continuoussodium chloride 0.9%, Last Rate: 75 mL/hr at 01/30/22 1103  dextrose 10 % in water (D10W)    Scheduled[START ON 1/31/2022] atorvastatin, 40 mg,  "Daily  multivitamin, 1 tablet, Daily  [START ON 1/31/2022] polyethylene glycol, 17 g, Daily  senna-docusate 8.6-50 mg, 1 tablet, BID  [START ON 1/31/2022] silodosin, 4 mg, Daily    PRNacetaminophen, 650 mg, Q6H PRN  calcium gluconate IVPB, 1 g, PRN  calcium gluconate IVPB, 2 g, PRN  calcium gluconate IVPB, 3 g, PRN  dextrose 10 % in water (D10W), , Continuous PRN  dextrose 50%, 12.5 g, PRN  glucagon (human recombinant), 1 mg, PRN  hydrALAZINE, 10 mg, Q4H PRN  insulin aspart U-100, 1-10 Units, Q4H PRN  labetalol, 10 mg, Q4H PRN  magnesium sulfate IVPB, 2 g, PRN  magnesium sulfate IVPB, 4 g, PRN  ondansetron, 4 mg, Q8H PRN  oxyCODONE, 5 mg, Q6H PRN  potassium chloride in water, 40 mEq, PRN   And  potassium chloride in water, 60 mEq, PRN   And  potassium chloride in water, 80 mEq, PRN  potassium, sodium phosphates, 2 packet, PRN  potassium, sodium phosphates, 2 packet, PRN  potassium, sodium phosphates, 2 packet, PRN  promethazine (PHENERGAN) IVPB, 12.5 mg, Q6H PRN  sodium chloride 0.9%, 10 mL, PRN      Today I personally reviewed pertinent medications, imaging, laboratory results, notably: cth w/ dec in mls  Diet  Diet diabetic [facility]; 2000 Calorie; Dysphagia Pureed (IDDSI Level 4); [place] Thick  Diet diabetic [facility]; 2000 Calorie; Dysphagia Pureed (IDDSI Level 4); Nectar Thick        Assessment/Plan:     Neuro  * Subdural hematoma  Neurological exam is fluctuating, get an EEG  keppra prophylaxis  1/28 s/p R craniotomy for aSDH evacuation w/ mls and brain compresssion  -sbp 100-160  -euNa++  -hob 0-20  -HV drain care   1/28 -vEEG grossly negative          Cerebral edema  Imaging at OSH revealed diffuse cerebral edema in setting of known SDHs    -close neuro monitoring with frequent neuro checks  -see "Subdural hematoma, acute" for further plan     Cardiac/Vascular  HTN (hypertension)  Hold amlodipine   Postop sbp goal 100-160      HLD (hyperlipidemia)  -continue home atorvastatin 40 mg daily "     Endocrine  Type 2 diabetes mellitus  SSI    GI  Vomiting  Likely sec to pain vs meds. Less likely to be sec to elevated ICP since the rest of his exam is unchanged. F/u CTH  F/U amylase and lipase  reglan           The patient is being Prophylaxed for:  Venous Thromboembolism with: Mechanical or Chemical  Stress Ulcer with: H2B  Ventilator Pneumonia with: not applicable    Activity Orders          Diet diabetic [facility]; 2000 Calorie; Dysphagia Pureed (IDDSI Level 4); [place] Thick: Diabetic starting at 01/30 0948    Turn patient every 2 hours starting at 01/27 2000    Straight Cath starting at 01/26 1200    Elevate HOB starting at 01/23 1821        Full Code    Silverio De Luna MD  Neurocritical Care  [facility] - Neuro Critical Care    Time spent with this ill patient and care team at the bedside: 35min

## 2022-01-30 NOTE — PLAN OF CARE
[facility] Care Plan    POC reviewed with Priyank Kathy Whittington and family at 1400. Pt and family verbalized understanding. Pt MARIANNA drain Dc'd today by neurosurgery. Pt tolerated well. A line Dc'd and pt tolerated that well also. Loja catheter was Dc'd and condom cath was placed. Continuing to monitor for out put pt has has to be straight catheted prior to surgery before surgical loja was in place. Pt is on mediation for urinary rentention already. Pt worked with Speech today and pass for [facility] soft diet with nectar thick liquids from a cup and no straws. Pt has been cleaning his plate for all meals. Pt no longer has HOB restrictions and order have been replaced for PT and OT  to start working with pt again. Pt has been weaned off O2 and has been on RA all shift. Potassium has been replaced again as his recheck was only 3.5. Medication have been changed to PO crushed in food/applesauce. Pt might get orders to transfer out of the lCU tomorrow. Linens changed. Questions and concerns addressed. No acute events today. Pt progressing toward goals. Will continue to monitor. See below and flowsheets for full assessment and VS info.       Neuro:  [place] Coma Scale  Best Eye Response: 4-->(E4) spontaneous  Best Motor Response: 6-->(M6) obeys commands  Best Verbal Response: 5-->(V5) oriented  Kaylah Coma Scale Score: 15  Assessment Qualifiers: patient intubated  Pupil PERRLA: yes     24 hr Temp:  [98.3 °F (36.8 °C)-99.2 °F (37.3 °C)]     CV:   Rhythm: normal sinus rhythm  BP goals:   SBP < 160  MAP > 65    Resp:   O2 Device (Oxygen Therapy): room air  Vent Mode: Spont  Set Rate: 18 BPM  Oxygen Concentration (%): 40  Vt Set: 400 mL  PEEP/CPAP: 5 cmH20  Pressure Support: 5 cmH20    Plan: N/A    GI/:     Diet/Nutrition Received: mechanical/dental soft  Last Bowel Movement: 01/23/22  Voiding Characteristics: ureteral catheter    Intake/Output Summary (Last 24 hours) at 1/30/2022 1650  Last data filed at 1/30/2022 1600  Gross per 24  hour   Intake 2676.88 ml   Output 1724 ml   Net 952.88 ml     Unmeasured Output  Urine Occurrence: 1  Stool Occurrence: 0  Pad Count: 1    Labs/Accuchecks:  Recent Labs   Lab 01/30/22  0009   WBC 11.70   RBC 3.85*   HGB 11.4*   HCT 32.9*         Recent Labs   Lab 01/30/22  0009 01/30/22  0009 01/30/22  1200     --   --    K 3.7   < > 3.5   CO2 25  --   --      --   --    BUN 22*  --   --    CREATININE 0.7  --   --    ALKPHOS 50*  --   --    ALT 23  --   --    AST 10  --   --    BILITOT 0.8  --   --     < > = values in this interval not displayed.      Recent Labs   Lab 01/28/22  0604   INR 1.0  1.0   APTT 24.8  24.8    No results for input(s): CPK, CPKMB, TROPONINI, MB in the last 168 hours.    Electrolytes: Electrolytes replaced  Accuchecks: ACHS    Gtts:   sodium chloride 0.9% 75 mL/hr at 01/30/22 1600    dextrose 10 % in water (D10W)         LDA/Wounds:  Lines/Drains/Airways       Drain              Male External Urinary Catheter 01/30/22 1222 Small <1 day              Peripheral Intravenous Line                   Peripheral IV - Single Lumen 20 G Left Hand -- days         Peripheral IV - Single Lumen 01/29/22 0500 20 G Right;Anterior Wrist 1 day                  Wounds: Yes  Wound care consulted: Yes   Problem: Adult Inpatient Plan of Care  Goal: Plan of Care Review  Outcome: Ongoing, Progressing  Goal: Patient-Specific Goal (Individualized)  Description: Admit Date: 1/23/2022    Admit Dx: Subdural     Past Medical History:  No date: Diabetes mellitus    History reviewed. No pertinent surgical history.    Individualization:   1. Keep family updated     Restraints: N/A          Outcome: Ongoing, Progressing  Goal: Absence of Hospital-Acquired Illness or Injury  Outcome: Ongoing, Progressing  Goal: Optimal Comfort and Wellbeing  Outcome: Ongoing, Progressing     Problem: Adjustment to Illness (Stroke, Hemorrhagic)  Goal: Optimal Coping  Outcome: Ongoing, Progressing     Problem: Bowel  Elimination Impaired (Stroke, Hemorrhagic)  Goal: Effective Bowel Elimination  Outcome: Ongoing, Not Progressing     Problem: Cognitive Impairment (Stroke, Hemorrhagic)  Goal: Optimal Cognitive Function  Outcome: Ongoing, Progressing     Problem: Communication Impairment (Stroke, Hemorrhagic)  Goal: Effective Communication Skills  Outcome: Ongoing, Progressing     Problem: Functional Ability Impaired (Stroke, Hemorrhagic)  Goal: Optimal Functional Ability  Outcome: Ongoing, Progressing     Problem: Urinary Elimination Impaired (Stroke, Hemorrhagic)  Goal: Effective Urinary Elimination  Outcome: Ongoing, Progressing     Problem: Diabetes Comorbidity  Goal: Blood Glucose Level Within Targeted Range  Outcome: Ongoing, Progressing     Problem: Skin Injury Risk Increased  Goal: Skin Health and Integrity  Outcome: Ongoing, Progressing     Problem: Fall Injury Risk  Goal: Absence of Fall and Fall-Related Injury  Outcome: Ongoing, Progressing

## 2022-01-30 NOTE — NURSING
neurosx at bedside and DC MARIANNA drain. Pt tolerated well. One suture placed without issues. Will continue to monitor the site and update as needed. Family at the bedside aware of procedure.

## 2022-01-30 NOTE — PT/OT/SLP EVAL
"Speech Language Pathology Evaluation  Bedside Swallow    Patient Name:  Priyank Whittington   MRN:  [id number]  Admitting Diagnosis: Subdural hematoma    Recommendations:                 General Recommendations:  Dysphagia therapy  Diet recommendations:  Puree, Nectar Thick   Aspiration Precautions: 1 bite/sip at a time, Alternating bites/sips, Assistance with meals and Assistance with thickening liquids, Avoid talking while eating, Check for pocketing/oral residue, Eliminate distractions, Feed only when awake/alert, HOB to 90 degrees, Meds crushed in puree, No straws, Small bites/sips and Strict aspiration precautions   General Precautions: Standard, aspiration,fall,seizure,pureed diet,nectar thick  Communication strategies:  provide increased time to answer and go to room if call light pushed    History:     Past Medical History:   Diagnosis Date    Diabetes mellitus        History reviewed. No pertinent surgical history.    Social History: Patient lives with family, IND pta, works as a .  He denied difficulty swallowing, reg diet/thin liquids pta.     Prior Intubation HX:  1/28-1/29 (crani)    Modified Barium Swallow: none    Chest X-Rays: 1/28: Mediastinal structures are midline.  Hilar contours are unremarkable.  Cardiac silhouette is magnified by AP technique.  Lung volumes are stable.  Patchy increased attenuation within the left lower lung zone likely relates to atelectasis or consolidation, stable when compared to the previous exam.  No pneumothorax or large pleural effusion.  No free air beneath the diaphragm.  No acute osseous abnormalities.  Visualized soft tissues are within normal limits.    Subjective     "I'm okay."     RN cleared pt for evaluation, elevated HOB to 30 degrees. (HOB restriction while drain in place, possible removal later today)    Pain/Comfort:  · Pain Rating 1: 0/10  · Pain Rating Post-Intervention 1: 0/10    Respiratory Status: Room air    Objective:     Oral " Musculature Evaluation  · Oral Musculature: general weakness,right weakness  · Dentition: present and adequate  · Secretion Management:  (pooled secretions noted initially)  · Mucosal Quality: adequate  · Mandibular Strength and Mobility: WFL  · Oral Labial Strength and Mobility: impaired coordination (mild)  · Lingual Strength and Mobility: impaired strength  · Volitional Cough: WFL  · Volitional Swallow: decreased hyolaryngeal rise suspected  · Voice Prior to PO Intake: mild hoarseness, mild dysarthria    Bedside Swallow Eval:   Consistencies Assessed:  · Thin liquids via spoon, cup, straw  · Nectar thick liquids via spoon, cup, straw  · Puree via tsp x5  · Solids cracker portion x1     Oral Phase:   · open mouth mastication  · Impaired rotational chew  · Oral residue-clears with double swallow  · Slow oral transit time    Pharyngeal Phase:   · Coughing x1 during mastication of cracker, questionable delayed from previous thin liquid trial  · decreased hyolaryngeal excursion to palpation  · multiple spontaneous swallows  · throat clearing with thin and delayed following nectar thick via straw  · wet vocal quality after swallow with thin   · No overt s/s aspiration with nectar thick liquids via cup or puree    Compensatory Strategies  · None    Treatment: Education provided to pt and family re: role of SLP, diet recs, swallow precs, s/s aspiration, and POC.  Family verbalized understanding and agreement.  Pt attentive and nodding in agreement though would benefit from reinforcement.      Assessment:     Priyank Whittington is a 62 y.o. male with an SLP diagnosis of Dysphagia.      Goals:   Multidisciplinary Problems     SLP Goals        Problem: SLP Goal    Goal Priority Disciplines Outcome   SLP Goal     SLP Ongoing, Progressing   Description: Speech Language Pathology Goals  Goals expected to be met by 2/6  1. Pt will participate in ongoing assessment of swallow.                                Plan:      · Patient to be seen:  4 x/week   · Plan of Care expires:  03/01/22  · Plan of Care reviewed with:  patient,family   · SLP Follow-Up:  Yes       Discharge recommendations:  rehabilitation facility   Barriers to Discharge:  Level of Skilled Assistance Needed      Time Tracking:     SLP Treatment Date:   01/30/22  Speech Start Time:  0854  Speech Stop Time:  0910     Speech Total Time (min):  16 min    Billable Minutes: Eval Swallow and Oral Function 8 and Self Care/Home Management Training 8    01/30/2022

## 2022-01-30 NOTE — SUBJECTIVE & OBJECTIVE
Interval History:  As above    Review of Systems   Constitutional: Positive for activity change.   HENT: Negative.    Eyes: Negative.    Respiratory: Negative.    Cardiovascular: Negative.    Gastrointestinal: Positive for nausea.   Endocrine: Negative.    Genitourinary: Positive for difficulty urinating.   Musculoskeletal: Negative.    Skin: Negative.    Allergic/Immunologic: Negative.    Neurological: Positive for headaches.   Hematological: Negative.    Psychiatric/Behavioral: Negative.      2 systems OR Unable to obtain a complete ROS due to level of consciousness.  Objective:     Vitals:  Temp: 98.7 °F (37.1 °C)  Pulse: 83  Rhythm: normal sinus rhythm  BP: 132/65  MAP (mmHg): 92  Resp: (!) 30  SpO2: 98 %  O2 Device (Oxygen Therapy): room air    Temp  Min: 98.3 °F (36.8 °C)  Max: 99.1 °F (37.3 °C)  Pulse  Min: 65  Max: 93  BP  Min: 106/57  Max: 156/70  MAP (mmHg)  Min: 78  Max: 122  Resp  Min: 9  Max: 30  SpO2  Min: 95 %  Max: 100 %  Oxygen Concentration (%)  Min: 40  Max: 40    01/29 0701 - 01/30 0700  In: 2163.1 [P.O.:170; I.V.:1832.3]  Out: 2141 [Urine:1705; Drains:436]   Unmeasured Output  Urine Occurrence: 1  Stool Occurrence: 0  Pad Count: 1       Physical Exam    Constitutional: No apparent distress.   Eyes: Conjunctiva clear, anicteric. Lids no lesions.  Head/Ears/Nose/Mouth/Throat/Neck: Moist mucous membranes. External ears, nose atraumatic.   Cardiovascular: Regular rhythm. No murmurs. No leg edema.  Respiratory: Comfortable respirations. Clear to auscultation.  Gastrointestinal: No hernia. Soft, nondistended, nontender. + bowel sounds.  Skin: No rashes, ulcers, lesions. On palpation no induration, nodules, tightening.    Neurologic Examination:    -Mental Status: aox3; speech clear and fluent; following commands  -Cranial nerves:  Pupils equal, round, and reactive bilateral. eomi  -Motor: BUE 5/5; BLE 5/5  -Reflexes: Plantar responses down.        Medications:  Continuoussodium chloride 0.9%, Last  Rate: 75 mL/hr at 01/30/22 1103  dextrose 10 % in water (D10W)    Scheduled[START ON 1/31/2022] atorvastatin, 40 mg, Daily  multivitamin, 1 tablet, Daily  [START ON 1/31/2022] polyethylene glycol, 17 g, Daily  senna-docusate 8.6-50 mg, 1 tablet, BID  [START ON 1/31/2022] silodosin, 4 mg, Daily    PRNacetaminophen, 650 mg, Q6H PRN  calcium gluconate IVPB, 1 g, PRN  calcium gluconate IVPB, 2 g, PRN  calcium gluconate IVPB, 3 g, PRN  dextrose 10 % in water (D10W), , Continuous PRN  dextrose 50%, 12.5 g, PRN  glucagon (human recombinant), 1 mg, PRN  hydrALAZINE, 10 mg, Q4H PRN  insulin aspart U-100, 1-10 Units, Q4H PRN  labetalol, 10 mg, Q4H PRN  magnesium sulfate IVPB, 2 g, PRN  magnesium sulfate IVPB, 4 g, PRN  ondansetron, 4 mg, Q8H PRN  oxyCODONE, 5 mg, Q6H PRN  potassium chloride in water, 40 mEq, PRN   And  potassium chloride in water, 60 mEq, PRN   And  potassium chloride in water, 80 mEq, PRN  potassium, sodium phosphates, 2 packet, PRN  potassium, sodium phosphates, 2 packet, PRN  potassium, sodium phosphates, 2 packet, PRN  promethazine (PHENERGAN) IVPB, 12.5 mg, Q6H PRN  sodium chloride 0.9%, 10 mL, PRN      Today I personally reviewed pertinent medications, imaging, laboratory results, notably: cth w/ dec in mls  Diet  [facility]; 2000 Calorie; Dysphagia Pureed (IDDSI Level 4); [place] Thick  Diet diabetic [facility]; 2000 Calorie; Dysphagia Pureed (IDDSI Level 4); Nectar Thick

## 2022-01-31 LAB
ALBUMIN SERPL BCP-MCNC: 2.5 G/DL (ref 3.5–5.2)
ALP SERPL-CCNC: 65 U/L (ref 55–135)
ALT SERPL W/O P-5'-P-CCNC: 88 U/L (ref 10–44)
ANION GAP SERPL CALC-SCNC: 5 MMOL/L (ref 8–16)
AST SERPL-CCNC: 49 U/L (ref 10–40)
BASOPHILS # BLD AUTO: 0.02 K/UL (ref 0–0.2)
BASOPHILS NFR BLD: 0.2 % (ref 0–1.9)
BILIRUB SERPL-MCNC: 0.7 MG/DL (ref 0.1–1)
BUN SERPL-MCNC: 18 MG/DL (ref 6–20)
CALCIUM SERPL-MCNC: 7.9 MG/DL (ref 8.7–10.5)
CHLORIDE SERPL-SCNC: 106 MMOL/L (ref 95–110)
CO2 SERPL-SCNC: 24 MMOL/L (ref 23–29)
CREAT SERPL-MCNC: 0.7 MG/DL (ref 0.5–1.4)
DIFFERENTIAL METHOD: ABNORMAL
EOSINOPHIL # BLD AUTO: 0.1 K/UL (ref 0–0.5)
EOSINOPHIL NFR BLD: 0.9 % (ref 0–8)
ERYTHROCYTE [DISTWIDTH] IN BLOOD BY AUTOMATED COUNT: 12.2 % (ref 11.5–14.5)
EST. GFR  (AFRICAN AMERICAN): >60 ML/MIN/1.73 M^2
EST. GFR  (NON AFRICAN AMERICAN): >60 ML/MIN/1.73 M^2
GLUCOSE SERPL-MCNC: 213 MG/DL (ref 70–110)
HCT VFR BLD AUTO: 33.5 % (ref 40–54)
HGB BLD-MCNC: 11.5 G/DL (ref 14–18)
IMM GRANULOCYTES # BLD AUTO: 0.12 K/UL (ref 0–0.04)
IMM GRANULOCYTES NFR BLD AUTO: 1 % (ref 0–0.5)
LYMPHOCYTES # BLD AUTO: 1.6 K/UL (ref 1–4.8)
LYMPHOCYTES NFR BLD: 13.3 % (ref 18–48)
MAGNESIUM SERPL-MCNC: 2.2 MG/DL (ref 1.6–2.6)
MCH RBC QN AUTO: 29.4 PG (ref 27–31)
MCHC RBC AUTO-ENTMCNC: 34.3 G/DL (ref 32–36)
MCV RBC AUTO: 86 FL (ref 82–98)
MONOCYTES # BLD AUTO: 1 K/UL (ref 0.3–1)
MONOCYTES NFR BLD: 8.3 % (ref 4–15)
NEUTROPHILS # BLD AUTO: 9.1 K/UL (ref 1.8–7.7)
NEUTROPHILS NFR BLD: 76.3 % (ref 38–73)
NRBC BLD-RTO: 0 /100 WBC
PHOSPHATE SERPL-MCNC: 2.8 MG/DL (ref 2.7–4.5)
PLATELET # BLD AUTO: 179 K/UL (ref 150–450)
PMV BLD AUTO: 10.3 FL (ref 9.2–12.9)
POCT GLUCOSE: 179 MG/DL (ref 70–110)
POCT GLUCOSE: 183 MG/DL (ref 70–110)
POCT GLUCOSE: 196 MG/DL (ref 70–110)
POCT GLUCOSE: 245 MG/DL (ref 70–110)
POCT GLUCOSE: 281 MG/DL (ref 70–110)
POTASSIUM SERPL-SCNC: 3.9 MMOL/L (ref 3.5–5.1)
PROT SERPL-MCNC: 5.1 G/DL (ref 6–8.4)
RBC # BLD AUTO: 3.91 M/UL (ref 4.6–6.2)
SODIUM SERPL-SCNC: 135 MMOL/L (ref 136–145)
WBC # BLD AUTO: 11.94 K/UL (ref 3.9–12.7)

## 2022-01-31 PROCEDURE — 84100 ASSAY OF PHOSPHORUS: CPT | Performed by: PHYSICIAN ASSISTANT

## 2022-01-31 PROCEDURE — 25000003 PHARM REV CODE 250: Performed by: NURSE PRACTITIONER

## 2022-01-31 PROCEDURE — 97168 OT RE-EVAL EST PLAN CARE: CPT

## 2022-01-31 PROCEDURE — 25000003 PHARM REV CODE 250: Performed by: PHYSICIAN ASSISTANT

## 2022-01-31 PROCEDURE — 51701 INSERT BLADDER CATHETER: CPT

## 2022-01-31 PROCEDURE — 63600175 PHARM REV CODE 636 W HCPCS: Performed by: PHYSICIAN ASSISTANT

## 2022-01-31 PROCEDURE — 99233 PR SUBSEQUENT HOSPITAL CARE,LEVL III: ICD-10-PCS | Mod: ,,, | Performed by: INTERNAL MEDICINE

## 2022-01-31 PROCEDURE — 51798 US URINE CAPACITY MEASURE: CPT

## 2022-01-31 PROCEDURE — 99233 SBSQ HOSP IP/OBS HIGH 50: CPT | Mod: ,,, | Performed by: INTERNAL MEDICINE

## 2022-01-31 PROCEDURE — 11000001 HC ACUTE MED/SURG PRIVATE ROOM

## 2022-01-31 PROCEDURE — 83735 ASSAY OF MAGNESIUM: CPT | Performed by: PHYSICIAN ASSISTANT

## 2022-01-31 PROCEDURE — 94761 N-INVAS EAR/PLS OXIMETRY MLT: CPT

## 2022-01-31 PROCEDURE — 97110 THERAPEUTIC EXERCISES: CPT

## 2022-01-31 PROCEDURE — 97535 SELF CARE MNGMENT TRAINING: CPT

## 2022-01-31 PROCEDURE — 85025 COMPLETE CBC W/AUTO DIFF WBC: CPT | Performed by: PHYSICIAN ASSISTANT

## 2022-01-31 PROCEDURE — 97164 PT RE-EVAL EST PLAN CARE: CPT

## 2022-01-31 PROCEDURE — 80053 COMPREHEN METABOLIC PANEL: CPT | Performed by: PHYSICIAN ASSISTANT

## 2022-01-31 RX ORDER — BISACODYL 10 MG
10 SUPPOSITORY, RECTAL RECTAL DAILY PRN
Status: DISCONTINUED | OUTPATIENT
Start: 2022-01-31 | End: 2022-02-02 | Stop reason: HOSPADM

## 2022-01-31 RX ORDER — INSULIN ASPART 100 [IU]/ML
1-10 INJECTION, SOLUTION INTRAVENOUS; SUBCUTANEOUS
Status: DISCONTINUED | OUTPATIENT
Start: 2022-01-31 | End: 2022-02-02 | Stop reason: HOSPADM

## 2022-01-31 RX ORDER — BACLOFEN 5 MG/1
5 TABLET ORAL 3 TIMES DAILY PRN
Status: DISCONTINUED | OUTPATIENT
Start: 2022-01-31 | End: 2022-02-02 | Stop reason: HOSPADM

## 2022-01-31 RX ORDER — INSULIN ASPART 100 [IU]/ML
2 INJECTION, SOLUTION INTRAVENOUS; SUBCUTANEOUS
Status: DISCONTINUED | OUTPATIENT
Start: 2022-01-31 | End: 2022-02-02 | Stop reason: HOSPADM

## 2022-01-31 RX ORDER — LIDOCAINE HYDROCHLORIDE 20 MG/ML
JELLY TOPICAL SEE ADMIN INSTRUCTIONS
Status: DISCONTINUED | OUTPATIENT
Start: 2022-01-31 | End: 2022-02-02 | Stop reason: HOSPADM

## 2022-01-31 RX ORDER — SILODOSIN 4 MG/1
8 CAPSULE ORAL DAILY
Status: DISCONTINUED | OUTPATIENT
Start: 2022-02-01 | End: 2022-02-02 | Stop reason: HOSPADM

## 2022-01-31 RX ADMIN — BISACODYL 10 MG: 10 SUPPOSITORY RECTAL at 10:01

## 2022-01-31 RX ADMIN — BACLOFEN 5 MG: 5 TABLET ORAL at 09:01

## 2022-01-31 RX ADMIN — POTASSIUM & SODIUM PHOSPHATES POWDER PACK 280-160-250 MG 2 PACKET: 280-160-250 PACK at 12:01

## 2022-01-31 RX ADMIN — POLYETHYLENE GLYCOL 3350 17 G: 17 POWDER, FOR SOLUTION ORAL at 08:01

## 2022-01-31 RX ADMIN — INSULIN ASPART 2 UNITS: 100 INJECTION, SOLUTION INTRAVENOUS; SUBCUTANEOUS at 05:01

## 2022-01-31 RX ADMIN — SENNOSIDES AND DOCUSATE SODIUM 1 TABLET: 50; 8.6 TABLET ORAL at 10:01

## 2022-01-31 RX ADMIN — INSULIN ASPART 2 UNITS: 100 INJECTION, SOLUTION INTRAVENOUS; SUBCUTANEOUS at 12:01

## 2022-01-31 RX ADMIN — INSULIN ASPART 6 UNITS: 100 INJECTION, SOLUTION INTRAVENOUS; SUBCUTANEOUS at 10:01

## 2022-01-31 RX ADMIN — SENNOSIDES AND DOCUSATE SODIUM 1 TABLET: 50; 8.6 TABLET ORAL at 08:01

## 2022-01-31 RX ADMIN — INSULIN ASPART 1 UNITS: 100 INJECTION, SOLUTION INTRAVENOUS; SUBCUTANEOUS at 11:01

## 2022-01-31 RX ADMIN — SODIUM CHLORIDE TAB 1 GM 1 G: 1 TAB at 03:01

## 2022-01-31 RX ADMIN — SODIUM CHLORIDE TAB 1 GM 1 G: 1 TAB at 09:01

## 2022-01-31 RX ADMIN — ATORVASTATIN CALCIUM 40 MG: 20 TABLET, FILM COATED ORAL at 08:01

## 2022-01-31 RX ADMIN — SODIUM CHLORIDE TAB 1 GM 1 G: 1 TAB at 08:01

## 2022-01-31 RX ADMIN — Medication 1 TABLET: at 08:01

## 2022-01-31 RX ADMIN — INSULIN ASPART 2 UNITS: 100 INJECTION, SOLUTION INTRAVENOUS; SUBCUTANEOUS at 11:01

## 2022-01-31 RX ADMIN — SILODOSIN 4 MG: 4 CAPSULE ORAL at 08:01

## 2022-01-31 NOTE — PLAN OF CARE
PT Re-eval complete and POC established.    Pam Duran, PT, DPT  2022      Problem: Physical Therapy Goal  Goal: Physical Therapy Goal  Description: Goals to be met by: 22     Patient will increase functional independence with mobility by performin. Supine to sit with Modified [place]  2. Sit to supine with Modified [place]  3. Rolling to Left and Right with Modified [place].  4. Sit to stand transfer with Stand-by Assistance  5. Bed to chair transfer with Contact Guard Assistance using LRAD  6. Gait  x 50 feet with Contact Guard Assistance using LRAD.   7. Lower extremity exercise program x15 reps per instruction, with supervision    Outcome: Ongoing, Progressing

## 2022-01-31 NOTE — PT/OT/SLP RE-EVAL
Physical Therapy [id number]  [id number] and co-treatment completed due to patient's complexity and benefit of two skilled therapists to facilitate functional and safe mobility at this time, maximize pt's participation with therapy, and to accommodate pt's activity tolerance.      Patient Name:  Priyank Whittington   MRN:  [id number]    Recommendations:     Discharge Recommendations:  rehabilitation facility   Discharge Equipment Recommendations: other (see comments) (TBD by next level of care or pending pt progress)   Barriers to discharge: Inaccessible home and Decreased caregiver support    Assessment:     Priyank Whittington is a 62 y.o. male admitted with a medical diagnosis of Subdural hematoma.  He presents with the following impairments/functional limitations:  weakness,impaired endurance,impaired self care skills,impaired functional mobilty,gait instability,impaired balance,visual deficits,decreased upper extremity function,decreased lower extremity function,decreased safety awareness,impaired fine motor,impaired skin. Pt would benefit from intensive inpatient rehabilitation for: Dynamic/static standing/sitting balance through skilled balance training, strengthening with the use of skilled therapeutic exercises interventions, mobility and safety training to ensure safe discharge home through skilled patient and caregiver education home management training, mobility through community re-integration training and mobility through adaptive equipment training. Pt highly motivated to return to independent PLOF and can tolerate 3+hours of therapy. Pt continues to benefit from a collaborative PT/OT/SLP program to improve quality of life and focus on recovery of impairments.      Rehab Prognosis:  Good; patient would benefit from acute skilled PT services to address these deficits and reach maximum level of function.      Recent Surgery: Procedure(s) (LRB):  CRANIOTOMY, FOR SUBDURAL HEMATOMA EVACUATION  (Right) 3 Days Post-Op    Plan:     During this hospitalization, patient to be seen 4 x/week to address the above listed problems via gait training,therapeutic activities,therapeutic exercises,neuromuscular re-education  · Plan of Care Expires:  03/02/22   Plan of Care Reviewed with: patient,spouse,family    Subjective     Communicated with RN prior to session.  Patient found HOB elevated with telemetry,bed alarm,blood pressure cuff,peripheral IV,pulse ox (continuous) upon PT entry to room, agreeable to evaluation.      Chief Complaint: none verbalized  Patient comments/goals: pt reports it feels good to be out of the bed  Pain/Comfort:  · Pain Rating 1: 0/10  · Pain Rating Post-Intervention 1: 0/10    Patients cultural, spiritual, Latter day conflicts given the current situation: no      Objective:     Patient found with: telemetry,bed alarm,blood pressure cuff,peripheral IV,pulse ox (continuous)     General Precautions: Standard, aspiration,fall,seizure   Orthopedic Precautions:N/A   Braces: N/A  Respiratory Status: Room air    Exams:  · Cognitive Exam:  Patient is oriented to Person, Place, Time and Situation  · Fine Motor Coordination: BLE, heel/shin, WFL   · Gross Motor Coordination:  WFL  · Postural Exam:  Patient presented with the following abnormalities:    · -       Rounded shoulders  · -       Forward head  · Sensation: -       Intact, denies numbness/tingling  · RLE ROM: WFL  · RLE Strength: Deficits: grossly 4/5 except knee flexion 4-/5  · LLE ROM: WFL  · LLE Strength: Deficits: grossly 4/5 except knee flexion 4-/5    Functional Mobility:  · Bed Mobility:     · Rolling Left:  minimum assistance  · Scooting: minimum assistance  · Supine to Sit: minimum assistance  · Transfers:     · Sit to Stand:  minimum assistance with no AD and hand-held assist  · Gait: Ramya x 2, HHA, 3 ft to bedside chair, Shuffled steps, some improvement noted with repetition   · Balance:   · Static Sitting: CGA-Ramya  · Dynamic  Sitting: modA with anterior lean  · Static Standing: Ramya with HHA  · Dynamic Standing: Ramya with HHA    AM-PAC 6 CLICK MOBILITY  Total Score:16       Therapeutic Activities and Exercises:  Patient also educated and instructed on therapeutic exercises. Therapeutic exercises included the following: Long Arc Quads, Seated marches (Single leg), Bilateral Heel raises, Bilateral Toe Raises. Pt performed 1x10 on BLEs with verbal/tactile cuing or assistance as needed.  Patient educated on role of therapy, goals of session, and benefits of mobilizing.   Discussed PT plan of care during hospitalization.   Patient educated on calling for assistance.   Patient educated on how their diagnosis impacts their mobility within PT scope of practice.   Communication board up to date.  All questions answered within PT scope of practice.      Patient left up in chair with all lines intact, call button in reach, chair alarm on, RN notified and family present.    GOALS:   Multidisciplinary Problems     Physical Therapy Goals        Problem: Physical Therapy Goal    Goal Priority Disciplines Outcome Goal Variances Interventions   Physical Therapy Goal     PT, PT/OT Ongoing, Progressing     Description: Goals to be met by: 22     Patient will increase functional independence with mobility by performin. Supine to sit with Modified [place]  2. Sit to supine with Modified [place]  3. Rolling to Left and Right with Modified [place].  4. Sit to stand transfer with Stand-by Assistance  5. Bed to chair transfer with Contact Guard Assistance using LRAD  6. Gait  x 50 feet with Contact Guard Assistance using LRAD.   7. Lower extremity exercise program x15 reps per instruction, with supervision                     History:     Past Medical History:   Diagnosis Date    Diabetes mellitus        Past Surgical History:   Procedure Laterality Date    CRANIOTOMY FOR EVACUATION OF SUBDURAL HEMATOMA Right 2022    Procedure:  CRANIOTOMY, FOR SUBDURAL HEMATOMA EVACUATION;  Surgeon: Ace Candelaria MD;  Location: [facility] OR [address] [place] [address];  Service: Neurosurgery;  Laterality: Right;  right, possible bilateral       Time Tracking:     PT Received On: 01/31/22  PT Start Time: 1110     PT Stop Time: 1136  PT Total Time (min): 26 min     Billable Minutes: Re-eval 10 and Therapeutic Exercise 10      01/31/2022

## 2022-01-31 NOTE — PLAN OF CARE
[facility] Care Plan    POC reviewed with Priyank Whittington and wife at 0300. Pt and wife verbalized understanding. Questions and concerns addressed. No acute events overnight. Pt progressing toward goals. Will continue to monitor. See below and flowsheets for full assessment and VS info.     - Possible transfer out of ICU today  -Pt presenting urinary retention; bladder scanned and straight cath x3 throughout shift. Pt complaining of pain during cath insertion; provider notified; urojet ordered   -On NS gtt at 75 mL/hr  - No BM since 1/23    Neuro:  [place] Coma Scale  Best Eye Response: 4-->(E4) spontaneous  Best Motor Response: 6-->(M6) obeys commands  Best Verbal Response: 5-->(V5) oriented  [place] Coma Scale Score: 15  Assessment Qualifiers: patient not sedated/intubated,no eye obstruction present  Pupil PERRLA: yes     24hr Temp:  [98 °F (36.7 °C)-99.8 °F (37.7 °C)]     CV:   Rhythm: normal sinus rhythm  BP goals:   SBP < 160  MAP > 65    Resp:   O2 Device (Oxygen Therapy): room air    Plan: N/A    GI/:     Diet/Nutrition Received: consistent carb/diabetic diet  Last Bowel Movement: 01/23/22  Voiding Characteristics: external catheter,dribbling    Intake/Output Summary (Last 24 hours) at 1/31/2022 0331  Last data filed at 1/31/2022 0305  Gross per 24 hour   Intake 3580.3 ml   Output 2251 ml   Net 1329.3 ml     Unmeasured Output  Urine Occurrence: 1  Stool Occurrence: 0  Pad Count: 1    Labs/Accuchecks:  Recent Labs   Lab 01/31/22  0104   WBC 11.94   RBC 3.91*   HGB 11.5*   HCT 33.5*         Recent Labs   Lab 01/31/22  0104   *   K 3.9   CO2 24      BUN 18   CREATININE 0.7   ALKPHOS 65   ALT 88*   AST 49*   BILITOT 0.7      Recent Labs   Lab 01/28/22  0604   INR 1.0  1.0   APTT 24.8  24.8    No results for input(s): CPK, CPKMB, TROPONINI, MB in the last 168 hours.    Electrolytes: N/A - electrolytes WDL  Accuchecks: Q4H    Gtts:   sodium chloride 0.9% 75 mL/hr at 01/31/22 7614     dextrose 10 % in water (D10W)         LDA/Wounds:  Lines/Drains/Airways       Drain              Male External Urinary Catheter 01/30/22 1222 Small <1 day              Peripheral Intravenous Line                   Peripheral IV - Single Lumen 20 G Left Hand -- days         Peripheral IV - Single Lumen 01/29/22 0500 20 G Right;Anterior Wrist 1 day                  Wounds: No  Wound care consulted: No

## 2022-01-31 NOTE — SUBJECTIVE & OBJECTIVE
Interval History:  As above    Review of Systems   Constitutional: Positive for activity change.   HENT: Negative.    Eyes: Negative.    Respiratory: Negative.    Cardiovascular: Negative.    Gastrointestinal: Positive for nausea.   Endocrine: Negative.    Genitourinary: Positive for difficulty urinating.   Musculoskeletal: Negative.    Skin: Negative.    Allergic/Immunologic: Negative.    Neurological: Positive for headaches.   Hematological: Negative.    Psychiatric/Behavioral: Negative.      2 systems OR Unable to obtain a complete ROS due to level of consciousness.  Objective:     Vitals:  Temp: 98.2 °F (36.8 °C)  Pulse: 74  Rhythm: normal sinus rhythm  BP: (!) 126/58  MAP (mmHg): 83  Resp: 20  SpO2: 97 %  O2 Device (Oxygen Therapy): room air    Temp  Min: 98 °F (36.7 °C)  Max: 99.8 °F (37.7 °C)  Pulse  Min: 67  Max: 99  BP  Min: 93/53  Max: 159/80  MAP (mmHg)  Min: 70  Max: 110  Resp  Min: 17  Max: 37  SpO2  Min: 95 %  Max: 100 %    01/30 0701 - 01/31 0700  In: 3652 [P.O.:1320; I.V.:1731]  Out: 2935 [Urine:2935]   Unmeasured Output  Urine Occurrence: 1  Stool Occurrence: 0  Pad Count: 1       Physical Exam  Constitutional: No apparent distress.   Eyes: Conjunctiva clear, anicteric. Lids no lesions.  Head/Ears/Nose/Mouth/Throat/Neck: Moist mucous membranes. External ears, nose atraumatic.   Cardiovascular: Regular rhythm. No murmurs. No leg edema.  Respiratory: Comfortable respirations. Clear to auscultation.  Gastrointestinal: No hernia. Soft, nondistended, nontender. + bowel sounds.  Skin: No rashes, ulcers, lesions. On palpation no induration, nodules, tightening.    Neurologic Examination:    -Mental Status: aox3; speech clear and fluent; following commands  -Cranial nerves:  Pupils equal, round, and reactive bilateral.  EOM impaired (right eye adduction and superior vertical gaze).   -Motor: BUE 5/5; BLE 5/5  -Reflexes: Plantar responses down.        Medications:  Continuousdextrose 10 % in water  (D10W)    Scheduledatorvastatin, 40 mg, Daily  multivitamin, 1 tablet, Daily  polyethylene glycol, 17 g, Daily  senna-docusate 8.6-50 mg, 1 tablet, BID  [START ON 2/1/2022] silodosin, 8 mg, Daily  sodium chloride, 1 g, TID    PRNacetaminophen, 650 mg, Q6H PRN  baclofen, 5 mg, TID PRN  bisacodyL, 10 mg, Daily PRN  calcium gluconate IVPB, 1 g, PRN  calcium gluconate IVPB, 2 g, PRN  calcium gluconate IVPB, 3 g, PRN  dextrose 10 % in water (D10W), , Continuous PRN  dextrose 50%, 12.5 g, PRN  glucagon (human recombinant), 1 mg, PRN  hydrALAZINE, 10 mg, Q4H PRN  insulin aspart U-100, 1-10 Units, Q4H PRN  labetalol, 10 mg, Q4H PRN  LIDOcaine HCl 2%, , See admin instructions  magnesium sulfate IVPB, 2 g, PRN  magnesium sulfate IVPB, 4 g, PRN  ondansetron, 4 mg, Q8H PRN  oxyCODONE, 5 mg, Q6H PRN  potassium chloride in water, 40 mEq, PRN   And  potassium chloride in water, 60 mEq, PRN   And  potassium chloride in water, 80 mEq, PRN  potassium, sodium phosphates, 2 packet, PRN  potassium, sodium phosphates, 2 packet, PRN  potassium, sodium phosphates, 2 packet, PRN  promethazine (PHENERGAN) IVPB, 12.5 mg, Q6H PRN  sodium chloride 0.9%, 10 mL, PRN      Today I personally reviewed pertinent medications, imaging, laboratory results, notably: cth w/ dec in mls  Diet  Diet diabetic [facility]; 2000 Calorie; Dysphagia Pureed (IDDSI Level 4); [place] Thick  Diet diabetic [facility]; 2000 Calorie; Dysphagia Pureed (IDDSI Level 4); Nectar Thick

## 2022-01-31 NOTE — PLAN OF CARE
Problem: Occupational Therapy Goal  Goal: Occupational Therapy Goal  Description: Goals to be met by: 2/14/2022    Patient will increase functional independence with ADLs by performing:    UE Dressing with Stand-by Assistance.  LE Dressing with Minimal Assistance.  Grooming while standing with Contact Guard Assistance.  Toileting from bedside commode with Minimal Assistance for hygiene and clothing management.   Supine to sit with Stand-by Assistance.  Sit to stand transfer with Stand-by Assistance with LRAD.  Toilet transfer to bedside commode with Contact Guard Assistance with LRAD.    Outcome: Ongoing, Progressing     Pt re-evaluated and OT goals updated.

## 2022-01-31 NOTE — PT/OT/SLP RE-EVAL
"Occupational Therapy   Re-evaluation and Treatment    Name: Priyank Whittington  MRN: [id number]  Admitting Diagnosis:  Subdural hematoma  Recent Surgery: Procedure(s) (LRB):  CRANIOTOMY, FOR SUBDURAL HEMATOMA EVACUATION (Right) 3 Days Post-Op     Co-treat with PT to have 2 skilled therapists present to safely re-assess pt's functional mobility.    Recommendations:     Discharge Recommendations: rehabilitation facility  Discharge Equipment Recommendations:  other (see comments) (TBD)  Barriers to discharge:  Other (Comment) (increased level of assistance required)    Assessment:     Priyank Whittington is a 62 y.o. male with a medical diagnosis of Subdural hematoma.  Pt tolerated session well, performing bed to chair step transfer and ADL tasks.  However, he requires increased assistance to safely ambulate (Min A of 2 with B HHA) and to perform ADLs (Min A for oral care) compared to his baseline.  Due to his current level of function compared to his PLOF and his home environment, inpatient rehab recommended at d/c for maximal pt gains in functional independence and to ensure pt's safety upon returning home.   He presents with the following.  Performance deficits affecting function are impaired endurance,weakness,impaired self care skills,impaired functional mobilty,gait instability,impaired balance,visual deficits,impaired fine motor,decreased upper extremity function,decreased lower extremity function,decreased safety awareness,impaired skin,decreased coordination.      Rehab Prognosis:  Good; patient would benefit from acute skilled OT services to address these deficits and reach maximum level of function.       Plan:     Patient to be seen 4 x/week to address the above listed problems via self-care/home management,therapeutic exercises,therapeutic activities,neuromuscular re-education  · Plan of Care Expires: 02/28/22  · Plan of Care Reviewed with: patient,spouse,family    Subjective   "Thank you."  Chief " Complaint: none stated  Patient/Family stated goals: to return to PLOF  Communicated with: nursing and PT prior to session.  Pain/Comfort:  · Pain Rating 1: 0/10  · Pain Rating Post-Intervention 1: 0/10    Objective:     Communicated with: nursing and PT prior to session.  Patient found HOB elevated with: telemetry,bed alarm,blood pressure cuff,pulse ox (continuous) with his sister present upon OT entry to room.    General Precautions: Standard, fall,aspiration,seizure   Orthopedic Precautions:N/A   Braces: N/A  Respiratory Status: Room air    Occupational Performance:    Bed Mobility:    · Patient completed Rolling/Turning to Left with minimal assistance  · Patient completed Scooting/Bridging to EOB with minimal assistance  · Patient completed Supine to Sit with minimal assistance  · Pt required CGA for static sitting balance but Mod A for dynamic sitting balance.    Functional Mobility/Transfers:  · Patient completed Sit <> Stand Transfer from EOB x 1 trial with minimum assistance  with  hand-held assist   · Patient completed Bed <> Chair Transfer using Step Transfer technique with minimum assistance and of 2 persons with bilateral hand-held assist  · Functional Mobility: Pt ambulated ~3 ft from EOB to the bedside chair with Min A of 2 with B HHA.  He was unsteady but had no overt LOB.    Activities of Daily Living:  · Grooming: minimum assistance with tray table setup to perform oral care while sitting UIC with (A) to grasp toothpaste tube with his L hand in order to apply paste to the tooth brush and (A) to hold basin for spitting.  Setup assistance to wash his face while supine with HOB elevated.   · Lower Body Dressing: moderate assistance as pt was able to doff non-skid sock with Mod A for dynamic sitting balance EOB but was unable to navi.  Pt had a significant anterior lean while attempting to navi sock.    Cognitive/Visual Perceptual:  Cognitive/Psychosocial Skills:     -       Oriented to: Person, Place,  Time and Situation   -       Follows Commands/attention:Follows multistep  commands  -       Communication: clear/fluent    Physical Exam:  Dominant hand:    -       R-handed  Upper Extremity Range of Motion:     -       Right Upper Extremity: WFL  -       Left Upper Extremity: WFL  Upper Extremity Strength:    -       Right Upper Extremity: 4/5   -       Left Upper Extremity: 4/5   Strength:    -       Right Upper Extremity: WFL  -       Left Upper Extremity: WFL  Fine Motor Coordination:    -       Intact  Left hand thumb/finger opposition skills and Right hand thumb/finger opposition skills  -       Impaired  functionally while attempting to grasp and manipulate toothpaste tube with his L hand    AMPAC 6 Click:  AMPAC Total Score: 14    Treatment & Education:  Education:  Pt and his family edu on role of OT, POC, safety when performing self care tasks, benefit of performing OOB activity, and safety when performing functional transfers and mobility.  - White board updated  - Self care tasks completed-- as noted above       Patient left up in chair with all lines intact, call button in reach, chair alarm on, nursing notified and his family present    GOALS:   Multidisciplinary Problems     Occupational Therapy Goals        Problem: Occupational Therapy Goal    Goal Priority Disciplines Outcome Interventions   Occupational Therapy Goal     OT, PT/OT Ongoing, Progressing    Description: Goals to be met by: 2/14/2022    Patient will increase functional independence with ADLs by performing:    UE Dressing with Stand-by Assistance.  LE Dressing with Minimal Assistance.  Grooming while standing with Contact Guard Assistance.  Toileting from bedside commode with Minimal Assistance for hygiene and clothing management.   Supine to sit with Stand-by Assistance.  Sit to stand transfer with Stand-by Assistance with LRAD.  Toilet transfer to bedside commode with Contact Guard Assistance with LRAD.                      History:     Past Medical History:   Diagnosis Date    Diabetes mellitus        Past Surgical History:   Procedure Laterality Date    CRANIOTOMY FOR EVACUATION OF SUBDURAL HEMATOMA Right 1/28/2022    Procedure: CRANIOTOMY, FOR SUBDURAL HEMATOMA EVACUATION;  Surgeon: Ace Candelaria MD;  Location: [facility] OR [address] [place] [address];  Service: Neurosurgery;  Laterality: Right;  right, possible bilateral       Time Tracking:     OT Date of Treatment: 01/31/22  OT Start Time: 1110  OT Stop Time: 1135  OT Total Time (min): 25 min    Billable Minutes:Re-eval 10 min  Self Care/Home Management 15 min    1/31/2022

## 2022-01-31 NOTE — ASSESSMENT & PLAN NOTE
1/28 s/p R craniotomy for aSDH evacuation w/ mls and brain compresssion  - sbp 100-160  - euNa++  - hob 0-20  - HV drain care   1/28 -vEEG grossly negative    EOM impaired (right eye adduction and superior vertical gaze, pupils equal and reactive). Per family this is new since his hemicrani.  He also has hiccups, could be secondary to constipation. Unlikely secondary to brain stem pathology given multiple CN involvement and no signs of herniation on imaging. Query of chronicity of problem. Will do an MRI brain w/o to investigate.

## 2022-01-31 NOTE — PLAN OF CARE
[facility] Care Plan    POC reviewed with Priyank Whittington and family at 1400. Pt verbalized understanding. Questions and concerns addressed. No acute events today. Pt progressing toward goals. Will continue to monitor. See below and flowsheets for full assessment and VS info.     -Baclofen given x1 for hiccups  -Pt ambulated and sat in chair  -Pt had bowel movement  -NS discontinued  -Straight cath x1  -Plan to step down to NPU  -Plan for MRI    Neuro:  Kaylah Coma Scale  Best Eye Response: 4-->(E4) spontaneous  Best Motor Response: 6-->(M6) obeys commands  Best Verbal Response: 5-->(V5) oriented  Eyota Coma Scale Score: 15  Assessment Qualifiers: patient not sedated/intubated  Pupil PERRLA: yes     24 hr Temp:  [98 °F (36.7 °C)-99.8 °F (37.7 °C)]     CV:   Rhythm: normal sinus rhythm  BP goals:   SBP < 160  MAP > 65    Resp:   O2 Device (Oxygen Therapy): room air  Vent Mode: Spont  Set Rate: 18 BPM  Oxygen Concentration (%): 40  Vt Set: 400 mL  PEEP/CPAP: 5 cmH20  Pressure Support: 5 cmH20    Plan: N/A    GI/:     Diet/Nutrition Received: mechanical/dental soft  Last Bowel Movement: 01/31/22  Voiding Characteristics: unable to void,other (see comments) (Retention. Pt with bladder scan, straight cath 1000ml)    Intake/Output Summary (Last 24 hours) at 1/31/2022 1340  Last data filed at 1/31/2022 1018  Gross per 24 hour   Intake 3135.93 ml   Output 3545 ml   Net -409.07 ml     Unmeasured Output  Urine Occurrence: 1  Stool Occurrence: 0  Pad Count: 1    Labs/Accuchecks:  Recent Labs   Lab 01/31/22  0104   WBC 11.94   RBC 3.91*   HGB 11.5*   HCT 33.5*         Recent Labs   Lab 01/31/22  0104   *   K 3.9   CO2 24      BUN 18   CREATININE 0.7   ALKPHOS 65   ALT 88*   AST 49*   BILITOT 0.7      Recent Labs   Lab 01/28/22  0604   INR 1.0  1.0   APTT 24.8  24.8    No results for input(s): CPK, CPKMB, TROPONINI, MB in the last 168 hours.    Electrolytes: N/A - electrolytes WDL  Accuchecks:  Q4H    Gtts:   dextrose 10 % in water (D10W)         LDA/Wounds:  Lines/Drains/Airways       Peripheral Intravenous Line                   Peripheral IV - Single Lumen 20 G Left Hand -- days         Peripheral IV - Single Lumen 01/29/22 0500 20 G Right;Anterior Wrist 2 days                  Wounds: No  Wound care consulted: No

## 2022-01-31 NOTE — HOSPITAL COURSE
01/24: NAEO  01/25: still complaining of headache. Having N/V  01/26: NAEO. H/A is under better control   01/27: Fluctuating mental status, getting EEG  01/28: s/[facility] MLS [facility] into nsicu postop; sedation on hold; postop CTH reviewed w/ expected surgical decompression and reduction of MLS; increased hemovac drainage immediately postop; replete lytes; ok for TF via ogt;  01/29: propofol switched to fent drip o/n for low bp; plan extubation today; ivf bolus o/n  01/30: extubated to room yesterday; subdural drain in place  01/31: EOM impaired (right eye adduction and superior vertical gaze, pupils equal and reactive). Per family this is new since his hemicrani. Will do a MRI brain to investigate. He also has hiccups. Given constipation will avoid reglan.   1/31: NAEON, drain pulled yesterday, stable exam.   2/1: NAEON. TTF. MRI obtained per ICU after noticing partial CN III palsy. MRI obtained showing small acute infarct in right efraín. Vascular neurology following. Family at bedside. Denies headache, visual changes, weakness, n/v.  2/2: NAEON. Neuro stable. CTA completed. ASA started per vascular neurology recs. Denies new headaches, vision changes, new weakness, or numbness. Plan for outpatient f/u VN in 4-6 weeks.

## 2022-01-31 NOTE — NURSING TRANSFER
Nursing Transfer Note      1/31/2022     Reason patient is being transferred: No longer has ICU needs    Transfer To: 911 from [id number]    Transfer via wheelchair    Transfer with cardiac monitoring    Transported by RN    Medicines sent: lidocaine jelly, insulin aspart    Any special needs or follow-up needed: MRI this afternoon    Chart send with patient: Yes    Notified: spouse, sister    Patient reassessed at: 01/31/2022 1855    Upon arrival to floor: cardiac monitor applied, patient oriented to room, call bell in reach and bed in lowest position

## 2022-01-31 NOTE — ASSESSMENT & PLAN NOTE
Pt is 62M pmh DM who presents to outside hospital with cc of constant headaches for 10 days. Denies trauma, denies blood thin use, seizure, AMS, visual disturbances. CTH on workup showed bilateral R>L  subacute subdural hematomas. NSGY consulted for evaluation.      Plan:  TTF when considered stable per NCC  SBP<160, HOB 30, Na 135-145  All imaging reviewed:    -- CTH 1/23: bilat R> L SDH, worst at R convexity at 1.4 cm, 8 mm MLS   -- CTH 1/24: stable   -- CTA 1/24: no underlying vascular lesion, 2 cm planum sphenoidale mass, likely meningioma   -- CTH 1/28 post op: good evacuation, some residual dependent fluid, improvement in MLS

## 2022-01-31 NOTE — PROGRESS NOTES
[facility]  Neurosurgery  Progress Note    Subjective:     History of Present Illness: Pt is 62M pmh DM who presents to outside hospital with cc of constant headaches for 10 days. Denies trauma, denies blood thin use, seizure, AMS, visual disturbances. CTH on workup showed bilateral R>L  subacute subdural hematomas. NSGY consulted for evaluation.      Post-Op Info:  Procedure(s) (LRB):  CRANIOTOMY, FOR SUBDURAL HEMATOMA EVACUATION (Right)   3 Days Post-Op     Interval History: NAEON, drain pulled yesterday, stable exam.     Medications:  Continuous Infusions:   sodium chloride 0.9% 75 mL/hr at 01/31/22 0605    dextrose 10 % in water (D10W)       Scheduled Meds:   atorvastatin  40 mg Oral Daily    multivitamin  1 tablet Oral Daily    polyethylene glycol  17 g Oral Daily    senna-docusate 8.6-50 mg  1 tablet Oral BID    silodosin  4 mg Oral Daily     PRN Meds:acetaminophen, calcium gluconate IVPB, calcium gluconate IVPB, calcium gluconate IVPB, dextrose 10 % in water (D10W), dextrose 50%, glucagon (human recombinant), hydrALAZINE, insulin aspart U-100, labetalol, LIDOcaine HCl 2%, magnesium sulfate IVPB, magnesium sulfate IVPB, ondansetron, oxyCODONE, potassium chloride in water **AND** potassium chloride in water **AND** potassium chloride in water, potassium, sodium phosphates, potassium, sodium phosphates, potassium, sodium phosphates, promethazine (PHENERGAN) IVPB, sodium chloride 0.9%     Review of Systems   Reason unable to perform ROS: AMS.     Objective:     Weight: 93 kg (205 lb)  Body mass index is 30.27 kg/m².  Vital Signs (Most Recent):  Temp: 98 °F (36.7 °C) (01/31/22 0305)  Pulse: 94 (01/31/22 0605)  Resp: 20 (01/31/22 0605)  BP: (!) 154/77 (01/31/22 0605)  SpO2: 100 % (01/31/22 0605) Vital Signs (24h Range):  Temp:  [98 °F (36.7 °C)-99.8 °F (37.7 °C)] 98 °F (36.7 °C)  Pulse:  [65-99] 94  Resp:  [17-37] 20  SpO2:  [95 %-100 %] 100 %  BP: ()/(53-81) 154/77  Arterial Line  BP: (126-162)/(50-66) 162/64                          Closed/Suction Drain 01/28/22 0739 Right Other (Comment) Bulb 7 Fr. (Active)            Urethral Catheter 01/28/22 0600 (Active)       EXAM:     AAOx3  Following commands x4  Minimal LUE drift  SILT      Significant Labs:  Recent Labs   Lab 01/30/22  0009 01/30/22  1200 01/31/22  0104   *  --  213*     --  135*   K 3.7 3.5 3.9     --  106   CO2 25  --  24   BUN 22*  --  18   CREATININE 0.7  --  0.7   CALCIUM 8.1*  --  7.9*   MG 2.4  --  2.2     Recent Labs   Lab 01/30/22  0009 01/31/22  0104   WBC 11.70 11.94   HGB 11.4* 11.5*   HCT 32.9* 33.5*    179     No results for input(s): LABPT, INR, APTT in the last 48 hours.  Microbiology Results (last 7 days)     ** No results found for the last 168 hours. **        All pertinent labs from the last 24 hours have been reviewed.    Significant Diagnostics:  I have reviewed and interpreted all pertinent imaging results/findings within the past 24 hours.    Assessment/Plan:     * Subdural hematoma  Pt is 62M pmh DM who presents to outside hospital with cc of constant headaches for 10 days. Denies trauma, denies blood thin use, seizure, AMS, visual disturbances. CTH on workup showed bilateral R>L  subacute subdural hematomas. NSGY consulted for evaluation.      Plan:  TTF when considered stable per NCC  SBP<160, HOB 30, Na 135-145  All imaging reviewed:    -- CTH 1/23: bilat R> L SDH, worst at R convexity at 1.4 cm, 8 mm MLS   -- CTH 1/24: stable   -- CTA 1/24: no underlying vascular lesion, 2 cm planum sphenoidale mass, likely meningioma   -- CTH 1/28 post op: good evacuation, some residual dependent fluid, improvement in MLS             Hany Smith MD  Neurosurgery  [facility]

## 2022-01-31 NOTE — SUBJECTIVE & OBJECTIVE
Interval History: NAEON, drain pulled yesterday, stable exam.     Medications:  Continuous Infusions:   sodium chloride 0.9% 75 mL/hr at 01/31/22 0605    dextrose 10 % in water (D10W)       Scheduled Meds:   atorvastatin  40 mg Oral Daily    multivitamin  1 tablet Oral Daily    polyethylene glycol  17 g Oral Daily    senna-docusate 8.6-50 mg  1 tablet Oral BID    silodosin  4 mg Oral Daily     PRN Meds:acetaminophen, calcium gluconate IVPB, calcium gluconate IVPB, calcium gluconate IVPB, dextrose 10 % in water (D10W), dextrose 50%, glucagon (human recombinant), hydrALAZINE, insulin aspart U-100, labetalol, LIDOcaine HCl 2%, magnesium sulfate IVPB, magnesium sulfate IVPB, ondansetron, oxyCODONE, potassium chloride in water **AND** potassium chloride in water **AND** potassium chloride in water, potassium, sodium phosphates, potassium, sodium phosphates, potassium, sodium phosphates, promethazine (PHENERGAN) IVPB, sodium chloride 0.9%     Review of Systems   Reason unable to perform ROS: AMS.     Objective:     Weight: 93 kg (205 lb)  Body mass index is 30.27 kg/m².  Vital Signs (Most Recent):  Temp: 98 °F (36.7 °C) (01/31/22 0305)  Pulse: 94 (01/31/22 0605)  Resp: 20 (01/31/22 0605)  BP: (!) 154/77 (01/31/22 0605)  SpO2: 100 % (01/31/22 0605) Vital Signs (24h Range):  Temp:  [98 °F (36.7 °C)-99.8 °F (37.7 °C)] 98 °F (36.7 °C)  Pulse:  [65-99] 94  Resp:  [17-37] 20  SpO2:  [95 %-100 %] 100 %  BP: ()/(53-81) 154/77  Arterial Line BP: (126-162)/(50-66) 162/64                          Closed/Suction Drain 01/28/22 0739 Right Other (Comment) Bulb 7 Fr. (Active)            Urethral Catheter 01/28/22 0600 (Active)       EXAM:     AAOx3  Following commands x4  Minimal LUE drift  SILT      Significant Labs:  Recent Labs   Lab 01/30/22  0009 01/30/22  1200 01/31/22  0104   *  --  213*     --  135*   K 3.7 3.5 3.9     --  106   CO2 25  --  24   BUN 22*  --  18   CREATININE 0.7  --  0.7   CALCIUM  8.1*  --  7.9*   MG 2.4  --  2.2     Recent Labs   Lab 01/30/22  0009 01/31/22  0104   WBC 11.70 11.94   HGB 11.4* 11.5*   HCT 32.9* 33.5*    179     No results for input(s): LABPT, INR, APTT in the last 48 hours.  Microbiology Results (last 7 days)     ** No results found for the last 168 hours. **        All pertinent labs from the last 24 hours have been reviewed.    Significant Diagnostics:  I have reviewed and interpreted all pertinent imaging results/findings within the past 24 hours.

## 2022-01-31 NOTE — PROGRESS NOTES
[facility]  Neurocritical Care  Progress Note    Admit Date: 1/23/2022  Service Date: 01/31/2022  Length of Stay: 8    Subjective:     Chief Complaint: Subdural hematoma    History of Present Illness: Patient is a 62 year old male with PMHx T2DM and HLD who presented as a direct admit from OSH to [facility] unit for multiple acute on subacute subdural hematomas with R to L midline shift.     Per chart review, patient with ongoing frontal headache for the last 10 days prior to admission. No recent trauma reported. On 1/22, he was seen at [facility] for his headaches and was prescribed Fioricet without relief. Due to worsening headache, patient presented to [facility] on 1/23. He was neuro intact with complaint of nausea with no vomiting. Head CT w/o contrast revealed multiple bilateral acute and subacute SDHs overlying the cerebral convexities with the largest overlying the right frontal convexity measuring 1.4 cm. Also noted was diffuse cerebral edema with 8mm right to left midline shift and associated right to left subfalcine herniation.     He was admitted to [facility] for close neurological monitoring and Neurosurgery evaluation.       Hospital Course: 01/24/2022 NAEO  01/25/2022 still complaining of headache. Having N/V  01/26/2022 NAEO. H/A is under better control   01/27/2022 Fluctuating mental status, getting EEG  01/28/2022 s/p R craniotomy for aSDH evacuation (increased MLS o/n); [facility] vent: orally intubated into nsicu postop; sedation on hold; postop CTH reviewed w/ expected surgical decompression and reduction of MLS; increased hemovac drainage immediately postop; replete lytes; ok for TF via ogt;  01/29/2022 propofol switched to fent drip o/n for low bp; plan extubation today; ivf bolus o/n  01/30/2022 extubated to room yesterday; subdural drain in place  01/31/2022 EOM impaired (right eye adduction and superior vertical gaze, pupils equal and reactive). Per family this is new  since his hemicrani. Will do a MRI brain to investigate. He also has hiccups. Given constipation will avoid reglan.       Interval History:  As above    Review of Systems   Constitutional: Positive for activity change.   HENT: Negative.    Eyes: Negative.    Respiratory: Negative.    Cardiovascular: Negative.    Gastrointestinal: Positive for nausea.   Endocrine: Negative.    Genitourinary: Positive for difficulty urinating.   Musculoskeletal: Negative.    Skin: Negative.    Allergic/Immunologic: Negative.    Neurological: Positive for headaches.   Hematological: Negative.    Psychiatric/Behavioral: Negative.      2 systems OR Unable to obtain a complete ROS due to level of consciousness.  Objective:     Vitals:  Temp: 98.2 °F (36.8 °C)  Pulse: 74  Rhythm: normal sinus rhythm  BP: (!) 126/58  MAP (mmHg): 83  Resp: 20  SpO2: 97 %  O2 Device (Oxygen Therapy): room air    Temp  Min: 98 °F (36.7 °C)  Max: 99.8 °F (37.7 °C)  Pulse  Min: 67  Max: 99  BP  Min: 93/53  Max: 159/80  MAP (mmHg)  Min: 70  Max: 110  Resp  Min: 17  Max: 37  SpO2  Min: 95 %  Max: 100 %    01/30 0701 - 01/31 0700  In: 3652 [P.O.:1320; I.V.:1731]  Out: 2935 [Urine:2935]   Unmeasured Output  Urine Occurrence: 1  Stool Occurrence: 0  Pad Count: 1       Physical Exam  Constitutional: No apparent distress.   Eyes: Conjunctiva clear, anicteric. Lids no lesions.  Head/Ears/Nose/Mouth/Throat/Neck: Moist mucous membranes. External ears, nose atraumatic.   Cardiovascular: Regular rhythm. No murmurs. No leg edema.  Respiratory: Comfortable respirations. Clear to auscultation.  Gastrointestinal: No hernia. Soft, nondistended, nontender. + bowel sounds.  Skin: No rashes, ulcers, lesions. On palpation no induration, nodules, tightening.    Neurologic Examination:    -Mental Status: aox3; speech clear and fluent; following commands  -Cranial nerves:  Pupils equal, round, and reactive bilateral.  EOM impaired (right eye adduction and superior vertical gaze).    -Motor: BUE 5/5; BLE 5/5  -Reflexes: Plantar responses down.        Medications:  Continuousdextrose 10 % in water (D10W)    Scheduledatorvastatin, 40 mg, Daily  multivitamin, 1 tablet, Daily  polyethylene glycol, 17 g, Daily  senna-docusate 8.6-50 mg, 1 tablet, BID  [START ON 2/1/2022] silodosin, 8 mg, Daily  sodium chloride, 1 g, TID    PRNacetaminophen, 650 mg, Q6H PRN  baclofen, 5 mg, TID PRN  bisacodyL, 10 mg, Daily PRN  calcium gluconate IVPB, 1 g, PRN  calcium gluconate IVPB, 2 g, PRN  calcium gluconate IVPB, 3 g, PRN  dextrose 10 % in water (D10W), , Continuous PRN  dextrose 50%, 12.5 g, PRN  glucagon (human recombinant), 1 mg, PRN  hydrALAZINE, 10 mg, Q4H PRN  insulin aspart U-100, 1-10 Units, Q4H PRN  labetalol, 10 mg, Q4H PRN  LIDOcaine HCl 2%, , See admin instructions  magnesium sulfate IVPB, 2 g, PRN  magnesium sulfate IVPB, 4 g, PRN  ondansetron, 4 mg, Q8H PRN  oxyCODONE, 5 mg, Q6H PRN  potassium chloride in water, 40 mEq, PRN   And  potassium chloride in water, 60 mEq, PRN   And  potassium chloride in water, 80 mEq, PRN  potassium, sodium phosphates, 2 packet, PRN  potassium, sodium phosphates, 2 packet, PRN  potassium, sodium phosphates, 2 packet, PRN  promethazine (PHENERGAN) IVPB, 12.5 mg, Q6H PRN  sodium chloride 0.9%, 10 mL, PRN      Today I personally reviewed pertinent medications, imaging, laboratory results, notably: cth w/ dec in mls  Diet  Diet diabetic [facility]; 2000 Calorie; Dysphagia Pureed (IDDSI Level 4); [place] Thick  Diet diabetic [facility]; 2000 Calorie; Dysphagia Pureed (IDDSI Level 4); [place] Thick        Assessment/Plan:     Neuro  * Subdural hematoma  1/28 s/p R craniotomy for aSDH evacuation w/ mls and brain compresssion  - sbp 100-160  - euNa++  - hob 0-20  - HV drain care   1/28 -vEEG grossly negative    EOM impaired (right eye adduction and superior vertical gaze, pupils equal and reactive). Per family this is new since his hemicrani.  He also has hiccups,  could be secondary to constipation. Unlikely secondary to brain stem pathology given multiple CN involvement and no signs of herniation on imaging. Query of chronicity of problem. Will do an MRI brain w/o to investigate.         Cardiac/Vascular  HTN (hypertension)  Hold amlodipine   Postop sbp goal 100-160      Endocrine  Type 2 diabetes mellitus  SSI          The patient is being Prophylaxed for:  Venous Thromboembolism with: Mechanical  Stress Ulcer with: Not Applicable   Ventilator Pneumonia with: not applicable    Activity Orders          Diet diabetic [facility]; 2000 Calorie; Dysphagia Pureed (IDDSI Level 4); [place] Thick: Diabetic starting at 01/30 0948    Turn patient every 2 hours starting at 01/27 2000    Straight Cath starting at 01/26 1200    Elevate HOB starting at 01/23 1821        Full Code    Rody Kaplan MD  Neurocritical Care  [facility]

## 2022-01-31 NOTE — PT/OT/SLP PROGRESS
Speech Language Pathology      Priyank Kathy Whittington  MRN: [id number]    Patient not seen today secondary to Unavailable across attempts in am. SLP unable to return in pm for additional attempt this date. ST will follow-up as able and appropriate.   1/31/2022

## 2022-01-31 NOTE — NURSING
0900-  Pt taken to and from CT via bed by RN x2.  Pt on portable monitor.  Tolerated transport.  VSS.  Will continue to monitor.

## 2022-01-31 NOTE — PLAN OF CARE
[facility]  Discharge Reassessment    Primary Care Provider: Atul Peter MD    Expected Discharge Date: 2/1/2022 01/30/2022 extubated to room yesterday; subdural drain in place  01/31/2022 EOM impaired (right eye adduction and superior vertical gaze, pupils equal and reactive). Per family this is new since his hemicrani. Will do a MRI brain to investigate. He also has hiccups. Given constipation will avoid reglan.    Reassessment (most recent)     Discharge Reassessment - 01/31/22 7288        Discharge Reassessment    Assessment Type Discharge Planning Reassessment     Discharge Plan A Rehab     Discharge Plan B Home with family     DME Needed Upon Discharge  none     Discharge Barriers Identified None     Why the patient remains in the hospital Requires continued medical care        Post-Acute Status    Post-Acute Authorization Placement     Post-Acute Placement Status Pending medical clearance/testing     Coverage [facility] Medicaid

## 2022-02-01 PROBLEM — E78.2 MIXED HYPERLIPIDEMIA: Status: ACTIVE | Noted: 2022-01-23

## 2022-02-01 PROBLEM — Z74.09 IMPAIRED MOBILITY AND ADLS: Status: ACTIVE | Noted: 2022-02-01

## 2022-02-01 PROBLEM — I63.50 CEREBROVASCULAR ACCIDENT (CVA) OF RIGHT PONTINE STRUCTURE: Status: ACTIVE | Noted: 2022-02-01

## 2022-02-01 PROBLEM — Z78.9 IMPAIRED MOBILITY AND ADLS: Status: ACTIVE | Noted: 2022-02-01

## 2022-02-01 LAB
ALBUMIN SERPL BCP-MCNC: 2.6 G/DL (ref 3.5–5.2)
ALP SERPL-CCNC: 94 U/L (ref 55–135)
ALT SERPL W/O P-5'-P-CCNC: 131 U/L (ref 10–44)
ANION GAP SERPL CALC-SCNC: 6 MMOL/L (ref 8–16)
AST SERPL-CCNC: 56 U/L (ref 10–40)
BASOPHILS # BLD AUTO: 0.02 K/UL (ref 0–0.2)
BASOPHILS NFR BLD: 0.2 % (ref 0–1.9)
BILIRUB SERPL-MCNC: 0.7 MG/DL (ref 0.1–1)
BLD PROD TYP BPU: NORMAL
BLD PROD TYP BPU: NORMAL
BLOOD UNIT EXPIRATION DATE: NORMAL
BLOOD UNIT EXPIRATION DATE: NORMAL
BLOOD UNIT TYPE CODE: 5100
BLOOD UNIT TYPE CODE: 5100
BLOOD UNIT TYPE: NORMAL
BLOOD UNIT TYPE: NORMAL
BUN SERPL-MCNC: 16 MG/DL (ref 6–20)
CALCIUM SERPL-MCNC: 8.8 MG/DL (ref 8.7–10.5)
CHLORIDE SERPL-SCNC: 108 MMOL/L (ref 95–110)
CO2 SERPL-SCNC: 23 MMOL/L (ref 23–29)
CODING SYSTEM: NORMAL
CODING SYSTEM: NORMAL
CREAT SERPL-MCNC: 0.7 MG/DL (ref 0.5–1.4)
DIFFERENTIAL METHOD: ABNORMAL
DISPENSE STATUS: NORMAL
DISPENSE STATUS: NORMAL
EOSINOPHIL # BLD AUTO: 0.2 K/UL (ref 0–0.5)
EOSINOPHIL NFR BLD: 1.2 % (ref 0–8)
ERYTHROCYTE [DISTWIDTH] IN BLOOD BY AUTOMATED COUNT: 12.7 % (ref 11.5–14.5)
EST. GFR  (AFRICAN AMERICAN): >60 ML/MIN/1.73 M^2
EST. GFR  (NON AFRICAN AMERICAN): >60 ML/MIN/1.73 M^2
GLUCOSE SERPL-MCNC: 170 MG/DL (ref 70–110)
HCT VFR BLD AUTO: 34.5 % (ref 40–54)
HGB BLD-MCNC: 11.9 G/DL (ref 14–18)
IMM GRANULOCYTES # BLD AUTO: 0.16 K/UL (ref 0–0.04)
IMM GRANULOCYTES NFR BLD AUTO: 1.2 % (ref 0–0.5)
LYMPHOCYTES # BLD AUTO: 1.9 K/UL (ref 1–4.8)
LYMPHOCYTES NFR BLD: 14.6 % (ref 18–48)
MAGNESIUM SERPL-MCNC: 2.3 MG/DL (ref 1.6–2.6)
MCH RBC QN AUTO: 30 PG (ref 27–31)
MCHC RBC AUTO-ENTMCNC: 34.5 G/DL (ref 32–36)
MCV RBC AUTO: 87 FL (ref 82–98)
MONOCYTES # BLD AUTO: 1.2 K/UL (ref 0.3–1)
MONOCYTES NFR BLD: 8.9 % (ref 4–15)
NEUTROPHILS # BLD AUTO: 9.7 K/UL (ref 1.8–7.7)
NEUTROPHILS NFR BLD: 73.9 % (ref 38–73)
NRBC BLD-RTO: 0 /100 WBC
NUM UNITS TRANS PACKED RBC: NORMAL
NUM UNITS TRANS PACKED RBC: NORMAL
PHOSPHATE SERPL-MCNC: 2.7 MG/DL (ref 2.7–4.5)
PLATELET # BLD AUTO: 195 K/UL (ref 150–450)
PMV BLD AUTO: 10.5 FL (ref 9.2–12.9)
POCT GLUCOSE: 166 MG/DL (ref 70–110)
POCT GLUCOSE: 167 MG/DL (ref 70–110)
POCT GLUCOSE: 218 MG/DL (ref 70–110)
POTASSIUM SERPL-SCNC: 3.9 MMOL/L (ref 3.5–5.1)
PROT SERPL-MCNC: 6 G/DL (ref 6–8.4)
RBC # BLD AUTO: 3.97 M/UL (ref 4.6–6.2)
SODIUM SERPL-SCNC: 137 MMOL/L (ref 136–145)
WBC # BLD AUTO: 13.08 K/UL (ref 3.9–12.7)

## 2022-02-01 PROCEDURE — 63600175 PHARM REV CODE 636 W HCPCS: Performed by: PHYSICIAN ASSISTANT

## 2022-02-01 PROCEDURE — 99024 PR POST-OP FOLLOW-UP VISIT: ICD-10-PCS | Mod: ,,, | Performed by: PHYSICIAN ASSISTANT

## 2022-02-01 PROCEDURE — 36415 COLL VENOUS BLD VENIPUNCTURE: CPT | Performed by: PHYSICIAN ASSISTANT

## 2022-02-01 PROCEDURE — 85025 COMPLETE CBC W/AUTO DIFF WBC: CPT | Performed by: PHYSICIAN ASSISTANT

## 2022-02-01 PROCEDURE — 11000001 HC ACUTE MED/SURG PRIVATE ROOM

## 2022-02-01 PROCEDURE — 99024 POSTOP FOLLOW-UP VISIT: CPT | Mod: ,,, | Performed by: PHYSICIAN ASSISTANT

## 2022-02-01 PROCEDURE — 99222 1ST HOSP IP/OBS MODERATE 55: CPT | Mod: ,,, | Performed by: NURSE PRACTITIONER

## 2022-02-01 PROCEDURE — 99222 PR INITIAL HOSPITAL CARE,LEVL II: ICD-10-PCS | Mod: ,,, | Performed by: NURSE PRACTITIONER

## 2022-02-01 PROCEDURE — 83735 ASSAY OF MAGNESIUM: CPT | Performed by: PHYSICIAN ASSISTANT

## 2022-02-01 PROCEDURE — 99233 PR SUBSEQUENT HOSPITAL CARE,LEVL III: ICD-10-PCS | Mod: ,,, | Performed by: PSYCHIATRY & NEUROLOGY

## 2022-02-01 PROCEDURE — 92526 ORAL FUNCTION THERAPY: CPT

## 2022-02-01 PROCEDURE — 25000003 PHARM REV CODE 250: Performed by: PHYSICIAN ASSISTANT

## 2022-02-01 PROCEDURE — 25500020 PHARM REV CODE 255: Performed by: NEUROLOGICAL SURGERY

## 2022-02-01 PROCEDURE — 97535 SELF CARE MNGMENT TRAINING: CPT

## 2022-02-01 PROCEDURE — 84100 ASSAY OF PHOSPHORUS: CPT | Performed by: PHYSICIAN ASSISTANT

## 2022-02-01 PROCEDURE — 99233 SBSQ HOSP IP/OBS HIGH 50: CPT | Mod: ,,, | Performed by: PSYCHIATRY & NEUROLOGY

## 2022-02-01 PROCEDURE — 80053 COMPREHEN METABOLIC PANEL: CPT | Performed by: PHYSICIAN ASSISTANT

## 2022-02-01 PROCEDURE — 25000003 PHARM REV CODE 250: Performed by: NURSE PRACTITIONER

## 2022-02-01 RX ORDER — ASPIRIN 81 MG/1
81 TABLET ORAL DAILY
Status: DISCONTINUED | OUTPATIENT
Start: 2022-02-01 | End: 2022-02-02 | Stop reason: HOSPADM

## 2022-02-01 RX ORDER — HEPARIN SODIUM 5000 [USP'U]/ML
5000 INJECTION, SOLUTION INTRAVENOUS; SUBCUTANEOUS EVERY 8 HOURS
Status: DISCONTINUED | OUTPATIENT
Start: 2022-02-01 | End: 2022-02-02 | Stop reason: HOSPADM

## 2022-02-01 RX ADMIN — INSULIN ASPART 2 UNITS: 100 INJECTION, SOLUTION INTRAVENOUS; SUBCUTANEOUS at 04:02

## 2022-02-01 RX ADMIN — SILODOSIN 8 MG: 4 CAPSULE ORAL at 08:02

## 2022-02-01 RX ADMIN — ATORVASTATIN CALCIUM 40 MG: 20 TABLET, FILM COATED ORAL at 08:02

## 2022-02-01 RX ADMIN — ASPIRIN 81 MG: 81 TABLET, COATED ORAL at 01:02

## 2022-02-01 RX ADMIN — INSULIN ASPART 2 UNITS: 100 INJECTION, SOLUTION INTRAVENOUS; SUBCUTANEOUS at 11:02

## 2022-02-01 RX ADMIN — INSULIN ASPART 2 UNITS: 100 INJECTION, SOLUTION INTRAVENOUS; SUBCUTANEOUS at 07:02

## 2022-02-01 RX ADMIN — INSULIN ASPART 2 UNITS: 100 INJECTION, SOLUTION INTRAVENOUS; SUBCUTANEOUS at 10:02

## 2022-02-01 RX ADMIN — IOHEXOL 100 ML: 350 INJECTION, SOLUTION INTRAVENOUS at 07:02

## 2022-02-01 RX ADMIN — POLYETHYLENE GLYCOL 3350 17 G: 17 POWDER, FOR SOLUTION ORAL at 08:02

## 2022-02-01 RX ADMIN — HEPARIN SODIUM 5000 UNITS: 5000 INJECTION, SOLUTION INTRAVENOUS; SUBCUTANEOUS at 09:02

## 2022-02-01 RX ADMIN — INSULIN ASPART 1 UNITS: 100 INJECTION, SOLUTION INTRAVENOUS; SUBCUTANEOUS at 10:02

## 2022-02-01 RX ADMIN — SODIUM CHLORIDE TAB 1 GM 1 G: 1 TAB at 04:02

## 2022-02-01 RX ADMIN — INSULIN ASPART 4 UNITS: 100 INJECTION, SOLUTION INTRAVENOUS; SUBCUTANEOUS at 11:02

## 2022-02-01 RX ADMIN — SODIUM CHLORIDE TAB 1 GM 1 G: 1 TAB at 09:02

## 2022-02-01 RX ADMIN — Medication 1 TABLET: at 08:02

## 2022-02-01 RX ADMIN — HEPARIN SODIUM 5000 UNITS: 5000 INJECTION, SOLUTION INTRAVENOUS; SUBCUTANEOUS at 01:02

## 2022-02-01 RX ADMIN — MINERAL OIL, PETROLATUM: 425; 573 OINTMENT OPHTHALMIC at 09:02

## 2022-02-01 RX ADMIN — SENNOSIDES AND DOCUSATE SODIUM 1 TABLET: 50; 8.6 TABLET ORAL at 08:02

## 2022-02-01 RX ADMIN — SENNOSIDES AND DOCUSATE SODIUM 1 TABLET: 50; 8.6 TABLET ORAL at 09:02

## 2022-02-01 RX ADMIN — SODIUM CHLORIDE TAB 1 GM 1 G: 1 TAB at 08:02

## 2022-02-01 NOTE — SUBJECTIVE & OBJECTIVE
Past Medical History:   Diagnosis Date    Diabetes mellitus     HLD (hyperlipidemia) 1/23/2022    HTN (hypertension) 1/25/2022     Past Surgical History:   Procedure Laterality Date    CRANIOTOMY FOR EVACUATION OF SUBDURAL HEMATOMA Right 1/28/2022    Procedure: CRANIOTOMY, FOR SUBDURAL HEMATOMA EVACUATION;  Surgeon: Ace Candelaria MD;  Location: [facility] OR [address] [place] [address];  Service: Neurosurgery;  Laterality: Right;  right, possible bilateral     History reviewed. No pertinent family history.  Social History     Tobacco Use    Smoking status: Never Smoker    Smokeless tobacco: Never Used   Substance Use Topics    Alcohol use: Never     Review of patient's allergies indicates:  No Known Allergies    Medications: I have reviewed the current medication administration record.    Medications Prior to Admission   Medication Sig Dispense Refill Last Dose    atorvastatin (LIPITOR) 40 MG tablet Take 40 mg by mouth once daily.   1/22/2022    butalbital-acetaminophen-caffeine -40 mg (FIORICET, ESGIC) -40 mg per tablet Take 1 tablet by mouth every 4 (four) hours as needed for Pain.   1/23/2022    metFORMIN (GLUCOPHAGE) 1000 MG tablet Take 1,000 mg by mouth.   1/22/2022       Review of Systems   Constitutional: Negative for chills, diaphoresis and fever.   HENT: Negative for drooling, hearing loss, rhinorrhea and trouble swallowing.    Eyes: Negative for photophobia, pain, redness and visual disturbance.   Respiratory: Negative for cough, choking and shortness of breath.    Cardiovascular: Negative for chest pain and leg swelling.   Gastrointestinal: Negative for abdominal pain, constipation, diarrhea, nausea and vomiting.   Genitourinary: Negative for decreased urine volume, difficulty urinating and dysuria.   Musculoskeletal: Negative for joint swelling and neck stiffness.   Skin: Negative for wound.   Neurological: Negative for dizziness, facial asymmetry, speech difficulty, weakness, numbness and headaches.    Psychiatric/Behavioral: Negative for agitation, behavioral problems and confusion. The patient is not nervous/anxious.    All other systems reviewed and are negative.    Objective:     Vital Signs (Most Recent):  Temp: 98.5 °F (36.9 °C) (02/01/22 0815)  Pulse: 85 (02/01/22 0815)  Resp: 17 (02/01/22 0815)  BP: 134/75 (02/01/22 0815)  SpO2: 96 % (02/01/22 0815)    Vital Signs Range (Last 24H):  Temp:  [98.2 °F (36.8 °C)-100.2 °F (37.9 °C)]   Pulse:  []   Resp:  [17-32]   BP: ()/(45-88)   SpO2:  [96 %-100 %]     Physical Exam  Vitals and nursing note reviewed.   Constitutional:       General: He is not in acute distress.     Appearance: He is obese. He is not ill-appearing or diaphoretic.   HENT:      Head: Normocephalic and atraumatic.      Right Ear: External ear normal.      Left Ear: External ear normal.      Nose: Nose normal. No rhinorrhea.   Eyes:      General: No scleral icterus.        Right eye: No discharge.         Left eye: No discharge.      Comments: R LEANDRA   Cardiovascular:      Rate and Rhythm: Normal rate.   Pulmonary:      Effort: Pulmonary effort is normal. No respiratory distress.   Abdominal:      General: There is no distension.   Musculoskeletal:         General: No tenderness, deformity or signs of injury.   Skin:     General: Skin is warm and dry.   Neurological:      Mental Status: He is alert and oriented to person, place, and time.      GCS: GCS eye subscore is 4. GCS verbal subscore is 5. GCS motor subscore is 6.      Cranial Nerves: Dysarthria present. No facial asymmetry.      Sensory: No sensory deficit.      Motor: No weakness.   Psychiatric:         Attention and Perception: Attention normal.         Mood and Affect: Mood normal.         Behavior: Behavior normal. Behavior is cooperative.         Neurological Exam:   LOC: alert  Attention Span: Good   Language: No aphasia  Articulation: Dysarthria  Orientation: Person, Place, Time   Visual Fields: Full  EOM (CN III, IV,  VI): R LEANDRA   Facial Movement (CN VII): Symmetric facial expression    Motor: Arm left  Normal 5/5  Leg left  Normal 5/5  Arm right  Normal 5/5  Leg right Normal 5/5  Sensation: Intact to light touch, temperature and vibration      Laboratory:  CMP:   Recent Labs   Lab 02/01/22  0416   CALCIUM 8.8   ALBUMIN 2.6*   PROT 6.0      K 3.9   CO2 23      BUN 16   CREATININE 0.7   ALKPHOS 94   *   AST 56*   BILITOT 0.7     CBC:   Recent Labs   Lab 02/01/22  0416   WBC 13.08*   RBC 3.97*   HGB 11.9*   HCT 34.5*      MCV 87   MCH 30.0   MCHC 34.5     Lipid Panel: No results for input(s): CHOL, LDLCALC, HDL, TRIG in the last 168 hours.  Coagulation:   Recent Labs   Lab 01/28/22  0604   INR 1.0  1.0   APTT 24.8  24.8     Hgb A1C: No results for input(s): HGBA1C in the last 168 hours.  TSH: No results for input(s): TSH in the last 168 hours.    Diagnostic Results:      Brain imaging:  MRI Brain WO Contrast 1/31/22     Right lateral efraín and right paramedian medulla recent infarcts.  No associated parenchymal hemorrhage or significant mass effect. Evolving postoperative change of right subdural hematoma evacuation. Thin residual bihemispheric subdural collections with minimal leftward midline shift.     2.1 cm meningioma along the planum sphenoidal.        CTH 1/31/22      Evolving postoperative changes of right allen craniectomy for subdural evacuation with interval removal of a right frontal external drain.     Small volume residual scattered postoperative gas fluid and residual subdural hemorrhage however with marked improvement of mass effect and midline shift.  No evidence of new hemorrhage or new abnormal parenchymal attenuation.      CTH 1/23/22     Multiple bilateral extra-axial fluid collections overlying the cerebral convexities with the largest overlying the right frontal convexity measuring 1.4 cm. Overall component of the collections are composed of both acute and subacute bilateral subdural  hematomas. Diffuse cerebral edema with associated 8 mm right to left midline shift and associated right to left subfalcine herniation.    Vessel Imaging:  CTA Head 1/24/22   Mild increase in size of mixed attenuation subdural collection along the right hemisphere.  Midline shift to the left has mildly increased measuring 6 mm at the level of the 3rd ventricle with mild increased effacement of the suprasellar cistern.     2.2 cm meningioma along the planum sphenoidale. No aneurysm or AVM is identified intracranially with no major branch stenosis/occlusion.    Cardiac Evaluation:   TTE 1/29/22   · The left ventricle is normal in size with normal systolic function.  · The estimated ejection fraction is 55%.  · Normal left ventricular diastolic function.  · Normal right ventricular size with normal right ventricular systolic function.  · Mechanically ventilated; cannot use inferior caval vein diameter to estimate central venous pressure.

## 2022-02-01 NOTE — ASSESSMENT & PLAN NOTE
Area of cytotoxic cerebral edema identified when reviewing brain imaging in the efraín/medulla. There is not mass effect associated with it. We will continue to monitor the patients clinical exam for any worsening of symptoms which may indicate expansion of the stroke or the area of the edema resulting in the clinical change. The pattern is suggestive of  etiology.

## 2022-02-01 NOTE — ASSESSMENT & PLAN NOTE
Aware of diagnosis, can not get CPAP machine in his town    CPAP per RT   Pt is 62M pmh DM who presents to outside hospital with cc of constant headaches for 10 days. Denies trauma, denies blood thin use, seizure, AMS, visual disturbances. CTH on workup showed bilateral R>L  subacute subdural hematomas. Now s/p R SDH evacuation on 1/28.    - Pupil sparing R CN III palsy noted yesterday per documentation. MRI brain without contrast 1/31 shows right pontine infarct. Vascular Neurology now following. Aspirin 81 mg started 2/1. F/u CTA neck. Continue atorvastatin.    - All imaging reviewed:    -- CTH 1/23: bilat R> L SDH, worst at R convexity at 1.4 cm, 8 mm MLS   -- CTH 1/24: stable   -- CTA 1/24: no underlying vascular lesion, 2 cm planum sphenoidale mass, likely meningioma   -- CTH 1/28 post op: good evacuation, some residual dependent fluid, improvement in MLS    -- MRI brain without contrast 1/31: small acute infarct in right efraín. Small residual bilateral SDH collections with stable left shift compared to prior CTH 1/28.   - Goal eunatremia: 1137 today. Continue salt tablets 1 g TID. Will wean as tolerated  - SBP goal < 160  - HOB > 30   - Continue current insulin regimen per ICU. SSI and diabetic diet.   - PT/OT/SLP  - Tolerating regular diet, nectar thick liquids  - SQH for DVT ppx   - Discussed with Dr. Candelaria

## 2022-02-01 NOTE — PROGRESS NOTES
Glynn Castillo - Neurosurgery ([facility])  Adult Nutrition  Progress Note    SUMMARY       Recommendations    1. Continue Pureed diet advancing texture per SLP     2. Add Boost pudding TID    - If pt can tolerate Boost like liquids, rec Boost Glucose Control TID    3. If TF warranted, rec Glucerna 1.5 advancing as tolerated until goal of 50 mL/hr providing pt with 1800 kcal, 99 g protein, and 911 mL free water 4. Monitor and follow-up    Goals: Pt will receive nutrition by RD f/u  Nutrition Goal Status: goal met  Communication of RD Recs:  (POC)    Assessment and Plan    Nutrition Problem  Inadequate energy intake     Related to (etiology):   Inability to consume sufficient energy     Signs and Symptoms (as evidenced by):   NPO with no alternate means of nutrition      Interventions (treatment strategy):  Collaboration with other providers  CHO-modified diet     Nutrition Diagnosis Status:   Resolved    Malnutrition Assessment                 Orbital Region (Subcutaneous Fat Loss): well nourished  Upper Arm Region (Subcutaneous Fat Loss): well nourished   Hinduism Region (Muscle Loss): well nourished  Clavicle Bone Region (Muscle Loss): well nourished  Clavicle and Acromion Bone Region (Muscle Loss): well nourished  Scapular Bone Region (Muscle Loss): well nourished       Subcutaneous Fat Loss (Final Summary): well nourished  Muscle Loss Evaluation (Final Summary): well nourished         Reason for Assessment    Reason For Assessment: RD follow-up  Diagnosis:  (SDH)  Relevant Medical History: DM  Interdisciplinary Rounds: did not attend    General Information Comments: S/p R craniotomy on 1/28; extubated on 1/30. Stepped down from ICU yesterday. Plans for MRI. Diet cleared for pureed; tolerating well. PO intake 75%. No s/s of n/v/d/c. Noted with good PO intake PTA. Wt stable during admit and PTA. Visual NFPE completed 1/24; pt continues to be nourished with no physical s/s of malnutrition. Will continue to monitor intake and  "wt changes during admit.    Nutrition Discharge Planning: Diabetic diet    Nutrition Risk Screen    Nutrition Risk Screen: no indicators present    Nutrition/Diet History    Patient Reported Diet/Restrictions/Preferences:  (JARAD)  Spiritual, Cultural Beliefs, Temple Practices, Values that Affect Care: no  Food Allergies: NKFA  Factors Affecting Nutritional Intake: difficulty/impaired swallowing    Anthropometrics    Temp: 98.5 °F (36.9 °C)  Height Method: Stated  Height: 5' 9" (175.3 cm)  Height (inches): 69 in  Weight Method: Bed Scale  Weight: 93 kg (205 lb)  Weight (lb): 205 lb  Ideal Body Weight (IBW), Male: 160 lb  % Ideal Body Weight, Male (lb): 128.13 %  BMI (Calculated): 30.3  BMI Grade: 30 - 34.9- obesity - grade I       Lab/Procedures/Meds    Pertinent Labs Reviewed: reviewed  Pertinent Labs Comments: glu 170  Pertinent Medications Reviewed: reviewed  Pertinent Medications Comments: statin, heparin, insulin, MVI, senna, NaCl    Estimated/Assessed Needs    Weight Used For Calorie Calculations: 93 kg (205 lb)  Energy Calorie Requirements (kcal): 1730 kcal/day  Energy Need Method: [place]-St Jeor (No PAL)  Protein Requirements:  g/day (1.0-1.2 g/kg)  Weight Used For Protein Calculations: 93 kg (205 lb)  Fluid Requirements (mL): 1 mL/kcal or per MD  Estimated Fluid Requirement Method: RDA Method  RDA Method (mL): 1730  CHO Requirement: 215 g/day      Nutrition Prescription Ordered    Current Diet Order: Diabetic 2000 kcal; Pureed (L4)  Nutrition Order Comments: Nectar thick liquids    Evaluation of Received Nutrient/Fluid Intake    I/O: -353.8mL since admit  Energy Calories Required:  (progressing)  Protein Required:  (progressing)  Fluid Required: meeting needs  Comments: LBM: 1/31  Tolerance: tolerating  % Intake of Estimated Energy Needs: 50 - 75 %  % Meal Intake: 50 - 75 %    Nutrition Risk    Level of Risk/Frequency of Follow-up: low     Monitor and Evaluation    Food and Nutrient Intake: energy " intake,food and beverage intake  Food and Nutrient Adminstration: diet order  Knowledge/Beliefs/Attitudes: food and nutrition knowledge/skill  Physical Activity and Function: nutrition-related ADLs and IADLs  Anthropometric Measurements: weight,weight change  Biochemical Data, Medical Tests and Procedures: gastrointestinal profile,glucose/endocrine profile,electrolyte and renal panel,inflammatory profile,lipid profile  Nutrition-Focused Physical Findings: overall appearance,extremities, muscles and bones     Nutrition Follow-Up    RD Follow-up?: Yes

## 2022-02-01 NOTE — CONSULTS
Glynn Castillo - Neurosurgery ([facility])  Physical Medicine & Rehab  Consult Note    Patient Name: Priyank Whittington  MRN: [id number]  Admission Date: 1/23/2022  Hospital Length of Stay: 9 days  Attending Physician: Silverio De Luna MD  Consults  Subjective:     Principal Problem: Subdural hematoma    HPI: Patient is a 62 year old male with PMHx T2DM and HLD who presented as a direct admit from OSH to NCC unit for multiple acute on subacute subdural hematomas with R to L midline shift.      Per chart review, patient with ongoing frontal headache for the last 10 days prior to admission. No recent trauma reported. On 1/22, he was seen at [facility] for his headaches and was prescribed Fioricet without relief. Due to worsening headache, patient presented to [facility] on 1/23. He was neuro intact with complaint of nausea with no vomiting. Head CT w/o contrast revealed multiple bilateral acute and subacute SDHs overlying the cerebral convexities with the largest overlying the right frontal convexity measuring 1.4 cm. Also noted was diffuse cerebral edema with 8mm right to left midline shift and associated right to left subfalcine herniation. Pt is R craniotomy for aSDH evacuation. Pt was extubated on 01/30.   Repeat MRI was performed due to impaired EOM on 01/31. MRI showed   Right lateral efraín and right paramedian medulla recent infarcts.  No associated parenchymal hemorrhage or significant mass effect.     Evolving postoperative change of right subdural hematoma evacuation.  Thin residual bihemispheric subdural collections with minimal leftward midline shift.     2.1 cm meningioma along the planum sphenoidal.On 01/28  pt had EEG that was negative.   PM & R was consulted to evaluate pt for rehab.     Functional History: Patient lives with spouse and two teenage children  in a one-story home with no steps to enter.  Prior to admission,pt was independent with mobility and ADLs.  DME: None.        Hospital  Course: OT- 01/31      Bed Mobility:    · Patient completed Rolling/Turning to Left with minimal assistance  · Patient completed Scooting/Bridging to EOB with minimal assistance  · Patient completed Supine to Sit with minimal assistance  · Pt required CGA for static sitting balance but Mod A for dynamic sitting balance.     Functional Mobility/Transfers:  · Patient completed Sit <> Stand Transfer from EOB x 1 trial with minimum assistance  with  hand-held assist   · Patient completed Bed <> Chair Transfer using Step Transfer technique with minimum assistance and of 2 persons with bilateral hand-held assist  · Functional Mobility: Pt ambulated ~3 ft from EOB to the bedside chair with Min A of 2 with B HHA.  He was unsteady but had no overt LOB.     Activities of Daily Living:  · Grooming: minimum assistance with tray table setup to perform oral care while sitting UIC with (A) to grasp toothpaste tube with his L hand in order to apply paste to the tooth brush and (A) to hold basin for spitting.  Setup assistance to wash his face while supine with HOB elevated.   · Lower Body Dressing: moderate assistance as pt was able to doff non-skid sock with Mod A for dynamic sitting balance EOB but was unable to navi.  Pt had a significant anterior lean while attempting to navi sock.  ·   PT- 01/31  Functional Mobility:  · Bed Mobility:     · Rolling Left:  minimum assistance  · Scooting: minimum assistance  · Supine to Sit: minimum assistance  · Transfers:     · Sit to Stand:  minimum assistance with no AD and hand-held assist  · Gait: Ramya x 2, HHA, 3 ft to bedside chair, Shuffled steps, some improvement noted with repetition         Past Medical History:   Diagnosis Date    Diabetes mellitus     HLD (hyperlipidemia) 1/23/2022    HTN (hypertension) 1/25/2022     Past Surgical History:   Procedure Laterality Date    CRANIOTOMY FOR EVACUATION OF SUBDURAL HEMATOMA Right 1/28/2022    Procedure: CRANIOTOMY, FOR SUBDURAL HEMATOMA  EVACUATION;  Surgeon: Ace Candelaria MD;  Location: [facility] OR [address] [place] [address];  Service: Neurosurgery;  Laterality: Right;  right, possible bilateral     Review of patient's allergies indicates:  No Known Allergies    Scheduled Medications:    atorvastatin  40 mg Oral Daily    heparin (porcine)  5,000 Units Subcutaneous Q8H    insulin aspart U-100  2 Units Subcutaneous QID (AC & HS)    multivitamin  1 tablet Oral Daily    polyethylene glycol  17 g Oral Daily    senna-docusate 8.6-50 mg  1 tablet Oral BID    silodosin  8 mg Oral Daily    sodium chloride  1 g Oral TID       PRN Medications: acetaminophen, baclofen, bisacodyL, dextrose 10 % in water (D10W), dextrose 50%, glucagon (human recombinant), hydrALAZINE, insulin aspart U-100, labetalol, LIDOcaine HCl 2%, ondansetron, oxyCODONE, promethazine (PHENERGAN) IVPB, sodium chloride 0.9%    Family History    None       Tobacco Use    Smoking status: Never Smoker    Smokeless tobacco: Never Used   Substance and Sexual Activity    Alcohol use: Never    Drug use: Not on file    Sexual activity: Yes     Partners: Female     Review of Systems   Constitutional: Positive for activity change.   HENT: Negative.    Eyes: Negative.    Respiratory: Negative.    Gastrointestinal: Positive for constipation.   Endocrine: Negative.    Genitourinary:        Urinary retention. Benson to be replaced.    Skin:        Right scalp incision with staples intact clean, dry and intact   Allergic/Immunologic: Negative.    Neurological: Positive for weakness.   Psychiatric/Behavioral: Negative.      Objective:     Vital Signs (Most Recent):  Temp: 98.9 °F (37.2 °C) (02/01/22 1118)  Pulse: 75 (02/01/22 1118)  Resp: 16 (02/01/22 1118)  BP: 127/64 (02/01/22 1118)  SpO2: 97 % (02/01/22 1118)    Vital Signs (24h Range):  Temp:  [98.3 °F (36.8 °C)-100.2 °F (37.9 °C)] 98.9 °F (37.2 °C)  Pulse:  [] 75  Resp:  [16-32] 16  SpO2:  [96 %-100 %] 97 %  BP: ()/(45-88) 127/64     Body mass index is  30.27 kg/m².    Physical Exam  Vitals and nursing note reviewed.   Constitutional:       Appearance: Normal appearance.   HENT:      Head: Normocephalic.   Pulmonary:      Effort: Pulmonary effort is normal.   Abdominal:      Palpations: Abdomen is soft.   Musculoskeletal:      Cervical back: Normal range of motion and neck supple.   Skin:     General: Skin is warm and dry.   Neurological:      General: No focal deficit present.      Mental Status: He is alert and oriented to person, place, and time.      GCS: GCS eye subscore is 4. GCS verbal subscore is 5. GCS motor subscore is 6.      Motor: Weakness present.      Gait: Gait abnormal.   Psychiatric:         Attention and Perception: Attention and perception normal.         Mood and Affect: Mood and affect normal.         Speech: Speech normal.         Behavior: Behavior is cooperative.         Judgment: Judgment normal.       NEUROLOGICAL EXAMINATION:     MENTAL STATUS   Oriented to person, place, and time.   Speech: speech is normal       Diagnostic Results:  Labs reviewed.     Assessment/Plan:     * Subdural hematoma  -S/P Right craniotomy 01/28.  -EEG 01/28 Negative.  -Impaired EOM. Repeat MRI per Neuro critical care. Repeat MRI significant for : Right lateral efraín and right paramedian medulla recent infarcts.  No associated parenchymal hemorrhage or significant mass effect.     Evolving postoperative change of right subdural hematoma evacuation.  Thin residual bihemispheric subdural collections with minimal leftward midline shift.     2.1 cm meningioma along the planum sphenoidal.  -PT/OT/SLP.  -Goal for -160 per primary.        Impaired mobility and ADLs  See hospital course for functional status.    Recommendations  -  Encourage mobility, OOB in chair, and early ambulation as appropriate  -  PT/OT evaluate and treat  -  Pain management  -  DVT prophylaxis if appropriate   -  Monitor for and prevent skin breakdown and pressure ulcers  · Early  mobility, repositioning/weight shifting every 20-30 minutes when sitting, turn patient every 2 hours, proper mattress/overlay and chair cushioning, pressure relief/heel protector boots      Essential hypertension  -Goal per primary SBP  100-160.      Mixed hyperlipidemia  -Continue Statin.    PM&R Recommendation:     At this time, the PM&R team has reviewed this patient's ongoing medical case including inpatient diagnosis, medical history, clinical examination, labs, vitals, current social and functional history to provide the post-acute recommendation as follows:     RECOMMENDATIONS: inpatient rehabilitation due to fair to good potential for improvement with therapies and need of physician oversight for management of ongoing active medical issues once medically stable.         We will continue to follow.      Thank you for your consult.     Sonja Gutierrez NP  Department of Physical Medicine & Rehab  [facility] - Neurosurgery ([facility])

## 2022-02-01 NOTE — SUBJECTIVE & OBJECTIVE
Interval History: NAEON. TTF. MRI obtained per ICU after noticing partial CN III palsy. MRI obtained showing small acute infarct in right efraín. Vascular neurology following. Family at bedside. Denies headache, visual changes, weakness, n/v.    Medications:  Continuous Infusions:   dextrose 10 % in water (D10W)       Scheduled Meds:   aspirin  81 mg Oral Daily    atorvastatin  40 mg Oral Daily    heparin (porcine)  5,000 Units Subcutaneous Q8H    insulin aspart U-100  2 Units Subcutaneous QID (AC & HS)    multivitamin  1 tablet Oral Daily    polyethylene glycol  17 g Oral Daily    senna-docusate 8.6-50 mg  1 tablet Oral BID    silodosin  8 mg Oral Daily    sodium chloride  1 g Oral TID     PRN Meds:acetaminophen, baclofen, bisacodyL, dextrose 10 % in water (D10W), dextrose 50%, glucagon (human recombinant), hydrALAZINE, insulin aspart U-100, labetalol, LIDOcaine HCl 2%, ondansetron, oxyCODONE, promethazine (PHENERGAN) IVPB, sodium chloride 0.9%       Objective:     Weight: 93 kg (205 lb)  Body mass index is 30.27 kg/m².  Vital Signs (Most Recent):  Temp: 99.2 °F (37.3 °C) (02/01/22 1532)  Pulse: 86 (02/01/22 1532)  Resp: 17 (02/01/22 1532)  BP: 114/66 (02/01/22 1532)  SpO2: 97 % (02/01/22 1532) Vital Signs (24h Range):  Temp:  [98.5 °F (36.9 °C)-100.2 °F (37.9 °C)] 99.2 °F (37.3 °C)  Pulse:  [] 86  Resp:  [16-21] 17  SpO2:  [96 %-100 %] 97 %  BP: ()/(45-76) 114/66                          Urethral Catheter 02/01/22 1022 16 Fr. (Active)         Neurosurgery Physical Exam    General: well developed, well nourished, no distress.   Head: normocephalic  Neck: No tracheal deviation.   Neurologic: Alert and oriented. Thought content appropriate.  GCS: Motor: 6/Verbal: 5/Eyes: 4 GCS Total: 15  Mental Status: Awake, Alert, Oriented x 4  Language: No aphasia  Speech: No dysarthria  Cranial nerves: face symmetric, tongue midline, CN II-XII grossly intact (except R CN III).   Eyes: pupils equal, round,  reactive to light with accomodation, L EOMI. Pupil sparing R CN III palsy (right eye ptosis, unable to adduct).   Pulmonary: normal respirations, no signs of respiratory distress  Abdomen: soft, non-distended, not tender to palpation  Sensory: intact to light touch throughout  Motor Strength: Moves all extremities spontaneously with good tone. Grossly full strength upper and lower extremities without focal weakness. No abnormal movements seen.   Pronator Drift: mild LUE drift  Finger-to-nose: Intact bilaterally  Skin: Skin is warm, dry and intact.    Right cranial incision c/d/I with skin edges well approximated with staples. No surrounding erythema or edema. No drainage from incision. No palpable underlying fluid collection.       Significant Labs:  Recent Labs   Lab 01/31/22  0104 02/01/22 0416   * 170*   * 137   K 3.9 3.9    108   CO2 24 23   BUN 18 16   CREATININE 0.7 0.7   CALCIUM 7.9* 8.8   MG 2.2 2.3     Recent Labs   Lab 01/31/22  0104 02/01/22 0416   WBC 11.94 13.08*   HGB 11.5* 11.9*   HCT 33.5* 34.5*    195     No results for input(s): LABPT, INR, APTT in the last 48 hours.  Microbiology Results (last 7 days)     ** No results found for the last 168 hours. **        Recent Lab Results       02/01/22  1131   02/01/22  0733   02/01/22  0416   01/31/22  2247   01/31/22  1701        Albumin     2.6           Alkaline Phosphatase     94           ALT     131           Anion Gap     6           AST     56           Baso #     0.02           Basophil %     0.2           BILIRUBIN TOTAL     0.7  Comment: For infants and newborns, interpretation of results should be based  on gestational age, weight and in agreement with clinical  observations.    Premature Infant recommended reference ranges:  Up to 24 hours.............<8.0 mg/dL  Up to 48 hours............<12.0 mg/dL  3-5 days..................<15.0 mg/dL  6-29 days.................<15.0 mg/dL             BUN     16           Calcium      8.8           Chloride     108           CO2     23           Creatinine     0.7           Differential Method     Automated           eGFR if      >60.0           eGFR if non      >60.0  Comment: Calculation used to obtain the estimated glomerular filtration  rate (eGFR) is the CKD-EPI equation.              Eos #     0.2           Eosinophil %     1.2           Glucose     170           Gran # (ANC)     9.7           Gran %     73.9           Hematocrit     34.5           Hemoglobin     11.9           Immature Grans (Abs)     0.16  Comment: Mild elevation in immature granulocytes is non specific and   can be seen in a variety of conditions including stress response,   acute inflammation, trauma and pregnancy. Correlation with other   laboratory and clinical findings is essential.             Immature Granulocytes     1.2           Lymph #     1.9           Lymph %     14.6           Magnesium     2.3           MCH     30.0           MCHC     34.5           MCV     87           Mono #     1.2           Mono %     8.9           MPV     10.5           nRBC     0           Phosphorus     2.7           Platelets     195           POCT Glucose 218   166     196   179       Potassium     3.9           PROTEIN TOTAL     6.0           RBC     3.97           RDW     12.7           Sodium     137           WBC     13.08                              All pertinent labs from the last 24 hours have been reviewed.    Significant Diagnostics:  I have reviewed all pertinent imaging results/findings within the past 24 hours.

## 2022-02-01 NOTE — ASSESSMENT & PLAN NOTE
-S/P Right craniotomy 01/28.  -EEG 01/28 Negative.  -Impaired EOM. Repeat MRI per Neuro critical care. Repeat MRI significant for : Right lateral efraín and right paramedian medulla recent infarcts.  No associated parenchymal hemorrhage or significant mass effect.     Evolving postoperative change of right subdural hematoma evacuation.  Thin residual bihemispheric subdural collections with minimal leftward midline shift.     2.1 cm meningioma along the planum sphenoidal.  -PT/OT/SLP.  -Goal for -160 per primary.

## 2022-02-01 NOTE — PLAN OF CARE
Recommendations    1. Continue Pureed diet advancing texture per SLP     2. Add Boost pudding TID    - If pt can tolerate Boost like liquids, rec Boost Glucose Control TID    3. If TF warranted, rec Glucerna 1.5 advancing as tolerated until goal of 50 mL/hr providing pt with 1800 kcal, 99 g protein, and 911 mL free water 4. Monitor and follow-up    Goals: Pt will receive nutrition by RD f/u  Nutrition Goal Status: goal met  Communication of RD Recs:  (POC)

## 2022-02-01 NOTE — SUBJECTIVE & OBJECTIVE
Past Medical History:   Diagnosis Date    Diabetes mellitus     HLD (hyperlipidemia) 1/23/2022    HTN (hypertension) 1/25/2022     Past Surgical History:   Procedure Laterality Date    CRANIOTOMY FOR EVACUATION OF SUBDURAL HEMATOMA Right 1/28/2022    Procedure: CRANIOTOMY, FOR SUBDURAL HEMATOMA EVACUATION;  Surgeon: Ace Candelaria MD;  Location: [facility] OR [address] [place] [address];  Service: Neurosurgery;  Laterality: Right;  right, possible bilateral     Review of patient's allergies indicates:  No Known Allergies    Scheduled Medications:    atorvastatin  40 mg Oral Daily    heparin (porcine)  5,000 Units Subcutaneous Q8H    insulin aspart U-100  2 Units Subcutaneous QID (AC & HS)    multivitamin  1 tablet Oral Daily    polyethylene glycol  17 g Oral Daily    senna-docusate 8.6-50 mg  1 tablet Oral BID    silodosin  8 mg Oral Daily    sodium chloride  1 g Oral TID       PRN Medications: acetaminophen, baclofen, bisacodyL, dextrose 10 % in water (D10W), dextrose 50%, glucagon (human recombinant), hydrALAZINE, insulin aspart U-100, labetalol, LIDOcaine HCl 2%, ondansetron, oxyCODONE, promethazine (PHENERGAN) IVPB, sodium chloride 0.9%    Family History    None       Tobacco Use    Smoking status: Never Smoker    Smokeless tobacco: Never Used   Substance and Sexual Activity    Alcohol use: Never    Drug use: Not on file    Sexual activity: Yes     Partners: Female     Review of Systems   Constitutional: Positive for activity change.   HENT: Negative.    Eyes: Negative.    Respiratory: Negative.    Gastrointestinal: Positive for constipation.   Endocrine: Negative.    Genitourinary:        Urinary retention. Benson to be replaced.    Skin:        Right scalp incision with staples intact clean, dry and intact   Allergic/Immunologic: Negative.    Neurological: Positive for weakness.   Psychiatric/Behavioral: Negative.      Objective:     Vital Signs (Most Recent):  Temp: 98.9 °F (37.2 °C) (02/01/22 1118)  Pulse: 75 (02/01/22  1118)  Resp: 16 (02/01/22 1118)  BP: 127/64 (02/01/22 1118)  SpO2: 97 % (02/01/22 1118)    Vital Signs (24h Range):  Temp:  [98.3 °F (36.8 °C)-100.2 °F (37.9 °C)] 98.9 °F (37.2 °C)  Pulse:  [] 75  Resp:  [16-32] 16  SpO2:  [96 %-100 %] 97 %  BP: ()/(45-88) 127/64     Body mass index is 30.27 kg/m².    Physical Exam  Vitals and nursing note reviewed.   Constitutional:       Appearance: Normal appearance.   HENT:      Head: Normocephalic.   Pulmonary:      Effort: Pulmonary effort is normal.   Abdominal:      Palpations: Abdomen is soft.   Musculoskeletal:      Cervical back: Normal range of motion and neck supple.   Skin:     General: Skin is warm and dry.   Neurological:      General: No focal deficit present.      Mental Status: He is alert and oriented to person, place, and time.      GCS: GCS eye subscore is 4. GCS verbal subscore is 5. GCS motor subscore is 6.      Motor: Weakness present.      Gait: Gait abnormal.   Psychiatric:         Attention and Perception: Attention and perception normal.         Mood and Affect: Mood and affect normal.         Speech: Speech normal.         Behavior: Behavior is cooperative.         Judgment: Judgment normal.       NEUROLOGICAL EXAMINATION:     MENTAL STATUS   Oriented to person, place, and time.   Speech: speech is normal       Diagnostic Results:  Labs reviewed.

## 2022-02-01 NOTE — PROGRESS NOTES
Glynn Castillo - Neurosurgery ([facility])  Neurosurgery  Progress Note    Subjective:     History of Present Illness: Pt is 62M pmh DM who presents to outside hospital with cc of constant headaches for 10 days. Denies trauma, denies blood thin use, seizure, AMS, visual disturbances. CTH on workup showed bilateral R>L  subacute subdural hematomas. NSGY consulted for evaluation.      Post-Op Info:  Procedure(s) (LRB):  CRANIOTOMY, FOR SUBDURAL HEMATOMA EVACUATION (Right)   4 Days Post-Op     Interval History: NAEON. TTF. MRI obtained per ICU after noticing partial CN III palsy. MRI obtained showing small acute infarct in right efraín. Vascular neurology following. Family at bedside. Denies headache, visual changes, weakness, n/v.    Medications:  Continuous Infusions:   dextrose 10 % in water (D10W)       Scheduled Meds:   aspirin  81 mg Oral Daily    atorvastatin  40 mg Oral Daily    heparin (porcine)  5,000 Units Subcutaneous Q8H    insulin aspart U-100  2 Units Subcutaneous QID (AC & HS)    multivitamin  1 tablet Oral Daily    polyethylene glycol  17 g Oral Daily    senna-docusate 8.6-50 mg  1 tablet Oral BID    silodosin  8 mg Oral Daily    sodium chloride  1 g Oral TID     PRN Meds:acetaminophen, baclofen, bisacodyL, dextrose 10 % in water (D10W), dextrose 50%, glucagon (human recombinant), hydrALAZINE, insulin aspart U-100, labetalol, LIDOcaine HCl 2%, ondansetron, oxyCODONE, promethazine (PHENERGAN) IVPB, sodium chloride 0.9%       Objective:     Weight: 93 kg (205 lb)  Body mass index is 30.27 kg/m².  Vital Signs (Most Recent):  Temp: 99.2 °F (37.3 °C) (02/01/22 1532)  Pulse: 86 (02/01/22 1532)  Resp: 17 (02/01/22 1532)  BP: 114/66 (02/01/22 1532)  SpO2: 97 % (02/01/22 1532) Vital Signs (24h Range):  Temp:  [98.5 °F (36.9 °C)-100.2 °F (37.9 °C)] 99.2 °F (37.3 °C)  Pulse:  [] 86  Resp:  [16-21] 17  SpO2:  [96 %-100 %] 97 %  BP: ()/(45-76) 114/66                          Urethral Catheter 02/01/22  1022 16 Fr. (Active)         Neurosurgery Physical Exam    General: well developed, well nourished, no distress.   Head: normocephalic  Neck: No tracheal deviation.   Neurologic: Alert and oriented. Thought content appropriate.  GCS: Motor: 6/Verbal: 5/Eyes: 4 GCS Total: 15  Mental Status: Awake, Alert, Oriented x 4  Language: No aphasia  Speech: No dysarthria  Cranial nerves: face symmetric, tongue midline, CN II-XII grossly intact (except R CN III).   Eyes: pupils equal, round, reactive to light with accomodation, L EOMI. Pupil sparing R CN III palsy (right eye ptosis, unable to adduct).   Pulmonary: normal respirations, no signs of respiratory distress  Abdomen: soft, non-distended, not tender to palpation  Sensory: intact to light touch throughout  Motor Strength: Moves all extremities spontaneously with good tone. Grossly full strength upper and lower extremities without focal weakness. No abnormal movements seen.   Pronator Drift: mild LUE drift  Finger-to-nose: Intact bilaterally  Skin: Skin is warm, dry and intact.    Right cranial incision c/d/I with skin edges well approximated with staples. No surrounding erythema or edema. No drainage from incision. No palpable underlying fluid collection.       Significant Labs:  Recent Labs   Lab 01/31/22  0104 02/01/22  0416   * 170*   * 137   K 3.9 3.9    108   CO2 24 23   BUN 18 16   CREATININE 0.7 0.7   CALCIUM 7.9* 8.8   MG 2.2 2.3     Recent Labs   Lab 01/31/22  0104 02/01/22  0416   WBC 11.94 13.08*   HGB 11.5* 11.9*   HCT 33.5* 34.5*    195     No results for input(s): LABPT, INR, APTT in the last 48 hours.  Microbiology Results (last 7 days)     ** No results found for the last 168 hours. **        Recent Lab Results       02/01/22  1131   02/01/22  0733   02/01/22  0416   01/31/22  2247   01/31/22  1701        Albumin     2.6           Alkaline Phosphatase     94           ALT     131           Anion Gap     6           AST     56            Baso #     0.02           Basophil %     0.2           BILIRUBIN TOTAL     0.7  Comment: For infants and newborns, interpretation of results should be based  on gestational age, weight and in agreement with clinical  observations.    Premature Infant recommended reference ranges:  Up to 24 hours.............<8.0 mg/dL  Up to 48 hours............<12.0 mg/dL  3-5 days..................<15.0 mg/dL  6-29 days.................<15.0 mg/dL             BUN     16           Calcium     8.8           Chloride     108           CO2     23           Creatinine     0.7           Differential Method     Automated           eGFR if      >60.0           eGFR if non      >60.0  Comment: Calculation used to obtain the estimated glomerular filtration  rate (eGFR) is the CKD-EPI equation.              Eos #     0.2           Eosinophil %     1.2           Glucose     170           Gran # (ANC)     9.7           Gran %     73.9           Hematocrit     34.5           Hemoglobin     11.9           Immature Grans (Abs)     0.16  Comment: Mild elevation in immature granulocytes is non specific and   can be seen in a variety of conditions including stress response,   acute inflammation, trauma and pregnancy. Correlation with other   laboratory and clinical findings is essential.             Immature Granulocytes     1.2           Lymph #     1.9           Lymph %     14.6           Magnesium     2.3           MCH     30.0           MCHC     34.5           MCV     87           Mono #     1.2           Mono %     8.9           MPV     10.5           nRBC     0           Phosphorus     2.7           Platelets     195           POCT Glucose 218   166     196   179       Potassium     3.9           PROTEIN TOTAL     6.0           RBC     3.97           RDW     12.7           Sodium     137           WBC     13.08                              All pertinent labs from the last 24 hours have been  reviewed.    Significant Diagnostics:  I have reviewed all pertinent imaging results/findings within the past 24 hours.    Assessment/Plan:     * Subdural hematoma  Pt is 62M pmh DM who presents to outside hospital with cc of constant headaches for 10 days. Denies trauma, denies blood thin use, seizure, AMS, visual disturbances. CTH on workup showed bilateral R>L  subacute subdural hematomas. Now s/p R SDH evacuation on 1/28.    - Pupil sparing R CN III palsy noted yesterday per documentation. MRI brain without contrast 1/31 shows right pontine infarct. Vascular Neurology now following. Aspirin 81 mg started 2/1. F/u CTA neck. Continue atorvastatin.    - All imaging reviewed:    -- CTH 1/23: bilat R> L SDH, worst at R convexity at 1.4 cm, 8 mm MLS   -- CTH 1/24: stable   -- CTA 1/24: no underlying vascular lesion, 2 cm planum sphenoidale mass, likely meningioma   -- CTH 1/28 post op: good evacuation, some residual dependent fluid, improvement in MLS    -- MRI brain without contrast 1/31: small acute infarct in right efraín. Small residual bilateral SDH collections with stable left shift compared to prior CTH 1/28.   - Goal eunatremia: 1137 today. Continue salt tablets 1 g TID. Will wean as tolerated  - SBP goal < 160  - HOB > 30   - Continue current insulin regimen per ICU. SSI and diabetic diet.   - PT/OT/SLP  - Tolerating regular diet, nectar thick liquids  - SQH for DVT ppx   - Discussed with Dr. Bari Chirinos PASilvinaC  Neurosurgery  Glynn Castillo - Neurosurgery ([facility])

## 2022-02-01 NOTE — HPI
Patient is a 62 year old male with PMHx T2DM and HLD who presented as a direct admit from OSH to NCC unit for multiple acute on subacute subdural hematomas with R to L midline shift.      Per chart review, patient with ongoing frontal headache for the last 10 days prior to admission. No recent trauma reported. On 1/22, he was seen at [facility] for his headaches and was prescribed Fioricet without relief. Due to worsening headache, patient presented to [facility] on 1/23. He was neuro intact with complaint of nausea with no vomiting. Head CT w/o contrast revealed multiple bilateral acute and subacute SDHs overlying the cerebral convexities with the largest overlying the right frontal convexity measuring 1.4 cm. Also noted was diffuse cerebral edema with 8mm right to left midline shift and associated right to left subfalcine herniation. Pt is R craniotomy for aSDH evacuation. Pt was extubated on 01/30.   Repeat MRI was performed due to impaired EOM on 01/31. MRI showed   Right lateral efraín and right paramedian medulla recent infarcts.  No associated parenchymal hemorrhage or significant mass effect.     Evolving postoperative change of right subdural hematoma evacuation.  Thin residual bihemispheric subdural collections with minimal leftward midline shift.     2.1 cm meningioma along the planum sphenoidal.On 01/28  pt had EEG that was negative.   PM & R was consulted to evaluate pt for rehab.     Functional History: Patient lives with spouse and two teenage children  in a one-story home with no steps to enter.  Prior to admission,pt was independent with mobility and ADLs.  DME: None.

## 2022-02-01 NOTE — HOSPITAL COURSE
OT- 01/31      Bed Mobility:    Patient completed Rolling/Turning to Left with minimal assistance  Patient completed Scooting/Bridging to EOB with minimal assistance  Patient completed Supine to Sit with minimal assistance  Pt required CGA for static sitting balance but Mod A for dynamic sitting balance.     Functional Mobility/Transfers:  Patient completed Sit <> Stand Transfer from EOB x 1 trial with minimum assistance  with  hand-held assist   Patient completed Bed <> Chair Transfer using Step Transfer technique with minimum assistance and of 2 persons with bilateral hand-held assist  Functional Mobility: Pt ambulated ~3 ft from EOB to the bedside chair with Min A of 2 with B HHA.  He was unsteady but had no overt LOB.     Activities of Daily Living:  Grooming: minimum assistance with tray table setup to perform oral care while sitting UIC with (A) to grasp toothpaste tube with his L hand in order to apply paste to the tooth brush and (A) to hold basin for spitting.  Setup assistance to wash his face while supine with HOB elevated.   Lower Body Dressing: moderate assistance as pt was able to doff non-skid sock with Mod A for dynamic sitting balance EOB but was unable to navi.  Pt had a significant anterior lean while attempting to navi sock.    PT- 01/31  Functional Mobility:  Bed Mobility:     Rolling Left:  minimum assistance  Scooting: minimum assistance  Supine to Sit: minimum assistance  Transfers:     Sit to Stand:  minimum assistance with no AD and hand-held assist  Gait: Ramya x 2, HHA, 3 ft to bedside chair, Shuffled steps, some improvement noted with repetition

## 2022-02-01 NOTE — HPI
Mr. Whittington is a 62 year old male with PMH of DM and HLD. He was recently admitted to [facility] for multiple acute on subacute SDH with a R to L midline shift. During his admission, neurosurgery was consulted. Patient underwent R craniotomy for SDH on 1/28/2022, drain was placed. Patient on 1/31/22 developed R LEANDRA while in [facility]. MRI was ordered then patient was stepped down to neurosurgery primary service. MRI revealed a R pontine and paramedian medulla infarcts. Stroke team was consulted. On exam patient had R LEANDRA present and mild dysarthria. No weakness or aphasia appreciated. Wife was at bedside.

## 2022-02-01 NOTE — ASSESSMENT & PLAN NOTE
Patient is a 62 year old male with PMH of DM and HLD. He was recently admitted to [facility] for multiple acute on subacute SDH with a R to L midline shift. During his admission, neurosurgery was consulted. Patient underwent R craniotomy for SDH on 1/28/2022, drain was placed. Patient on 1/31/22 developed R LEANDRA while in [facility]. MRI was ordered then patient was stepped down to neurosurgery primary service. MRI revealed a R pontine and paramedian medulla infarcts. Stroke team was consulted. On exam patient had R LEANDRA present and mild dysarthria. No weakness or aphasia appreciated.     Recommending ASA 81mg, continue atorvastatin 40mg, PT/OT evaluation (new orders needed), and outpatient follow up in 4-6 weeks with VN.       Antithrombotics for secondary stroke prevention: Antiplatelets: Aspirin: 81 mg daily okay with recent SDH    Statins for secondary stroke prevention and hyperlipidemia, if present:   Statins: Atorvastatin- 40 mg daily    Aggressive risk factor modification: HTN, DM, HLD, Obesity     Rehab efforts: The patient has been evaluated by a stroke team provider and the therapy needs have been fully considered based off the presenting complaints and exam findings. The following therapy evaluations are needed: PT evaluate and treat, OT evaluate and treat, SLP evaluate and treat, PM&R evaluate for appropriate placement    Diagnostics ordered/pending: None     VTE prophylaxis: Heparin 5000 units SQ every 8 hours  Mechanical prophylaxis: Place SCDs    BP parameters: Infarct: No intervention, SBP <220

## 2022-02-01 NOTE — CONSULTS
Glynn Castillo - Neurosurgery ([facility])  Vascular Neurology  Comprehensive Stroke Center  Consult Note    Inpatient consult to Vascular (Stroke) Neurology  Consult performed by: Brandon Bautista PA-C  Consult ordered by: Kristina Kelly PA-C        Assessment/Plan:     Patient is a 62 y.o. year old male with:    * Subdural hematoma  Patient admitted for SDH and is s/p R craniotomy       Cerebrovascular accident (CVA) of right pontine structure  Patient is a 62 year old male with PMH of DM and HLD. He was recently admitted to [facility] for multiple acute on subacute SDH with a R to L midline shift. During his admission, neurosurgery was consulted. Patient underwent R craniotomy for SDH on 1/28/2022, drain was placed. Patient on 1/31/22 developed R LEANDRA while in [facility]. MRI was ordered then patient was stepped down to neurosurgery primary service. MRI revealed a R pontine and paramedian medulla infarcts. Stroke team was consulted. On exam patient had R CN III palsy (only can look to R with R eye, R eye ptosis, and pupil spared) present. No weakness or aphasia appreciated.     Recommending CTA Neck without contrast, ASA 81mg, continue atorvastatin 40mg, PT/OT evaluation (new orders needed), and outpatient follow up in 4-6 weeks with VN.       Antithrombotics for secondary stroke prevention: Antiplatelets: Aspirin: 81 mg daily okay with recent SDH    Statins for secondary stroke prevention and hyperlipidemia, if present:   Statins: Atorvastatin- 40 mg daily    Aggressive risk factor modification: HTN, DM, HLD, Obesity     Rehab efforts: The patient has been evaluated by a stroke team provider and the therapy needs have been fully considered based off the presenting complaints and exam findings. The following therapy evaluations are needed: PT evaluate and treat, OT evaluate and treat, SLP evaluate and treat, PM&R evaluate for appropriate placement    Diagnostics ordered/pending: CTA Neck without contrast      VTE prophylaxis:  Heparin 5000 units SQ every 8 hours  Mechanical prophylaxis: Place SCDs    BP parameters: Infarct: No intervention, SBP <220        Vomiting  Resolved    Essential hypertension  Stroke RF   SBP <220 for acute infarct without intervention     Type 2 diabetes mellitus  Stroke RF   A1C 7.2   Currently on aspart 2 units before meals and night   SSI     Mixed hyperlipidemia  Stroke RF   LDL 84.8  Atorvastatin 40mg , continue     Cytotoxic cerebral edema  Area of cytotoxic cerebral edema identified when reviewing brain imaging in the efraín/medulla. There is not mass effect associated with it. We will continue to monitor the patients clinical exam for any worsening of symptoms which may indicate expansion of the stroke or the area of the edema resulting in the clinical change. The pattern is suggestive of  etiology.           STROKE DOCUMENTATION          NIH Scale:  1a. Level of Consciousness: 0-->Alert, keenly responsive  1b. LOC Questions: 0-->Answers both questions correctly  1c. LOC Commands: 0-->Performs both tasks correctly  2. Best Gaze: 1-->Partial gaze palsy, gaze is abnormal in one or both eyes, but forced deviation or total gaze paresis is not present  3. Visual: 0-->No visual loss  4. Facial Palsy: 0-->Normal symmetrical movements  5a. Motor Arm, Left: 0-->No drift, limb holds 90 (or 45) degrees for full 10 secs  5b. Motor Arm, Right: 0-->No drift, limb holds 90 (or 45) degrees for full 10 secs  6a. Motor Leg, Left: 0-->No drift, leg holds 30 degree position for full 5 secs  6b. Motor Leg, Right: 0-->No drift, leg holds 30 degree position for full 5 secs  7. Limb Ataxia: 0-->Absent  8. Sensory: 0-->Normal, no sensory loss  9. Best Language: 0-->No aphasia, normal  10. Dysarthria: 0-->No dysarthria   11. Extinction and Inattention (formerly Neglect): 0-->No abnormality  Total (NIH Stroke Scale): 1    Modified [place] Score: 0  [place] Coma Scale:15   ABCD2 Score:    [id number] Score:   HAS -BLED Score:    ICH Score:   Hunt & Miller Classification:       Thrombolysis Candidate? No, Out of window , Non-disabling symptoms - Low NIHSS , recent SDH    Delays to Thrombolysis?  Not Applicable    Interventional Revascularization Candidate?   Is the patient eligible for mechanical endovascular reperfusion (YASMINE)?  No; no significant neurologic deficit (NIHSS <6)  and No; at this time symptoms not suggestive of large vessel occlusion    Delays to Thrombectomy? Not Applicable    Hemorrhagic change of an Ischemic Stroke: Does this patient have an ischemic stroke with hemorrhagic changes? No     Subjective:     History of Present Illness:  Mr. Whittington is a 62 year old male with PMH of DM and HLD. He was recently admitted to [facility] for multiple acute on subacute SDH with a R to L midline shift. During his admission, neurosurgery was consulted. Patient underwent R craniotomy for SDH on 1/28/2022, drain was placed. Patient on 1/31/22 developed restricted EOMs in R eye while in [facility]. MRI was ordered then patient was stepped down to neurosurgery primary service. MRI revealed a R pontine and paramedian medulla infarcts. Stroke team was consulted. On exam patient had R CN III palsy present (pupil spared, can only look to the R, R eye ptosis). No weakness or aphasia appreciated. Wife was at bedside.           Past Medical History:   Diagnosis Date    Diabetes mellitus     HLD (hyperlipidemia) 1/23/2022    HTN (hypertension) 1/25/2022     Past Surgical History:   Procedure Laterality Date    CRANIOTOMY FOR EVACUATION OF SUBDURAL HEMATOMA Right 1/28/2022    Procedure: CRANIOTOMY, FOR SUBDURAL HEMATOMA EVACUATION;  Surgeon: Ace Candelaria MD;  Location: [facility] OR [address] [place] [address];  Service: Neurosurgery;  Laterality: Right;  right, possible bilateral     History reviewed. No pertinent family history.  Social History     Tobacco Use    Smoking status: Never Smoker    Smokeless tobacco: Never Used   Substance Use Topics    Alcohol use: Never      Review of patient's allergies indicates:  No Known Allergies    Medications: I have reviewed the current medication administration record.    Medications Prior to Admission   Medication Sig Dispense Refill Last Dose    atorvastatin (LIPITOR) 40 MG tablet Take 40 mg by mouth once daily.   1/22/2022    butalbital-acetaminophen-caffeine -40 mg (FIORICET, ESGIC) -40 mg per tablet Take 1 tablet by mouth every 4 (four) hours as needed for Pain.   1/23/2022    metFORMIN (GLUCOPHAGE) 1000 MG tablet Take 1,000 mg by mouth.   1/22/2022       Review of Systems   Constitutional: Negative for chills, diaphoresis and fever.   HENT: Negative for drooling, hearing loss, rhinorrhea and trouble swallowing.    Eyes: Negative for photophobia, pain, redness and visual disturbance.   Respiratory: Negative for cough, choking and shortness of breath.    Cardiovascular: Negative for chest pain and leg swelling.   Gastrointestinal: Negative for abdominal pain, constipation, diarrhea, nausea and vomiting.   Genitourinary: Negative for decreased urine volume, difficulty urinating and dysuria.   Musculoskeletal: Negative for joint swelling and neck stiffness.   Skin: Negative for wound.   Neurological: Negative for dizziness, facial asymmetry, speech difficulty, weakness, numbness and headaches.   Psychiatric/Behavioral: Negative for agitation, behavioral problems and confusion. The patient is not nervous/anxious.    All other systems reviewed and are negative.    Objective:     Vital Signs (Most Recent):  Temp: 98.5 °F (36.9 °C) (02/01/22 0815)  Pulse: 85 (02/01/22 0815)  Resp: 17 (02/01/22 0815)  BP: 134/75 (02/01/22 0815)  SpO2: 96 % (02/01/22 0815)    Vital Signs Range (Last 24H):  Temp:  [98.2 °F (36.8 °C)-100.2 °F (37.9 °C)]   Pulse:  []   Resp:  [17-32]   BP: ()/(45-88)   SpO2:  [96 %-100 %]     Physical Exam  Vitals and nursing note reviewed.   Constitutional:       General: He is not in acute distress.      Appearance: He is obese. He is not ill-appearing or diaphoretic.   HENT:      Head: Normocephalic and atraumatic.      Right Ear: External ear normal.      Left Ear: External ear normal.      Nose: Nose normal. No rhinorrhea.   Eyes:      General: No scleral icterus.        Right eye: No discharge.         Left eye: No discharge.      Comments: R eye EOMs impaired (only look towards R, pupil spared), R eye ptosis   Cardiovascular:      Rate and Rhythm: Normal rate.   Pulmonary:      Effort: Pulmonary effort is normal. No respiratory distress.   Abdominal:      General: There is no distension.   Musculoskeletal:         General: No tenderness, deformity or signs of injury.   Skin:     General: Skin is warm and dry.   Neurological:      Mental Status: He is alert and oriented to person, place, and time.      GCS: GCS eye subscore is 4. GCS verbal subscore is 5. GCS motor subscore is 6.      Cranial Nerves: No facial asymmetry.      Sensory: No sensory deficit.      Motor: No weakness.   Psychiatric:         Attention and Perception: Attention normal.         Mood and Affect: Mood normal.         Behavior: Behavior normal. Behavior is cooperative.      Speech: No slurred speech         Neurological Exam:   LOC: alert  Attention Span: Good   Language: No aphasia  Articulation: Dysarthria  Orientation: Person, Place, Time   Visual Fields: Full  EOM (CN III, IV, VI): CN III (R eye can only look to R, pupils spared)   Facial Movement (CN VII): Symmetric facial expression    Motor: Arm left  Normal 5/5  Leg left  Normal 5/5  Arm right  Normal 5/5  Leg right Normal 5/5  Sensation: Intact to light touch, temperature and vibration      Laboratory:  CMP:   Recent Labs   Lab 02/01/22 0416   CALCIUM 8.8   ALBUMIN 2.6*   PROT 6.0      K 3.9   CO2 23      BUN 16   CREATININE 0.7   ALKPHOS 94   *   AST 56*   BILITOT 0.7     CBC:   Recent Labs   Lab 02/01/22 0416   WBC 13.08*   RBC 3.97*   HGB 11.9*   HCT 34.5*       MCV 87   MCH 30.0   MCHC 34.5     Lipid Panel: No results for input(s): CHOL, LDLCALC, HDL, TRIG in the last 168 hours.  Coagulation:   Recent Labs   Lab 01/28/22  0604   INR 1.0  1.0   APTT 24.8  24.8     Hgb A1C: No results for input(s): HGBA1C in the last 168 hours.  TSH: No results for input(s): TSH in the last 168 hours.    Diagnostic Results:      Brain imaging:  MRI Brain WO Contrast 1/31/22     Right lateral efraín and right paramedian medulla recent infarcts.  No associated parenchymal hemorrhage or significant mass effect. Evolving postoperative change of right subdural hematoma evacuation. Thin residual bihemispheric subdural collections with minimal leftward midline shift.     2.1 cm meningioma along the planum sphenoidal.        CTH 1/31/22      Evolving postoperative changes of right allen craniectomy for subdural evacuation with interval removal of a right frontal external drain.     Small volume residual scattered postoperative gas fluid and residual subdural hemorrhage however with marked improvement of mass effect and midline shift.  No evidence of new hemorrhage or new abnormal parenchymal attenuation.      CTH 1/23/22     Multiple bilateral extra-axial fluid collections overlying the cerebral convexities with the largest overlying the right frontal convexity measuring 1.4 cm. Overall component of the collections are composed of both acute and subacute bilateral subdural hematomas. Diffuse cerebral edema with associated 8 mm right to left midline shift and associated right to left subfalcine herniation.    Vessel Imaging:  CTA Head 1/24/22   Mild increase in size of mixed attenuation subdural collection along the right hemisphere.  Midline shift to the left has mildly increased measuring 6 mm at the level of the 3rd ventricle with mild increased effacement of the suprasellar cistern.     2.2 cm meningioma along the planum sphenoidale. No aneurysm or AVM is identified intracranially with  no major branch stenosis/occlusion.    Cardiac Evaluation:   TTE 1/29/22   · The left ventricle is normal in size with normal systolic function.  · The estimated ejection fraction is 55%.  · Normal left ventricular diastolic function.  · Normal right ventricular size with normal right ventricular systolic function.  · Mechanically ventilated; cannot use inferior caval vein diameter to estimate central venous pressure.            Brandon Bautista PA-C  [facility]  Department of Vascular Neurology   [facility] Neurosurgery [facility])

## 2022-02-01 NOTE — PT/OT/SLP PROGRESS
"Speech Language Pathology Treatment    Patient Name:  Priyank Whittington   MRN:  [id number]  Admitting Diagnosis: Subdural hematoma    Recommendations:                 General Recommendations:  Dysphagia therapy  Diet recommendations:  Regular, Liquid Diet Level: Nectar Thick   Aspiration Precautions: 1 bite/sip at a time, Alternating bites/sips, Assistance with meals and Assistance with thickening liquids, Check for pocketing/oral residue, Feed only when awake/alert, HOB to 90 degrees, Meds crushed in puree, Small bites/sips and Strict aspiration precautions   General Precautions: Standard, aspiration,fall,seizure  Communication strategies:  provide increased time to answer and go to room if call light pushed    Subjective     "Yes, thank you."       Pain/Comfort:  · Pain Rating 1: 0/10  · Pain Rating Post-Intervention 1: 0/10    Respiratory Status: Room air    Objective:     Has the patient been evaluated by SLP for swallowing?   Yes  Keep patient NPO? No     Pt seen bedside, alert and cooperative.  Improved articulatory precision noted.  Nurse assisted in repositioning pt and HOB raised upright.  Improved oral motor strength and coordination also noted.  Pt accepted thin liquids via spoon and cup without overt s/s aspiration.  Swallow response appeared mildly delayed.  Wet vocal quality with congested cough noted following thin via straw.  No overt s/s aspiration with trials of nectar thick liquids, puree, or solids.  Improved mastication of solids with good oral clearance noted.  Education provided re: upgrade to regular solids, continued nectar thick liquids, use of thickener, swallow precs, s/s aspiration, and POC.  Pt and spouse verbalized understanding and agreement.       Assessment:     Priyank Whittington is a 62 y.o. male with an SLP diagnosis of Dysphagia.     Goals:   Multidisciplinary Problems     SLP Goals        Problem: SLP Goal    Goal Priority Disciplines Outcome   SLP Goal     SLP Ongoing, " Progressing   Description: Speech Language Pathology Goals  Goals expected to be met by 2/6  1. Pt will participate in ongoing assessment of swallow.                                Plan:     · Patient to be seen:  4 x/week   · Plan of Care expires:  03/01/22  · Plan of Care reviewed with:  patient,spouse   · SLP Follow-Up:  Yes       Discharge recommendations:  rehabilitation facility   Barriers to Discharge:  Level of Skilled Assistance Needed      Time Tracking:     SLP Treatment Date:   02/01/22  Speech Start Time:  0728  Speech Stop Time:  0748     Speech Total Time (min):  20 min    Billable Minutes: Treatment Swallowing Dysfunction 10 and Self Care/Home Management Training 10    02/01/2022

## 2022-02-02 ENCOUNTER — TELEPHONE (OUTPATIENT)
Dept: NEUROSURGERY | Facility: CLINIC | Age: 62
End: 2022-02-02
Payer: MEDICAID

## 2022-02-02 VITALS
SYSTOLIC BLOOD PRESSURE: 137 MMHG | OXYGEN SATURATION: 98 % | RESPIRATION RATE: 17 BRPM | HEIGHT: 69 IN | WEIGHT: 205 LBS | BODY MASS INDEX: 30.36 KG/M2 | TEMPERATURE: 98 F | DIASTOLIC BLOOD PRESSURE: 59 MMHG | HEART RATE: 96 BPM

## 2022-02-02 LAB
ANION GAP SERPL CALC-SCNC: 9 MMOL/L (ref 8–16)
BUN SERPL-MCNC: 18 MG/DL (ref 6–20)
CALCIUM SERPL-MCNC: 9.4 MG/DL (ref 8.7–10.5)
CHLORIDE SERPL-SCNC: 106 MMOL/L (ref 95–110)
CO2 SERPL-SCNC: 22 MMOL/L (ref 23–29)
CREAT SERPL-MCNC: 0.8 MG/DL (ref 0.5–1.4)
EST. GFR  (AFRICAN AMERICAN): >60 ML/MIN/1.73 M^2
EST. GFR  (NON AFRICAN AMERICAN): >60 ML/MIN/1.73 M^2
GLUCOSE SERPL-MCNC: 149 MG/DL (ref 70–110)
POCT GLUCOSE: 143 MG/DL (ref 70–110)
POCT GLUCOSE: 195 MG/DL (ref 70–110)
POCT GLUCOSE: 203 MG/DL (ref 70–110)
POCT GLUCOSE: 241 MG/DL (ref 70–110)
POTASSIUM SERPL-SCNC: 4.2 MMOL/L (ref 3.5–5.1)
SODIUM SERPL-SCNC: 137 MMOL/L (ref 136–145)

## 2022-02-02 PROCEDURE — 99024 PR POST-OP FOLLOW-UP VISIT: ICD-10-PCS | Mod: ,,, | Performed by: PHYSICIAN ASSISTANT

## 2022-02-02 PROCEDURE — 25000003 PHARM REV CODE 250: Performed by: PHYSICIAN ASSISTANT

## 2022-02-02 PROCEDURE — 97110 THERAPEUTIC EXERCISES: CPT

## 2022-02-02 PROCEDURE — 36415 COLL VENOUS BLD VENIPUNCTURE: CPT | Performed by: PHYSICIAN ASSISTANT

## 2022-02-02 PROCEDURE — 99024 POSTOP FOLLOW-UP VISIT: CPT | Mod: ,,, | Performed by: PHYSICIAN ASSISTANT

## 2022-02-02 PROCEDURE — 80048 BASIC METABOLIC PNL TOTAL CA: CPT | Performed by: PHYSICIAN ASSISTANT

## 2022-02-02 PROCEDURE — 94761 N-INVAS EAR/PLS OXIMETRY MLT: CPT

## 2022-02-02 PROCEDURE — 63600175 PHARM REV CODE 636 W HCPCS: Performed by: PHYSICIAN ASSISTANT

## 2022-02-02 PROCEDURE — 92526 ORAL FUNCTION THERAPY: CPT

## 2022-02-02 PROCEDURE — 25000003 PHARM REV CODE 250: Performed by: NURSE PRACTITIONER

## 2022-02-02 PROCEDURE — 97530 THERAPEUTIC ACTIVITIES: CPT

## 2022-02-02 RX ORDER — BACLOFEN 5 MG/1
5 TABLET ORAL 3 TIMES DAILY PRN
Start: 2022-02-02 | End: 2023-01-17

## 2022-02-02 RX ORDER — SILODOSIN 8 MG/1
8 CAPSULE ORAL DAILY
Start: 2022-02-03 | End: 2023-02-03

## 2022-02-02 RX ORDER — ASPIRIN 81 MG/1
81 TABLET ORAL DAILY
Refills: 0
Start: 2022-02-03 | End: 2023-02-03

## 2022-02-02 RX ORDER — AMOXICILLIN 250 MG
1 CAPSULE ORAL 2 TIMES DAILY
Start: 2022-02-02

## 2022-02-02 RX ORDER — POLYETHYLENE GLYCOL 3350 17 G/17G
17 POWDER, FOR SOLUTION ORAL DAILY
Refills: 0
Start: 2022-02-03

## 2022-02-02 RX ORDER — ACETAMINOPHEN 325 MG/1
650 TABLET ORAL EVERY 6 HOURS PRN
Refills: 0
Start: 2022-02-02 | End: 2023-03-28

## 2022-02-02 RX ORDER — OXYCODONE HYDROCHLORIDE 5 MG/1
5 TABLET ORAL EVERY 6 HOURS PRN
Refills: 0
Start: 2022-02-02 | End: 2022-02-28 | Stop reason: ALTCHOICE

## 2022-02-02 RX ORDER — HEPARIN SODIUM 5000 [USP'U]/ML
5000 INJECTION, SOLUTION INTRAVENOUS; SUBCUTANEOUS EVERY 8 HOURS
Start: 2022-02-02

## 2022-02-02 RX ADMIN — SODIUM CHLORIDE TAB 1 GM 1 G: 1 TAB at 02:02

## 2022-02-02 RX ADMIN — HEPARIN SODIUM 5000 UNITS: 5000 INJECTION, SOLUTION INTRAVENOUS; SUBCUTANEOUS at 02:02

## 2022-02-02 RX ADMIN — INSULIN ASPART 4 UNITS: 100 INJECTION, SOLUTION INTRAVENOUS; SUBCUTANEOUS at 12:02

## 2022-02-02 RX ADMIN — INSULIN ASPART 2 UNITS: 100 INJECTION, SOLUTION INTRAVENOUS; SUBCUTANEOUS at 12:02

## 2022-02-02 RX ADMIN — INSULIN ASPART 2 UNITS: 100 INJECTION, SOLUTION INTRAVENOUS; SUBCUTANEOUS at 08:02

## 2022-02-02 RX ADMIN — POLYETHYLENE GLYCOL 3350 17 G: 17 POWDER, FOR SOLUTION ORAL at 08:02

## 2022-02-02 RX ADMIN — HEPARIN SODIUM 5000 UNITS: 5000 INJECTION, SOLUTION INTRAVENOUS; SUBCUTANEOUS at 06:02

## 2022-02-02 RX ADMIN — SODIUM CHLORIDE TAB 1 GM 1 G: 1 TAB at 08:02

## 2022-02-02 RX ADMIN — SILODOSIN 8 MG: 4 CAPSULE ORAL at 08:02

## 2022-02-02 RX ADMIN — ATORVASTATIN CALCIUM 40 MG: 20 TABLET, FILM COATED ORAL at 08:02

## 2022-02-02 RX ADMIN — ASPIRIN 81 MG: 81 TABLET, COATED ORAL at 08:02

## 2022-02-02 RX ADMIN — Medication 1 TABLET: at 08:02

## 2022-02-02 RX ADMIN — SENNOSIDES AND DOCUSATE SODIUM 1 TABLET: 50; 8.6 TABLET ORAL at 08:02

## 2022-02-02 NOTE — TELEPHONE ENCOUNTER
----- Message from Leena Templeton PA-C sent at 2/2/2022  3:01 PM CST -----  Regarding: Post Op Follow up  Hello!    Mr. Whittington is s/p R SDH evac on 1/28. Can you schedule him for a 2 week wound check and 6 week post op follow up w/ Dr. Candelaria with CT?    I have ordered the imaging.    Thank you!  Leena MAGAÑA

## 2022-02-02 NOTE — PLAN OF CARE
Glynn Castillo - Neurosurgery (Hospital)  Discharge Final Note    Primary Care Provider: Atul Peter MD    Expected Discharge Date: 2/4/2022     Patient to be discharged to [facility].  Care deferred to [facility].  Wheelchair requested through [facility] for 3:30.  Patient and family aware of discharge and time for transport.  Neurosurgery clinic to schedule follow up appointment.    Nurse to call report to [phone] or [phone].  Wheelchair requested for 3:30 which is not a guaranteed arrival time.    Final Discharge Note (most recent)     Final Note - 02/02/22 1440        Final Note    Assessment Type Final Discharge Note     Anticipated Discharge Disposition Rehab Facility        Post-Acute Status    Post-Acute Authorization Placement     Post-Acute Placement Status Set-up Complete/Auth obtained     Discharge Delays None known at this time                 Important Message from Medicare             Contact Info     [facility]nn Castillo - Neurosurgery [address]th Fl   Specialty: Neurosurgery    [address]  [place] [address]   Phone: [phone]       Next Steps: Follow up in 2 week(s)    Instructions: For wound check    Ace Candelaria MD   Specialty: Neurosurgery    [address]  [place] [address]   Phone: [phone]       Next Steps: Follow up in 6 week(s)    Instructions: Post-op follow up

## 2022-02-02 NOTE — PLAN OF CARE
Plan of care reviewed with the patient and his family members.The patient and his family members both verbalized their understanding. No voiced complaints of any pain or discomfort noted. No s/s of any respiratory distress noted. No s/s of hypo/hyperglycemia noted. All questions and concerns were addressed and discussed with the patient and his family members. No s/s of an adverse reaction noted to the medication therapy The patient is AAOX4,VSS.Telemetry monitoring is in place. The Benson cathter is patent and draining urine to the BSDB.The patient's family members are at the bedside. Will continue to monitor and report any changes.

## 2022-02-02 NOTE — PT/OT/SLP PROGRESS
Physical Therapy Treatment    Patient Name:  Priyank Whittington   MRN:  [id number]    Recommendations:     Discharge Recommendations:  rehabilitation facility   Discharge Equipment Recommendations: other (see comments) (TBD by next level of care)   Barriers to discharge: Inaccessible home and Decreased caregiver support    Assessment:     Priyank Whittington is a 62 y.o. male admitted with a medical diagnosis of Subdural hematoma.  He presents with the following impairments/functional limitations:  weakness,impaired endurance,impaired self care skills,impaired functional mobilty,gait instability,impaired balance,visual deficits,decreased upper extremity function,decreased lower extremity function,decreased safety awareness,impaired fine motor,impaired coordination,impaired skin. Pt would benefit from intensive inpatient rehabilitation for: Dynamic/static standing/sitting balance through skilled balance training, strengthening with the use of skilled therapeutic exercises interventions, mobility and safety training to ensure safe discharge home through skilled patient and caregiver education home management training, mobility through community re-integration training and mobility through adaptive equipment training. Pt highly motivated to return to independent PLOF and can tolerate 3+hours of therapy. Pt continues to benefit from a collaborative PT/OT/SLP program to improve quality of life and focus on recovery of impairments.      Rehab Prognosis: Good; patient would benefit from acute skilled PT services to address these deficits and reach maximum level of function.    Recent Surgery: Procedure(s) (LRB):  CRANIOTOMY, FOR SUBDURAL HEMATOMA EVACUATION (Right) 5 Days Post-Op    Plan:     During this hospitalization, patient to be seen 4 x/week to address the identified rehab impairments via gait training,therapeutic activities,therapeutic exercises,neuromuscular re-education and progress toward the following  goals:    · Plan of Care Expires:  03/02/22    Subjective     Chief Complaint: none verbalized  Patient/Family Comments/goals: pt going to Rehab today  Pain/Comfort:  Pain Rating 1: 0/10  Pain Rating Post-Intervention 1: 0/10      Objective:     Communicated with RN prior to session.  Patient found up in chair with telemetry,peripheral IV upon PT entry to room.     General Precautions: Standard, aspiration,fall   Orthopedic Precautions:N/A   Braces: N/A  Respiratory Status: Room air     Functional Mobility:  · Deferred in favor of BLE there-ex 2/2 pt awaiting transport      AM-PAC 6 CLICK MOBILITY  Turning over in bed (including adjusting bedclothes, sheets and blankets)?: 3  Sitting down on and standing up from a chair with arms (e.g., wheelchair, bedside commode, etc.): 3  Moving from lying on back to sitting on the side of the bed?: 3  Moving to and from a bed to a chair (including a wheelchair)?: 3  Need to walk in hospital room?: 3  Climbing 3-5 steps with a railing?: 2  Basic Mobility Total Score: 17       Therapeutic Activities and Exercises:  Patient also educated and instructed on therapeutic exercises. Therapeutic exercises included the following: Hip ABD, Straight Leg Raises, Long Arc Quads, Seated marches (Single leg), Bilateral Heel raises, Bilateral Toe Raises. Pt performed 1x15 on BLEs with verbal cuing or assistance as needed.  Patient educated on role of therapy, goals of session, and benefits of mobilizing.   Discussed PT plan of care during hospitalization.   Patient educated on calling for assistance.   Patient educated on how their diagnosis impacts their mobility within PT scope of practice.   Communication board up to date.  All questions answered within PT scope of practice.      Patient left up in chair with all lines intact, call button in reach and family present..    GOALS:   Multidisciplinary Problems     Physical Therapy Goals        Problem: Physical Therapy Goal    Goal Priority  Disciplines Outcome Goal Variances Interventions   Physical Therapy Goal     PT, PT/OT Ongoing, Progressing     Description: Goals to be met by: 22     Patient will increase functional independence with mobility by performin. Supine to sit with Modified [place]  2. Sit to supine with Modified [place]  3. Rolling to Left and Right with Modified [place].  4. Sit to stand transfer with Stand-by Assistance  5. Bed to chair transfer with Contact Guard Assistance using LRAD  6. Gait  x 50 feet with Contact Guard Assistance using LRAD.   7. Lower extremity exercise program x15 reps per instruction, with supervision                     Time Tracking:     PT Received On: 22  PT Start Time: 1556     PT Stop Time: 1607  PT Total Time (min): 11 min     Billable Minutes: Therapeutic Exercise 11    Treatment Type: Treatment  PT/PTA: PT     PTA Visit Number: 0     2022

## 2022-02-02 NOTE — DISCHARGE SUMMARY
Glynn Castillo - Neurosurgery ([facility])  Neurosurgery  Discharge Summary      Patient Name: Priyank Whittington  MRN: [id number]  Admission Date: 1/23/2022  Hospital Length of Stay: 10 days  Discharge Date and Time: 2/2/2022  Attending Physician: Ace Candelaria MD   Discharging Provider: Leena Templeton PA-C  Primary Care Provider: Atul Peter MD    HPI:   Pt is 62M pmh DM who presents to outside hospital with cc of constant headaches for 10 days. Denies trauma, denies blood thin use, seizure, AMS, visual disturbances. CTH on workup showed bilateral R>L  subacute subdural hematomas. NSGY consulted for evaluation.      Procedure(s) (LRB):  CRANIOTOMY, FOR SUBDURAL HEMATOMA EVACUATION (Right)     Hospital Course: 01/24: NAEO  01/25: still complaining of headache. Having N/V  01/26: NAEO. H/A is under better control   01/27: Fluctuating mental status, getting EEG  01/28: s/p R craniotomy for aSDH evacuation (increased MLS o/n); mech vent: orally intubated into nsicu postop; sedation on hold; postop CTH reviewed w/ expected surgical decompression and reduction of MLS; increased hemovac drainage immediately postop; replete lytes; ok for TF via ogt;  01/29: propofol switched to fent drip o/n for low bp; plan extubation today; ivf bolus o/n  01/30: extubated to room yesterday; subdural drain in place  01/31: EOM impaired (right eye adduction and superior vertical gaze, pupils equal and reactive). Per family this is new since his hemicrani. Will do a MRI brain to investigate. He also has hiccups. Given constipation will avoid reglan.   1/31: NAEON, drain pulled yesterday, stable exam.   2/1: NAEON. TTF. MRI obtained per ICU after noticing partial CN III palsy. MRI obtained showing small acute infarct in right efraín. Vascular neurology following. Family at bedside. Denies headache, visual changes, weakness, n/v.  2/2: NAEON. Neuro stable. CTA completed. ASA started per vascular neurology recs. Denies new headaches, vision  changes, new weakness, or numbness. Plan for outpatient f/u VN in 4-6 weeks.   Medically stable for discharge to rehab. Incision care and activity recommendations reviewed. Plan of care discussed with patient and wife and they voiced understanding. Their questions were all answered. Follow-up in Neurosurgery clinic arranged.     Goals of Care Treatment Preferences:  Code Status: Full Code      Consults:   Consults (From admission, onward)        Status Ordering Provider     Inpatient consult to Vascular (Stroke) Neurology  Once        Provider:  (Not yet assigned)    Completed JENNIFER TOTH     Inpatient consult to Physical Medicine Rehab  Once        Provider:  (Not yet assigned)    Completed ECHO EID     Inpatient consult to Social Work/Case Management  Once        Provider:  (Not yet assigned)    Acknowledged FRANCISCO ROSALES     Inpatient consult to Neurosurgery  Once        Provider:  (Not yet assigned)    Acknowledged FRANCISCO CARO     Inpatient consult to Registered Dietitian/Nutritionist  Once        Provider:  (Not yet assigned)    Completed FRANCISCO CARO     IP consult to case management/social work  Once        Provider:  (Not yet assigned)    Acknowledged FRANCISCO CARO          Significant Diagnostic Studies: Labs:   BMP:   Recent Labs   Lab 02/01/22  0416 02/02/22  0833   * 149*    137   K 3.9 4.2    106   CO2 23 22*   BUN 16 18   CREATININE 0.7 0.8   CALCIUM 8.8 9.4   MG 2.3  --    , CMP   Recent Labs   Lab 02/01/22 0416 02/02/22 0833    137   K 3.9 4.2    106   CO2 23 22*   * 149*   BUN 16 18   CREATININE 0.7 0.8   CALCIUM 8.8 9.4   PROT 6.0  --    ALBUMIN 2.6*  --    BILITOT 0.7  --    ALKPHOS 94  --    AST 56*  --    *  --    ANIONGAP 6* 9   ESTGFRAFRICA >60.0 >60.0   EGFRNONAA >60.0 >60.0    and CBC   Recent Labs   Lab 02/01/22 0416   WBC 13.08*   HGB 11.9*   HCT 34.5*          Radiology: CTH, CXR, CTH, MRI brain w/o contrast,  CTA neck    Pending Diagnostic Studies:     Procedure Component Value Units Date/Time    IR Embolization (CNS) Intracranial [[id number]]     Order Status: Sent Lab Status: No result         Final Active Diagnoses:    Diagnosis Date Noted POA    PRINCIPAL PROBLEM:  Subdural hematoma [S06.5X9A] 01/23/2022 Unknown    Cerebrovascular accident (CVA) of right pontine structure [I63.50] 02/01/2022 Unknown    Impaired mobility and ADLs [Z74.09, Z78.9] 02/01/2022 No    Essential hypertension [I10] 01/25/2022 Yes    Vomiting [R11.10] 01/25/2022 Yes    Cytotoxic cerebral edema [G93.6] 01/23/2022 Yes    Mixed hyperlipidemia [E78.2] 01/23/2022 Yes    Type 2 diabetes mellitus [E11.9] 01/23/2022 Yes      Problems Resolved During this Admission:      Discharged Condition: good     Disposition: Rehab Facility    Follow Up:   Follow-up Information     [facility] - Neurosurgery 8th Fl In 2 weeks.    Specialty: Neurosurgery  Why: For wound check  Contact information:  [address] [facility] [address] [phone]  Additional information:  8th [facility]   Please park in [facility].   Check in desk is located in the lobby. Please take the C elevator to 8th floor which opens to the lobby.           Ace Candelaria MD In 6 weeks.    Specialty: Neurosurgery  Why: Post-op follow up  Contact information:  [address] [facility] [address]  [phone]                       Patient Instructions:      Ambulatory referral/consult to Vascular Neurology   Standing Status: Future   Referral Priority: Routine Referral Type: Consultation   Referral Reason: Specialty Services Required   Requested Specialty: Vascular Neurology     Notify your health care provider if you experience any of the following:  increased confusion or weakness     Notify your health care provider if you experience any of the following:  persistent dizziness, light-headedness, or visual disturbances     Notify your health care provider  if you experience any of the following:  worsening rash     Notify your health care provider if you experience any of the following:  severe persistent headache     Notify your health care provider if you experience any of the following:  difficulty breathing or increased cough     Notify your health care provider if you experience any of the following:  redness, tenderness, or signs of infection (pain, swelling, redness, odor or green/yellow discharge around incision site)     Notify your health care provider if you experience any of the following:  severe uncontrolled pain     Notify your health care provider if you experience any of the following:  persistent nausea and vomiting or diarrhea     Notify your health care provider if you experience any of the following:  temperature >100.4     Activity as tolerated     Medications:  Reconciled Home Medications:      Medication List      START taking these medications    acetaminophen 325 MG tablet  Commonly known as: TYLENOL  Take 2 tablets (650 mg total) by mouth every 6 (six) hours as needed for Pain.     aspirin 81 MG EC tablet  Commonly known as: ECOTRIN  Take 1 tablet (81 mg total) by mouth once daily.  Start taking on: February 3, 2022     baclofen 5 mg Tab tablet  Commonly known as: LIORESAL  Take 1 tablet (5 mg total) by mouth 3 (three) times daily as needed (hiccups).     heparin (porcine) 5,000 unit/mL injection  Inject 1 mL (5,000 Units total) into the skin every 8 (eight) hours.     multivitamin Tab  Take 1 tablet by mouth once daily.  Start taking on: February 3, 2022     oxyCODONE 5 MG immediate release tablet  Commonly known as: ROXICODONE  Take 1 tablet (5 mg total) by mouth every 6 (six) hours as needed for Pain.     polyethylene glycol 17 gram Pwpk  Commonly known as: GLYCOLAX  Take 17 g by mouth once daily.  Start taking on: February 3, 2022     senna-docusate 8.6-50 mg 8.6-50 mg per tablet  Commonly known as: PERICOLACE  Take 1 tablet by mouth 2  (two) times daily.     silodosin 8 mg Cap capsule  Commonly known as: RAPAFLO  Take 1 capsule (8 mg total) by mouth once daily.  Start taking on: February 3, 2022     sodium chloride 1 gram tablet  Take 1 tablet (1 g total) by mouth 3 (three) times daily.     white petrolatum-mineral oil 57.3-42.5% 57.3-42.5 % Oint  Commonly known as: REFRESH P.M.  Place into the right eye every evening.        CONTINUE taking these medications    atorvastatin 40 MG tablet  Commonly known as: LIPITOR  Take 40 mg by mouth once daily.     butalbital-acetaminophen-caffeine -40 mg -40 mg per tablet  Commonly known as: FIORICET, ESGIC  Take 1 tablet by mouth every 4 (four) hours as needed for Pain.     metFORMIN 1000 MG tablet  Commonly known as: GLUCOPHAGE  Take 1,000 mg by mouth.            Leena Templeton PA-C  Neurosurgery  [facility])

## 2022-02-02 NOTE — PLAN OF CARE
Problem: Adult Inpatient Plan of Care  Goal: Plan of Care Review  Outcome: Ongoing, Progressing  Goal: Patient-Specific Goal (Individualized)  Description: Admit Date: 1/23/2022    Admit Dx: Subdural     Past Medical History:  No date: Diabetes mellitus    History reviewed. No pertinent surgical history.    Individualization:   1. Keep family updated     Restraints: N/A          Outcome: Ongoing, Progressing  Goal: Absence of Hospital-Acquired Illness or Injury  Outcome: Ongoing, Progressing  Goal: Optimal Comfort and Wellbeing  Outcome: Ongoing, Progressing  Goal: Readiness for Transition of Care  Outcome: Ongoing, Progressing     Problem: Bowel Elimination Impaired (Stroke, Hemorrhagic)  Goal: Effective Bowel Elimination  Outcome: Ongoing, Progressing     Problem: Cognitive Impairment (Stroke, Hemorrhagic)  Goal: Optimal Cognitive Function  Outcome: Ongoing, Progressing     Problem: Pain (Stroke, Hemorrhagic)  Goal: Acceptable Pain Control  Outcome: Ongoing, Progressing     Problem: Swallowing Impairment (Stroke, Hemorrhagic)  Goal: Optimal Eating and Swallowing Without Aspiration  Outcome: Ongoing, Progressing     Problem: Urinary Elimination Impaired (Stroke, Hemorrhagic)  Goal: Effective Urinary Elimination  Outcome: Ongoing, Progressing    Education provided to patient for plan of care and agrees with obtainable goals.

## 2022-02-02 NOTE — PLAN OF CARE
CM met with family who chooses [facility] as choice for Rehab.  Patient is not currently medically ready for discharge to Rehab.

## 2022-02-02 NOTE — SUBJECTIVE & OBJECTIVE
"Interval History: NAEON. Neuro stable. CTA completed. ASA started per vascular neurology recs. Denies new headaches, vision changes, new weakness, or numbness. Plan for outpatient f/u VN in 4-6 weeks. Na stable at 137.    Medications:  Continuous Infusions:   dextrose 10 % in water (D10W)       Scheduled Meds:   aspirin  81 mg Oral Daily    atorvastatin  40 mg Oral Daily    heparin (porcine)  5,000 Units Subcutaneous Q8H    insulin aspart U-100  2 Units Subcutaneous QID (AC & HS)    multivitamin  1 tablet Oral Daily    polyethylene glycol  17 g Oral Daily    senna-docusate 8.6-50 mg  1 tablet Oral BID    silodosin  8 mg Oral Daily    sodium chloride  1 g Oral TID    white petrolatum-mineral oil 57.3-42.5%   Right Eye QHS     PRN Meds:acetaminophen, baclofen, bisacodyL, dextrose 10 % in water (D10W), dextrose 50%, glucagon (human recombinant), hydrALAZINE, insulin aspart U-100, labetalol, LIDOcaine HCl 2%, ondansetron, oxyCODONE, promethazine (PHENERGAN) IVPB, sodium chloride 0.9%     Review of Systems  Objective:     Weight: 93 kg (205 lb)  Body mass index is 30.27 kg/m².  Vital Signs (Most Recent):  Temp: 98.9 °F (37.2 °C) (02/02/22 0728)  Pulse: 79 (02/02/22 0800)  Resp: 18 (02/02/22 0433)  BP: 127/77 (02/02/22 0728)  SpO2: 95 % (02/02/22 0728) Vital Signs (24h Range):  Temp:  [98.4 °F (36.9 °C)-99.2 °F (37.3 °C)] 98.9 °F (37.2 °C)  Pulse:  [] 79  Resp:  [17-18] 18  SpO2:  [94 %-97 %] 95 %  BP: (114-127)/(66-77) 127/77                          Urethral Catheter 02/01/22 1022 16 Fr. (Active)   Site Assessment Clean;Intact 02/01/22 2000   Collection Container Standard drainage bag 02/01/22 2000   Securement Method secured to top of thigh w/ adhesive device 02/01/22 2000   Catheter Care Performed yes 02/01/22 2000   Reason for Continuing Urinary Catheterization Urinary retention 02/01/22 2000   CAUTI Prevention Bundle StatLock in place w 1" slack;Intact seal between catheter & drainage " tubing;Drainage bag/urimeter off the floor;Sheeting clip in use;No dependent loops or kinks;Drainage bag/urimeter not overfilled (<2/3 full);Drainage bag/urimeter below bladder 02/01/22 2000   Output (mL) 650 mL 02/02/22 0500         Neurosurgery Physical Exam  General: well developed, well nourished, no distress.   Head: normocephalic  Neck: No tracheal deviation.   Neurologic: Alert and oriented. Thought content appropriate.  GCS: Motor: 6/Verbal: 5/Eyes: 4 GCS Total: 15  Mental Status: Awake, Alert, Oriented x 4  Language: No aphasia  Speech: No dysarthria  Cranial nerves: face symmetric, tongue midline, CN II-XII grossly intact (except R CN III).   Eyes: pupils equal, round, reactive to light with accomodation, L EOMI. Pupil sparing R CN III palsy (right eye ptosis, unable to adduct).   Pulmonary: normal respirations, no signs of respiratory distress  Abdomen: soft, non-distended, not tender to palpation  Sensory: intact to light touch throughout  Motor Strength: Moves all extremities spontaneously with good tone. Grossly full strength upper and lower extremities without focal weakness. No abnormal movements seen.   Pronator Drift: mild LUE drift  Finger-to-nose: Intact bilaterally  Skin: Skin is warm, dry and intact.     Right cranial incision c/d/I with skin edges well approximated with staples. No surrounding erythema or edema. No drainage from incision. No palpable underlying fluid collection.     Significant Labs:  Recent Labs   Lab 02/01/22  0416 02/02/22  0833   * 149*    137   K 3.9 4.2    106   CO2 23 22*   BUN 16 18   CREATININE 0.7 0.8   CALCIUM 8.8 9.4   MG 2.3  --      Recent Labs   Lab 02/01/22  0416   WBC 13.08*   HGB 11.9*   HCT 34.5*        No results for input(s): LABPT, INR, APTT in the last 48 hours.  Microbiology Results (last 7 days)     ** No results found for the last 168 hours. **        All pertinent labs from the last 24 hours have been  reviewed.    Significant Diagnostics:  I have reviewed all pertinent imaging results/findings within the past 24 hours.

## 2022-02-02 NOTE — PT/OT/SLP PROGRESS
"Occupational Therapy   Treatment    Name: Priyank Whittington  MRN: [id number]  Admitting Diagnosis:  Subdural hematoma  5 Days Post-Op    Recommendations:     Discharge Recommendations: rehabilitation facility  Discharge Equipment Recommendations:  other (see comments) (TBD)  Barriers to discharge:  Other (Comment) (increased level of assistance required)    Assessment:     Priyank Whittington is a 62 y.o. male with a medical diagnosis of Subdural hematoma.  Pt tolerated session well, ambulating ~20 ft in his room with Min-Mod A with RW.  He is very unsteady and remains a fall risk.  Pt was oriented to person, place, and time.   He presents with the following. Performance deficits affecting function are weakness,impaired endurance,impaired self care skills,impaired functional mobilty,gait instability,impaired balance,visual deficits,impaired fine motor,decreased lower extremity function,decreased upper extremity function,decreased safety awareness,impaired coordination,decreased coordination,impaired skin.     Rehab Prognosis:  Good; patient would benefit from acute skilled OT services to address these deficits and reach maximum level of function.       Plan:     Patient to be seen 4 x/week to address the above listed problems via self-care/home management,therapeutic activities,therapeutic exercises,neuromuscular re-education  · Plan of Care Expires: 02/28/22  · Plan of Care Reviewed with: patient,family    Subjective   "Thank you."  Pain/Comfort:  Pain Rating 1: 0/10  Pain Rating Post-Intervention 1: 0/10    Objective:     Communicated with: nursing prior to session.  Patient found HOB elevated with telemetry,loja catheter with his family present upon OT entry to room.    General Precautions: Standard, aspiration,fall,seizure   Orthopedic Precautions:N/A   Braces: N/A  Respiratory Status: Room air     Occupational Performance:     Bed Mobility:    · Patient completed Rolling/Turning to Right with stand by " assistance  · Patient completed Scooting/Bridging with stand by assistance  · Patient completed Supine to Sit with stand by assistance     Functional Mobility/Transfers:  · Patient completed Sit <> Stand Transfer from EOB with minimum assistance  with  rolling walker, requiring cueing for hand placement  · Functional Mobility: Pt ambulated ~20 ft from EOB to his bedroom door and then to bedside chair with Min-Mod A with RW.  He had 2 LOB that required Mod A for therapist to correct.    Activities of Daily Living:  · Upper Body Dressing: minimum assistance to navi back gown as a robe while sitting EOB   · Pt already performed other ADLs.        [facility] 6 Click ADL: 15    Treatment & Education:  Pt and his family edu on role of OT, POC, safety when performing self care tasks, benefit of performing OOB activity, and safety when performing functional transfers and mobility.    - Self care tasks completed-- as noted above       Patient left up in chair with all lines intact, call button in reach and his family and speech language pathologist  presentEducation:      GOALS:   Multidisciplinary Problems     Occupational Therapy Goals        Problem: Occupational Therapy Goal    Goal Priority Disciplines Outcome Interventions   Occupational Therapy Goal     OT, PT/OT Ongoing, Progressing    Description: Goals to be met by: 2/14/2022    Patient will increase functional independence with ADLs by performing:    UE Dressing with Stand-by Assistance.  LE Dressing with Minimal Assistance.  Grooming while standing with Contact Guard Assistance.  Toileting from bedside commode with Minimal Assistance for hygiene and clothing management.   Supine to sit with Stand-by Assistance. - met 2/2  Sit to stand transfer with Stand-by Assistance with LRAD.  Toilet transfer to bedside commode with Contact Guard Assistance with LRAD.                     Time Tracking:     OT Date of Treatment: 02/02/22  OT Start Time: 1354  OT Stop Time: 1408  OT  Total Time (min): 14 min    Billable Minutes:Therapeutic Activity 14 min    OT/WERNER: OT          2/2/2022

## 2022-02-02 NOTE — NURSING
Discharge instructions given to the patient and his family members. The patient and his family members verbalized their understanding. No s/s of any respiratory distress noted.No voiced complaints of any pain or discomfort noted at the present time.The telemetry monitor and the 2 IV catheters were removed as ordered. The patient was transported from the facility via transport in a wheelchair with his family at his side with his belongings to a transportation van to be transported to Rehab.Report was called and given to the receiving facility No apparent distress noted at the present time.

## 2022-02-02 NOTE — PLAN OF CARE
CTA neck negative for LVO, high grade stenosis, or vascular malformation. No further recommendations at this time. Please see consult note from 2/1/22 for further details. Please have patient follow up in clinic in 4-6 weeks as outpatient with vascular neurology.       Brandon Bautista PA-C  Vascular Neurology   [phone]

## 2022-02-02 NOTE — PLAN OF CARE
Problem: Occupational Therapy Goal  Goal: Occupational Therapy Goal  Description: Goals to be met by: 2/14/2022    Patient will increase functional independence with ADLs by performing:    UE Dressing with Stand-by Assistance.  LE Dressing with Minimal Assistance.  Grooming while standing with Contact Guard Assistance.  Toileting from bedside commode with Minimal Assistance for hygiene and clothing management.   Supine to sit with Stand-by Assistance. - met 2/2  Sit to stand transfer with Stand-by Assistance with LRAD.  Toilet transfer to bedside commode with Contact Guard Assistance with LRAD.    Outcome: Ongoing, Progressing     Continue OT POC.

## 2022-02-02 NOTE — PROGRESS NOTES
Glynn Castillo - Neurosurgery ([facility])  Neurosurgery  Progress Note    Subjective:     History of Present Illness: Pt is 62M pmh DM who presents to outside hospital with cc of constant headaches for 10 days. Denies trauma, denies blood thin use, seizure, AMS, visual disturbances. CTH on workup showed bilateral R>L  subacute subdural hematomas. NSGY consulted for evaluation.      Post-Op Info:  Procedure(s) (LRB):  CRANIOTOMY, FOR SUBDURAL HEMATOMA EVACUATION (Right)   5 Days Post-Op     Interval History: NAEON. Neuro stable. CTA completed. ASA started per vascular neurology recs. Denies new headaches, vision changes, new weakness, or numbness. Plan for outpatient f/u VN in 4-6 weeks. Na stable at 137.    Medications:  Continuous Infusions:   dextrose 10 % in water (D10W)       Scheduled Meds:   aspirin  81 mg Oral Daily    atorvastatin  40 mg Oral Daily    heparin (porcine)  5,000 Units Subcutaneous Q8H    insulin aspart U-100  2 Units Subcutaneous QID (AC & HS)    multivitamin  1 tablet Oral Daily    polyethylene glycol  17 g Oral Daily    senna-docusate 8.6-50 mg  1 tablet Oral BID    silodosin  8 mg Oral Daily    sodium chloride  1 g Oral TID    white petrolatum-mineral oil 57.3-42.5%   Right Eye QHS     PRN Meds:acetaminophen, baclofen, bisacodyL, dextrose 10 % in water (D10W), dextrose 50%, glucagon (human recombinant), hydrALAZINE, insulin aspart U-100, labetalol, LIDOcaine HCl 2%, ondansetron, oxyCODONE, promethazine (PHENERGAN) IVPB, sodium chloride 0.9%     Review of Systems  Objective:     Weight: 93 kg (205 lb)  Body mass index is 30.27 kg/m².  Vital Signs (Most Recent):  Temp: 98.9 °F (37.2 °C) (02/02/22 0728)  Pulse: 79 (02/02/22 0800)  Resp: 18 (02/02/22 0433)  BP: 127/77 (02/02/22 0728)  SpO2: 95 % (02/02/22 0728) Vital Signs (24h Range):  Temp:  [98.4 °F (36.9 °C)-99.2 °F (37.3 °C)] 98.9 °F (37.2 °C)  Pulse:  [] 79  Resp:  [17-18] 18  SpO2:  [94 %-97 %] 95 %  BP: (114-127)/(66-77) 127/77  "                         Urethral Catheter 02/01/22 1022 16 Fr. (Active)   Site Assessment Clean;Intact 02/01/22 2000   Collection Container Standard drainage bag 02/01/22 2000   Securement Method secured to top of thigh w/ adhesive device 02/01/22 2000   Catheter Care Performed yes 02/01/22 2000   Reason for Continuing Urinary Catheterization Urinary retention 02/01/22 2000   CAUTI Prevention Bundle StatLock in place w 1" slack;Intact seal between catheter & drainage tubing;Drainage bag/urimeter off the floor;Sheeting clip in use;No dependent loops or kinks;Drainage bag/urimeter not overfilled (<2/3 full);Drainage bag/urimeter below bladder 02/01/22 2000   Output (mL) 650 mL 02/02/22 0500         Neurosurgery Physical Exam  General: well developed, well nourished, no distress.   Head: normocephalic  Neck: No tracheal deviation.   Neurologic: Alert and oriented. Thought content appropriate.  GCS: Motor: 6/Verbal: 5/Eyes: 4 GCS Total: 15  Mental Status: Awake, Alert, Oriented x 4  Language: No aphasia  Speech: No dysarthria  Cranial nerves: face symmetric, tongue midline, CN II-XII grossly intact (except R CN III).   Eyes: pupils equal, round, reactive to light with accomodation, L EOMI. Pupil sparing R CN III palsy (right eye ptosis, unable to adduct).   Pulmonary: normal respirations, no signs of respiratory distress  Abdomen: soft, non-distended, not tender to palpation  Sensory: intact to light touch throughout  Motor Strength: Moves all extremities spontaneously with good tone. Grossly full strength upper and lower extremities without focal weakness. No abnormal movements seen.   Pronator Drift: mild LUE drift  Finger-to-nose: Intact bilaterally  Skin: Skin is warm, dry and intact.     Right cranial incision c/d/I with skin edges well approximated with staples. No surrounding erythema or edema. No drainage from incision. No palpable underlying fluid collection.     Significant Labs:  Recent Labs   Lab " 02/01/22  0416 02/02/22  0833   * 149*    137   K 3.9 4.2    106   CO2 23 22*   BUN 16 18   CREATININE 0.7 0.8   CALCIUM 8.8 9.4   MG 2.3  --      Recent Labs   Lab 02/01/22  0416   WBC 13.08*   HGB 11.9*   HCT 34.5*        No results for input(s): LABPT, INR, APTT in the last 48 hours.  Microbiology Results (last 7 days)     ** No results found for the last 168 hours. **        All pertinent labs from the last 24 hours have been reviewed.    Significant Diagnostics:  I have reviewed all pertinent imaging results/findings within the past 24 hours.    Assessment/Plan:     * Subdural hematoma  Pt is 62M pmh DM who presents to outside hospital with cc of constant headaches for 10 days. Denies trauma, denies blood thin use, seizure, AMS, visual disturbances. CTH on workup showed bilateral R>L  subacute subdural hematomas. Now s/p R SDH evacuation on 1/28.    - Pupil sparing R CN III palsy noted yesterday per documentation. MRI brain without contrast 1/31 shows right pontine infarct. Vascular Neurology now following. Aspirin 81 mg started 2/1. CTA neck obtained without high grade stenosis noted. Continue atorvastatin.     --Plan for outpatient follow up with VN in 4-6 weeks.   - All imaging reviewed:    -- CTH 1/23: bilat R> L SDH, worst at R convexity at 1.4 cm, 8 mm MLS   -- CTH 1/24: stable   -- CTA 1/24: no underlying vascular lesion, 2 cm planum sphenoidale mass, likely meningioma   -- CTH 1/28 post op: good evacuation, some residual dependent fluid, improvement in MLS    -- MRI brain without contrast 1/31: small acute infarct in right efraín. Small residual bilateral SDH collections with stable left shift compared to prior CTH 1/28.   - Goal eunatremia: Stable. Continue salt tablets 1 g TID. Will wean as tolerated  - SBP goal < 160  - HOB > 30   - Continue current insulin regimen per ICU. SSI and diabetic diet.   - PT/OT/SLP  - Tolerating regular diet, nectar thick liquids  - SQH for DVT ppx      Dispo: pending PT/OT recs.  Discussed with Dr. Bari Templeton PA-C  Neurosurgery  Glynn Castillo - Neurosurgery ([facility])

## 2022-02-02 NOTE — ASSESSMENT & PLAN NOTE
Pt is 62M pmh DM who presents to outside hospital with cc of constant headaches for 10 days. Denies trauma, denies blood thin use, seizure, AMS, visual disturbances. CTH on workup showed bilateral R>L  subacute subdural hematomas. Now s/p R SDH evacuation on 1/28.    - Pupil sparing R CN III palsy noted yesterday per documentation. MRI brain without contrast 1/31 shows right pontine infarct. Vascular Neurology now following. Aspirin 81 mg started 2/1. CTA neck obtained without high grade stenosis noted. Continue atorvastatin.     --Plan for outpatient follow up with VN in 4-6 weeks.   - All imaging reviewed:    -- CTH 1/23: bilat R> L SDH, worst at R convexity at 1.4 cm, 8 mm MLS   -- CTH 1/24: stable   -- CTA 1/24: no underlying vascular lesion, 2 cm planum sphenoidale mass, likely meningioma   -- CTH 1/28 post op: good evacuation, some residual dependent fluid, improvement in MLS    -- MRI brain without contrast 1/31: small acute infarct in right efraín. Small residual bilateral SDH collections with stable left shift compared to prior CTH 1/28.   - Goal eunatremia: Stable. Continue salt tablets 1 g TID. Will wean as tolerated  - SBP goal < 160  - HOB > 30   - Continue current insulin regimen per ICU. SSI and diabetic diet.   - PT/OT/SLP  - Tolerating regular diet, nectar thick liquids  - SQH for DVT ppx     Dispo: pending PT/OT recs.  Discussed with Dr. Candelaria

## 2022-02-02 NOTE — PT/OT/SLP PROGRESS
Speech Language Pathology Treatment    Patient Name:  Priyank Whittington   MRN:  [id number]  Admitting Diagnosis: Subdural hematoma    Recommendations:                 General Recommendations:  Dysphagia therapy  Diet recommendations:  Regular,  Thin   Aspiration Precautions: 1 bite/sip at a time, Alternating bites/sips, Assistance with meals and Assistance with thickening liquids, Check for pocketing/oral residue, Feed only when awake/alert, HOB to 90 degrees, Meds crushed in puree, Small bites/sips and Strict aspiration precautions   General Precautions: Standard, aspiration,fall  Communication strategies:  provide increased time to answer and go to room if call light pushed    Subjective     Pt found resting in bedside chair upon SLP entry into room. Pt agreeable to participate in all aspects of session.     Pain/Comfort:  Pain Rating 1: 0/10    Respiratory Status: Room air    Objective:     Has the patient been evaluated by SLP for swallowing?   Yes  Keep patient NPO? No     Pt seen for ongoing dysphagia therapy. He reported good tolerance of current regular diet with nectar thickened liquids and verbalized that family had been assisting him with preparation of thickened liquids. He consumed self-regulated bites of ramón cracker with good oral clearance and no evidence of significant oral deficits. Given open cup sip, pt exhibited good tolerance of small sips x8 without overt signs of aspiration including no coughing, throat clearing, or change in vocal quality. Single instance fo throat clearing noted with larger sip and this was eliminated with single verbal cue to decrease bolus size. SLP provided education regarding diet consistency recommendations, overt s/s of aspiration, safe swallow strategies, importance and rationale behind implementation of strategies, and SLP POC. Pt verbalized understanding and had no additional questions or concerns upon SLP exit.     Assessment:     Priyank Whittington is a 62  y.o. male who presents with a functional swallow for tolerance of an unmodified diet at this time. SLP to continue to follow to ensure diet tolerance.     Goals:   Multidisciplinary Problems     SLP Goals        Problem: SLP Goal    Goal Priority Disciplines Outcome   SLP Goal     SLP Ongoing, Progressing   Description: Speech Language Pathology Goals  Goals expected to be met by 2/6  1. Pt will participate in ongoing assessment of swallow.                                Plan:     · Patient to be seen:  4 x/week   · Plan of Care expires:  03/01/22  · Plan of Care reviewed with:  patient,family   · SLP Follow-Up:  Yes       Discharge recommendations:  rehabilitation facility   Barriers to Discharge:  Level of Skilled Assistance Needed      Time Tracking:     SLP Treatment Date:   02/02/22  Speech Start Time:  1408  Speech Stop Time:  1417     Speech Total Time (min):  9 min    Billable Minutes: Treatment Swallowing Dysfunction 9       02/02/2022

## 2022-02-02 NOTE — PLAN OF CARE
Plan of care reviewed with the patient and his family members.The patient and his family members both verbalized their understanding. No voiced complaints of any type of pain or discomfort noted at the present time. The patient has a right cranial incision that is open to air and the staples are intact. No redness,drainage,or swelling is noted from the incision.No s/s of any respiratory distress noted. No s/s of hypo/hyperglycemia noted. All questions and concerns were addressed and discussed with the patient and his family members. No s/s of an adverse reaction noted to the medication therapy The patient is AAOX4,VSS.Telemetry monitoring is in place. The Benson cathter is patent and draining urine to the BSDB.The patient's family members are at the bedside. Will continue to monitor and report any changes.

## 2022-02-02 NOTE — DISCHARGE INSTRUCTIONS
Neurosurgery Patient Information      -No driving until cleared in your post-operative appointment. No driving while on narcotics.   -Do not take any OTC products containing acetaminophen at the same time as you take your narcotic pain medication. Medications that may contain acetaminophen include but are not limited to: Excedrin and other headache medications, arthritis medications, cold and sinus medications, etc. Please review the list of active ingredients in any OTC medication prior to taking it.  -Do not take any Aleve, Naprosyn, Naproxen, Ibuprofen, Advil or any other nonsteroidal anti-inflammatory drug (NSAID) for 2 weeks after surgery.  -Do not take any herbal supplements for 2 weeks after surgery.   -Do not consume any alcoholic beverages until released by your neurosurgeon  -Do not perform any excessive bending over or leaning forward as this is a fall hazard.  -Do not lift anything heavier than a gallon of milk until cleared in post-operative visit.     Contact the Neurosurgery Office immediately if:  If you begin to notice any neurologic changes such as:           -Sudden onset of lethargy or sleepiness           -Sudden confusion, trouble speaking, or understanding            -Sudden trouble seeing in one or both eyes            -Sudden trouble walking, dizziness, loss of coordination            -Sudden severe headache with no known cause            -Sudden onset of numbness or weakness       Wound Care:  Keep your incision open to air. You may shower on the 2nd day after your surgery. Please shower with baby shampoo, but do not take a bath. Keep the incision clean and dry at all times. Please cover the incision with saran wrap or other occlusive dressing while showering and REMOVE once you have completed taking your shower. Do not allow the force of water to hit the incision. If the incision gets damp, gently pat it dry. Do not rub or scrub the incision. You cannot take a bath/swim/submerge the  incision until 8 weeks after surgery.    The incision does not need to be cleaned with any water, soap, alcohol, peroxide, or other substance.      Please apply bacitracin ointment to incisions twice daily. You have dissolvable suture in place. It does not need to be removed.       You now have an implanted device in place. It is imperative that any infection (such as a urinary tract infection) be treated immediately so that it cannot get into your bloodstream. If an infection ends up in your blood, it may seed the device, thus requiring us to remove it. Call the Neurosurgery office or go to the Emergency Room for any signs of infection including: increased redness, drainage, pain or fever (temperature greater than or equal to 101.4).         Miscellaneous:  -Follow up in 2 weeks for wound check and 6 weeks for post op follow up.    Please contact the Neurosurgery Office at [phone] with any questions or concerns.   Our after hours number is [phone] - ask for Neurosurgery On Call.

## 2022-02-02 NOTE — PLAN OF CARE
[facility]    FACILITY TRANSFER ORDERS      Patient Name: Priyank Whittington  YOB: 1960    PCP: Atul Peter MD   PCP Address: [address] / [place]  PCP Phone Number: [phone]  PCP Fax: [phone]    Encounter Date: 02/02/2022    Admit to: Rehab    Vital Signs:  Routine    Diagnoses:   Active Hospital Problems    Diagnosis  POA    *Subdural hematoma [S06.5X9A]  Unknown    Cerebrovascular accident (CVA) of right pontine structure [I63.50]  Unknown    Impaired mobility and ADLs [Z74.09, Z78.9]  No    Essential hypertension [I10]  Yes    Vomiting [R11.10]  Yes    Cytotoxic cerebral edema [G93.6]  Yes    Mixed hyperlipidemia [E78.2]  Yes    Type 2 diabetes mellitus [E11.9]  Yes      Resolved Hospital Problems   No resolved problems to display.       Allergies:Review of patient's allergies indicates:  No Known Allergies    Diet: diabetic diet: 2000 calorie    Activities: Activity as tolerated    Nursing: Seizure precautions, fall precautions      Labs: CBC and CMP per facility protocol    CONSULTS:    Physical Therapy to evaluate and treat. , Occupational Therapy to evaluate and treat.     MISCELLANEOUS CARE:  Diabetes Care:   SN to perform and educate Diabetic management with blood glucose monitoring:, Fingerstick blood sugar AC and HS and Report CBG < 60 or > 350 to physician.    Loja care: Empty loja bag every shift. Voiding trial in 1 week on 2/8/2022.    WOUND CARE ORDERS  Please apply bacitracin ointment to incisions twice daily. You have staples in place. Nursing staff to evaluate incision, staples to be removed 2/11/2022 if incision appears well healed. Please call [facility] neurosurgery office for any questions or concerns. Neurosurgery Office: [phone]    Medications: Review discharge medications with patient and family and provide education.    Facility provider to assess ongoing need for subcutaneous heparin for DVT  prophylaxis.      Current Discharge Medication List      START taking these medications    Details   acetaminophen (TYLENOL) 325 MG tablet Take 2 tablets (650 mg total) by mouth every 6 (six) hours as needed for Pain.  Refills: 0      aspirin (ECOTRIN) 81 MG EC tablet Take 1 tablet (81 mg total) by mouth once daily.  Refills: 0      baclofen (LIORESAL) 5 mg Tab tablet Take 1 tablet (5 mg total) by mouth 3 (three) times daily as needed (hiccups).      heparin sodium,porcine (HEPARIN, PORCINE,) 5,000 unit/mL injection Inject 1 mL (5,000 Units total) into the skin every 8 (eight) hours.      multivitamin Tab Take 1 tablet by mouth once daily.      oxyCODONE (ROXICODONE) 5 MG immediate release tablet Take 1 tablet (5 mg total) by mouth every 6 (six) hours as needed for Pain.  Refills: 0    Comments: Quantity prescribed more than 7 day supply? Yes, quantity medically necessary      polyethylene glycol (GLYCOLAX) 17 gram PwPk Take 17 g by mouth once daily.  Refills: 0      senna-docusate 8.6-50 mg (PERICOLACE) 8.6-50 mg per tablet Take 1 tablet by mouth 2 (two) times daily.      silodosin (RAPAFLO) 8 mg Cap capsule Take 1 capsule (8 mg total) by mouth once daily.      sodium chloride 1 gram tablet Take 1 tablet (1 g total) by mouth 3 (three) times daily.      white petrolatum-mineral oil 57.3-42.5% (REFRESH P.M.) 57.3-42.5 % Oint Place into the right eye every evening.  Refills: 0         CONTINUE these medications which have NOT CHANGED    Details   atorvastatin (LIPITOR) 40 MG tablet Take 40 mg by mouth once daily.      butalbital-acetaminophen-caffeine -40 mg (FIORICET, ESGIC) -40 mg per tablet Take 1 tablet by mouth every 4 (four) hours as needed for Pain.      metFORMIN (GLUCOPHAGE) 1000 MG tablet Take 1,000 mg by mouth.                Immunizations Administered as of 2/2/2022     No immunizations on file.          This patient does not have documented COVID vaccinations. COVID neg 1/28/2022.    Some  patients may experience side effects after vaccination.  These may include fever, headache, muscle or joint aches.  Most symptoms resolve with 24-48 hours and do not require urgent medical evaluation unless they persist for more than 72 hours or symptoms are concerning for an unrelated medical condition.          _________________________________  Leena Templeton PA-C  02/02/2022

## 2022-02-03 ENCOUNTER — TELEPHONE (OUTPATIENT)
Dept: NEUROLOGY | Facility: CLINIC | Age: 62
End: 2022-02-03
Payer: MEDICAID

## 2022-02-03 NOTE — OP NOTE
Glynn Castillo - Neurosurgery ([facility])  Neurosurgery  Operative Note    OP Note      Date of Procedure: 1/28/2022       Pre-Operative Diagnosis: Subdural hematoma [S 06.5X9A]    Post-Operative Diagnosis: Post-Op Diagnosis Codes:     * Subdural hematoma [S06.5X9A]    Anesthesia: General    Procedures performed:  Right frontal parietal craniotomy for evacuation of subdural hematoma     Surgeon: Ace Candelaria MD    Assistant::  Wander Bonds MD    Indication for Procedure:  This is a 62-year-old male with subdural hematoma with expansion in change of mental status.  We felt patient needed urgent intervention.    Operative Note:  Patient was anesthetized intubated by anesthesia.  Placed in the supine position on a horseshoe.  Head turned to left.  Head was shaved prepped and draped sterile fashion a linear frontal parietal incision was carried out.  Dissection was taken down through the bone self-retaining retractors put in place.  We placed messi holes at each in of the incision then we turned ovoid craniotomy.  The dura was tacked up and then opened in a cruciate fashion.  Subacute hematoma came out under high pressure.  We evacuated the hematoma then took down some of the membranes very carefully with bipolar and micro dissection.  We irrigated copious irrigation in all directions underneath the bone flap getting out some acute component of the hematoma as well as older components.  Once were happy with the irrigation which were happy with the hemostasis the membrane we left a subdural drain in place.  We brought out through a small stab incision.  We reapproximated the dura primarily then we fixated the bone flap using titanium plates and screws leaving the back bur hole available for the drain.  We then closed rest of the wound in layers.  Sterile dressing put in place.  Patient was extubated brought out up to the neuro ICU without any problems or complication    EBL:  50 cc  Specimen Sent:  Hematoma

## 2022-02-03 NOTE — TELEPHONE ENCOUNTER
Called and spoke with Obdulia at [facility]. Appt scheduled and confirmed. She will give to pt.

## 2022-02-10 NOTE — ADDENDUM NOTE
Addendum  created 02/10/22 1409 by Trevor Miguel MD    Attestation recorded in Intraprocedure, Intraprocedure Attestations filed

## 2022-02-14 DIAGNOSIS — S06.5XAA SUBDURAL HEMATOMA: Primary | ICD-10-CM

## 2022-02-23 ENCOUNTER — CLINICAL SUPPORT (OUTPATIENT)
Dept: REHABILITATION | Facility: HOSPITAL | Age: 62
End: 2022-02-23
Payer: MEDICAID

## 2022-02-23 DIAGNOSIS — Z74.09 DECREASED STRENGTH, ENDURANCE, AND MOBILITY: ICD-10-CM

## 2022-02-23 DIAGNOSIS — Z74.09 IMPAIRED MOBILITY AND ADLS: ICD-10-CM

## 2022-02-23 DIAGNOSIS — I63.50 CEREBROVASCULAR ACCIDENT (CVA) OF RIGHT PONTINE STRUCTURE: Primary | ICD-10-CM

## 2022-02-23 DIAGNOSIS — R53.1 DECREASED STRENGTH, ENDURANCE, AND MOBILITY: ICD-10-CM

## 2022-02-23 DIAGNOSIS — R68.89 DECREASED STRENGTH, ENDURANCE, AND MOBILITY: ICD-10-CM

## 2022-02-23 DIAGNOSIS — H53.2 DOUBLE VISION WITH BOTH EYES OPEN: ICD-10-CM

## 2022-02-23 DIAGNOSIS — Z78.9 IMPAIRED MOBILITY AND ADLS: ICD-10-CM

## 2022-02-23 PROCEDURE — 97166 OT EVAL MOD COMPLEX 45 MIN: CPT | Mod: PN

## 2022-02-23 PROCEDURE — 97110 THERAPEUTIC EXERCISES: CPT | Mod: PN | Performed by: PHYSICAL THERAPIST

## 2022-02-23 PROCEDURE — 97161 PT EVAL LOW COMPLEX 20 MIN: CPT | Mod: PN | Performed by: PHYSICAL THERAPIST

## 2022-02-23 NOTE — PATIENT INSTRUCTIONS
Bridge        Lie back, legs bent. Inhale, pressing hips up. Keeping ribs in, lengthen lower back. Hold 3 sec. Exhale, rolling down along spine from top.  Repeat 10 times. Perform 2 sets. Do 1 sessions per day.  [email or web address]   Caudal Rotation: Hip Roll, Neutral Lordosis - Supine        Lie with knees bent , feet flat. Tighten stomach, lower knees out to right side, rotating hips and trunk. Keep stomach tight for return.  Repeat 10 times per set. Do 1 sets per session. Do 5-7 sessions per week.  Copyright © [employer]. All rights reserved.   Chair Sitting        Sit at edge of seat, spine straight, one leg extended. Put a hand on each thigh and bend forward from the hip, keeping spine straight. Allow hand on extended leg to reach toward toes. Support upper body with other arm. Hold 30 seconds.  Repeat 2-3 times per session. Do 1-2 sessions per day.  Copyright © [employer]. All rights reserved.   ABDUCTION: Standing (Active)        Stand, feet flat. Place band around ankles. Lift right leg out to side.   Complete 2 sets of 10 repetitions. Perform 1 sessions per day.  [email or web address]   EXTENSION: Standing (Active)        Stand, both feet flat. Place band around ankles. Draw right leg behind body as far as possible.   Complete 2 sets of 10 repetitions. Perform 1 sessions per day.  Copyright © [employer]. All rights reserved.   Heel Raise: Bilateral (Standing)        Hold onto countertop for support. Rise on balls of feet.  Repeat 10 times per set. Do 3 sets per session. Do 1 sessions per day.  [email or web address]   Copyright © [employer]. All rights reserved.   Half Squat to Chair        Stand with feet shoulder width apart. Push buttocks backward and lower slowly, touching chair lightly and returning to standing position.  Complete 1 sets of 10 repetitions. Perform 1 sessions per day.  [email or web address]   Copyright © [employer]. All rights reserved.             .

## 2022-02-23 NOTE — PROGRESS NOTES
See POC for PT evaluation.     Veronica Bosch, PT, DPT, CBIS  Board-Certified Clinical Specialist in Neurologic Physical Therapy

## 2022-02-23 NOTE — PROGRESS NOTES
"Subjective:      Priyank Whittington is a 62 y.o. male who was referred by Dr. Atul Peter for evaluation of elevated PSA.      Last seen by me for elevated PSA  (7.6) in April 2021; CRYS revealed enlarged prostate without nodules. PSA was repeated and noted to be 3.0 with 16.67; lost to follow up.   He presents today with elevated PSA, 19.5. Reports that he was treated for UTI around the time that labs were collected.   He has no personal history and no family history of prostate cancer. His AUA Symptom Score is 0/0, denies LUTS. He has no prior genitourinary history of hematuria, hematospermia, prostatitis, UTI, erectile dysfunction, urolithiasis, epididymal orchitis, previous  surgery.  Currently on heparin     The following portions of the patient's history were reviewed and updated as appropriate: allergies, current medications, past family history, past medical history, past social history, past surgical history and problem list.    Review of Systems  Constitutional: no fever or chills  ENT: no nasal congestion or sore throat  Respiratory: no cough or shortness of breath  Cardiovascular: no chest pain or palpitations  Gastrointestinal: no nausea or vomiting, tolerating diet  Genitourinary: as per HPI  Hematologic/Lymphatic: no easy bruising or lymphadenopathy  Musculoskeletal: no arthralgias or myalgias  Neurological: no seizures or tremors  Behavioral/Psych: no auditory or visual hallucinations     Objective:   Vitals: /78 (BP Location: Left arm, Patient Position: Sitting, BP Method: Small (Automatic))   Pulse 94   Ht 5' 9" (1.753 m)   Wt 90 kg (198 lb 6.6 oz)   BMI 29.30 kg/m²     Physical Exam   General: alert and oriented, no acute distress  Head: normocephalic, atraumatic  Neck: supple, no lymphadenopathy, normal ROM, no masses  Respiratory: Symmetric expansion, non-labored breathing  Cardiovascular: regular rate and rhythm, nomal pulses, no peripheral edema  Abdomen: soft, non " tender, non distended, no palpable masses, no hernias, no hepatomegaly or splenomegaly  Genitourinary:   Prostate: 45 grams, soft, no palpable nodules; seminal vesicles not palpated  Rectum: normal rectal tone, no rectal mass, normal perineum  Lymphatic: no inguinal nodes  Skin: normal coloration and turgor, no rashes, no suspicious skin lesions noted  Neuro: alert and oriented x3, no gross deficits  Psych: normal judgment and insight, normal mood/affect and non-anxious    Physical Exam    Lab Review   Urinalysis demonstrates negative for all components  Lab Results   Component Value Date    WBC 13.08 (H) 02/01/2022    HGB 11.9 (L) 02/01/2022    HCT 34.5 (L) 02/01/2022    MCV 87 02/01/2022     02/01/2022     Lab Results   Component Value Date    CREATININE 0.8 02/02/2022    BUN 18 02/02/2022     Lab Results   Component Value Date    PSATOTAL 3.0 04/07/2021    PSAFREE 0.50 04/07/2021    PSAFREEPCT 16.67 04/07/2021       No results found for: PSA      Assessment and Plan:   1. Elevated prostate specific antigen (PSA)  2. Enlarged Prostate on rectal exam   - Urine culture  - PSA, Total and Free; Future     --Patient has had elevated PSA in the past which was repeated and noted to be 3.0. He reports UTI several weeks ago around the time of bloodwork; suspect that current elevation is related to inflammatory/infectious process. Will repeat UC and PSA. Will notify via telephone     --RTC after results     This note is dictated on M*Modal word recognition program.  There are word recognition mistakes that are occasionally missed on review.

## 2022-02-24 ENCOUNTER — OFFICE VISIT (OUTPATIENT)
Dept: UROLOGY | Facility: CLINIC | Age: 62
End: 2022-02-24
Payer: MEDICAID

## 2022-02-24 VITALS
WEIGHT: 198.44 LBS | SYSTOLIC BLOOD PRESSURE: 137 MMHG | DIASTOLIC BLOOD PRESSURE: 78 MMHG | HEIGHT: 69 IN | HEART RATE: 94 BPM | BODY MASS INDEX: 29.39 KG/M2

## 2022-02-24 DIAGNOSIS — N40.0 ENLARGED PROSTATE ON RECTAL EXAMINATION: ICD-10-CM

## 2022-02-24 DIAGNOSIS — R97.20 ELEVATED PROSTATE SPECIFIC ANTIGEN (PSA): Primary | ICD-10-CM

## 2022-02-24 PROBLEM — R68.89 DECREASED STRENGTH, ENDURANCE, AND MOBILITY: Status: ACTIVE | Noted: 2022-02-24

## 2022-02-24 PROBLEM — Z74.09 DECREASED STRENGTH, ENDURANCE, AND MOBILITY: Status: ACTIVE | Noted: 2022-02-24

## 2022-02-24 PROBLEM — R53.1 DECREASED STRENGTH, ENDURANCE, AND MOBILITY: Status: ACTIVE | Noted: 2022-02-24

## 2022-02-24 PROCEDURE — 3008F BODY MASS INDEX DOCD: CPT | Mod: CPTII,,, | Performed by: NURSE PRACTITIONER

## 2022-02-24 PROCEDURE — 99999 PR PBB SHADOW E&M-EST. PATIENT-LVL IV: ICD-10-PCS | Mod: PBBFAC,,, | Performed by: NURSE PRACTITIONER

## 2022-02-24 PROCEDURE — 3078F DIAST BP <80 MM HG: CPT | Mod: CPTII,,, | Performed by: NURSE PRACTITIONER

## 2022-02-24 PROCEDURE — 99214 OFFICE O/P EST MOD 30 MIN: CPT | Mod: PBBFAC,PN | Performed by: NURSE PRACTITIONER

## 2022-02-24 PROCEDURE — 1111F PR DISCHARGE MEDS RECONCILED W/ CURRENT OUTPATIENT MED LIST: ICD-10-PCS | Mod: CPTII,,, | Performed by: NURSE PRACTITIONER

## 2022-02-24 PROCEDURE — 1159F PR MEDICATION LIST DOCUMENTED IN MEDICAL RECORD: ICD-10-PCS | Mod: CPTII,,, | Performed by: NURSE PRACTITIONER

## 2022-02-24 PROCEDURE — 3075F PR MOST RECENT SYSTOLIC BLOOD PRESS GE 130-139MM HG: ICD-10-PCS | Mod: CPTII,,, | Performed by: NURSE PRACTITIONER

## 2022-02-24 PROCEDURE — 99999 PR PBB SHADOW E&M-EST. PATIENT-LVL IV: CPT | Mod: PBBFAC,,, | Performed by: NURSE PRACTITIONER

## 2022-02-24 PROCEDURE — 1160F RVW MEDS BY RX/DR IN RCRD: CPT | Mod: CPTII,,, | Performed by: NURSE PRACTITIONER

## 2022-02-24 PROCEDURE — 1111F DSCHRG MED/CURRENT MED MERGE: CPT | Mod: CPTII,,, | Performed by: NURSE PRACTITIONER

## 2022-02-24 PROCEDURE — 1159F MED LIST DOCD IN RCRD: CPT | Mod: CPTII,,, | Performed by: NURSE PRACTITIONER

## 2022-02-24 PROCEDURE — 3051F HG A1C>EQUAL 7.0%<8.0%: CPT | Mod: CPTII,,, | Performed by: NURSE PRACTITIONER

## 2022-02-24 PROCEDURE — 99214 OFFICE O/P EST MOD 30 MIN: CPT | Mod: S$PBB,,, | Performed by: NURSE PRACTITIONER

## 2022-02-24 PROCEDURE — 3008F PR BODY MASS INDEX (BMI) DOCUMENTED: ICD-10-PCS | Mod: CPTII,,, | Performed by: NURSE PRACTITIONER

## 2022-02-24 PROCEDURE — 1160F PR REVIEW ALL MEDS BY PRESCRIBER/CLIN PHARMACIST DOCUMENTED: ICD-10-PCS | Mod: CPTII,,, | Performed by: NURSE PRACTITIONER

## 2022-02-24 PROCEDURE — 99214 PR OFFICE/OUTPT VISIT, EST, LEVL IV, 30-39 MIN: ICD-10-PCS | Mod: S$PBB,,, | Performed by: NURSE PRACTITIONER

## 2022-02-24 PROCEDURE — 3075F SYST BP GE 130 - 139MM HG: CPT | Mod: CPTII,,, | Performed by: NURSE PRACTITIONER

## 2022-02-24 PROCEDURE — 3078F PR MOST RECENT DIASTOLIC BLOOD PRESSURE < 80 MM HG: ICD-10-PCS | Mod: CPTII,,, | Performed by: NURSE PRACTITIONER

## 2022-02-24 PROCEDURE — 87086 URINE CULTURE/COLONY COUNT: CPT | Performed by: NURSE PRACTITIONER

## 2022-02-24 PROCEDURE — 3051F PR MOST RECENT HEMOGLOBIN A1C LEVEL 7.0 - < 8.0%: ICD-10-PCS | Mod: CPTII,,, | Performed by: NURSE PRACTITIONER

## 2022-02-24 NOTE — PLAN OF CARE
"[facility]  Physical Therapy Neurological Rehabilitation Initial Evaluation    Name: Priyank Whittingotn  Clinic Number: [id number]    Therapy Diagnosis:   Encounter Diagnosis   Name Primary?    Decreased strength, endurance, and mobility      Physician: Atul Peter MD    Physician Orders: PT Eval and Treat neuro PT2  Medical Diagnosis from Referral: S06.5X9A (ICD-10-CM) - Subdural hematoma  Evaluation Date: 2/23/2022  Authorization Period Expiration: 12/31/2022  Plan of Care Expiration: 4/8/2022  Visit # / Visits authorized: 1/ 1    Time In: 1405  Time Out: 1435  Total Billable Time: 30 minutes    Precautions: Standard    Subjective   Date of onset: 1/23/2022  History of current condition - Priyank reports: "I had a brain surgery". D/c from rehab on 2/14/2022. Per medical record:  pt reported to ER with reports of headache (1/22/2022). CT scan was performed the next day showing multiple bilateral acute and subacute subdural hematomas with noted diffuse cerebral edema.  decompressive craniotomy on 1/27/22. Repeat MRI on 1/31/22 showed small acute infarct of R efraín. PMHx: DM (metmorfin 2x/day), cataract surgery LEO     Medical History:   Past Medical History:   Diagnosis Date    Diabetes mellitus     HLD (hyperlipidemia) 1/23/2022    HTN (hypertension) 1/25/2022       Surgical History:   Priyank Whittington  has a past surgical history that includes Craniotomy for evacuation of subdural hematoma (Right, 1/28/2022).    Medications:   Priyank has a current medication list which includes the following prescription(s): acetaminophen, aspirin, atorvastatin, baclofen, butalbital-acetaminophen-caffeine -40 mg, heparin (porcine), metformin, multivitamin, oxycodone, polyethylene glycol, senna-docusate 8.6-50 mg, silodosin, sodium chloride, and white petrolatum-mineral oil 57.3-42.5%.    Allergies:   Review of patient's allergies indicates:  No Known Allergies     Imaging, " MRI studies, head 1/31/22: Right lateral efraín and right paramedian medulla recent infarcts.  No associated parenchymal hemorrhage or significant mass effect.   Evolving postoperative change of right subdural hematoma evacuation.  Thin residual bihemispheric subdural collections with minimal leftward midline shift.  2.1 cm meningioma along the planum sphenoidal.:   CT, head 1/23/22: Multiple bilateral extra-axial fluid collections overlying the cerebral convexities with the largest overlying the right frontal convexity measuring 1.4 cm.    Overall component of the collections are composed of both acute and subacute bilateral subdural hematomas.   Diffuse cerebral edema with associated 8 mm right to left midline shift and associated right to left subfalcine herniation.  Neurosurgical consultation is recommended.    Paranasal sinus disease.    Prior Therapy: inpatient rehab   Social History:  lives with their family- spouse and 2 teenage kids  Falls: denies   DME: Straight cane    Home Environment: 1 story home, no steps to enter   Exercise Routine / History:  had a physical job prior to hospitalization. 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 Denies fatigue with daily activities  Current Level of Function: SPC when outside of the house, not driving, reports back tightness. Pt reports use of SPC due to fatigue    Pain:  Current 7/10, worst 7/10, best 5/10   Location: left back   Description: Tight  Aggravating Factors: unable to indicate  Easing Factors: not reported    Pts goals: to become like I used to be    Objective     - Follows commands: yes   - Speech: no deficits     Mental status: alert, oriented to person, place, and time, normal mood, behavior, speech, dress, motor  activity, and thought processes  Appearance: Casually dressed  Behavior:  calm and cooperative  Attention Span and Concentration:  Normal    Dominant hand:  right     Posture Alignment :rounded shoulders    Skin integrity:  Intact    Sensation:  Light Touch: Intact           Proprioception:   Intact    Tone: 0 - No increase in muscle tone  Limbs/muscles affected: NA    Cranial Nerve Assessment:   NT    Visual/Auditory: denies changes     Coordination:   - fine motor: Refer to OT report for details   - UE coordination: finger to nose: Refer to OT report for details                        Pronation/ supination: Refer to OT report for details   - LE coordination:  alternating toe taps: good speed and coordination                                Heel to shin: good aim and coordination    ROM:   UPPER EXTREMITY--AROM/PROM  Refer to OT report for details            RANGE OF MOTION--LOWER EXTREMITIES  (R) LE Hip: WFLs   Knee: WFLs   Ankle: WFLs    (L) LE: Hip: slight tightness with SLR but WFLs as compared to R   Knee: WFLs   Ankle: WFLs    Strength: manual muscle test grades below   Upper Extremity Strength  Refer to OT report for details     Lower Extremity Strength   RLE LLE   Hip Flexion: 5/5 5/5   Hip Extension:  4+/5 4/5   Hip Abduction: 5/5 4/5   Hip Adduction: NT NT   Knee Extension: 5/5 5/5   Knee Flexion: 5/5 4+/5   Ankle Dorsiflexion: 5/5 5/5   Ankle Plantarflexion: 5/5 5/5   Ankle Inversion: NT NT   Ankle Eversion: NT NT     Abdominal Strength: NT       Evaluation   Single Limb Stance R LE 26 sec  (<10 sec = HIGH FALL RISK)   Single Limb Stance L LE 3 sec  (<10 sec = HIGH FALL RISK)   5 times sit-stand 13 seconds  >12 sec= fall risk for general elderly  >16 sec= fall risk for Parkinson's disease  >10 sec= balance/vestibular dysfunction (<61 y/o)  >14.2 sec= balance/vestibular dysfunction (>61 y/o)  >12 sec= fall risk for CVA   FGA NT     Postural control:  MCTSIB:  1. Eyes Open/feet together/Firm: 30 seconds, no  sway  2. Eyes Closed/feet together/Firm: 30 seconds, min sway  3. Eyes Open/feet together/Foam: 30 seconds, min sway  4. Eyes Closed/feet together/Foam: 30 seconds, min-mod sway    Gait Assessment:   - AD used: SPC  - Assistance: mod I  - Distance: community  - Curb: Mod I  - Ramp:  Mod I  - Stairs: NT      GAIT DEVIATIONS:  Gait component performance:   Slight decrease in L foot clearance during swing    Impairments contributing to deviations: decreased endurance, decreased strength    Endurance Deficit: fair to good for eval      Evaluation   Timed Up and Go NT  < 20 sec safe for independent transfers,     < 30 sec assist required for transfers   6 meter walk test 1.09 m/s (per 6 MWT)   6 min walk test 1276 ft     Functional Mobility (Bed mobility, transfers)  Bed mobility: I  Supine to sit: I  Sit to supine: I  Rolling: I  Transfers to bed: I  Transfers to toilet: NT  Sit to stand:  I  Stand pivot:  I  Car transfers: NT  Wheelchair mobility: NA  Floor transfers: NT      CMS Impairment/Limitation/Restriction for FOTO  Survey    Not captured by staff         TREATMENT   Treatment Time In: 1435  Treatment Time Out: 1455  Total Treatment time separate from Evaluation: 20 minutes    Priyank received therapeutic exercises to develop strength, endurance, flexibility and core stabilization for 20 minutes including:  Supine: bridging 10x   LTR 10x  Sitting: L HSS 2 x 30 sec  Parallel bars: hip abd GTB 2 x 10   Hip ext GTB 2 x 10   Heel raises 3 x 10  Sit<>stand without UE 10x      Home Exercises and Patient Education Provided    Education provided:   - role of PT/ POC  - review of HEP + continue with walking outside at least 3x/ week    Written Home Exercises Provided: yes.  Exercises were reviewed and Priyank was able to demonstrate them prior to the end of the session.  Priyank demonstrated good  understanding of the education provided.     See EMR under Patient Instructions for exercises provided  2/23/2022.    Assessment   Priyank is a 62 y.o. male referred to outpatient Physical Therapy with a medical diagnosis of subdural hematoma. Pt presents with decreased strength of LLE, decreased single limb stability of LLE, muscle tightness, and decreased activity tolerance. Pt reports sensation of tightness in left side of lower back and occassionally in LLE. Weakness is located mainly in L hip. Per 5 times sit<>stand test, pt is slightly slower than fall risk cut off.  Impaired single limb stability noted on LLE as compared to R. Pt's gait velocity os ~25% slower than average for his age and gender. 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 Pt was instructed in a standing HEP for LE strengthening. Pt demonstrated appropriate fatigue with performance of HEP. Pt can benefit from skilled PT services to progress HEP as needed and improve activity tolerance as close to prior level of function as possible to allow pt to return to work.       Pt prognosis is Excellent.   Pt will benefit from skilled outpatient Physical Therapy to address the deficits stated above and in the chart below, provide pt/family education, and to maximize pt's level of independence.     Plan of care discussed with patient: Yes  Pt's spiritual, cultural and educational needs considered and patient is agreeable to the plan of care and goals as stated below:     Anticipated Barriers for therapy: none    Medical Necessity is demonstrated by the following  History  Co-morbidities and personal factors that may impact the plan of care Co-morbidities:   diabetes    Personal Factors:   no deficits     moderate   Examination  Body Structures and Functions, activity limitations and participation restrictions that may impact the plan of care Body Regions:   lower extremities    Body Systems:    gross symmetry  ROM  strength  gross coordinated  movement  balance  gait  transfers  transitions  motor control  skin integrity    Participation Restrictions:   Unable to work  Uses SPC outside of home  Limited tolerance to gait and activity  Requires skilled PT to issue and progress HEP as needed     Activity limitations:   Learning and applying knowledge  no deficits    General Tasks and Commands  no deficits    Communication  no deficits    Mobility  walking  driving (bike, car, motorcycle)    Self care  no deficits    Domestic Life  performing yard work    Interactions/Relationships  no deficits    Life Areas  employment    Community and Social Life  community life  recreation and leisure         high   Clinical Presentation stable and uncomplicated low    Decision Making/ Complexity Score: low     Goals:  Short Term Goals: 3 weeks   1. Pt will report performing walking program at least 15 minutes at a time for improving tolerance to gait to decrease reliance on SPC.   2. Pt will demonstrate 5 times sit<>stand test in 10 sec or less to demonstrate decreased fall risk and improved LE strength.     Long Term Goals: 6 weeks   1. Pt will demonstrate a 25% improvement in gait velocity to ambulate at an average speed for his age by walking at 1.36 m/s.   2. Pt will demonstrate improved stability of LLE for improved tolerance to normal mobility and activities by performing SLS x 10 sec.  3. Pt will deny pain in LLE or back x 1 week to demonstrate improved tolerance to his normal activities.      Plan   Plan of care Certification: 2/23/2022 to 4/8/2022.    Outpatient Physical Therapy 2 times weekly for 6 weeks (8 visits total) to include the following interventions: Moist Heat/ Ice, Patient Education, Therapeutic Activities and Therapeutic Exercise.     Veronica Bosch, PT, DPT, CBIS  Board-Certified Clinical Specialist in Neurologic Physical Therapy      I certify the need for these services furnished under this plan of treatment and while under my  care.  ____________________________________ Physician/Referring Practitioner   Date of Signature

## 2022-02-24 NOTE — PLAN OF CARE
"  [facility] Occupational Therapy  Initial Neurological Evaluation     Date: 2/23/2022  Patient: Priyank Whittington  Chart Number: [id number]    Therapy Diagnosis: No diagnosis found.  Physician: Fay, Provider    Physician Orders: eval and treat; Neuro program  Medical Diagnosis: S06.5X9A (ICD-10-CM) - Subdural hematoma   Evaluation Date: 2/23/2022  Plan of Care Expiration Period: 4/22/2022  Insurance Authorization period Expiration: 12/31/2022  Date of Return to MD: edy  Visit # / Visits Authorized: 1 / 1  FOTO: 33% limitation    Time In: 09:40  Time Out: 10:20  Total Billable (one on one) Time: 40 minutes    Precautions: Standard, Diabetes and Fall    Subjective     History of Current Condition: when at work, had headache, and was seen at [facility] for reported headache x 10 days. He was evaluated there on 01/22/2022. He was prescribed Fioricet and sent home. The next day, on 01/23/2022, he was evaluated at [facility]. He did not have any associated nausea nausea or vomiting. CT scan imaging of the head without contrast showed multiple bilateral acute and subacute subdural hematomas with noted diffuse cerebral edema. He was transferred to neurocritical care at the [facility] for evaluation by Neurosurgery. He was taken back to decompressive craniotomy on 1/27/2022. Repeat MRI done on 01/31 showed small acute infarct of the right efraín. 2/14/2022 dc home.     Involved Side: left  Dominant Side: Right  Date of Onset: 1/22/2022  Surgical Procedure: 01/28: s/p R craniotomy for a SDH evacuation   Imaging: can be found under imaging in EMR.  Previous Therapy: while admitted.    Patient's Goals for Therapy: "to do what I used to do"    Pain: denies pain    Occupational Profile:  Living Environment: Pt resides with spouse & 2 teenage children in 1 story house with no steps to enter.  Pt was independent with ADL's & ambulation PTA.  Pt has a bathtub for bathing.  Pt was an " active ; was working as a .  Pt has no reported hobbies.  Has not been driving since surgery. Currently is mod(I) with ADL, needing increased time to complete tasks.    Past Medical History/Physical Systems Review:     Past Medical History:  Priyank Whittington  has a past medical history of Diabetes mellitus, HLD (hyperlipidemia), and HTN (hypertension).    Past Surgical History:  Priyank Whittington  has a past surgical history that includes Craniotomy for evacuation of subdural hematoma (Right, 1/28/2022).    Current Medications:  Priyank has a current medication list which includes the following prescription(s): acetaminophen, aspirin, atorvastatin, baclofen, butalbital-acetaminophen-caffeine -40 mg, heparin (porcine), metformin, multivitamin, oxycodone, polyethylene glycol, senna-docusate 8.6-50 mg, silodosin, sodium chloride, and white petrolatum-mineral oil 57.3-42.5%.    Allergies:  Review of patient's allergies indicates:  No Known Allergies     Objective     Cognitive Exam:  Oriented: Person, Place, Time and Situation  Behaviors: normal, cooperative  Follows Commands/attention: Follows multistep  commands  Communication: clear/fluent  Memory: No Deficits noted as determined by 3 word recall after 1 minute and 3 minutes  Safety awareness/insight to disability: aware of diagnosis, treatment, and prognosis  Coping skills/emotional control: Appropriate to situation    Visual/Perceptual: reports in right eye has tears; sees double.  Tracking: impaired Jumpy, skipping left to right, horizontally  Saccades: impaired    Horizontal: right; skipping   Diagonal: decreased on right; left top to center, left bottom to center  Acuity: NT  Convergence: impaired, >30 cm. not alligning with right eye.  Nystagmus: present in right  R/L discrimination: intact  Visual field: periferall vision left at 65°, right at 75°  Motor Planning Praxis: intact  Perceptual testing:    Line bisection: Within  Normal Limits (WNL)    Maze Test - 54.59 seconds, 0 error(s) (Cut point score is 60 seconds or less with 1 error or less).    Trail Making Test A - 53.62 seconds, 0 error(s) (Average 29 seconds, Deficient > 78 seconds, rule of thumb: most in 90 seconds)   Trail Making Test B - 140.06 seconds, 5 error(s), and didn't complete subsequently. (Average 75 seconds, Deficient > 273 seconds, rule of thumb: most in 3 minutes)    Physical Exam:  Postural examination/scapula alignment: Rounded shoulder and Head forward  Joint integrity: Firm end feeling  Skin integrity: intact  Edema: none noted     (B)UE ROM WFL; not measured due to time.  Fist: normal  (B)UE strength not tested due to time; able to move against gravity through full range.     Strength: (MELISSA Dynamometer in lbs.) Average 3 trials, Position II:     2/23/2022 2/23/2022    Left Right   Rung II 60.9# 69.5#     Pinch Strength (Measured in psi): NT due to time.    Fine Motor Coordination: 9 Hole Peg Test (in seconds)  Left 2/23/2022 Right 2/23/2022   35.33  31.06   Comments: initially, when using right, decreased heeding of directions.    Gross motor coordination:   - JOE (Rapid Alternating Movements): intact  - Finger to Nose (5 times): intact  - Finger Flicks (coordination moving from digit flexion to digit extension): slowed with left    Tone:  Modified Vickey Scale:   0 - No increase in muscle tone    Sensation:  Priyank  reports numbness, however, due to time not tested.     Balance:   Static Sit - GOOD+: Takes MAXIMAL challenges from all directions.    Dynamic sit- GOOD: Takes MODERATE challenges from all directions  Static Stand - GOOD-: Takes MODERATE challenges from all directions but inconsistently  Dynamic stand - POOR+: Needs MINIMAL assist to maintain    Endurance Deficit: mild                    Functional Status      Functional Mobility: Mod(I) with straight cane; increased time to complete    ADL's:  Feeding: Mod I. Hasn't tried cutting  yet  Grooming: Mod I for time  Hygiene: Mod I  UB Dressing: Mod I Has not done buttons yet  LB Dressing: Mod I Has not worn regular pants; tried zipper  Toileting: Mod I  Bathing: Min A; spouse assists. Soaks in tub.    IADL's:  Homecare: Mod I  Cooking: hasn't done this yet  Laundry: Mod I  Yard work: Hasn't done it yet  Use of telephone: difficult due to vision issues  Money management: hasn't done yet  Medication management: Mod I; additional time needed  Handwriting: no concerns. Rated handwriting 8/10 for performance, which he is satisfied with.  Technology Use: has not used yet    CMS Impairment/Limitation/Restriction for FOTO Survey    Therapist reviewed FOTO scores for Priyank Whittington on 2/23/2022.   FOTO documents entered into [employer] - see Media section.    Limitation Score: 33%  Category: Carrying               Treatment     Eval only.    Home Exercises and Patient Education Provided    Education provided:   -role of OT, goals for OT, scheduling/cancellations, insurance limitations with patient.    Written Home Exercises Provided: to be addressed next session.  See EMR under Patient Instructions or handouts for exercises if provided  .    Assessment     Priyank Whittington is a 62 y.o. male referred to outpatient occupational therapy and presents with a medical diagnosis of Subdural hematoma, resulting in impaired function, decreased work ability and limitations with vision and demonstrates limitations as described in the chart below. Following medical record review it is determined that patient will benefit from occupational therapy services in order to maximize pain free and/or functional use of bilateral UE.    Patient prognosis is Good due to awareness of limitations.  Patient will benefit from skilled outpatient Occupational Therapy to address the deficits stated above and in the chart below, provide patient/family education, and to maximize patient's level of independence.     Plan of care  discussed with patient: Yes  Patient's spiritual, cultural and educational needs considered and patient is agreeable to the plan of care and goals as stated below:     Anticipated Barriers for therapy: none identified    Medical Necessity is demonstrated by the following  Profile and History Assessment of Occupational Performance Level of Clinical Decision Making Complexity Score   Occupational Profile:   Priyank Whittington is a 62 y.o. male who lives with their family and is currently employed Priyank Whittington has difficulty with  ADLs and IADLs as listed previously, which  affecting his/her daily functional abilities.      Comorbidities:    has a past medical history of Diabetes mellitus, HLD (hyperlipidemia), and HTN (hypertension).    Medical and Therapy History Review:   Expanded             Performance Deficits    Physical:  Joint Stability  Muscle Power/Strength  Muscle Endurance  Gross Motor Coordination  Fine Motor Coordination  Visual Functions    Cognitive:  No Deficits    Psychosocial:    Social Interaction  Habits  Routines  Rituals     Clinical Decision Making:  moderate    Assessment Process:  Detailed Assessments    Modification/Need for Assistance:  Minimal-Moderate Modifications/Assistance    Intervention Selection:  Several Treatment Options       moderate  Based on PMHX, co morbidities , data from assessments and functional level of assistance required with task and clinical presentation directly impacting function.         The following goals were discussed with the patient and patient is in agreement with them as to be addressed in the treatment plan.     Goals:    Short Term Goals: 4 weeks   - Pt will increase  strength by 3-5# with BUE to increase performance during daily tasks.  - Pt will carry plate 10 feet using BUE to improve ability to carry household objects safely.  - Priyank is able to place can at shelve at shoulder height using either hand.  - Pt to be independent with  oculomotor HEP.   - Pt to be independent with UE strengthening and stretching HEP.   - Pt will decrease time on 9HPT by 4 seconds with right hand to improve fine motor speed and coordination needed to perform money manipulation and ADLs.   - Near point convergence will decrease to 25 cm or less to improve oculomotor coordination.  - Pt will report he is able to read for 10 minutes without eye fatigue or double vision.       Long Term Goals: 8 weeks   - Pt will increase  strength by 6-10# with BUE to increase performance during daily tasks.  - Pt will carry plate 10 feet using either hand to improve ability to carry household objects safely.  - Pt will decrease time on 9HPT by 8 seconds with right hand to improve fine motor speed and coordination needed to perform money manipulation and ADLs.   - Near point convergence will decrease to 20 cm or less to improve oculomotor coordination.  - Pt will report he is able to read for 30 minutes without eye fatigue or double vision.       More goals to be added as appropriate     Plan   Certification Period/Plan of care expiration: 2/23/2022 to 4/22/2022    Outpatient Occupational Therapy 2 times weekly for 8 weeks to include the following interventions: Neuromuscular Re-ed, Patient Education, Self Care, Therapeutic Activities and Therapeutic Exercise.    MARIA GUADALUPE Sommer, DENVER/L, CEAS-I  2/22/2022       I certify the need for these services furnished under this plan of treatment and while under my care.  ____________________________________ Physician/Referring Practitioner     ____________________________________ Date of Signature

## 2022-02-25 ENCOUNTER — TELEPHONE (OUTPATIENT)
Dept: UROLOGY | Facility: CLINIC | Age: 62
End: 2022-02-25
Payer: MEDICAID

## 2022-02-25 DIAGNOSIS — R97.20 ELEVATED PROSTATE SPECIFIC ANTIGEN (PSA): Primary | ICD-10-CM

## 2022-02-25 LAB — BACTERIA UR CULT: NO GROWTH

## 2022-02-25 NOTE — TELEPHONE ENCOUNTER
Spoke with pt stated that PSA has went down to 7. 5 and we will repeat in 6 months. Pt verbalized understanding.

## 2022-02-25 NOTE — TELEPHONE ENCOUNTER
----- Message from Veronica De Oliveira NP sent at 2/25/2022  7:42 AM CST -----  Please call patient and schedule psa in 6 months with follow up after   Thanks  M

## 2022-02-28 ENCOUNTER — PATIENT OUTREACH (OUTPATIENT)
Dept: EMERGENCY MEDICINE | Facility: HOSPITAL | Age: 62
End: 2022-02-28
Payer: MEDICAID

## 2022-02-28 ENCOUNTER — HOSPITAL ENCOUNTER (EMERGENCY)
Facility: HOSPITAL | Age: 62
Discharge: HOME | End: 2022-02-28
Attending: EMERGENCY MEDICINE
Payer: MEDICAID

## 2022-02-28 VITALS
HEART RATE: 81 BPM | DIASTOLIC BLOOD PRESSURE: 75 MMHG | WEIGHT: 196 LBS | HEIGHT: 69 IN | OXYGEN SATURATION: 100 % | SYSTOLIC BLOOD PRESSURE: 144 MMHG | RESPIRATION RATE: 18 BRPM | TEMPERATURE: 98 F | BODY MASS INDEX: 29.03 KG/M2

## 2022-02-28 DIAGNOSIS — L02.91 ABSCESS: Primary | ICD-10-CM

## 2022-02-28 PROCEDURE — 99284 EMERGENCY DEPT VISIT MOD MDM: CPT | Mod: 25

## 2022-02-28 PROCEDURE — 25000003 PHARM REV CODE 250: Performed by: NURSE PRACTITIONER

## 2022-02-28 PROCEDURE — 10061 I&D ABSCESS COMP/MULTIPLE: CPT

## 2022-02-28 RX ORDER — HYDROCODONE BITARTRATE AND ACETAMINOPHEN 5; 325 MG/1; MG/1
1 TABLET ORAL EVERY 6 HOURS PRN
Qty: 8 TABLET | Refills: 0 | Status: SHIPPED | OUTPATIENT
Start: 2022-02-28

## 2022-02-28 RX ORDER — LIDOCAINE HYDROCHLORIDE 10 MG/ML
10 INJECTION INFILTRATION; PERINEURAL
Status: COMPLETED | OUTPATIENT
Start: 2022-02-28 | End: 2022-02-28

## 2022-02-28 RX ORDER — HYDROCODONE BITARTRATE AND ACETAMINOPHEN 5; 325 MG/1; MG/1
1 TABLET ORAL
Status: COMPLETED | OUTPATIENT
Start: 2022-02-28 | End: 2022-02-28

## 2022-02-28 RX ORDER — SULFAMETHOXAZOLE AND TRIMETHOPRIM 800; 160 MG/1; MG/1
1 TABLET ORAL 2 TIMES DAILY
Qty: 14 TABLET | Refills: 0 | Status: SHIPPED | OUTPATIENT
Start: 2022-02-28 | End: 2022-03-07

## 2022-02-28 RX ORDER — SULFAMETHOXAZOLE AND TRIMETHOPRIM 800; 160 MG/1; MG/1
1 TABLET ORAL
Status: COMPLETED | OUTPATIENT
Start: 2022-02-28 | End: 2022-02-28

## 2022-02-28 RX ADMIN — LIDOCAINE HYDROCHLORIDE 10 ML: 10 INJECTION, SOLUTION INFILTRATION; PERINEURAL at 12:02

## 2022-02-28 RX ADMIN — HYDROCODONE BITARTRATE AND ACETAMINOPHEN 1 TABLET: 5; 325 TABLET ORAL at 12:02

## 2022-02-28 RX ADMIN — SULFAMETHOXAZOLE AND TRIMETHOPRIM 1 TABLET: 800; 160 TABLET ORAL at 12:02

## 2022-02-28 NOTE — PROGRESS NOTES
Established Patient - Audio Only Telehealth Visit     The patient location is: [place]  The chief complaint leading to consultation is: lab results   Visit type: Virtual visit with audio only (telephone)  Total time spent with patient: 12 minutes        The reason for the audio only service rather than synchronous audio and video virtual visit was related to technical difficulties or patient preference/necessity.     Each patient to whom I provide medical services by telemedicine is:  (1) informed of the relationship between the physician and patient and the respective role of any other health care provider with respect to management of the patient; and (2) notified that they may decline to receive medical services by telemedicine and may withdraw from such care at any time. Patient verbally consented to receive this service via voice-only telephone call.       HPI: Last seen by me for elevated PSA  (7.6) in April 2021; CRYS revealed enlarged prostate without nodules. PSA was repeated and noted to be 3.0 with 16.67; lost to follow up.   He presents today with elevated PSA, 19.5. Reports that he was treated for UTI around the time that labs were collected.   He has no personal history and no family history of prostate cancer. His AUA Symptom Score is 0/0, denies LUTS. He has no prior genitourinary history of hematuria, hematospermia, prostatitis, UTI, erectile dysfunction, urolithiasis, epididymal orchitis, previous  surgery.  Currently on heparin      Assessment and plan:    --PSA has went down to 7.5 consistent with inflammatory process; will repeat in 6 months      This note is dictated on M*Modal word recognition program.  There are word recognition mistakes that are occasionally missed on review.             This service was not originating from a related E/M service provided within the previous 7 days nor will  to an E/M service or procedure within the next 24 hours or my soonest available appointment.   Prevailing standard of care was able to be met in this audio-only visit.

## 2022-02-28 NOTE — DISCHARGE INSTRUCTIONS
Please make sure to maintain to keep the area clean. Wash with soap and water. If the abscess is in an area you shave then change your razor and do not shave the area until the wound is healed. Do not share towels or other personal items. Use warm compresses 10-15 minutes, 4-5 times a day to help increase wound drainage and decrease swelling, and take your antibiotic medication as prescribed.     Please return to the Emergency Department for any new or worsening problems including: worsening of your abscess, increasing redness or redness extending further up your body, or temperature of greater than 100.4F.    Please follow up with your Primary Care Doctor or Return to the ED in 2-3 days for packing removal and a wound check, or sooner if you wound gets worse. Your packing needs to be removed in 2 - 3 days.

## 2022-02-28 NOTE — PROGRESS NOTES
Patient declined ED navigation assessment and denied any needs at this time.     Tyesha Goel  ED Navigator  [facility]  [phone]

## 2022-02-28 NOTE — ED PROVIDER NOTES
"Encounter Date: 2/28/2022     CC: abscess, midline lower back    History     Pt presenting to ED c/o two days of pain (constant, non-radiating) from an abscess in his lower back. Aggravated by touch and certain movements. Relieved by nothing. Pt endorses presence of abscess for one year but previously asymptomatic. No PMH of immunocompromise state. No hx of spinal surgery or hardware. Denies fever, chills, other systemic symptoms. Denies sick contacts, recent travel. Of note, pt was discharged from the hospital 2 weeks prior s/p craniotomy for evacuation of subdural hematoma.     Chief Complaint   Patient presents with    Abscess     Patient reports an abscess to lower back that has been there "for almost a year" but didn't become painful until 2 days ago. Patient denies drainage from sites, fevers, chills.     Review of patient's allergies indicates:  No Known Allergies  Past Medical History:   Diagnosis Date    Cerebrovascular accident (CVA) of right pontine structure 01/23/2022    Diabetes mellitus     HLD (hyperlipidemia) 1/23/2022    HTN (hypertension) 1/25/2022    Subdural hematoma 01/23/2022     Past Surgical History:   Procedure Laterality Date    CRANIOTOMY FOR EVACUATION OF SUBDURAL HEMATOMA Right 1/28/2022    Procedure: CRANIOTOMY, FOR SUBDURAL HEMATOMA EVACUATION;  Surgeon: Ace Candelaria MD;  Location: [facility] OR [address] [place] [address];  Service: Neurosurgery;  Laterality: Right;  right, possible bilateral     History reviewed. No pertinent family history.  Social History     Tobacco Use    Smoking status: Never Smoker    Smokeless tobacco: Never Used   Substance Use Topics    Alcohol use: Never    Drug use: Never     Review of Systems   Constitutional: Negative for chills and fever.   HENT: Negative.    Eyes: Negative.    Cardiovascular: Negative.    Gastrointestinal: Negative.  Negative for diarrhea, nausea and vomiting.   Endocrine: Negative.    Genitourinary: Negative for difficulty urinating, dysuria and " flank pain.   Musculoskeletal: Negative for back pain and gait problem.   Skin: Positive for wound.   Allergic/Immunologic: Negative for immunocompromised state.   Neurological: Negative for dizziness, weakness and numbness.   Hematological: Negative.    Psychiatric/Behavioral: Negative.  Negative for confusion.       Physical Exam     Initial Vitals [02/28/22 1156]   BP Pulse Resp Temp SpO2   (!) 144/75 81 18 97.7 °F (36.5 °C) 100 %      MAP       --         Physical Exam    Constitutional: No distress.   HENT:   Head: Normocephalic and atraumatic.   Eyes: Pupils are equal, round, and reactive to light. No scleral icterus.   Neck:   Normal range of motion.  Cardiovascular: Normal rate, regular rhythm and normal heart sounds.   Pulmonary/Chest: Breath sounds normal.   Abdominal: Bowel sounds are normal.   Musculoskeletal:         General: Normal range of motion.      Cervical back: Normal range of motion.     Neurological: He is alert and oriented to person, place, and time. He has normal strength and normal reflexes. No sensory deficit. GCS eye subscore is 4. GCS verbal subscore is 5. GCS motor subscore is 6.   Skin: Skin is warm and dry. Capillary refill takes less than 2 seconds. Abscess (Back) noted. No rash noted.        Mid-line, lower back above waistband, 2 inches   Psychiatric: He has a normal mood and affect. His behavior is normal.         ED Course   I & D - Incision and Drainage    Date/Time: 2/28/2022 12:52 PM  Location procedure was performed: [facility] EMERGENCY DEPARTMENT  Performed by: Alecia Lopez  Authorized by: LEDA Urbano   Assisting provider: LEDA Urbano  Consent Done: Yes  Consent: Verbal consent obtained.  Risks and benefits: risks, benefits and alternatives were discussed  Consent given by: patient  Patient understanding: patient states understanding of the procedure being performed  Patient identity confirmed: verbally with patient  Type: abscess  Anesthesia: local  infiltration    Anesthesia:  Local Anesthetic: lidocaine 1% without epinephrine  Anesthetic total: 5 mL    Patient sedated: no  Scalpel size: 11  Incision type: single straight  Complexity: complex  Drainage: bloody,  pus and  purulent  Drainage amount: moderate  Wound treatment: incision,  drainage,  expression of material,  wound left open and  deloculation  Complications: No  Estimated blood loss (mL): 5  Specimens: No  Implants: No  Patient tolerance: Patient tolerated the procedure well with no immediate complications        Labs Reviewed - No data to display       Imaging Results    None          Medications   LIDOcaine HCL 10 mg/ml (1%) injection 10 mL (10 mLs Infiltration Given 2/28/22 1224)   sulfamethoxazole-trimethoprim 800-160mg per tablet 1 tablet (1 tablet Oral Given 2/28/22 1244)   HYDROcodone-acetaminophen 5-325 mg per tablet 1 tablet (1 tablet Oral Given 2/28/22 1244)     Medical Decision Making:   History:   Old Medical Records: I decided to obtain old medical records.  Old Records Summarized: records from previous admission(s).       <> Summary of Records: Patient admitted 01/23/2021 for subdural hematoma/CVA       APC / Resident Notes:   This is an evaluation of a 62 y.o. male that presents to the Emergency Department for an abscess. Physical Exam shows a non-toxic, afebrile, and well appearing male. There is an abscess to the upper lumbar midline back with induration and increased warmth. There is no active drainage.  Normal neurologic exam. No pain/tenderness outside of erythematous region. No areas of bruising, bullae, or crepitus. Vital Signs Are Reassuring. Procedure: Abscess was drained per the procedure note.  Wound was drained by the medical student under my direct supervision during the entirety of the procedure.    My overall impression is Abscess of the back. I considered, but at this time, do not suspect an extensive cellulitis, sepsis, necrotizing fasciitis, or bacteremia.     D/C  Information: Wound care, Warm compresses. The diagnosis, treatment plan, instructions for follow-up and reevaluation with his PCP or the ED in 2 - 3 days for wound recheck as well as ED return precautions were discussed and understanding was verbalized. All questions or concerns have been addressed.  BRIAN García, LEDA-NICOLASA          ED Course as of 02/28/22 1254   Mon Feb 28, 2022   1201 Patient being seen by Med Student - Alecia Lopez.  [AF]      ED Course User Index  [AF] LEDA Urbano             Clinical Impression:   Final diagnoses:  [L02.91] Abscess (Primary)          ED Disposition Condition    Discharge Stable        ED Prescriptions     Medication Sig Dispense Start Date End Date Auth. Provider    sulfamethoxazole-trimethoprim 800-160mg (BACTRIM DS) 800-160 mg Tab Take 1 tablet by mouth 2 (two) times daily. for 7 days 14 tablet 2/28/2022 3/7/2022 LEDA rUbano    HYDROcodone-acetaminophen (NORCO) 5-325 mg per tablet Take 1 tablet by mouth every 6 (six) hours as needed for Pain. 8 tablet 2/28/2022  LEDA Urbano        Follow-up Information     Follow up With Specialties Details Why Contact Info    Your Primary Care Doctor  Schedule an appointment as soon as possible for a visit  Please call and schedule an appointment for follow up this week.     [facility] Emergency Dept Emergency Medicine Go to  If symptoms worsen Chris [place] [id number] [phone]           LEDA Urbano  02/28/22 6870

## 2022-03-02 ENCOUNTER — OFFICE VISIT (OUTPATIENT)
Dept: UROLOGY | Facility: CLINIC | Age: 62
End: 2022-03-02
Payer: MEDICAID

## 2022-03-02 DIAGNOSIS — N40.0 ENLARGED PROSTATE ON RECTAL EXAMINATION: ICD-10-CM

## 2022-03-02 DIAGNOSIS — R97.20 ELEVATED PROSTATE SPECIFIC ANTIGEN (PSA): Primary | ICD-10-CM

## 2022-03-02 PROCEDURE — 99212 PR OFFICE/OUTPT VISIT, EST, LEVL II, 10-19 MIN: ICD-10-PCS | Mod: 95,,, | Performed by: NURSE PRACTITIONER

## 2022-03-02 PROCEDURE — 1159F MED LIST DOCD IN RCRD: CPT | Mod: CPTII,95,, | Performed by: NURSE PRACTITIONER

## 2022-03-02 PROCEDURE — 3051F PR MOST RECENT HEMOGLOBIN A1C LEVEL 7.0 - < 8.0%: ICD-10-PCS | Mod: CPTII,95,, | Performed by: NURSE PRACTITIONER

## 2022-03-02 PROCEDURE — 1160F RVW MEDS BY RX/DR IN RCRD: CPT | Mod: CPTII,95,, | Performed by: NURSE PRACTITIONER

## 2022-03-02 PROCEDURE — 1111F DSCHRG MED/CURRENT MED MERGE: CPT | Mod: CPTII,95,, | Performed by: NURSE PRACTITIONER

## 2022-03-02 PROCEDURE — 99212 OFFICE O/P EST SF 10 MIN: CPT | Mod: 95,,, | Performed by: NURSE PRACTITIONER

## 2022-03-02 PROCEDURE — 1111F PR DISCHARGE MEDS RECONCILED W/ CURRENT OUTPATIENT MED LIST: ICD-10-PCS | Mod: CPTII,95,, | Performed by: NURSE PRACTITIONER

## 2022-03-02 PROCEDURE — 3051F HG A1C>EQUAL 7.0%<8.0%: CPT | Mod: CPTII,95,, | Performed by: NURSE PRACTITIONER

## 2022-03-02 PROCEDURE — 1160F PR REVIEW ALL MEDS BY PRESCRIBER/CLIN PHARMACIST DOCUMENTED: ICD-10-PCS | Mod: CPTII,95,, | Performed by: NURSE PRACTITIONER

## 2022-03-02 PROCEDURE — 1159F PR MEDICATION LIST DOCUMENTED IN MEDICAL RECORD: ICD-10-PCS | Mod: CPTII,95,, | Performed by: NURSE PRACTITIONER

## 2022-03-03 ENCOUNTER — PES CALL (OUTPATIENT)
Dept: ADMINISTRATIVE | Facility: CLINIC | Age: 62
End: 2022-03-03
Payer: MEDICAID

## 2022-03-07 NOTE — PROGRESS NOTES
"  Physical Therapy Daily Treatment Note     Name: Priyank Chavez [facility]  Clinic Number: [id number]    Therapy Diagnosis:   Encounter Diagnosis   Name Primary?    Decreased strength, endurance, and mobility Yes     Physician: Atul Peter.*    Visit Date: 3/8/2022    Physician Orders: Atul Peter MD     Physician Orders: PT Eval and Treat neuro PT2  Medical Diagnosis from Referral: S06.5X9A (ICD-10-CM) - Subdural hematoma  Evaluation Date: 2/23/2022  Authorization Period Expiration: 12/31/2022  Plan of Care Expiration: 4/8/2022  Visit # / Visits authorized: 1/ 25       Time In: 0715  Time Out: 0800  Total Billable Time: 45 minutes    Precautions: Standard    Subjective     Pt reports: no new reports. Walking daily 30-45 min  He was compliant with home exercise program.  Response to previous treatment: no adverse effects reported  Functional change: arrived without SPC    Pain: 5/10 prior to natali  Location: left back      Objective   Bold= new or progression    Priyank received therapeutic exercises to develop strength, endurance, flexibility and core stabilization for 45 minutes including:    Recumbent stepper BUE/LE L4 x 8 min for improved endurance and strength    Supine: TA activation 2 x 10- unable to perform pelvic tilt   L hamstring stretch with strap 2 x 30 sec   LTR 10x   Bridging GTB 2 x 10    Child's pose- all directions- 2 x 30 sec    Sit<>stands from mat with 10# kettle bell- 2 x 10    Parallel bar: modified SLS R up on 12" cone x 30 sec   modified SLS, standing on foam R up on 12" cone 3 x 30 sec   Hip hikes L on 6" step, fingertip support 2 x 10   3 cone tap (6") with R foot    Moist heat applied to lumbar region x 10 min with supine ex      Home Exercises Provided and Patient Education Provided     Education provided:   - continue with HEP, added child's pose    Written Home Exercises Provided: Patient instructed to cont prior HEP. And yes  Exercises were reviewed and Priyank was " able to demonstrate them prior to the end of the session.  Priyank demonstrated good  understanding of the education provided.     See EMR under Patient Instructions for exercises provided prior visit., 3/8/22    Assessment   Priyank tolerated first follow up PT session well. Pt reports good compliance with HEP and walking program. He arrived with reports of L sided back tightness wrapping towards ribs. This was addressed with moist heat and core stretching and strengthening. Pt required verbal cues for transverse abdominal activation and sit<>stands for correct technique. Pt was not able to coordinate pelvic tiltPt also participated in exercises to address L single limb stability and balance. He reported appropriate challenge with all interventions performed today.decresaed back tightness reported at conclusion of session.  Pt can benefit from continued skilled PT to progress HEP as needed and to address tightness, balance, strength, and activity tolerance to allow pt to return as close to prior level of function as possible.       Priyank Is progressing well towards his goals.   Pt prognosis is Excellent.     Pt will continue to benefit from skilled outpatient physical therapy to address the deficits listed in the problem list box on initial evaluation, provide pt/family education and to maximize pt's level of independence in the home and community environment.     Pt's spiritual, cultural and educational needs considered and pt agreeable to plan of care and goals.     Anticipated barriers to physical therapy: none    Goals:   Short Term Goals: 3 weeks   1. Pt will report performing walking program at least 15 minutes at a time for improving tolerance to gait to decrease reliance on SPC. ongoing  2. Pt will demonstrate 5 times sit<>stand test in 10 sec or less to demonstrate decreased fall risk and improved LE strength. ongoing     Long Term Goals: 6 weeks   1. Pt will demonstrate a 25% improvement in gait  velocity to ambulate at an average speed for his age by walking at 1.36 m/s. ongoing  2. Pt will demonstrate improved stability of LLE for improved tolerance to normal mobility and activities by performing SLS x 10 sec.ongoing  3. Pt will deny pain in LLE or back x 1 week to demonstrate improved tolerance to his normal activities.  ongoing    Plan   Plan of care Certification: 2/23/2022 to 4/8/2022.     Outpatient Physical Therapy 2 times weekly (8 visits total) to include the following interventions: Moist Heat/ Ice, Patient Education, Therapeutic Activities and Therapeutic Exercise    Continue with core and hip strengthening. Continue to address SLS on L      Veronica Bosch, PT, DPT, CBIS  Board-Certified Clinical Specialist in Neurologic Physical Therapy    3/8/2022

## 2022-03-08 ENCOUNTER — CLINICAL SUPPORT (OUTPATIENT)
Dept: REHABILITATION | Facility: HOSPITAL | Age: 62
End: 2022-03-08
Payer: MEDICAID

## 2022-03-08 DIAGNOSIS — Z74.09 IMPAIRED MOBILITY AND ADLS: ICD-10-CM

## 2022-03-08 DIAGNOSIS — R53.1 DECREASED STRENGTH, ENDURANCE, AND MOBILITY: Primary | ICD-10-CM

## 2022-03-08 DIAGNOSIS — Z78.9 IMPAIRED MOBILITY AND ADLS: ICD-10-CM

## 2022-03-08 DIAGNOSIS — H53.2 DOUBLE VISION WITH BOTH EYES OPEN: Primary | ICD-10-CM

## 2022-03-08 DIAGNOSIS — R68.89 DECREASED STRENGTH, ENDURANCE, AND MOBILITY: Primary | ICD-10-CM

## 2022-03-08 DIAGNOSIS — Z74.09 DECREASED STRENGTH, ENDURANCE, AND MOBILITY: Primary | ICD-10-CM

## 2022-03-08 PROCEDURE — 97110 THERAPEUTIC EXERCISES: CPT | Mod: PN | Performed by: PHYSICAL THERAPIST

## 2022-03-08 PROCEDURE — 97530 THERAPEUTIC ACTIVITIES: CPT | Mod: PN

## 2022-03-08 PROCEDURE — 97010 HOT OR COLD PACKS THERAPY: CPT | Mod: PN | Performed by: PHYSICAL THERAPIST

## 2022-03-08 NOTE — PROGRESS NOTES
"[facility]  Occupational Therapy Treatment Note    Date: 3/8/2022  Name: Priyank Chavez [facility]  Clinic Number: [id number]    Therapy Diagnosis:   Encounter Diagnoses   Name Primary?    Double vision with both eyes open Yes    Impaired mobility and ADLs      Physician: Sabas Tobar    Physician Orders: eval and treat; Neuro program  Medical Diagnosis: S06.5X9A (ICD-10-CM) - Subdural hematoma   Evaluation Date: 2/23/2022  Plan of Care Expiration Period: 4/22/2022  PROGRESS NOTE due at visit 9.  Insurance Authorization period Expiration: 12/31/2022  Date of Return to MD: tbd  Visit # / Visits Authorized: 1/20 (plus eval)  FOTO: 33% limitation    Precautions: Standard, Diabetes and Fall    Time In: 08:04  Time Out: 08:46  Total Billable Time: 42 minutes    SUBJECTIVE     Pt reports: "my vision is getting better".  He was compliant with home exercise program given last session.   Response to previous treatment: no complaints  Functional change: n/a, eval only.    Pain: 0/10  Location: n/a     Involved Side: left  Dominant Side: Right  Date of Onset: 1/22/2022    OBJECTIVE     Objective Measures updated at progress report unless specified.    Treatment     Priyank received the treatments listed below, while facing wall:    Therapeutic activities to improve functional performance for 42  minutes, including:  - scalene stretch  - shoulder shrugs  - scapular retractions.  - VOR 1, vertical and horizontal  - smooth pursuit  - saccades  - translating palm <> fingers of marbles; each hand. Some slip  with right hand.  - isospheres x 2 minutes each hands  - pencil push ups  - left WB with body on arm weights with shifting on and off using right.   - D1/D2 flexion/extension patterns with left hand using cones  - green clothes pin for pompon , both hands x 1 container each.    Patient Education and Home Exercises      Education provided:   - HEP vision; to perform 1 x daily  - Progress " towards goals     Written Home Exercises Provided: Patient instructed to cont prior HEP.  Exercises were reviewed and Priyank was able to demonstrate them prior to the end of the session.  Priyank demonstrated fair  understanding of the HEP provided. See EMR under Patient Instructions for exercises provided during therapy sessions.       Assessment     Priyank Whittington is a 62 y.o. male referred to outpatient occupational therapy and presents with a medical diagnosis of Subdural hematoma, resulting in impaired function, decreased work ability and limitations with vision and demonstrates limitations as described in the initial evaluation.    Priyank presented motivated to participate in session today, with report of improving vision with regular performance (every other day). Tolerated all activities presented to him today, with decreased flow and calibration observed during distal use of UE, and slow speed, but improved ability to keep on target with oculomotor therex. Unable to organize movements with VOR 2.  Pt would continue to benefit from skilled OT.     Priyank is progressing well towards his goals and there are no updates to goals at this time. Pt prognosis is Good.     Pt will continue to benefit from skilled outpatient occupational therapy to address the deficits listed in the problem list on initial evaluation provide pt/family education and to maximize pt's level of independence in the home and community environment.     Pt's spiritual, cultural and educational needs considered and pt agreeable to plan of care and goals.    Anticipated barriers to occupational therapy: none identified.    Goals:  Short Term Goals: 4 weeks   - Pt will increase  strength by 3-5# with BUE to increase performance during daily tasks.  - Pt will carry plate 10 feet using BUE to improve ability to carry household objects safely.  - Priyank is able to place can at shelve at shoulder height using either hand.  - Pt to be  independent with oculomotor HEP.   - Pt to be independent with UE strengthening and stretching HEP.   - Pt will decrease time on 9HPT by 4 seconds with right hand to improve fine motor speed and coordination needed to perform money manipulation and ADLs.   - Near point convergence will decrease to 25 cm or less to improve oculomotor coordination.  - Pt will report he is able to read for 10 minutes without eye fatigue or double vision.        Long Term Goals: 8 weeks   - Pt will increase  strength by 6-10# with BUE to increase performance during daily tasks.  - Pt will carry plate 10 feet using either hand to improve ability to carry household objects safely.  - Pt will decrease time on 9HPT by 8 seconds with right hand to improve fine motor speed and coordination needed to perform money manipulation and ADLs.   - Near point convergence will decrease to 20 cm or less to improve oculomotor coordination.  - Pt will report he is able to read for 30 minutes without eye fatigue or double vision.       More goals to be added as appropriate     PLAN     Certification Period/Plan of care expiration: 2/23/2022 to 4/22/2022     Outpatient Occupational Therapy 2 times weekly for 8 weeks to include the following interventions: Neuromuscular Re-ed, Patient Education, Self Care, Therapeutic Activities and Therapeutic Exercise.    Updates/Grading for next session: shoulder HEP    MARIA GUADALUPE Sommer, CJR/L, CEAS-I  3/9/2022

## 2022-03-09 NOTE — PROGRESS NOTES
"[facility]  Occupational Therapy Treatment Note    Date: 3/11/2022  Name: Priyank Whartongesha  Clinic Number: [id number]    Therapy Diagnosis:   Encounter Diagnoses   Name Primary?    Double vision with both eyes open Yes    Impaired mobility and ADLs      Physician: Sabas Tobar    Physician Orders: eval and treat; Neuro program  Medical Diagnosis: S06.5X9A (ICD-10-CM) - Subdural hematoma   Evaluation Date: 2/23/2022  Plan of Care Expiration Period: 4/22/2022  PROGRESS NOTE due at visit 9.  Insurance Authorization period Expiration: 12/31/2022  Date of Return to MD: tbd  Visit # / Visits Authorized: 2/20 (plus eval)  FOTO: 33% limitation    Precautions: Standard, Diabetes and Fall    Time In: 08:48  Time Out: 09:31  Total Billable Time: 43 minutes    SUBJECTIVE     Pt reports: "improving with double vision"  my vision is getting better".  He was compliant with home exercise program given last session.   Response to previous treatment: no complaints  Functional change: easier with reading    Pain: 0/10; reports tightness in ribs on left side.  Location: n/a     Involved Side: left  Dominant Side: Right  Date of Onset: 1/22/2022    OBJECTIVE     Objective Measures updated at progress report unless specified.    Treatment     Priyank received the treatments listed below:    Therapeutic activities to improve functional performance for 43 minutes, including:  - scapular mobilization left  - left WB with body on arm weights with shifting on and off using right.   In supine, (B) shoulder stretches performed with use of dowel stick; patient states he has a cane at home he could use.  - scapular retractions.  - reverse pendulums CW and CCW  - D1/d2 exgtension/flexion patterns x 3 reps each, then followed using grasp/release element using squiglies. Limited fluidity with movement past 90° shoulder flexion.  - resistrance using green theraband with arm pointed to ceiling, all planes.  In " seated,   - VOR 1, vertical and horizontal; decreased ROM towards right. ~100Bpm x 30 seconds x 3 reps horizontal; 25, 20 and 30 seconds with vertical VOR before symptoms of dizziness start.  - gross  with silver spring 2nd rung for peg  and placement on table, both hands. Replacement of pegs using left hand both times.   In standing, using dowel stick extension and internal rotation, x 10 reps each.    Patient Education and Home Exercises      Education provided:   - HEP vision; to perform 1 x daily  - HEP shoulder with use of dowel  - Progress towards goals     Written Home Exercises Provided: Patient instructed to cont prior HEP, in addition to HEP provided today.  Exercises were reviewed and Priyank was able to demonstrate them prior to the end of the session.  Priyank demonstrated fair  understanding of the HEP provided. See EMR under Patient Instructions for exercises provided during therapy sessions.       Assessment     Priyank Whittington is a 62 y.o. male referred to outpatient occupational therapy and presents with a medical diagnosis of Subdural hematoma, resulting in impaired function, decreased work ability and limitations with vision and demonstrates limitations as described in the initial evaluation.    Priyank presented motivated to participate in session today, open to feedback and education provided. Focus on (L) shoulder strengthening today, as well as continuation of hand strength and oculomotor therex. Tolerated all activities, but with reported dizziness during oculomotor therex after ~20-30 seconds. Pt would continue to benefit from skilled OT.     Priyank is progressing well towards his goals and there are no updates to goals at this time. Pt prognosis is Good.     Pt will continue to benefit from skilled outpatient occupational therapy to address the deficits listed in the problem list on initial evaluation provide pt/family education and to maximize pt's level of independence  in the home and community environment.     Pt's spiritual, cultural and educational needs considered and pt agreeable to plan of care and goals.    Anticipated barriers to occupational therapy: none identified.    Goals:  Short Term Goals: 4 weeks   - Pt will increase  strength by 3-5# with BUE to increase performance during daily tasks.  - Pt will carry plate 10 feet using BUE to improve ability to carry household objects safely.  - Priyank is able to place can at shelve at shoulder height using either hand.  - Pt to be independent with oculomotor HEP.   - Pt to be independent with UE strengthening and stretching HEP.   - Pt will decrease time on 9HPT by 4 seconds with right hand to improve fine motor speed and coordination needed to perform money manipulation and ADLs.   - Near point convergence will decrease to 25 cm or less to improve oculomotor coordination.  - Pt will report he is able to read for 10 minutes without eye fatigue or double vision.        Long Term Goals: 8 weeks   - Pt will increase  strength by 6-10# with BUE to increase performance during daily tasks.  - Pt will carry plate 10 feet using either hand to improve ability to carry household objects safely.  - Pt will decrease time on 9HPT by 8 seconds with right hand to improve fine motor speed and coordination needed to perform money manipulation and ADLs.   - Near point convergence will decrease to 20 cm or less to improve oculomotor coordination.  - Pt will report he is able to read for 30 minutes without eye fatigue or double vision.       More goals to be added as appropriate     PLAN     Certification Period/Plan of care expiration: 2/23/2022 to 4/22/2022     Outpatient Occupational Therapy 2 times weekly for 8 weeks to include the following interventions: Neuromuscular Re-ed, Patient Education, Self Care, Therapeutic Activities and Therapeutic Exercise.    Updates/Grading for next session: continue as tolerated.     MARI AGUADALUPE koch  DENVER Ramírez/L, CEAS-I  3/11/2022

## 2022-03-10 ENCOUNTER — OFFICE VISIT (OUTPATIENT)
Dept: NEUROLOGY | Facility: CLINIC | Age: 62
End: 2022-03-10
Payer: MEDICAID

## 2022-03-10 VITALS
WEIGHT: 200.75 LBS | DIASTOLIC BLOOD PRESSURE: 80 MMHG | SYSTOLIC BLOOD PRESSURE: 149 MMHG | HEIGHT: 69 IN | BODY MASS INDEX: 29.73 KG/M2 | HEART RATE: 78 BPM

## 2022-03-10 DIAGNOSIS — I63.50 CEREBROVASCULAR ACCIDENT (CVA) OF RIGHT PONTINE STRUCTURE: ICD-10-CM

## 2022-03-10 PROCEDURE — 99999 PR PBB SHADOW E&M-EST. PATIENT-LVL III: CPT | Mod: PBBFAC,,, | Performed by: STUDENT IN AN ORGANIZED HEALTH CARE EDUCATION/TRAINING PROGRAM

## 2022-03-10 PROCEDURE — 99214 PR OFFICE/OUTPT VISIT, EST, LEVL IV, 30-39 MIN: ICD-10-PCS | Mod: S$PBB,,, | Performed by: STUDENT IN AN ORGANIZED HEALTH CARE EDUCATION/TRAINING PROGRAM

## 2022-03-10 PROCEDURE — 99214 OFFICE O/P EST MOD 30 MIN: CPT | Mod: S$PBB,,, | Performed by: STUDENT IN AN ORGANIZED HEALTH CARE EDUCATION/TRAINING PROGRAM

## 2022-03-10 PROCEDURE — 99999 PR PBB SHADOW E&M-EST. PATIENT-LVL III: ICD-10-PCS | Mod: PBBFAC,,, | Performed by: STUDENT IN AN ORGANIZED HEALTH CARE EDUCATION/TRAINING PROGRAM

## 2022-03-10 PROCEDURE — 99213 OFFICE O/P EST LOW 20 MIN: CPT | Mod: PBBFAC | Performed by: STUDENT IN AN ORGANIZED HEALTH CARE EDUCATION/TRAINING PROGRAM

## 2022-03-10 PROCEDURE — 99213 OFFICE O/P EST LOW 20 MIN: CPT | Mod: PBBFAC | Performed by: PSYCHIATRY & NEUROLOGY

## 2022-03-10 NOTE — PROGRESS NOTES
"  Physical Therapy Daily Treatment Note     Name: Priyank Chavez [facility]  Clinic Number: [id number]    Therapy Diagnosis:   Encounter Diagnosis   Name Primary?    Decreased strength, endurance, and mobility Yes     Physician: Atul Peter.*    Visit Date: 3/11/2022    Physician Orders: Atul Peter MD     Physician Orders: PT Eval and Treat neuro PT2  Medical Diagnosis from Referral: S06.5X9A (ICD-10-CM) - Subdural hematoma  Evaluation Date: 2/23/2022  Authorization Period Expiration: 12/31/2022  Plan of Care Expiration: 4/8/2022  Visit # / Visits authorized: 2/ 25       Time In: 0801  Time Out: 0845  Total Billable Time: 41 minutes    Precautions: Standard    Subjective     Pt reports: Priyank reports Neurology appointment went well on 08/10/22 stating that he is recovering well. Continues to walk 20 - 45 minutes a day.   He was compliant with home exercise program.  Response to previous treatment: no adverse effects reported  Functional change: Continues to arrive without SPC    Pain: 6/10   Location: left back    Description: tightness     Objective   Bold= new or progression  Italicized = new progressions for next session     Priyank received therapeutic exercises to develop strength, endurance, flexibility and core stabilization for 41 minutes including:    Recumbent stepper BUE/LE L4 x 8 min for improved endurance and strength    Supine:   LTR 2 x 12   TA activation 1 x 10- unable to perform pelvic tilt   Seated pelvic tilts    L hamstring stretch with strap 2 x 30 sec   Bridging GTB 2 x 10    Child's pose to L, R- 2 x 30 sec ea    Sit<>stands from mat with 10# kettle bell- 2 x 10 - on foam    Parallel bar:   modified SLS, standing on foam R up on 12" cone 3 x 30 sec   Hip hikes on 6" step, fingertip support 1 x 10 ea side   2 cone tap (6") with R foot 2 x 15   Toe taps to 6" cone w/ foam 2x12 - R foot      Moist heat applied to lumbar region x 10 min with supine ex      Home Exercises " Provided and Patient Education Provided     Education provided:   Patient was instructed to continue HEP and stretching at home to improve back tightness. Discussed safety/strategy of riding stationary bike at home to continue to improve endurance.    Written Home Exercises Provided: Patient instructed to cont prior HEP with emphasis on stretching to manage tightness. Exercises were reviewed and Priyank was able to demonstrate them prior to the end of the session.  Priyank demonstrated good  understanding of the education provided.     See EMR under Patient Instructions for exercises provided prior visit., updated on 3/8/22    Assessment   Priyank tolerated session well today. Reports good compliance with HEP and walking programs at home and continues to report L sided back tightness. During today's session patient demonstrated improved enducrance with stepper and exercises given. Pt. Continues to require verbal cuing for activation of transverse abdominal muscles. Focused on exercises for improved L hip and back mobility, LLE stability, and L hip strengthening with good tolerance. Continues to demonstrate improved LLE stability with progression of exercises. Reporting appropriate challenge with progressed balance exercises. Patient to continue to benefit from skilled physical therapy to address tightness, balance, strength, and activity tolerance so patient is able to return to prior level of function.     Priyank Is progressing well towards his goals.   Pt prognosis is Excellent.     Pt will continue to benefit from skilled outpatient physical therapy to address the deficits listed in the problem list box on initial evaluation, provide pt/family education and to maximize pt's level of independence in the home and community environment.     Pt's spiritual, cultural and educational needs considered and pt agreeable to plan of care and goals.     Anticipated barriers to physical therapy: none    Goals:   Short Term  Goals: 3 weeks   1. Pt will report performing walking program at least 15 minutes at a time for improving tolerance to gait to decrease reliance on SPC. Ongoing  2. Pt will demonstrate 5 times sit<>stand test in 10 sec or less to demonstrate decreased fall risk and improved LE strength. ongoing     Long Term Goals: 6 weeks   1. Pt will demonstrate a 25% improvement in gait velocity to ambulate at an average speed for his age by walking at 1.36 m/s. ongoing  2. Pt will demonstrate improved stability of LLE for improved tolerance to normal mobility and activities by performing SLS x 10 sec.ongoing  3. Pt will deny pain in LLE or back x 1 week to demonstrate improved tolerance to his normal activities.  ongoing    Plan   Plan of care Certification: 2/23/2022 to 4/8/2022.     Outpatient Physical Therapy 2 times weekly (8 visits total) to include the following interventions: Moist Heat/ Ice, Patient Education, Therapeutic Activities and Therapeutic Exercise    Continue with core and hip strengthening. Continue to address SLS on L    Indu Sandoval, SHEILA    I, MARIO ALBERTO JaegerT, certify that I was present in the room directing the student in service delivery and guiding them using my skilled judgment. As the co-signing therapist I have reviewed the students documentation and am responsible for the treatment, assessment, and plan.     Veronica Bosch, PT, DPT, CBIS  Board-Certified Clinical Specialist in Neurologic Physical Therapy    3/11/2022

## 2022-03-10 NOTE — PROGRESS NOTES
Vascular Neurology Clinic  [facility] Follow-up Clinic Visit    Patient Name: Priyank Whittington  MRN: [id number]  Date: 3/10/22  Referring Provider: Brandon Bautista    CC: Ischemic stroke    SUBJECTIVE:      History of Present Illness: Priyank Whittington is a 62 y.o. male with a past medical history significant for DM who presents for follow-up for a recent admission for bilateral subdural hemorrhages and right pontine and paramedial medullary infarctions.     He was admitted to the neurosurgery service from 1/23-2/2/22 after presenting to an outside hospital with 10d of persistent headaches. He underwent right craniotomy for SDH on 1/28/22; EVD was also placed. On 1/31/22, he developed restricted eye movements OD while in [facility]. MRI was ordered, which showed a right pontine and paramedian medullary infarction. His exam was significant for ptosis and CN III palsy OD. No other weakness or aphasia. His hospital work-up included an extended EEG, which was negative for seizures or epileptiform discharges. He was discharged to [facility].     Since his discharge, he has done well and is participating in outpatient PT and OT. He reports residual intermittent mild diplopia and numbness in his left lateral hand and left foot. He has not returned to work as a  yet, but plans to. He denies history of seizures, syncope/presyncope, headaches, falls.      Etiology: Small vessel disease  Intervention: None    Work-up:  Imaging:  - NCCTH (1/23/22) :Multiple bilateral extra-axial fluid collections overlying the cerebral convexities with the largest overlying the right frontal convexity measuring 1.4 cm. Overall component of the collections are composed of both acute and subacute bilateral subdural hematomas. Diffuse cerebral edema with associated 8 mm right to left midline shift and associated right to left subfalcine herniation.  Neurosurgical consultation is recommended.  - CTA h/n (1/24/22): Mild increase in size of mixed  attenuation subdural collection along the right hemisphere.  Midline shift to the left has mildly increased measuring 6 mm at the level of the 3rd ventricle with mild increased effacement of the suprasellar cistern. 2.2 cm meningioma along the planum sphenoidale. No aneurysm or AVM is identified intracranially with no major branch stenosis/occlusion.  - CTA neck (2/1/22): CT angiogram of the cervical vasculature appears within normal limits.  No significant atherosclerotic change or high-grade stenosis.  - MRI brain w/o contrast (1/31/22): Right lateral efraín and right paramedian medulla recent infarcts.  No associated parenchymal hemorrhage or significant mass effect. Evolving postoperative change of right subdural hematoma evacuation.  Thin residual bihemispheric subdural collections with minimal leftward midline shift. 2.1 cm meningioma along the planum sphenoidal.    Cardiac Evaluation:  - TTE (1/29/22): EF 55%, no left atrial enlargement    Labs:  Recent Labs   Lab 01/24/22  0124   Hemoglobin A1C 7.2 H   LDL Cholesterol 84.8   HDL 32 L   Triglycerides 101   Cholesterol 137        Review of Systems: The following systems were reviewed with pertinent positives and negatives documented in the HPI: constitutional, eyes, CV, respiratory, GI, , musculoskeletal, skin, neurological, psychiatric    Past Medical History:  Past Medical History:   Diagnosis Date    Cerebrovascular accident (CVA) of right pontine structure 01/23/2022    Diabetes mellitus     HLD (hyperlipidemia) 1/23/2022    HTN (hypertension) 1/25/2022    Subdural hematoma 01/23/2022       Medications:    Current Outpatient Medications:     acetaminophen (TYLENOL) 325 MG tablet, Take 2 tablets (650 mg total) by mouth every 6 (six) hours as needed for Pain., Disp: , Rfl: 0    aspirin (ECOTRIN) 81 MG EC tablet, Take 1 tablet (81 mg total) by mouth once daily., Disp: , Rfl: 0    atorvastatin (LIPITOR) 40 MG tablet, Take 40 mg by mouth once daily.,  Disp: , Rfl:     baclofen (LIORESAL) 5 mg Tab tablet, Take 1 tablet (5 mg total) by mouth 3 (three) times daily as needed (hiccups)., Disp: , Rfl:     butalbital-acetaminophen-caffeine -40 mg (FIORICET, ESGIC) -40 mg per tablet, Take 1 tablet by mouth every 4 (four) hours as needed for Pain., Disp: , Rfl:     heparin sodium,porcine (HEPARIN, PORCINE,) 5,000 unit/mL injection, Inject 1 mL (5,000 Units total) into the skin every 8 (eight) hours., Disp: , Rfl:     HYDROcodone-acetaminophen (NORCO) 5-325 mg per tablet, Take 1 tablet by mouth every 6 (six) hours as needed for Pain., Disp: 8 tablet, Rfl: 0    metFORMIN (GLUCOPHAGE) 1000 MG tablet, Take 1,000 mg by mouth., Disp: , Rfl:     multivitamin Tab, Take 1 tablet by mouth once daily., Disp: , Rfl:     polyethylene glycol (GLYCOLAX) 17 gram PwPk, Take 17 g by mouth once daily., Disp: , Rfl: 0    senna-docusate 8.6-50 mg (PERICOLACE) 8.6-50 mg per tablet, Take 1 tablet by mouth 2 (two) times daily., Disp: , Rfl:     silodosin (RAPAFLO) 8 mg Cap capsule, Take 1 capsule (8 mg total) by mouth once daily., Disp: , Rfl:     sodium chloride 1 gram tablet, Take 1 tablet (1 g total) by mouth 3 (three) times daily., Disp: , Rfl:     white petrolatum-mineral oil 57.3-42.5% (REFRESH P.M.) 57.3-42.5 % Oint, Place into the right eye every evening., Disp: , Rfl: 0  Any other notable medications as documented in HPI.    Allergies:  Review of patient's allergies indicates:  No Known Allergies    Social History:  Social History     Socioeconomic History    Marital status:    Tobacco Use    Smoking status: Never Smoker    Smokeless tobacco: Never Used   Substance and Sexual Activity    Alcohol use: Never    Drug use: Never    Sexual activity: Yes     Partners: Female     Any other notable Social History as documented in HPI.    Family History:  No family history on file.  Any other notable FMH as documented in HPI.  No known family history of stroke  or TIA.    OBJECTIVE:     Physical Exam:   Vitals:    03/10/22 1542   BP: (!) 149/80   Pulse: 78     GEN: NAD, pleasant, cooperative    CVS: RRR    CHEST: No signs of resp distress, on room air    NEURO:  Mental status: The patient is alert, attentive, and oriented.  Speech: Fluent speech with intact repetition, comprehension, and naming.     Cranial nerves:  CN II: Visual fields are full to confrontation. Pupils are 4 mm and reactive to light.  CN III, IV, VI: Subtle ptosis OD, Subtle dysconjugate eye movements. Denies diplopia today.  CN V: Facial sensation is intact in all 3 divisions bilaterally.  CN VII: Face is symmetric with normal eye closure and smile.  CN VIII: Hearing is normal bilaterally  CN IX, X: Palate elevates symmetrically. Phonation is normal.  CN XI: Head turning and shoulder shrug are intact  CN XII: Tongue is midline with normal movements and no atrophy.    Motor: Muscle bulk and tone are normal. No pronator drift. 5/5 strength throughout.    Reflexes: Reflexes are 2+ and symmetric at the biceps, triceps, knees, and ankles.    Sensory: Decreased sensation to light touch, temperature lateral left hand involving the first three digits. Decreased sensation to light touch left foot. Otherwise intact sensation.    Coordination: Subtle slowing of fine motor movements left hand. There is no dysmetria on finger-to-nose and heel-knee-shin. There are no abnormal or extraneous movements.    Gait/Stance: Posture is normal. Gait is steady with normal steps, base, arm swing, and turning.     ASSESSMENT/PLAN:     Diagnosis/Etiology: Right pontine and medullary infarction, etiology small vessel disease. Recommend aggressive vascular risk factor management.   Stroke Risk Factors: HTN, DM  Effects of Stroke: Mild, intermittent diplopia    Recommendations:   - Additional studies: Referral to neuro-ophthalmology for diplopia, dysconjugate eye movements  - Antiplatelet/Anticoagulation: Continue home ASA 81mg  -  Lipid Management: Goal LDL <70mg/dL. Continue home atorvastatin 40mg.  - Diabetes: Target hemoglobin A1c <7%, measured 2X/year or quarterly if not meeting goals  - Hypertension: Target systolic BP <130-140mmHg, on no BP meds currently  - Therapy: Follow-up with outpatient PT/OT; consider driving evaluation with outpatient OT; f/u neuro-ophthalmology  - RTC as needed      Problem List Items Addressed This Visit        Neuro    Cerebrovascular accident (CVA) of right pontine structure    Relevant Orders    Ambulatory consult to Ophthalmology            Kelsy Ro MD, PhD  Vascular Neurology Fellow, PGY-5

## 2022-03-11 ENCOUNTER — CLINICAL SUPPORT (OUTPATIENT)
Dept: REHABILITATION | Facility: HOSPITAL | Age: 62
End: 2022-03-11
Payer: MEDICAID

## 2022-03-11 DIAGNOSIS — Z74.09 IMPAIRED MOBILITY AND ADLS: ICD-10-CM

## 2022-03-11 DIAGNOSIS — H53.2 DOUBLE VISION WITH BOTH EYES OPEN: Primary | ICD-10-CM

## 2022-03-11 DIAGNOSIS — Z78.9 IMPAIRED MOBILITY AND ADLS: ICD-10-CM

## 2022-03-11 DIAGNOSIS — Z74.09 DECREASED STRENGTH, ENDURANCE, AND MOBILITY: Primary | ICD-10-CM

## 2022-03-11 DIAGNOSIS — R53.1 DECREASED STRENGTH, ENDURANCE, AND MOBILITY: Primary | ICD-10-CM

## 2022-03-11 DIAGNOSIS — R68.89 DECREASED STRENGTH, ENDURANCE, AND MOBILITY: Primary | ICD-10-CM

## 2022-03-11 PROCEDURE — 97010 HOT OR COLD PACKS THERAPY: CPT | Mod: PN | Performed by: PHYSICAL THERAPIST

## 2022-03-11 PROCEDURE — 97110 THERAPEUTIC EXERCISES: CPT | Mod: PN | Performed by: PHYSICAL THERAPIST

## 2022-03-11 PROCEDURE — 97530 THERAPEUTIC ACTIVITIES: CPT | Mod: PN

## 2022-03-11 NOTE — PATIENT INSTRUCTIONS
ROM: Flexion - Wand        Bring wand directly over head, leading with right side. Reach back until stretch is felt.   Repeat 10 times per set. Do 1-3 sets per session. Do 3 sessions per day.        ROM: Flexion - Wand (Supine)        Lie on back holding wand. Raise arms over head.   Repeat 10 times per set. Do 1-3 sets per session. Do 3 sessions per day.       ROM: Extension - Wand (Standing)        Stand holding wand behind back. Raise arms as far as possible.  Repeat 10 times per set. Do 1-3 sets per session. Do 3 sessions per day.    ROM: Abduction - Wand        Holding wand with left hand palm up, push wand directly out to side, leading with other hand palm down, until stretch is felt.  Repeat 10 times per set. Do 1-3 sets per session. Do 3 sessions per day.      ROM: Horizontal Abduction / Adduction - Wand        Keeping both palms down, push right hand across body with other hand. Then pull back across body, keeping arms parallel to floor. Do not allow trunk to twist.   Repeat 10 times per set. Do 1-3 sets per session. Do 3 sessions per day.     ROM: Extension / Internal Rotation - Wand        Stand holding cane behind back with both hands palm-up. Slide cane up spine toward head. Repeat 10 times per set. Perform 1-3 sets per session. Do 3 sessions per day.

## 2022-03-14 PROBLEM — R29.898 DECREASED GRIP STRENGTH: Status: ACTIVE | Noted: 2022-03-14

## 2022-03-14 PROBLEM — Z78.9 ALTERATION IN INSTRUMENTAL ACTIVITIES OF DAILY LIVING (IADL): Status: ACTIVE | Noted: 2022-03-14

## 2022-03-14 PROBLEM — Z78.9 DEFICIT IN ACTIVITIES OF DAILY LIVING (ADL): Status: ACTIVE | Noted: 2022-03-14

## 2022-03-14 NOTE — PROGRESS NOTES
"[facility]  Occupational Therapy Treatment Note    Date: 3/15/2022  Name: Priyank Chavez [facility]  Clinic Number: [id number]    Therapy Diagnosis:   Encounter Diagnosis   Name Primary?    Double vision with both eyes open Yes     Physician: Sabas Tobar    Physician Orders: eval and treat; Neuro program  Medical Diagnosis: S06.5X9A (ICD-10-CM) - Subdural hematoma   Evaluation Date: 2/23/2022  Plan of Care Expiration Period: 4/22/2022  PROGRESS NOTE due at visit 9.  Insurance Authorization period Expiration: 12/31/2022  Date of Return to MD: tbd  Visit # / Visits Authorized: 3/20 (plus eval)  FOTO: 33% limitation    Precautions: Standard, Diabetes and Fall    Time In: 8:45am  Time Out: 9:30am  Total Billable Time: 45 minutes    SUBJECTIVE     Pt reports: "Doing ok. Arm feels good"   He was compliant with home exercise program given last session.   Response to previous treatment: no complaints  Functional change: easier with reading    Pain: 0/10; reports tightness in ribs on left side.  Location: n/a     Involved Side: left  Dominant Side: Right  Date of Onset: 1/22/2022    OBJECTIVE     Objective Measures updated at progress report unless specified.    Treatment     Priyank received the treatments listed below:    Therapeutic activities to improve functional performance for 45 minutes, including:  - Standing UBE res 4.0 x8 min all backwards for increased L periscapular strengthening and endurance   - Seated scapular activation into elevation and retraction x10 each with 5 sec holds left  - Modified quad closed chain left WB with R Ue crossing midline to place and remove squigz on mirror     In Supine:  - reverse pendulums CW and CCW L 3# DB 3x10 both ways    - Standing at mirror D1/d2 extension/flexion patterns using squigz at wall mirror  - standing free motion Long arm ext 3x10 10#   - Standing free motion IR 3x10 7# L Er 3# 3x10 L  - (NP) VOR 1, vertical and horizontal; decreased ROM " towards right. ~100Bpm x 30 seconds x 3 reps horizontal; 25, 20 and 30 seconds with vertical VOR before symptoms of dizziness start.  - gross  with silver spring 3rd rung 2x30 L  - digit ext red band 2x30 L      Patient Education and Home Exercises      Education provided:   - HEP vision; to perform 1 x daily  - HEP shoulder with use of dowel  - Progress towards goals     Written Home Exercises Provided: Patient instructed to cont prior HEP, in addition to HEP provided today.  Exercises were reviewed and Priyank was able to demonstrate them prior to the end of the session.  Priyank demonstrated fair  understanding of the HEP provided. See EMR under Patient Instructions for exercises provided during therapy sessions.       Assessment       Priyank had great tolerance to tx today. He was motivated and participate din all challenges. Well demonstrated L UE activation with all tasks, especially with standing D1/D2 movement. Pt would continue to benefit from skilled OT.     Priyank is progressing well towards his goals and there are no updates to goals at this time. Pt prognosis is Good.     Pt will continue to benefit from skilled outpatient occupational therapy to address the deficits listed in the problem list on initial evaluation provide pt/family education and to maximize pt's level of independence in the home and community environment.     Pt's spiritual, cultural and educational needs considered and pt agreeable to plan of care and goals.    Anticipated barriers to occupational therapy: none identified.    Goals:  Short Term Goals: 4 weeks   - Pt will increase  strength by 3-5# with BUE to increase performance during daily tasks.  - Pt will carry plate 10 feet using BUE to improve ability to carry household objects safely.  - Priyank is able to place can at shelve at shoulder height using either hand.  - Pt to be independent with oculomotor HEP.   - Pt to be independent with UE strengthening and  stretching HEP.   - Pt will decrease time on 9HPT by 4 seconds with right hand to improve fine motor speed and coordination needed to perform money manipulation and ADLs.   - Near point convergence will decrease to 25 cm or less to improve oculomotor coordination.  - Pt will report he is able to read for 10 minutes without eye fatigue or double vision.        Long Term Goals: 8 weeks   - Pt will increase  strength by 6-10# with BUE to increase performance during daily tasks.  - Pt will carry plate 10 feet using either hand to improve ability to carry household objects safely.  - Pt will decrease time on 9HPT by 8 seconds with right hand to improve fine motor speed and coordination needed to perform money manipulation and ADLs.   - Near point convergence will decrease to 20 cm or less to improve oculomotor coordination.  - Pt will report he is able to read for 30 minutes without eye fatigue or double vision.       More goals to be added as appropriate     PLAN     Certification Period/Plan of care expiration: 2/23/2022 to 4/22/2022     Outpatient Occupational Therapy 2 times weekly for 8 weeks to include the following interventions: Neuromuscular Re-ed, Patient Education, Self Care, Therapeutic Activities and Therapeutic Exercise.    Updates/Grading for next session: continue as tolerated.     Gage ROACH  3/14/2022

## 2022-03-15 ENCOUNTER — CLINICAL SUPPORT (OUTPATIENT)
Dept: REHABILITATION | Facility: HOSPITAL | Age: 62
End: 2022-03-15
Payer: MEDICAID

## 2022-03-15 ENCOUNTER — HOSPITAL ENCOUNTER (OUTPATIENT)
Dept: RADIOLOGY | Facility: HOSPITAL | Age: 62
Discharge: HOME | End: 2022-03-15
Attending: PHYSICIAN ASSISTANT
Payer: MEDICAID

## 2022-03-15 ENCOUNTER — OFFICE VISIT (OUTPATIENT)
Dept: NEUROSURGERY | Facility: CLINIC | Age: 62
End: 2022-03-15
Payer: MEDICAID

## 2022-03-15 VITALS
WEIGHT: 200 LBS | HEIGHT: 69 IN | BODY MASS INDEX: 29.62 KG/M2 | SYSTOLIC BLOOD PRESSURE: 125 MMHG | DIASTOLIC BLOOD PRESSURE: 83 MMHG | HEART RATE: 97 BPM

## 2022-03-15 DIAGNOSIS — Z78.9 ALTERATION IN INSTRUMENTAL ACTIVITIES OF DAILY LIVING (IADL): ICD-10-CM

## 2022-03-15 DIAGNOSIS — S06.5XAA SUBDURAL HEMATOMA: Primary | ICD-10-CM

## 2022-03-15 DIAGNOSIS — R29.898 DECREASED GRIP STRENGTH: ICD-10-CM

## 2022-03-15 DIAGNOSIS — S06.5XAA SUBDURAL HEMATOMA: ICD-10-CM

## 2022-03-15 DIAGNOSIS — R68.89 DECREASED STRENGTH, ENDURANCE, AND MOBILITY: Primary | ICD-10-CM

## 2022-03-15 DIAGNOSIS — Z78.9 DEFICIT IN ACTIVITIES OF DAILY LIVING (ADL): ICD-10-CM

## 2022-03-15 DIAGNOSIS — H53.2 DOUBLE VISION WITH BOTH EYES OPEN: Primary | ICD-10-CM

## 2022-03-15 DIAGNOSIS — R53.1 DECREASED STRENGTH, ENDURANCE, AND MOBILITY: Primary | ICD-10-CM

## 2022-03-15 DIAGNOSIS — Z74.09 DECREASED STRENGTH, ENDURANCE, AND MOBILITY: Primary | ICD-10-CM

## 2022-03-15 PROCEDURE — 99214 OFFICE O/P EST MOD 30 MIN: CPT | Mod: PBBFAC,25 | Performed by: PHYSICIAN ASSISTANT

## 2022-03-15 PROCEDURE — 1160F RVW MEDS BY RX/DR IN RCRD: CPT | Mod: CPTII,,, | Performed by: PHYSICIAN ASSISTANT

## 2022-03-15 PROCEDURE — 1159F MED LIST DOCD IN RCRD: CPT | Mod: CPTII,,, | Performed by: PHYSICIAN ASSISTANT

## 2022-03-15 PROCEDURE — 3051F PR MOST RECENT HEMOGLOBIN A1C LEVEL 7.0 - < 8.0%: ICD-10-PCS | Mod: CPTII,,, | Performed by: PHYSICIAN ASSISTANT

## 2022-03-15 PROCEDURE — 3079F DIAST BP 80-89 MM HG: CPT | Mod: CPTII,,, | Performed by: PHYSICIAN ASSISTANT

## 2022-03-15 PROCEDURE — 1160F PR REVIEW ALL MEDS BY PRESCRIBER/CLIN PHARMACIST DOCUMENTED: ICD-10-PCS | Mod: CPTII,,, | Performed by: PHYSICIAN ASSISTANT

## 2022-03-15 PROCEDURE — 3079F PR MOST RECENT DIASTOLIC BLOOD PRESSURE 80-89 MM HG: ICD-10-PCS | Mod: CPTII,,, | Performed by: PHYSICIAN ASSISTANT

## 2022-03-15 PROCEDURE — 3008F PR BODY MASS INDEX (BMI) DOCUMENTED: ICD-10-PCS | Mod: CPTII,,, | Performed by: PHYSICIAN ASSISTANT

## 2022-03-15 PROCEDURE — 3074F PR MOST RECENT SYSTOLIC BLOOD PRESSURE < 130 MM HG: ICD-10-PCS | Mod: CPTII,,, | Performed by: PHYSICIAN ASSISTANT

## 2022-03-15 PROCEDURE — 3051F HG A1C>EQUAL 7.0%<8.0%: CPT | Mod: CPTII,,, | Performed by: PHYSICIAN ASSISTANT

## 2022-03-15 PROCEDURE — 3008F BODY MASS INDEX DOCD: CPT | Mod: CPTII,,, | Performed by: PHYSICIAN ASSISTANT

## 2022-03-15 PROCEDURE — 70450 CT HEAD WITHOUT CONTRAST: ICD-10-PCS | Mod: 26,,, | Performed by: RADIOLOGY

## 2022-03-15 PROCEDURE — 99024 PR POST-OP FOLLOW-UP VISIT: ICD-10-PCS | Mod: ,,, | Performed by: PHYSICIAN ASSISTANT

## 2022-03-15 PROCEDURE — 1159F PR MEDICATION LIST DOCUMENTED IN MEDICAL RECORD: ICD-10-PCS | Mod: CPTII,,, | Performed by: PHYSICIAN ASSISTANT

## 2022-03-15 PROCEDURE — 70450 CT HEAD/BRAIN W/O DYE: CPT | Mod: TC

## 2022-03-15 PROCEDURE — 97110 THERAPEUTIC EXERCISES: CPT | Mod: PN | Performed by: PHYSICAL THERAPIST

## 2022-03-15 PROCEDURE — 70450 CT HEAD/BRAIN W/O DYE: CPT | Mod: 26,,, | Performed by: RADIOLOGY

## 2022-03-15 PROCEDURE — 99999 PR PBB SHADOW E&M-EST. PATIENT-LVL IV: ICD-10-PCS | Mod: PBBFAC,,, | Performed by: PHYSICIAN ASSISTANT

## 2022-03-15 PROCEDURE — 97530 THERAPEUTIC ACTIVITIES: CPT | Mod: PN

## 2022-03-15 PROCEDURE — 3074F SYST BP LT 130 MM HG: CPT | Mod: CPTII,,, | Performed by: PHYSICIAN ASSISTANT

## 2022-03-15 PROCEDURE — 99024 POSTOP FOLLOW-UP VISIT: CPT | Mod: ,,, | Performed by: PHYSICIAN ASSISTANT

## 2022-03-15 PROCEDURE — 99999 PR PBB SHADOW E&M-EST. PATIENT-LVL IV: CPT | Mod: PBBFAC,,, | Performed by: PHYSICIAN ASSISTANT

## 2022-03-15 NOTE — PROGRESS NOTES
"  Physical Therapy Daily Treatment Note     Name: Priyank Whartongesha  Clinic Number: [id number]    Therapy Diagnosis:   Encounter Diagnosis   Name Primary?    Decreased strength, endurance, and mobility Yes     Physician: Atul Peter.*    Visit Date: 3/15/2022    Physician Orders: Atul Peter MD     Physician Orders: PT Eval and Treat neuro PT2  Medical Diagnosis from Referral: S06.5X9A (ICD-10-CM) - Subdural hematoma  Evaluation Date: 2/23/2022  Authorization Period Expiration: 12/31/2022  Plan of Care Expiration: 4/8/2022  Visit # / Visits authorized: 3/ 25       Time In: 08:00  Time Out: 08:44  Total Billable Time: 41 minutes    Precautions: Standard    Subjective     Pt reports: Pt. Continues to report back tightness. Continues to walk 30 minutes/day.     He was compliant with home exercise program.  Response to previous treatment: Reports no adverse effects  Functional change: Ongoing. Continues to report to appointment with no SPC    Pain: 5/10  Location: left back    Description: tightness     Objective   Bold= new or progression      Priyank received therapeutic exercises to develop strength, endurance, flexibility and core stabilization for 41 minutes including:    Recumbent stepper BUE/LE L5 x 6 min for improved endurance and strength    Supine:   LTR 2 x 12   L hamstring stretch with strap 2 x 30 sec   Moist heat applied to lumbar region x 5 min with supine ex    Child's pose to L, R - 2 x 30 sec ea  Seated TA drawing in - 2 x 10, VC required     Sit<>stands from chair with 10# kettle bell- 2 x 10 - feet on foam    Parallel bar:   modified SLS, standing on foam R up on 12" cone 1 x 30 sec   SLS on firm surface 2 x 30 seconds   Toe tap to 6" cone w/ foam 2 x 20 - R foot only   Fwd step ups on 6" step - 2 x 12 ea   Lat step ups on 6" step 1 x 12 ea   Walking marches w/ farmer carries - 7.5lbs, 5 laps ea hand     Side steps w/ YTB - x 3 laps (20 ft)  Monster walks w/ YTB - x 3 laps " MA spoke to patient and patient verified last name and date of birth. Patient informed of normal results from 3/22/2019. Patient aware the following results are still pending NA. Patient verbalizes understanding, and was encouraged to call back at [phone] with any additional questions or concerns.     ----- Message from Selene Bishop CNM sent at 3/26/2019  7:25 PM CDT -----  Please notify Debra of her negative GBS. Thanks!  Selene     (20 ft)      Home Exercises Provided and Patient Education Provided     Education provided:   Patient was instructed to continue HEP and stretching at home to improve back tightness. Discussed safety/strategy of riding stationary bike at home to continue to improve endurance.    Written Home Exercises Provided: Patient instructed to cont prior HEP with emphasis on stretching to manage tightness. Exercises were reviewed and Priyank was able to demonstrate them prior to the end of the session.  Priyank demonstrated good  understanding of the education provided.     See EMR under Patient Instructions for exercises provided prior visit., updated on 3/8/22    Assessment   Priyank tolerated session well today and continues to report good compliance with HEP and his walking program. Pt. Continues to have L sides tightness in his back. During today's session, Priyank demonstrated improved hamstring length and improved single leg static balance. Patient continues to require verbal cuing for activation of transverse abdominal muscles. Continues to demonstrate LLE instability with dynamic exercises requiring further focus in following session. Patient to continue to benefit from skilled physical therapy to address L sided tightness, balance, LLE strength, and activity tolerance.    Priyank Is progressing well towards his goals.   Pt prognosis is Excellent.     Pt will continue to benefit from skilled outpatient physical therapy to address the deficits listed in the problem list box on initial evaluation, provide pt/family education and to maximize pt's level of independence in the home and community environment.     Pt's spiritual, cultural and educational needs considered and pt agreeable to plan of care and goals.     Anticipated barriers to physical therapy: none    Goals:   Short Term Goals: 3 weeks   1. Pt will report performing walking program at least 15 minutes at a time for improving tolerance to gait to decrease  reliance on SPC. Ongoing  2. Pt will demonstrate 5 times sit<>stand test in 10 sec or less to demonstrate decreased fall risk and improved LE strength. ongoing     Long Term Goals: 6 weeks   1. Pt will demonstrate a 25% improvement in gait velocity to ambulate at an average speed for his age by walking at 1.36 m/s. ongoing  2. Pt will demonstrate improved stability of LLE for improved tolerance to normal mobility and activities by performing SLS x 10 sec.MET, 3/15/22  3. Pt will deny pain in LLE or back x 1 week to demonstrate improved tolerance to his normal activities.  ongoing    Plan   Plan of care Certification: 2/23/2022 to 4/8/2022.     Outpatient Physical Therapy 2 times weekly (8 visits total) to include the following interventions: Moist Heat/ Ice, Patient Education, Therapeutic Activities and Therapeutic Exercise    Continue with core and hip strengthening. Continue to address SLS on L    Indu Sandoval, SHEILA    I, Veronica Bosch, DPT, certify that I was present in the room directing the student in service delivery and guiding them using my skilled judgment. As the co-signing therapist I have reviewed the students documentation and am responsible for the treatment, assessment, and plan.     Veronica Bosch, PT, DPT, CBIS  Board-Certified Clinical Specialist in Neurologic Physical Therapy    3/15/2022

## 2022-03-15 NOTE — PROGRESS NOTES
Neurosurgery  Established Patient    SUBJECTIVE:     History of Present Illness:  63 yo M s/p craniotomy for SDH on 1/28/22 with Dr. Candelaria.  Patient overall making gradual improvement since he left the hospital.  Denies new worsening headache.  Denies visual changes, weakness, speech difficulty, balance difficulty, or seizures.  Patient has been working with physical therapy.  He is still on aspirin 81 mg daily.    Review of patient's allergies indicates:  No Known Allergies    Current Outpatient Medications   Medication Sig Dispense Refill    aspirin (ECOTRIN) 81 MG EC tablet Take 1 tablet (81 mg total) by mouth once daily.  0    atorvastatin (LIPITOR) 40 MG tablet Take 40 mg by mouth once daily.      baclofen (LIORESAL) 5 mg Tab tablet Take 1 tablet (5 mg total) by mouth 3 (three) times daily as needed (hiccups).      metFORMIN (GLUCOPHAGE) 1000 MG tablet Take 1,000 mg by mouth.      silodosin (RAPAFLO) 8 mg Cap capsule Take 1 capsule (8 mg total) by mouth once daily.      acetaminophen (TYLENOL) 325 MG tablet Take 2 tablets (650 mg total) by mouth every 6 (six) hours as needed for Pain. (Patient not taking: Reported on 3/15/2022)  0    butalbital-acetaminophen-caffeine -40 mg (FIORICET, ESGIC) -40 mg per tablet Take 1 tablet by mouth every 4 (four) hours as needed for Pain.      heparin sodium,porcine (HEPARIN, PORCINE,) 5,000 unit/mL injection Inject 1 mL (5,000 Units total) into the skin every 8 (eight) hours. (Patient not taking: Reported on 3/15/2022)      HYDROcodone-acetaminophen (NORCO) 5-325 mg per tablet Take 1 tablet by mouth every 6 (six) hours as needed for Pain. (Patient not taking: Reported on 3/15/2022) 8 tablet 0    multivitamin Tab Take 1 tablet by mouth once daily. (Patient not taking: Reported on 3/15/2022)      polyethylene glycol (GLYCOLAX) 17 gram PwPk Take 17 g by mouth once daily. (Patient not taking: Reported on 3/15/2022)  0    senna-docusate 8.6-50 mg (PERICOLACE)  "8.6-50 mg per tablet Take 1 tablet by mouth 2 (two) times daily. (Patient not taking: Reported on 3/15/2022)      sodium chloride 1 gram tablet Take 1 tablet (1 g total) by mouth 3 (three) times daily. (Patient not taking: Reported on 3/15/2022)      white petrolatum-mineral oil 57.3-42.5% (REFRESH P.M.) 57.3-42.5 % Oint Place into the right eye every evening. (Patient not taking: Reported on 3/15/2022)  0     No current facility-administered medications for this visit.       Past Medical History:   Diagnosis Date    Cerebrovascular accident (CVA) of right pontine structure 01/23/2022    Diabetes mellitus     HLD (hyperlipidemia) 1/23/2022    HTN (hypertension) 1/25/2022    Subdural hematoma 01/23/2022     Past Surgical History:   Procedure Laterality Date    CRANIOTOMY FOR EVACUATION OF SUBDURAL HEMATOMA Right 1/28/2022    Procedure: CRANIOTOMY, FOR SUBDURAL HEMATOMA EVACUATION;  Surgeon: Ace Candelaria MD;  Location: [facility] OR [address] [place] [address];  Service: Neurosurgery;  Laterality: Right;  right, possible bilateral     Family History    None       Social History     Socioeconomic History    Marital status:    Tobacco Use    Smoking status: Never Smoker    Smokeless tobacco: Never Used   Substance and Sexual Activity    Alcohol use: Never    Drug use: Never    Sexual activity: Yes     Partners: Female       Review of Systems  Constitutional: no fever or chills  Eyes: no visual changes  ENT: no nasal congestion or sore throat  Respiratory: no cough or shortness of breath  Cardiovascular: no chest pain or palpitations  Gastrointestinal: no nausea or vomiting  Genitourinary: no hematuria or dysuria  Integument/Breast: no rash or pruritis  Hematologic/Lymphatic: no easy bruising or lymphadenopathy  Musculoskeletal: no arthralgias or myalgias  Neurological: no seizures or tremors    OBJECTIVE:     Vital Signs  Pulse: 97  BP: 125/83  Pain Score: 0-No pain  Height: 5' 9" (175.3 cm)  Weight: 90.7 kg (200 lb)  Body " mass index is 29.53 kg/m².    Neurosurgery Physical Exam  General: no distress  Neurologic: Alert and oriented. Thought content appropriate.  Head: normocephalic. Surgical incision well healed  GCS: Motor: 6/Verbal: 5/Eyes: 4 GCS Total: 15  Cranial nerves: face symmetric, tongue midline, pupils equal, round, reactive to light with accomodation, extraocular muscles intact  Sensory: mild nubness throughout left side to light touch  Motor Strength:full strength upper and lower extremities, No focal numbness or weakness  Pronator Drift: no drift noted  Finger to nose normal  Lungs:  normal respiratory effort  Abdomen: soft, non-tender   Extremities: no cyanosis or edema, or clubbing      Diagnostic Results: personally reviewed  EXAMINATION:  CT HEAD WITHOUT CONTRAST     CLINICAL HISTORY:  r sdh evac follow up; Traumatic subdural hemorrhage with loss of consciousness of unspecified duration, initial encounter     TECHNIQUE:  Low dose axial images were obtained through the head.  Coronal and sagittal reformations were also performed. Contrast was not administered.     COMPARISON:  CT and MRI dated 01/31/2022     FINDINGS:  Expected evolution of postsurgical changes status post right-sided craniotomy and extra-axial hemorrhage evacuation.  There has been resorption of some of the previously noted hyperdense hemorrhage beneath the craniotomy flap and along the posterior falx when compared to the prior study.  No evidence of new hemorrhage is identified.  Residual small low-density extra-axial collection beneath the craniotomy flap measures 5 mm in width.     No new intraparenchymal process.  No midline shift herniation or hydrocephalus.     Impression:     Expected evolution of postsurgical changes.  No new intracranial hemorrhage with resorption of some of the previously identified hyperdense blood products.  Small residual low-density collection beneath the craniotomy flap.        Electronically signed by: Hunter  Jakob  Date:                                            03/15/2022  Time:                                           12:38    ASSESSMENT/PLAN:     61 yo M s/p craniotomy for SDH on 1/28/22 with Dr. Candelaria.  Repeat head CT shows interval reduction in size of postoperative subdural collection, without acute intracranial abnormality.  Patient doing well from neurosurgical standpoint will follow up in 3 months with repeat head CT.  Patient needs follow-up with vascular Neurology for previously identified pontine infarct, our staff will reach out to schedule appointment.  Continue aspirin 81 mg at this time.  Patient urged to call clinic with questions or concerns the meantime.    Malik Toure Jr. SHAYY  [facility]  Department of Neurosurgery      Note dictated with voice recognition software, please excuse any grammatical errors.

## 2022-03-17 NOTE — PROGRESS NOTES
"[facility]  Occupational Therapy Treatment Note    Date: 3/18/2022  Name: Priyank Whittington  Clinic Number: [id number]    Therapy Diagnosis:   Encounter Diagnoses   Name Primary?    Decreased  strength Yes    Deficit in activities of daily living (ADL)     Double vision with both eyes open     Alteration in instrumental activities of daily living (IADL)      Physician: Sabas Tobar    Physician Orders: eval and treat; Neuro program  Medical Diagnosis: S06.5X9A (ICD-10-CM) - Subdural hematoma   Evaluation Date: 2/23/2022  Plan of Care Expiration Period: 4/22/2022  PROGRESS NOTE due at visit 9.  Insurance Authorization period Expiration: 12/31/2022  Date of Return to MD: tbd  Visit # / Visits Authorized: 4/20 (plus eval)  FOTO: 33% limitation    Precautions: Standard, Diabetes and Fall    Time In: 8:45am  Time Out: 9:30am  Total Billable Time: 45 minutes    SUBJECTIVE     Pt reports: "Feeling a little tight in my left side  He was compliant with home exercise program given last session.   Response to previous treatment: no complaints  Functional change: easier with reading    Pain: 0/10; reports tightness in ribs on left side.  Location: n/a     Involved Side: left  Dominant Side: Right  Date of Onset: 1/22/2022    OBJECTIVE     Objective Measures updated at progress report unless specified.    Treatment     Priyank received the treatments listed below:    Therapeutic activities to improve functional performance for 45 minutes, including:  - Standing UBE res 4.0 x8 min all backwards for increased L periscapular strengthening and endurance   - Seated scapular activation into elevation and retraction x10 each with 5 sec holds left  - Modified quad closed chain left WB with R Ue crossing midline to place and remove squigz on mirror     In Supine:  - reverse pendulums alphabet x2 4# DB 3x10 both ways    - (NP) Standing at mirror D1/d2 extension/flexion patterns using squigz at " wall mirror  - (NP) VOR 1, vertical and horizontal; decreased ROM towards right. ~100Bpm x 30 seconds x 3 reps horizontal; and 30 seconds with vertical x3 VOR before symptoms of dizziness start.  - gross  with silver spring 4th rung 2x30 L  - digit ext red band 2x30 L  - walking with weights 10# DB each hand x4 laps around gym  - Standing functional reach with weights 6-10# DBs from counter to shelf at eye level x2 trials each  - Standing at free motion 3x10 long arm ext 10#  - wall slides into shoulder Abd 2x30 sec holds and ER doorway stretch 2x30 sec      Patient Education and Home Exercises      Education provided:   - HEP vision; to perform 1 x daily  - HEP shoulder with use of dowel  - Progress towards goals     Written Home Exercises Provided: Patient instructed to cont prior HEP, in addition to HEP provided today.  Exercises were reviewed and Priyank was able to demonstrate them prior to the end of the session.  Priyank demonstrated fair  understanding of the HEP provided. See EMR under Patient Instructions for exercises provided during therapy sessions.       Assessment       Priyank had great tolerance to tx again today. He was motivated and participated in all challenges. Well demonstrated L UE activation with all tasks, especially with functional reach and carry. Pt would continue to benefit from skilled OT.     Priyank is progressing well towards his goals and there are no updates to goals at this time. Pt prognosis is Good.     Pt will continue to benefit from skilled outpatient occupational therapy to address the deficits listed in the problem list on initial evaluation provide pt/family education and to maximize pt's level of independence in the home and community environment.     Pt's spiritual, cultural and educational needs considered and pt agreeable to plan of care and goals.    Anticipated barriers to occupational therapy: none identified.    Goals:  Short Term Goals: 4 weeks   - Pt will  increase  strength by 3-5# with BUE to increase performance during daily tasks.  - Pt will carry plate 10 feet using BUE to improve ability to carry household objects safely.  - Priyank is able to place can at shelve at shoulder height using either hand.  - Pt to be independent with oculomotor HEP.   - Pt to be independent with UE strengthening and stretching HEP.   - Pt will decrease time on 9HPT by 4 seconds with right hand to improve fine motor speed and coordination needed to perform money manipulation and ADLs.   - Near point convergence will decrease to 25 cm or less to improve oculomotor coordination.  - Pt will report he is able to read for 10 minutes without eye fatigue or double vision.        Long Term Goals: 8 weeks   - Pt will increase  strength by 6-10# with BUE to increase performance during daily tasks.  - Pt will carry plate 10 feet using either hand to improve ability to carry household objects safely.  - Pt will decrease time on 9HPT by 8 seconds with right hand to improve fine motor speed and coordination needed to perform money manipulation and ADLs.   - Near point convergence will decrease to 20 cm or less to improve oculomotor coordination.  - Pt will report he is able to read for 30 minutes without eye fatigue or double vision.       More goals to be added as appropriate     PLAN     Certification Period/Plan of care expiration: 2/23/2022 to 4/22/2022     Outpatient Occupational Therapy 2 times weekly for 8 weeks to include the following interventions: Neuromuscular Re-ed, Patient Education, Self Care, Therapeutic Activities and Therapeutic Exercise.    Updates/Grading for next session: continue as tolerated.     Gage ROACH  3/17/2022

## 2022-03-18 ENCOUNTER — CLINICAL SUPPORT (OUTPATIENT)
Dept: REHABILITATION | Facility: HOSPITAL | Age: 62
End: 2022-03-18
Payer: MEDICAID

## 2022-03-18 DIAGNOSIS — Z74.09 DECREASED STRENGTH, ENDURANCE, AND MOBILITY: Primary | ICD-10-CM

## 2022-03-18 DIAGNOSIS — Z78.9 DEFICIT IN ACTIVITIES OF DAILY LIVING (ADL): ICD-10-CM

## 2022-03-18 DIAGNOSIS — Z78.9 ALTERATION IN INSTRUMENTAL ACTIVITIES OF DAILY LIVING (IADL): ICD-10-CM

## 2022-03-18 DIAGNOSIS — H53.2 DOUBLE VISION WITH BOTH EYES OPEN: ICD-10-CM

## 2022-03-18 DIAGNOSIS — R53.1 DECREASED STRENGTH, ENDURANCE, AND MOBILITY: Primary | ICD-10-CM

## 2022-03-18 DIAGNOSIS — R29.898 DECREASED GRIP STRENGTH: Primary | ICD-10-CM

## 2022-03-18 DIAGNOSIS — R68.89 DECREASED STRENGTH, ENDURANCE, AND MOBILITY: Primary | ICD-10-CM

## 2022-03-18 PROCEDURE — 97110 THERAPEUTIC EXERCISES: CPT | Mod: PN | Performed by: PHYSICAL THERAPIST

## 2022-03-18 PROCEDURE — 97530 THERAPEUTIC ACTIVITIES: CPT | Mod: PN

## 2022-03-18 NOTE — PROGRESS NOTES
See POC for PT reassessment    Veronica Bosch, PT, DPT, CBIS  Board-Certified Clinical Specialist in Neurologic Physical Therapy

## 2022-03-18 NOTE — PLAN OF CARE
Physical Therapy Daily Treatment Note     Name: Priyank Chavezha  Clinic Number: [id number]    Therapy Diagnosis:   Encounter Diagnosis   Name Primary?    Decreased strength, endurance, and mobility Yes     Physician: Atul Peter.*    Visit Date: 3/18/2022    Physician Orders: Atul Peter MD     Physician Orders: PT Eval and Treat neuro PT2  Medical Diagnosis from Referral: S06.5X9A (ICD-10-CM) - Subdural hematoma  Evaluation Date: 2/23/2022  Authorization Period Expiration: 12/31/2022  Plan of Care Expiration: 4/8/2022  Visit # / Visits authorized: 4/ 25       Time In: 0930  Time Out: 10:13  Total Billable Time: 41 minutes    Precautions: Standard    Subjective     Pt reports: Patient reports continues L back tightness, but overall improved endurance. Continues to walk everyday and is able to walk for 30 minutes.     He was compliant with home exercise program.  Response to previous treatment: Reports no adverse effects  Functional change: No longer requires SPC.     Pain: 5/10  Location: left back    Description: tightness     Objective     Bold= new or progression    Priyank received therapeutic exercises to develop strength, endurance, flexibility and core stabilization for 41 minutes including:     Strength: manual muscle test grades below   Upper Extremity Strength  Refer to OT report for details      Lower Extremity Strength   RLE 2/23 RLE 3/18 LLE 2/23 LLE 3/18   Hip Flexion: 5/5 5/5 5/5 5/5   Hip Extension:  4+/5 5/5 4/5 5/5   Hip Abduction: 5/5 5/5 4/5 5/5   Hip Adduction: NT NT NT 5/5   Knee Extension: 5/5 5/5 5/5 5/5   Knee Flexion: 5/5 5/5 4+/5 5/5   Ankle Dorsiflexion: 5/5 5/5 5/5 5/5   Ankle Plantarflexion: 5/5 NT 5/5 NT   Ankle Inversion: NT NT NT NT   Ankle Eversion: NT NT NT NT       Abdominal Strength: NT          Evaluation 3/18   Single Limb Stance R LE 26 sec  (<10 sec = HIGH FALL RISK) 30 sec   Single Limb Stance L LE 3 sec  (<10 sec = HIGH FALL RISK) 30 sec   5  times sit-stand 13 seconds  >12 sec= fall risk for general elderly  >16 sec= fall risk for Parkinson's disease  >10 sec= balance/vestibular dysfunction (<61 y/o)  >14.2 sec= balance/vestibular dysfunction (>61 y/o)  >12 sec= fall risk for CVA 9.19s    FGA NT NT      Postural control:  MCTSIB:  1. Eyes Open/feet together/Firm: 30 seconds, no sway  2. Eyes Closed/feet together/Firm: 30 seconds, min sway  3. Eyes Open/feet together/Foam: 30 seconds, min sway  4. Eyes Closed/feet together/Foam: 30 seconds, min-mod sway     Gait Assessment:   - AD used: None  - Assistance: independent  - Distance: community  - Curb: NT  - Ramp: NT  - Stairs: Independent - reciprocal stepping, no UE support        GAIT DEVIATIONS:  Gait component performance: none     Impairments contributing to deviations: none     Endurance Deficit: good        Evaluation 3/18   Timed Up and Go NT  < 20 sec safe for independent transfers,     < 30 sec assist required for transfers NT   6 meter walk test 1.09 m/s (per 6 MWT) 1.86 m/s (per 6 MWT)   6 min walk test 1276 ft 1511 ft       Ascending/ descening 2 steps - 4 x 5   Ascending - intermittent step to - VC for reciprocal   Descending - intermittent step to - VC for reciprocal     Walking marchsantosh w/ farmer carries - 7.5lbs, 2 x 25 ft ea hand      Home Exercises Provided and Patient Education Provided     Education provided:   Patient was instructed to continue HEP and stretching at home to improve back tightness.  - reviewed results of assessments, good progress     Written Home Exercises Provided: Patient instructed to cont prior HEP with emphasis on stretching to manage tightness. Exercises were reviewed and Priyank was able to demonstrate them prior to the end of the session.  Priyank demonstrated good  understanding of the education provided.     See EMR under Patient Instructions for exercises provided prior visit., updated on 3/8/22    Assessment   Assessment period: 2/23/2022 to 3/18/2022    Gretchen demonstrates significant improvements in static single limb stability and LLE strength. Patient demonstrated normal walking speed for males his age indicated by the 6 minute walk test. [facility] deficits in dynamic single limb stability and endurance to improve negotiation of curbs and return to prior level of function.     Priyank Is progressing well towards his goals.   Pt prognosis is Excellent.     Pt will continue to benefit from skilled outpatient physical therapy to address the deficits listed in the problem list box on initial evaluation, provide pt/family education and to maximize pt's level of independence in the home and community environment.     Pt's spiritual, cultural and educational needs considered and pt agreeable to plan of care and goals.     Anticipated barriers to physical therapy: none    Goals:   Short Term Goals: 3 weeks   1. Pt will report performing walking program at least 15 minutes at a time for improving tolerance to gait to decrease reliance on SPC. MET 03/18/22  2. Pt will demonstrate 5 times sit<>stand test in 10 sec or less to demonstrate decreased fall risk and improved LE strength. MET 03/18/22     Long Term Goals: 8 weeks - Modified on 3/18/22  1. Pt will demonstrate a 25% improvement in gait velocity to ambulate at an average speed for his age by walking at 1.36 m/s. MET 3/15  2. Pt will demonstrate improved stability of LLE for improved tolerance to normal mobility and activities by performing SLS x 10 sec.MET, 3/15/22  3. Pt will deny pain in LLE or back x 1 week to demonstrate improved tolerance to his normal activities.  ongoing - patient continues to have pain in L side of back.   4. Patient will be able to ascend and descend stairs with reciprocal gait to demonstrate improved SL stability and community ambulation ongoing  5. Patient will report walking for 45 minutes at a time to demonstrate improved  endurance and improved tolerance to gait. ongoing  6. Patient will be able to carry 20lbs 200 feet to demonstrate ability to demonstrate ability to perform work tasks. ongoing    Plan   Plan of care Certification: 2/23/2022 to 4/8/2022.     Outpatient Physical Therapy 2 times weekly (8 visits total) to include the following interventions: Moist Heat/ Ice, Patient Education, Therapeutic Activities and Therapeutic Exercise    Continued focus on core stability with emphasis on more functional tasks with goal to eventually return to work.     Indu Sandoval, SPT    I, Veronica Bosch, DPT, certify that I was present in the room directing the student in service delivery and guiding them using my skilled judgment. As the co-signing therapist I have reviewed the students documentation and am responsible for the treatment, assessment, and plan.     Veronica Bosch, PT, DPT, CBIS  Board-Certified Clinical Specialist in Neurologic Physical Therapy    3/18/2022

## 2022-03-22 ENCOUNTER — CLINICAL SUPPORT (OUTPATIENT)
Dept: REHABILITATION | Facility: HOSPITAL | Age: 62
End: 2022-03-22
Payer: MEDICAID

## 2022-03-22 DIAGNOSIS — Z78.9 DEFICIT IN ACTIVITIES OF DAILY LIVING (ADL): ICD-10-CM

## 2022-03-22 DIAGNOSIS — R29.898 DECREASED GRIP STRENGTH: Primary | ICD-10-CM

## 2022-03-22 DIAGNOSIS — Z78.9 ALTERATION IN INSTRUMENTAL ACTIVITIES OF DAILY LIVING (IADL): ICD-10-CM

## 2022-03-22 DIAGNOSIS — H53.2 DOUBLE VISION WITH BOTH EYES OPEN: ICD-10-CM

## 2022-03-22 PROCEDURE — 97530 THERAPEUTIC ACTIVITIES: CPT | Mod: PN

## 2022-03-22 NOTE — PROGRESS NOTES
"[facility]  Occupational Therapy Treatment Note    Date: 3/22/2022  Name: Priyank Whartongesha  Clinic Number: [id number]    Therapy Diagnosis:   Encounter Diagnoses   Name Primary?    Decreased  strength Yes    Deficit in activities of daily living (ADL)     Double vision with both eyes open     Alteration in instrumental activities of daily living (IADL)      Physician: Sabas Tobar    Physician Orders: eval and treat; Neuro program  Medical Diagnosis: S06.5X9A (ICD-10-CM) - Subdural hematoma   Evaluation Date: 2/23/2022  Plan of Care Expiration Period: 4/22/2022  PROGRESS NOTE due at visit 9.  Insurance Authorization period Expiration: 12/31/2022  Date of Return to MD: tbd  Visit # / Visits Authorized: 5/20 (plus eval)  FOTO: 33% limitation    Precautions: Standard, Diabetes and Fall    Time In: 12:25pm  Time Out: 1:10pm  Total Billable Time: 45 minutes    SUBJECTIVE     Pt reports: "Doing everyhting without issue. Just having some side tightness"   He was compliant with home exercise program given last session.   Response to previous treatment: no complaints  Functional change: easier with reading    Pain: 0/10; reports tightness in ribs on left side.  Location: n/a     Involved Side: left  Dominant Side: Right  Date of Onset: 1/22/2022    OBJECTIVE     Objective Measures updated at progress report unless specified.    Treatment     Priyank received the treatments listed below:    Therapeutic activities to improve functional performance for 45 minutes, including:  - Standing UBE res 5.0 x8 min all backwards for increased L periscapular strengthening and endurance   - Seated scapular activation into elevation and retraction x10 each with 5 sec holds left  - Modified quad closed chain left WB with R Ue crossing midline to place and remove squigz on mirror     - gross  with silver spring 4th rung 2x30 L  - digit ext red band 2x30 L  - Standing at free motion 3x10 long arm " ext 10#  - Standing free motion IR 3x10 7# L Er 3# 3x10 L  - wall slides into shoulder Abd 2x30 sec holds and ER doorway stretch 2x30 sec  - Fine motor tasking:    - bolt board all 10 nuts holding onto all 10 and remove/replace nuts   - coin  x5 L hand      Patient Education and Home Exercises      Education provided:   - HEP vision; to perform 1 x daily  - HEP shoulder with use of dowel  - Progress towards goals     Written Home Exercises Provided: Patient instructed to cont prior HEP, in addition to HEP provided today.  Exercises were reviewed and Priyank was able to demonstrate them prior to the end of the session.  Priyank demonstrated fair  understanding of the HEP provided. See EMR under Patient Instructions for exercises provided during therapy sessions.       Assessment     Priyank again tolerated session well. All exercises completed without difficulty. He is reporting full return of function with some L sided flank tightness. Pt would continue to benefit from skilled OT.     Priyank is progressing well towards his goals and there are no updates to goals at this time. Pt prognosis is Good.     Pt will continue to benefit from skilled outpatient occupational therapy to address the deficits listed in the problem list on initial evaluation provide pt/family education and to maximize pt's level of independence in the home and community environment.     Pt's spiritual, cultural and educational needs considered and pt agreeable to plan of care and goals.    Anticipated barriers to occupational therapy: none identified.    Goals:  Short Term Goals: 4 weeks   - Pt will increase  strength by 3-5# with BUE to increase performance during daily tasks.  - Pt will carry plate 10 feet using BUE to improve ability to carry household objects safely.  - Priyank is able to place can at shelve at shoulder height using either hand.  - Pt to be independent with oculomotor HEP.   - Pt to be independent with UE  strengthening and stretching HEP.   - Pt will decrease time on 9HPT by 4 seconds with right hand to improve fine motor speed and coordination needed to perform money manipulation and ADLs.   - Near point convergence will decrease to 25 cm or less to improve oculomotor coordination.  - Pt will report he is able to read for 10 minutes without eye fatigue or double vision.        Long Term Goals: 8 weeks   - Pt will increase  strength by 6-10# with BUE to increase performance during daily tasks.  - Pt will carry plate 10 feet using either hand to improve ability to carry household objects safely.  - Pt will decrease time on 9HPT by 8 seconds with right hand to improve fine motor speed and coordination needed to perform money manipulation and ADLs.   - Near point convergence will decrease to 20 cm or less to improve oculomotor coordination.  - Pt will report he is able to read for 30 minutes without eye fatigue or double vision.       More goals to be added as appropriate     PLAN     Certification Period/Plan of care expiration: 2/23/2022 to 4/22/2022     Outpatient Occupational Therapy 2 times weekly for 8 weeks to include the following interventions: Neuromuscular Re-ed, Patient Education, Self Care, Therapeutic Activities and Therapeutic Exercise.    Updates/Grading for next session: Reassess with possible d/c    Gage ROACH  3/22/2022

## 2022-03-22 NOTE — PROGRESS NOTES
"    Physical Therapy Daily Treatment Note     Name: Priyank Chavez [facility]  Clinic Number: [id number]    Therapy Diagnosis:   Encounter Diagnosis   Name Primary?    Decreased strength, endurance, and mobility Yes     Physician: Atul Peter.*    Visit Date: 3/24/2022    Physician Orders: Atul Peter MD     Physician Orders: PT Eval and Treat neuro PT2  Medical Diagnosis from Referral: S06.5X9A (ICD-10-CM) - Subdural hematoma  Evaluation Date: 2/23/2022  Authorization Period Expiration: 12/31/2022  Plan of Care Expiration: 4/8/2022  Visit # / Visits authorized: 5/ 25       Time In: 11:00  Time Out: 11:46  Total Billable Time: 43 minutes    Precautions: Standard    Subjective     Pt reports: Pt. Continues to report back tightness. Patient expresses that he feels he has no real limitations at home and his main complaint is tightness in the low back. Continues to walk 30 - 35 minutes/day.   He was compliant with home exercise program.  Response to previous treatment: Reports no adverse effects  Functional change: ongoing    Pain: 4/10  Location: left back    Description: tightness     Objective   Bold= new or progression    Priyank received therapeutic exercises to develop strength, endurance, flexibility and core stabilization for 43 minutes including:    Recumbent stepper BUE/LE L6 x 6 min for improved endurance and strength    AT // Bars:    SL stance - 3 x 30s L leg - mod sway   Tandem stance, LLE, firm - horz head turns 2 x 30s - mod sway   Tandem stance, RLE, firm - vertical head turns 2 x 30s - mod sway     Obstacle course:    2 foam pads, 8" box, 4" box, medium kiran, x 3 cones (360) - 30ft x 4 laps     box carries, 11.5# - 30 ft x 4 laps  Marching, farmer carries 10# KB 40 ft x 3 laps - ea hand    Sit<>stands from 18" plyobox w/ foam - 3 x 10     Side steps w/ RTB - x 3 laps (20 ft)  Monster walks w/ RTB - x 3 laps (20 ft)    Ascending/descending 5 stair - reciprocal, no UE support "       Home Exercises Provided and Patient Education Provided     Education provided:   Patient was instructed to continue HEP and stretching at home to improve back tightness. Discussed safety/strategy of riding stationary bike at home to continue to improve endurance.    Written Home Exercises Provided: Patient instructed to cont prior HEP with emphasis on stretching to manage tightness. Exercises were reviewed and Priyank was able to demonstrate them prior to the end of the session.  Priyank demonstrated good  understanding of the education provided.     See EMR under Patient Instructions for exercises provided prior visit., updated on 3/8/22    Assessment   Priyank tolerated his session well today. Patient continues to report good compliance with HEP and walking program, noting he feels he is doing well at home. Patient reports his chief complaint is tightness in the back (L>R). Patient demonstrated good dynamic balance (RLE > LLE) with progression of exercises today and addition of obstacle course. Patient to continue to benefit from skilled physical therapy to address core stability, tightness in the low back, and proximal hip stability to improve functional mobility.     Priyank Is progressing well towards his goals.   Pt prognosis is Excellent.     Pt will continue to benefit from skilled outpatient physical therapy to address the deficits listed in the problem list box on initial evaluation, provide pt/family education and to maximize pt's level of independence in the home and community environment.     Pt's spiritual, cultural and educational needs considered and pt agreeable to plan of care and goals.     Anticipated barriers to physical therapy: none    Goals:   Short Term Goals: 3 weeks   1. Pt will report performing walking program at least 15 minutes at a time for improving tolerance to gait to decrease reliance on SPC. MET 03/18/22  2. Pt will demonstrate 5 times sit<>stand test in 10 sec or less to  demonstrate decreased fall risk and improved LE strength. MET 03/18/22     Long Term Goals: 8 weeks - Modified on 3/18/22  1. Pt will demonstrate a 25% improvement in gait velocity to ambulate at an average speed for his age by walking at 1.36 m/s. MET 3/15  2. Pt will demonstrate improved stability of LLE for improved tolerance to normal mobility and activities by performing SLS x 10 sec.MET, 3/15/22  3. Pt will deny pain in LLE or back x 1 week to demonstrate improved tolerance to his normal activities.  ongoing - patient continues to have pain in L side of back.   4. Patient will be able to ascend and descend stairs with reciprocal gait to demonstrate improved SL stability and community ambulation ongoing  5. Patient will report walking for 45 minutes at a time to demonstrate improved endurance and improved tolerance to gait. ongoing  6. Patient will be able to carry 20lbs 200 feet to demonstrate ability to demonstrate ability to perform work tasks. ongoing    Plan   Plan of care Certification: 2/23/2022 to 4/8/2022.     Outpatient Physical Therapy 2 times weekly (8 visits total) to include the following interventions: Moist Heat/ Ice, Patient Education, Therapeutic Activities and Therapeutic Exercise    Discussed addition of core and hip strenghtening exercise to improve tightness in low back next session. Continue to address SLS on L    Indu Sandoval, SHEILA    I, Veronica Bosch, MARIO ALBERTOT, certify that I was present in the room directing the student in service delivery and guiding them using my skilled judgment. As the co-signing therapist I have reviewed the students documentation and am responsible for the treatment, assessment, and plan.     Veronica Bosch, PT, DPT, CBIS  Board-Certified Clinical Specialist in Neurologic Physical Therapy    3/24/2022

## 2022-03-23 ENCOUNTER — CLINICAL SUPPORT (OUTPATIENT)
Dept: REHABILITATION | Facility: HOSPITAL | Age: 62
End: 2022-03-23
Payer: MEDICAID

## 2022-03-23 DIAGNOSIS — Z78.9 ALTERATION IN INSTRUMENTAL ACTIVITIES OF DAILY LIVING (IADL): ICD-10-CM

## 2022-03-23 DIAGNOSIS — H53.2 DOUBLE VISION WITH BOTH EYES OPEN: ICD-10-CM

## 2022-03-23 DIAGNOSIS — R29.898 DECREASED GRIP STRENGTH: Primary | ICD-10-CM

## 2022-03-23 DIAGNOSIS — Z78.9 DEFICIT IN ACTIVITIES OF DAILY LIVING (ADL): ICD-10-CM

## 2022-03-23 PROCEDURE — 97530 THERAPEUTIC ACTIVITIES: CPT | Mod: PN

## 2022-03-23 NOTE — PLAN OF CARE
"[facility]  Occupational Therapy Treatment and PROGRESS Note    Date: 3/23/2022  Name: Priyank Whittington  Clinic Number: [id number]    Therapy Diagnosis:   Encounter Diagnoses   Name Primary?    Decreased  strength Yes    Deficit in activities of daily living (ADL)     Double vision with both eyes open     Alteration in instrumental activities of daily living (IADL)      Physician: Sabas Tobar    Physician Orders: eval and treat; Neuro program  Medical Diagnosis: S06.5X9A (ICD-10-CM) - Subdural hematoma   Evaluation Date: 2/23/2022  Plan of Care Expiration Period: 4/22/2022  Insurance Authorization period Expiration: 12/31/2022  Date of Return to MD: 5/17/2022 - neuro  Visit # / Visits Authorized: 6/20 (plus eval)  FOTO: 33% limitation    Precautions: Standard, Diabetes and Fall    Time In: 10:16  Time Out: 11:03  Total Billable Time: 47 minutes    SUBJECTIVE     Pt reports: "still tightness in the side (L)". Went to the MD; states that imaging per CT shows improvement. "I am not driving yet; they told me I need the driving evaluation."  He was compliant with home exercise program given last session.   Response to previous treatment: no complaints  Functional change: easier with reading    Pain: 0/10; reports tightness in ribs on left side.  Location: n/a     Involved Side: left  Dominant Side: Right  Date of Onset: 1/22/2022    OBJECTIVE     Visual/Perceptual:   Tracking: intact  Saccades: intact  Acuity: NT  Convergence: double at ~8cm  Nystagmus: intact  R/L discrimination: intact  Visual field: periferal vision normal both sides at 85°.  Motor Planning Praxis: intact  Perceptual testing:   · Line bisection: Within Normal Limits (WNL) at eval   · Maze Test - 27.73 seconds; 2 errors, compared to 54.59 seconds, 0 error(s) on eval. (Cut point score is 60 seconds or less with 1 error or less).   · Trail Making Test A - 32.23 with one self corrected error, compared to 53.62 " seconds, 0 error(s) at eval. (Average 29 seconds, Deficient > 78 seconds, rule of thumb: most in 90 seconds)  · Trail Making Test B - 72.53; no errors; an improvement from 140.06 seconds, 5 error(s), and didn't complete subsequently, at initial eval. (Average 75 seconds, Deficient > 273 seconds, rule of thumb: most in 3 minutes)     Physical Exam:  Postural examination/scapula alignment: Rounded shoulder and Head forward; left scapula slightly abducted and upwardly rotated.  Joint integrity: Firm end feeling  Skin integrity: intact  Edema: none      (B)UE ROM WFL  Fist: normal  (B)UE strength WFL      Strength: (MELISSA Dynamometer in lbs.) Average 3 trials, Position II:       2/23/2022 2/23/2022 3/23/2022 3/23/2022     Left Right LEFT RIGHT   Rung II 60.9# 69.5# 69.7# 70.2#    [norms for men aged 60-64: R=89.7 +/-20.4; L=76.8 +/-20.3 (Narendra et al, 1985)]     Fine Motor Coordination: 9 Hole Peg Test (in seconds)  Left 2/23/2022 Right 2/23/2022 3/23/2022   LEFT 3/23/202   RIGHT   35.33  31.06 29.42  27.01   Comments: slip  both hands.  [norms for men aged 56-60: R=20.90s +/-4.55s; L=21.64s +/-3.39s (Mile et al, 2003)]    Gross motor coordination:   · JOE (Rapid Alternating Movements): intact  · Finger to Nose (5 times): intact  · Finger Flicks (coordination moving from digit flexion to digit extension): intact     Sensation:  3/23/2022: Priyank reports numbness in left thumb.  · Sharp/dull: intact left  · Light touch: intact left  · 2pnt discrimination: intact for .4mm; inconsistent at .3mm.  · Temperature: not tested, but Priyank reports no concerns  · Proprioception: slightly limited in left, with decreased pad to pinky. 6/8.  · Kinesiology: intact     Balance: normal     Endurance Deficit: normal    Treatment     Priyank received the treatments listed below:    Therapeutic activities to improve functional performance for 47 minutes, including:  - Seated scapular activation into elevation and  retraction x 5 each with 5 sec holds left  - Priyank performed left upper extremity weight bearing on a static surface with body on arm weight shift with independent provided for closed chain body on arm and arm on body weight shifts, followed by WB through elbow.  - seated PNF D1 and D2 flexion/extension patterns with independence, with crossing of midline to reach and place squiglies.  - reverse quad WB with hip/trunk motions for on/off body on arms weight shifts.  - measures taken for update of plan of care. (see above)  - wall walks for abduction and forward flexion stretching    Patient Education and Home Exercises      Education provided:   - HEP vision; to perform 1 x daily  - HEP shoulder with use of dowel  - Progress towards goals     Written Home Exercises Provided: Patient instructed to cont prior HEP.  Exercises were reviewed and Priyank was able to demonstrate them prior to the end of the session.  Priyank demonstrated fair  understanding of the HEP provided. See EMR under Patient Instructions for exercises provided during therapy sessions.       Assessment     Priyank presents motivated to participate. Measures taken today, with improvements seen in vision, strength, although still limited with fine motor coordination. Recommend continuation OT to focus on fine motor coordination and assess skills as needed for driving.     Priyank is progressing well towards his goals; updated to goals can be seen below. Pt prognosis is Good.     Pt will continue to benefit from skilled outpatient occupational therapy to address the deficits listed, provide pt education, and to maximize pt's level of independence in the home and community environment.     Pt's spiritual, cultural and educational needs considered and pt agreeable to plan of care and goals.  Anticipated barriers to occupational therapy: none identified.    Goals:  Short Term Goals: 4 weeks   - Pt will increase  strength by 3-5# with BUE to increase  performance during daily tasks. MET for left hand 3/23/2022; DISCONTINUE  - Pt will carry plate 10 feet using BUE to improve ability to carry household objects safely. MET 3/23/2022  - Priyank is able to place can at shelve at shoulder height using either hand. MET 3/23/2022  - Pt to be independent with oculomotor HEP. MET 3/23/2022  - Pt to be independent with UE strengthening and stretching HEP. MET 3/23/2022   - Pt will decrease time on 9HPT by 4 seconds with right hand to improve fine motor speed and coordination needed to perform money manipulation and ADLs. MET 3/23/2022  - Near point convergence will decrease to 25 cm or less to improve oculomotor coordination. MET 3/23/2022  - Pt will report he is able to read for 10 minutes without eye fatigue or double vision. MET 3/23/2022     Long Term Goals: 8 weeks   - Pt will increase  strength by 6-10# with BUE to increase performance during daily tasks. MET 3/23/2022 for left hand; DISCONTINUE  - Pt will carry plate 10 feet using either hand to improve ability to carry household objects safely. MET 3/23/2022  - Pt will decrease time on 9HPT by 8 seconds with right hand to improve fine motor speed and coordination needed to perform money manipulation and ADLs. NOT MET 3/23/2022; CONTINUE  - Near point convergence will decrease to 20 cm or less to improve oculomotor coordination. MET 3/23/2022  - Pt will report he is able to read for 30 minutes without eye fatigue or double vision.  MET 3/23/2022    Goal added 3/23/2022; to be met by discharge  - Priyank will complete in clinic testing of skills needed for community mobility.      More goals to be added as appropriate     PLAN     Certification Period/Plan of care expiration: 2/23/2022 to 4/22/2022     Continue Outpatient Occupational Therapy 2 times weekly to include the following interventions: Neuromuscular Re-ed, Patient Education, Self Care, Therapeutic Activities and Therapeutic Exercise.    Updates/Grading  for next session: fine motor coordination, in-clinic testing of skills needed for driving.    MARIA GUADALUPE Sommer, [place]/L, CEAS-I  3/23/2022     I certify the need for these services furnished under this plan of treatment and while under my care.    __________________________________ Physician/Referring Practitioner    _______________________ __________  Date of Signature

## 2022-03-23 NOTE — PROGRESS NOTES
"    Physical Therapy Daily Treatment Note     Name: Priyank Whartongesha  Clinic Number: [id number]    Therapy Diagnosis:   Encounter Diagnosis   Name Primary?    Decreased strength, endurance, and mobility Yes     Physician: Atul Peter.*    Visit Date: 3/31/2022    Physician Orders: Atul Peter MD     Physician Orders: PT Eval and Treat neuro PT2  Medical Diagnosis from Referral: S06.5X9A (ICD-10-CM) - Subdural hematoma  Evaluation Date: 2/23/2022  Authorization Period Expiration: 12/31/2022  Plan of Care Expiration: 4/8/2022  Visit # / Visits authorized: 7/ 25       Time In: 10:31  Time Out: 11:16  Total Billable Time: 44 minutes    Precautions: Standard    Subjective     Pt reports:Pt. Continues to report back tightness as his main complaint. Continues to walk 45 minutes.  He was compliant with home exercise program.  Response to previous treatment: Reports no adverse effects  Functional change: ongoing    Pain: 4/10  Location: left back    Description: tightness     Objective   Bold= new or progression    Priyank received therapeutic exercises to develop strength, endurance, flexibility and core stabilization for 44 minutes including:     Recumbent stepper BUE/LE L6 x 8 min for improved endurance and strength     At MAT:               Child's pose - R, L - 3 x 30 ea              LTR 2 x 12              Supine Marching w/ abdominal drawing in 2 x 20 - alt     Free motion:    Sit <>stand - 18" plyobox w/ foam under feet - 10lbs KB,  2 x 12       Pallof press hold - side step (x3 steps) - 2 x 10 ea, black TB                 Monster walks(fwd/bwd) w/ RTB - x 3 laps (20 ft)   Marches, w/ farmers carry 10# - 4 laps ea hand    At stairs:    Step up to stability - 6in step, 2 x 10 ea        Home Exercises Provided and Patient Education Provided     Education provided:   Patient was instructed to continue HEP and stretching at home to improve back tightness. Discussed safety/strategy of riding " stationary bike at home to continue to improve endurance.    Written Home Exercises Provided: Patient instructed to cont prior HEP with emphasis on stretching to manage tightness. Exercises were reviewed and Priyank was able to demonstrate them prior to the end of the session.  Priyank demonstrated good  understanding of the education provided.     See EMR under Patient Instructions for exercises provided prior visit., updated on 3/8/22    Assessment   Priyank tolerated his session well today. He continues to tolerate good compliance with HEP and walking program with increased tolerance. Patient continues to tolerate progression of exercises and demonstrates improved single limb stability with improved control with marches. Patient demonstrated moderate sway with step up to stability exercise and poor trunk stability with pallof press hold w/ side stepping indicated with increased trunk rotation. Patient to continue to benefit from skilled physical therapy to address single limb stability and endurance to return to prior level of function.     Priyank Is progressing well towards his goals.   Pt prognosis is Excellent.     Pt will continue to benefit from skilled outpatient physical therapy to address the deficits listed in the problem list box on initial evaluation, provide pt/family education and to maximize pt's level of independence in the home and community environment.     Pt's spiritual, cultural and educational needs considered and pt agreeable to plan of care and goals.     Anticipated barriers to physical therapy: none    Goals:   Short Term Goals: 3 weeks   1. Pt will report performing walking program at least 15 minutes at a time for improving tolerance to gait to decrease reliance on SPC. MET 03/18/22  2. Pt will demonstrate 5 times sit<>stand test in 10 sec or less to demonstrate decreased fall risk and improved LE strength. MET 03/18/22     Long Term Goals: 8 weeks - Modified on 3/18/22  1. Pt will  demonstrate a 25% improvement in gait velocity to ambulate at an average speed for his age by walking at 1.36 m/s. MET 3/15  2. Pt will demonstrate improved stability of LLE for improved tolerance to normal mobility and activities by performing SLS x 10 sec.MET, 3/15/22  3. Pt will deny pain in LLE or back x 1 week to demonstrate improved tolerance to his normal activities.  ongoing - patient continues to have pain in L side of back.   4. Patient will be able to ascend and descend stairs with reciprocal gait to demonstrate improved SL stability and community ambulation ongoing  5. Patient will report walking for 45 minutes at a time to demonstrate improved endurance and improved tolerance to gait. Met 3/31/22  6. Patient will be able to carry 20lbs 200 feet to demonstrate ability to demonstrate ability to perform work tasks. ongoing    Plan   Plan of care Certification: 2/23/2022 to 4/8/2022.     Outpatient Physical Therapy 2 times weekly to include the following interventions: Moist Heat/ Ice, Patient Education, Therapeutic Activities and Therapeutic Exercise    Discussed addition of core and hip strenghtening exercise to improve tightness in low back next session. Continue to address SLS on L    Indu Sandoval, SPT    I, Veronica Bosch, MARIO ALBERTOT, certify that I was present in the room directing the student in service delivery and guiding them using my skilled judgment. As the co-signing therapist I have reviewed the students documentation and am responsible for the treatment, assessment, and plan.     Veronica Bosch, PT, DPT, CBIS  Board-Certified Clinical Specialist in Neurologic Physical Therapy    3/31/2022

## 2022-03-24 ENCOUNTER — CLINICAL SUPPORT (OUTPATIENT)
Dept: REHABILITATION | Facility: HOSPITAL | Age: 62
End: 2022-03-24
Payer: MEDICAID

## 2022-03-24 DIAGNOSIS — R68.89 DECREASED STRENGTH, ENDURANCE, AND MOBILITY: Primary | ICD-10-CM

## 2022-03-24 DIAGNOSIS — Z74.09 DECREASED STRENGTH, ENDURANCE, AND MOBILITY: Primary | ICD-10-CM

## 2022-03-24 DIAGNOSIS — R53.1 DECREASED STRENGTH, ENDURANCE, AND MOBILITY: Primary | ICD-10-CM

## 2022-03-24 PROCEDURE — 97110 THERAPEUTIC EXERCISES: CPT | Mod: PN | Performed by: PHYSICAL THERAPIST

## 2022-03-28 NOTE — PROGRESS NOTES
"    Physical Therapy Daily Treatment Note     Name: Priyank Chavez [facility]  Clinic Number: [id number]    Therapy Diagnosis:   Encounter Diagnosis   Name Primary?    Decreased strength, endurance, and mobility Yes     Physician: Atul Peter.*    Visit Date: 3/29/2022    Physician Orders: Atul Peter MD     Physician Orders: PT Eval and Treat neuro PT2  Medical Diagnosis from Referral: S06.5X9A (ICD-10-CM) - Subdural hematoma  Evaluation Date: 2/23/2022  Authorization Period Expiration: 12/31/2022  Plan of Care Expiration: 4/8/2022  Visit # / Visits authorized: 6/ 25       Time In: 10:15  Time Out: 11:00  Total Billable Time: 43 minutes    Precautions: Standard    Subjective     Pt reports: Patient reports back tightness on L side continues to be about the same. Patient reports he has continued his walking program and that he was able to tolerate 45-50 minutes over the past week.      He was compliant with home exercise program.  Response to previous treatment: Reports no adverse effects  Functional change: ongoing    Pain: 4/10 prior to session, 3/10 at end of session  Location: left back    Description: tightness     Objective   Bold= new or progression    Priyank received therapeutic exercises to develop strength, endurance, flexibility and core stabilization for 43 minutes including:    Recumbent stepper BUE/LE L6 x 8 min for improved endurance and strength    At MAT:    Child's pose - R, L - 2 x 30 ea   LTR 2 x 20    Supine Marching w/ abdominal drawing in 2 x 20 - alt    Sit <>stand - 18" plyobox w/ foam under feet - 10lbs KB,  2 x 10     Obstacle course:    2 foam pads, 8" box, 4" box, medium kiran, large kiran, x 2 cones (360) - 30ft x 4 laps      Pallof press hold - side step (x3 steps) - 1 x 10 ea, black TB     Side steps w/ RTB - x 3 laps (20 ft)  Monster walks(fwd/bwd) w/ RTB - x 2 laps (20 ft)      Home Exercises Provided and Patient Education Provided     Education provided: "   Patient was instructed to continue HEP and stretching at home to improve back tightness.    Written Home Exercises Provided: Patient instructed to cont prior HEP with emphasis on stretching to manage tightness. Exercises were reviewed and Priyank was able to demonstrate them prior to the end of the session.  Priyank demonstrated good  understanding of the education provided.     See EMR under Patient Instructions for exercises provided prior visit., updated on 3/8/22.     Assessment   Priyank tolerated his session well today. Patient continues to report compliance with HEP and walking program, noting increased tolerance for walking. Patient continues to tolerate progression of exercises focusing on core activation and strength, and reports improved back tightness at end of session. Patient continues to demonstrate good dynamic balance on compliant and firm surfaces and improved endurance with progression of NuStep. Patient to continue to benefit from skilled physical therapy to address core stability and improve tightness in the low back.     Priyank Is progressing well towards his goals.   Pt prognosis is Excellent.     Pt will continue to benefit from skilled outpatient physical therapy to address the deficits listed in the problem list box on initial evaluation, provide pt/family education and to maximize pt's level of independence in the home and community environment.     Pt's spiritual, cultural and educational needs considered and pt agreeable to plan of care and goals.     Anticipated barriers to physical therapy: none    Goals:   Short Term Goals: 3 weeks   1. Pt will report performing walking program at least 15 minutes at a time for improving tolerance to gait to decrease reliance on SPC. MET 03/18/22  2. Pt will demonstrate 5 times sit<>stand test in 10 sec or less to demonstrate decreased fall risk and improved LE strength. MET 03/18/22     Long Term Goals: 8 weeks - Modified on 3/18/22  1. Pt will  demonstrate a 25% improvement in gait velocity to ambulate at an average speed for his age by walking at 1.36 m/s. MET 3/15  2. Pt will demonstrate improved stability of LLE for improved tolerance to normal mobility and activities by performing SLS x 10 sec.MET, 3/15/22  3. Pt will deny pain in LLE or back x 1 week to demonstrate improved tolerance to his normal activities.  ongoing - patient continues to have pain in L side of back.   4. Patient will be able to ascend and descend stairs with reciprocal gait to demonstrate improved SL stability and community ambulation ongoing  5. Patient will report walking for 45 minutes at a time to demonstrate improved endurance and improved tolerance to gait. ongoing  6. Patient will be able to carry 20lbs 200 feet to demonstrate ability to demonstrate ability to perform work tasks. ongoing    Plan   Plan of care Certification: 2/23/2022 to 4/8/2022.     Outpatient Physical Therapy 2 times weekly (8 visits total) to include the following interventions: Moist Heat/ Ice, Patient Education, Therapeutic Activities and Therapeutic Exercise    Discussed continuation of core and hip strenghtening exercise to improve tightness in low back next session. Continue to address SLS on L    Indu Sandoval, SPT    I, Veronica Bosch, MARIO ALBERTOT, certify that I was present in the room directing the student in service delivery and guiding them using my skilled judgment. As the co-signing therapist I have reviewed the students documentation and am responsible for the treatment, assessment, and plan.     Veronica Bosch, PT, DPT, CBIS  Board-Certified Clinical Specialist in Neurologic Physical Therapy    3/29/2022

## 2022-03-29 ENCOUNTER — CLINICAL SUPPORT (OUTPATIENT)
Dept: REHABILITATION | Facility: HOSPITAL | Age: 62
End: 2022-03-29
Payer: MEDICAID

## 2022-03-29 DIAGNOSIS — Z74.09 DECREASED STRENGTH, ENDURANCE, AND MOBILITY: Primary | ICD-10-CM

## 2022-03-29 DIAGNOSIS — R29.898 DECREASED GRIP STRENGTH: Primary | ICD-10-CM

## 2022-03-29 DIAGNOSIS — R68.89 DECREASED STRENGTH, ENDURANCE, AND MOBILITY: Primary | ICD-10-CM

## 2022-03-29 DIAGNOSIS — H53.2 DOUBLE VISION WITH BOTH EYES OPEN: ICD-10-CM

## 2022-03-29 DIAGNOSIS — R53.1 DECREASED STRENGTH, ENDURANCE, AND MOBILITY: Primary | ICD-10-CM

## 2022-03-29 DIAGNOSIS — Z78.9 ALTERATION IN INSTRUMENTAL ACTIVITIES OF DAILY LIVING (IADL): ICD-10-CM

## 2022-03-29 DIAGNOSIS — Z78.9 DEFICIT IN ACTIVITIES OF DAILY LIVING (ADL): ICD-10-CM

## 2022-03-29 PROCEDURE — 97110 THERAPEUTIC EXERCISES: CPT | Mod: PN | Performed by: PHYSICAL THERAPIST

## 2022-03-29 PROCEDURE — 97530 THERAPEUTIC ACTIVITIES: CPT | Mod: PN

## 2022-03-29 NOTE — PROGRESS NOTES
"  [facility]  Occupational Therapy Treatment Note    Date: 3/29/2022  Name: Priyank Chavezha  Clinic Number: [id number]    Therapy Diagnosis:   Encounter Diagnoses   Name Primary?    Decreased  strength Yes    Deficit in activities of daily living (ADL)     Double vision with both eyes open     Alteration in instrumental activities of daily living (IADL)      Physician: Referring, Unknown    Physician Orders: eval and treat; Neuro program  Medical Diagnosis: S06.5X9A (ICD-10-CM) - Subdural hematoma   Evaluation Date: 2/23/2022  Plan of Care Expiration Period: 4/22/2022  Insurance Authorization period Expiration: 12/31/2022  Date of Return to MD: 5/17/2022 - neuro  Visit # / Visits Authorized: 7/20 (plus eval)  FOTO: 33% limitation     Precautions: Standard, Diabetes and Fall     Time In: 11:00am  Time Out: 11:45am  Total Billable Time: 45 minutes    SUBJECTIVE     Pt reports: "doing good. No issues with my eye homework"   He was compliant with home exercise program given last session.   Response to previous treatment:positive  Functional change: good functional return    Pain: 0/10  Location: n/a    OBJECTIVE     Objective Measures updated at progress report unless specified.    Treatment     Priyank received the treatments listed below:     Therapeutic activities to improve functional performance for 45  minutes, including:  - High level community mobility testing for visual perception   - MVPT-4 completed in 31.26 minutes, Raw score of 34/45. Primary misses in figure foreground and spatial relationships   - reviewed results and encouraged formal testing for community mobility       Patient Education and Home Exercises      Education provided:   - HEP vision; to perform 1 x daily  - HEP shoulder with use of dowel  - Progress towards goals     Written Home Exercises Provided: Patient instructed to cont prior HEP.  Exercises were reviewed and Priyank was able to demonstrate " them prior to the end of the session.  Priyank demonstrated good  understanding of the HEP provided. See EMR under Patient Instructions for exercises provided during therapy sessions.       Assessment     Pt would continue to benefit from skilled OT. Priyank completed the MVPT-4 this date. Deficits identified in spatial orientation and figure foreground. He also demonstrated slower than average processing speed. Encouraged formal driving eval this date.       Priyank is progressing well towards his goals and there are no updates to goals at this time. Pt prognosis is Good.     Pt will continue to benefit from skilled outpatient occupational therapy to address the deficits listed in the problem list on initial evaluation provide pt/family education and to maximize pt's level of independence in the home and community environment.     Pt's spiritual, cultural and educational needs considered and pt agreeable to plan of care and goals.    Anticipated barriers to occupational therapy: n/a    Goals:  Short Term Goals: 4 weeks   - Pt will increase  strength by 3-5# with BUE to increase performance during daily tasks. MET for left hand 3/23/2022; DISCONTINUE  - Pt will carry plate 10 feet using BUE to improve ability to carry household objects safely. MET 3/23/2022  - Priyank is able to place can at shelve at shoulder height using either hand. MET 3/23/2022  - Pt to be independent with oculomotor HEP. MET 3/23/2022  - Pt to be independent with UE strengthening and stretching HEP. MET 3/23/2022   - Pt will decrease time on 9HPT by 4 seconds with right hand to improve fine motor speed and coordination needed to perform money manipulation and ADLs. MET 3/23/2022  - Near point convergence will decrease to 25 cm or less to improve oculomotor coordination. MET 3/23/2022  - Pt will report he is able to read for 10 minutes without eye fatigue or double vision. MET 3/23/2022     Long Term Goals: 8 weeks   - Pt will increase   strength by 6-10# with BUE to increase performance during daily tasks. MET 3/23/2022 for left hand; DISCONTINUE  - Pt will carry plate 10 feet using either hand to improve ability to carry household objects safely. MET 3/23/2022  - Pt will decrease time on 9HPT by 8 seconds with right hand to improve fine motor speed and coordination needed to perform money manipulation and ADLs. NOT MET 3/23/2022; CONTINUE  - Near point convergence will decrease to 20 cm or less to improve oculomotor coordination. MET 3/23/2022  - Pt will report he is able to read for 30 minutes without eye fatigue or double vision.  MET 3/23/2022     Goal added 3/23/2022; to be met by discharge  - Priyank will complete in clinic testing of skills needed for community mobility.      More goals to be added as appropriate      PLAN      Certification Period/Plan of care expiration: 2/23/2022 to 4/22/2022     Continue Outpatient Occupational Therapy 2 times weekly to include the following interventions: Neuromuscular Re-ed, Patient Education, Self Care, Therapeutic Activities and Therapeutic Exercise.     Updates/Grading for next session: fine motor coordination, in-clinic testing of skills needed for driving.        MARLEY River

## 2022-03-30 ENCOUNTER — CLINICAL SUPPORT (OUTPATIENT)
Dept: REHABILITATION | Facility: HOSPITAL | Age: 62
End: 2022-03-30
Payer: MEDICAID

## 2022-03-30 DIAGNOSIS — H53.2 DOUBLE VISION WITH BOTH EYES OPEN: ICD-10-CM

## 2022-03-30 DIAGNOSIS — R29.898 DECREASED GRIP STRENGTH: Primary | ICD-10-CM

## 2022-03-30 DIAGNOSIS — Z78.9 ALTERATION IN INSTRUMENTAL ACTIVITIES OF DAILY LIVING (IADL): ICD-10-CM

## 2022-03-30 DIAGNOSIS — Z78.9 DEFICIT IN ACTIVITIES OF DAILY LIVING (ADL): ICD-10-CM

## 2022-03-30 PROCEDURE — 97530 THERAPEUTIC ACTIVITIES: CPT | Mod: PN

## 2022-03-30 NOTE — PROGRESS NOTES
[facility]  Occupational Therapy Treatment Note    Date: 3/30/2022  Name: Priyank Whittington  Clinic Number: [id number]    Therapy Diagnosis:   Encounter Diagnoses   Name Primary?    Decreased  strength Yes    Deficit in activities of daily living (ADL)     Double vision with both eyes open     Alteration in instrumental activities of daily living (IADL)      Physician: Referring, Unknown    Physician Orders: eval and treat; Neuro program  Medical Diagnosis: S06.5X9A (ICD-10-CM) - Subdural hematoma   Evaluation Date: 2/23/2022  Plan of Care Expiration Period: 4/22/2022  Insurance Authorization period Expiration: 12/31/2022  Date of Return to MD: 5/17/2022 - neuro  Visit # / Visits Authorized: 8/20 (plus eval)  FOTO: 33% limitation     Precautions: Standard, Diabetes and Fall     Time In: 11:45  Time Out: 12:30  Total Billable Time: 45 minutes    SUBJECTIVE     Pt reports: "my side is just tight; the home work for my eyes is going well, I am not getting dizzy. I think my eyes are like they were before all this".  He was compliant with home exercise program given last session.   Response to previous treatment: positive  Functional change: good functional return    Pain: 0/10 "just tight"  Location: left lateral upper trunk, near shoulder    OBJECTIVE     Objective Measures updated at progress report unless specified.    3/29/2022   MVPT-4 completed in 31.26 minutes, Raw score of 34/45. Primary misses in figure foreground and spatial relationships    Treatment     Priyank received the treatments listed below:     Therapeutic activities to improve functional performance for 45 minutes, including:  - Seated scapular activation into elevation and retraction x 5 each with 5 sec holds left  - Priyank performed left upper extremity weight bearing on a static surface with body on arm weight shift with independent provided for closed chain body on arm and arm on body weight shifts, " followed by WB through elbow.  - reverse quad WB with hip/trunk motions for on/off body on arms weight shifts.  - in supine, shoulder ROM with stretching at end ranges; inhibition at pectoralis and serratus.  - in standing, card search and placement using left UE.   - VOR 1 vertical and horizontal at 110BPM; loosing fixation on object after ~ 20 seconds  - habituation using cones from ground placing behind self both sides, and turning to  from behind; had LOB x 1, self corrected.  - walk while head turning, with slight gait deviation  - forward walk with catch and throw, followed by backwards walking with toss and catch  - ball toss with catch with 1kg ball on trampoline; throw with left, catch attempts with right. 75% of time unable to catch in hand, but it rolling off the hand into chest.     Patient Education and Home Exercises      Education provided:   - HEP vision; to perform 1 x daily  - HEP shoulder with use of dowel  - Progress towards goals     Written Home Exercises Provided: Patient instructed to cont prior HEP.  Exercises were reviewed and Priyank was able to demonstrate them prior to the end of the session.  Priyank demonstrated good  understanding of the HEP provided. See EMR under Patient Instructions for exercises provided during therapy sessions.       Assessment     Pt would continue to benefit from skilled OT. Patient has seen improvements in function, and questions limitations seen by therapists with vestibular function and visual perception. Is willing to continue with sessions a few more times to continue to work on goals and improve fine motor coordination. Tolerated all activities, with LOB noted and gait deviations noted.     Priyank is progressing well towards his goals and there are no updates to goals at this time. Pt prognosis is Good.     Pt will continue to benefit from skilled outpatient occupational therapy to address the deficits listed in the problem list on initial  evaluation provide pt/family education and to maximize pt's level of independence in the home and community environment.     Pt's spiritual, cultural and educational needs considered and pt agreeable to plan of care and goals.    Anticipated barriers to occupational therapy: n/a    Goals:  Short Term Goals: 4 weeks   - Pt will increase  strength by 3-5# with BUE to increase performance during daily tasks. MET for left hand 3/23/2022; DISCONTINUE  - Pt will carry plate 10 feet using BUE to improve ability to carry household objects safely. MET 3/23/2022  - Priyank is able to place can at shelve at shoulder height using either hand. MET 3/23/2022  - Pt to be independent with oculomotor HEP. MET 3/23/2022  - Pt to be independent with UE strengthening and stretching HEP. MET 3/23/2022   - Pt will decrease time on 9HPT by 4 seconds with right hand to improve fine motor speed and coordination needed to perform money manipulation and ADLs. MET 3/23/2022  - Near point convergence will decrease to 25 cm or less to improve oculomotor coordination. MET 3/23/2022  - Pt will report he is able to read for 10 minutes without eye fatigue or double vision. MET 3/23/2022     Long Term Goals: 8 weeks   - Pt will increase  strength by 6-10# with BUE to increase performance during daily tasks. MET 3/23/2022 for left hand; DISCONTINUE  - Pt will carry plate 10 feet using either hand to improve ability to carry household objects safely. MET 3/23/2022  - Pt will decrease time on 9HPT by 8 seconds with right hand to improve fine motor speed and coordination needed to perform money manipulation and ADLs. NOT MET 3/23/2022; CONTINUE  - Near point convergence will decrease to 20 cm or less to improve oculomotor coordination. MET 3/23/2022  - Pt will report he is able to read for 30 minutes without eye fatigue or double vision.  MET 3/23/2022     Goal added 3/23/2022; to be met by discharge  - Priyank will complete in clinic testing of  skills needed for community mobility.      More goals to be added as appropriate      PLAN      Certification Period/Plan of care expiration: 2/23/2022 to 4/22/2022     Continue Outpatient Occupational Therapy 2 times weekly to include the following interventions: Neuromuscular Re-ed, Patient Education, Self Care, Therapeutic Activities and Therapeutic Exercise.     Updates/Grading for next session: fine motor coordination, continue with vision    MARIA GUADALUPE Sommer, OTR/L, CEAS-I  3/30/2022

## 2022-03-31 ENCOUNTER — CLINICAL SUPPORT (OUTPATIENT)
Dept: REHABILITATION | Facility: HOSPITAL | Age: 62
End: 2022-03-31
Payer: MEDICAID

## 2022-03-31 DIAGNOSIS — R53.1 DECREASED STRENGTH, ENDURANCE, AND MOBILITY: Primary | ICD-10-CM

## 2022-03-31 DIAGNOSIS — Z74.09 DECREASED STRENGTH, ENDURANCE, AND MOBILITY: Primary | ICD-10-CM

## 2022-03-31 DIAGNOSIS — R68.89 DECREASED STRENGTH, ENDURANCE, AND MOBILITY: Primary | ICD-10-CM

## 2022-03-31 PROCEDURE — 97110 THERAPEUTIC EXERCISES: CPT | Mod: PN | Performed by: PHYSICAL THERAPIST

## 2022-04-04 NOTE — PROGRESS NOTES
"    Physical Therapy Daily Treatment Note     Name: Priyank Chavezha  Clinic Number: [id number]    Therapy Diagnosis:   No diagnosis found.  Physician: Atul Peter.*    Visit Date: 4/5/2022    Physician Orders: Atul Peter MD     Physician Orders: PT Eval and Treat neuro PT2  Medical Diagnosis from Referral: S06.5X9A (ICD-10-CM) - Subdural hematoma  Evaluation Date: 2/23/2022  Authorization Period Expiration: 12/31/2022  Plan of Care Expiration: 4/8/2022  Visit # / Visits authorized: 7/ 25       Time In: 10:31  Time Out: 11:16  Total Billable Time: 44 minutes    Precautions: Standard    Subjective     Pt reports:Pt. Continues to report back tightness as his main complaint. Continues to walk 45 minutes.  He was compliant with home exercise program.  Response to previous treatment: Reports no adverse effects  Functional change: ongoing    Pain: 4/10  Location: left back    Description: tightness     Objective   Bold= new or progression    Priyank received therapeutic exercises to develop strength, endurance, flexibility and core stabilization for 44 minutes including:    Discuss possible DC     Recumbent stepper BUE/LE L6 x 8 min for improved endurance and strength     At MAT:               Child's pose - R, L - 3 x 30 ea              LTR 2 x 12              Supine Marching w/ abdominal drawing in 2 x 20 - alt    Free motion:    Sit <>stand - 18" plyobox w/ foam under feet - 10lbs KB,  2 x 12       Pallof press hold - side step (x3 steps) - 2 x 10 ea, black TB                 Monster walks(fwd/bwd) w/ RTB - x 3 laps (20 ft)   Marches, w/ farmers carry 10# - 4 laps ea hand     Wood chops     At stairs:    Step up to stability - 6in step, 2 x 10 ea        Home Exercises Provided and Patient Education Provided     Education provided:   Patient was instructed to continue HEP and stretching at home to improve back tightness. Discussed safety/strategy of riding stationary bike at home to continue " to improve endurance.    Written Home Exercises Provided: Patient instructed to cont prior HEP with emphasis on stretching to manage tightness. Exercises were reviewed and Priyank was able to demonstrate them prior to the end of the session.  Priyank demonstrated good  understanding of the education provided.     See EMR under Patient Instructions for exercises provided prior visit., updated on 3/8/22    Assessment   Priyank tolerated his session well today. He continues to tolerate good compliance with HEP and walking program with increased tolerance. Patient continues to tolerate progression of exercises and demonstrates improved single limb stability with improved control with marches. Patient demonstrated moderate sway with step up to stability exercise and poor trunk stability with pallof press hold w/ side stepping indicated with increased trunk rotation. Patient to continue to benefit from skilled physical therapy to address single limb stability and endurance to return to prior level of function.     Priyank Is progressing well towards his goals.   Pt prognosis is Excellent.     Pt will continue to benefit from skilled outpatient physical therapy to address the deficits listed in the problem list box on initial evaluation, provide pt/family education and to maximize pt's level of independence in the home and community environment.     Pt's spiritual, cultural and educational needs considered and pt agreeable to plan of care and goals.     Anticipated barriers to physical therapy: none    Goals:   Short Term Goals: 3 weeks   1. Pt will report performing walking program at least 15 minutes at a time for improving tolerance to gait to decrease reliance on SPC. MET 03/18/22  2. Pt will demonstrate 5 times sit<>stand test in 10 sec or less to demonstrate decreased fall risk and improved LE strength. MET 03/18/22     Long Term Goals: 8 weeks - Modified on 3/18/22  1. Pt will demonstrate a 25% improvement in gait  velocity to ambulate at an average speed for his age by walking at 1.36 m/s. MET 3/15  2. Pt will demonstrate improved stability of LLE for improved tolerance to normal mobility and activities by performing SLS x 10 sec.MET, 3/15/22  3. Pt will deny pain in LLE or back x 1 week to demonstrate improved tolerance to his normal activities.  ongoing - patient continues to have pain in L side of back.   4. Patient will be able to ascend and descend stairs with reciprocal gait to demonstrate improved SL stability and community ambulation ongoing  5. Patient will report walking for 45 minutes at a time to demonstrate improved endurance and improved tolerance to gait. Met 3/31/22  6. Patient will be able to carry 20lbs 200 feet to demonstrate ability to demonstrate ability to perform work tasks. ongoing    Plan   Plan of care Certification: 2/23/2022 to 4/8/2022.     Outpatient Physical Therapy 2 times weekly to include the following interventions: Moist Heat/ Ice, Patient Education, Therapeutic Activities and Therapeutic Exercise    Discussed addition of core and hip strenghtening exercise to improve tightness in low back next session. Continue to address SLS on L    Indu Sandoval, SPT    I, Veronica Bosch, MARIO ALBERTOT, certify that I was present in the room directing the student in service delivery and guiding them using my skilled judgment. As the co-signing therapist I have reviewed the students documentation and am responsible for the treatment, assessment, and plan.     Veronica Bosch, PT, DPT, CBIS  Board-Certified Clinical Specialist in Neurologic Physical Therapy    4/5/2022

## 2022-04-05 ENCOUNTER — CLINICAL SUPPORT (OUTPATIENT)
Dept: REHABILITATION | Facility: HOSPITAL | Age: 62
End: 2022-04-05
Payer: MEDICAID

## 2022-04-05 DIAGNOSIS — Z74.09 DECREASED STRENGTH, ENDURANCE, AND MOBILITY: Primary | ICD-10-CM

## 2022-04-05 DIAGNOSIS — H53.2 DOUBLE VISION WITH BOTH EYES OPEN: ICD-10-CM

## 2022-04-05 DIAGNOSIS — R29.898 DECREASED GRIP STRENGTH: Primary | ICD-10-CM

## 2022-04-05 DIAGNOSIS — R53.1 DECREASED STRENGTH, ENDURANCE, AND MOBILITY: Primary | ICD-10-CM

## 2022-04-05 DIAGNOSIS — Z78.9 DEFICIT IN ACTIVITIES OF DAILY LIVING (ADL): ICD-10-CM

## 2022-04-05 DIAGNOSIS — Z78.9 ALTERATION IN INSTRUMENTAL ACTIVITIES OF DAILY LIVING (IADL): ICD-10-CM

## 2022-04-05 DIAGNOSIS — R68.89 DECREASED STRENGTH, ENDURANCE, AND MOBILITY: Primary | ICD-10-CM

## 2022-04-05 PROCEDURE — 97110 THERAPEUTIC EXERCISES: CPT | Mod: PN | Performed by: PHYSICAL THERAPIST

## 2022-04-05 PROCEDURE — 97530 THERAPEUTIC ACTIVITIES: CPT | Mod: PN

## 2022-04-05 NOTE — PROGRESS NOTES
See POC for updated POC    Veronica Bosch, PT, DPT, CBIS  Board-Certified Clinical Specialist in Neurologic Physical Therapy

## 2022-04-05 NOTE — PROGRESS NOTES
"[facility]  Occupational Therapy Treatment Note    Date: 4/5/2022  Name: Priyank Chavez [facility]  Clinic Number: [id number]    Therapy Diagnosis:   Encounter Diagnoses   Name Primary?    Decreased  strength Yes    Deficit in activities of daily living (ADL)     Double vision with both eyes open     Alteration in instrumental activities of daily living (IADL)      Physician: Atul Peter*    Physician Orders: eval and treat; Neuro program  Medical Diagnosis: S06.5X9A (ICD-10-CM) - Subdural hematoma   Evaluation Date: 2/23/2022  Plan of Care Expiration Period: 4/22/2022  Insurance Authorization period Expiration: 12/31/2022  Date of Return to MD: 5/17/2022 - neuro  Visit # / Visits Authorized: 9/20 (plus eval)  FOTO: 33% limitation     Precautions: Standard, Diabetes and Fall     Time In: 10:19  Time Out: 11:01  Total Billable Time: 40 minutes    SUBJECTIVE     Pt reports: "I was doing things yesterday and I had pain in my right side, so I didn't do as much. I feel better today".  He was compliant with home exercise program given last session.   Response to previous treatment: positive  Functional change: good functional return    Pain: 0/10 "just tight"  Location: left lateral upper trunk, near shoulder    OBJECTIVE     Objective Measures updated at progress report unless specified.    Treatment     Priyank received the treatments listed below:     Therapeutic activities to improve functional performance for 40 minutes, including:    - saccades horizontal at 110bpm x 30 seconds, vertical without metronome use, both using number to words cards; loosing focus.  - smooth pursuit 2x30 seconds at 115 BPM, horizontal and vertical; loosing track on object.  - VOR 1 1x30 seconds at 115 BPM, vertical and horizontal  - VOR 2 attempted; loosing track on object when moving to the right, then loosing rhythm of opposite movements, needing assist to perform horizontally and vertically, 2 " x 30 seconds each.  - saccades using A and B card x 1x60 seconds, horizontally and vertically.    - small pegs  x 3, then placing 1 by 1 into peg board; transfering pegs palm <> fingers, 1 slip  at the beginning only.  - isospheres x 2 minutes  - green clothes pin for pompon , 3point and 2point , 1 container each.  - gross grasp gripper, silver spring on 3rd rung for medium size peg  from peg board and transferring to table.  - hammer ulnar/radial deviation 2 x 30 seconds  - juxicicer x2 minutes  - shuffling, dealing and turning cards.    Patient Education and Home Exercises      Education provided:   - HEP vision; to perform 1 x daily  - HEP shoulder with use of dowel  - Progress towards goals     Written Home Exercises Provided: Patient instructed to cont prior HEP, but reprinted for patient.  Exercises were reviewed and Priyank was able to demonstrate them prior to the end of the session.  Priyank demonstrated good  understanding of the HEP provided. See EMR under Patient Instructions for exercises provided during therapy sessions.       Assessment     Pt tolerated all activities presented to him, with decreased ability to sustain focus with saccades, smooth pursuit, and assistance needed with performance of VOR 2. Improving with fine motor coordination.     Priyank is progressing well towards his goals and there are no updates to goals at this time. Pt prognosis is Good.     Pt will continue to benefit from skilled outpatient occupational therapy to address the deficits listed in the problem list on initial evaluation provide pt/family education and to maximize pt's level of independence in the home and community environment.     Pt's spiritual, cultural and educational needs considered and pt agreeable to plan of care and goals.    Anticipated barriers to occupational therapy: n/a    Goals:  Short Term Goals: 4 weeks   - Pt will increase  strength by 3-5# with BUE to increase  performance during daily tasks. MET for left hand 3/23/2022; DISCONTINUE  - Pt will carry plate 10 feet using BUE to improve ability to carry household objects safely. MET 3/23/2022  - Priyank is able to place can at shelve at shoulder height using either hand. MET 3/23/2022  - Pt to be independent with oculomotor HEP. PROGRESSING 4/5/2022  - Pt to be independent with UE strengthening and stretching HEP. MET 3/23/2022   - Pt will decrease time on 9HPT by 4 seconds with right hand to improve fine motor speed and coordination needed to perform money manipulation and ADLs. MET 3/23/2022  - Near point convergence will decrease to 25 cm or less to improve oculomotor coordination. MET 3/23/2022  - Pt will report he is able to read for 10 minutes without eye fatigue or double vision. MET 3/23/2022     Long Term Goals: 8 weeks   - Pt will increase  strength by 6-10# with BUE to increase performance during daily tasks. MET 3/23/2022 for left hand; DISCONTINUE  - Pt will carry plate 10 feet using either hand to improve ability to carry household objects safely. MET 3/23/2022  - Pt will decrease time on 9HPT by 8 seconds with right hand to improve fine motor speed and coordination needed to perform money manipulation and ADLs. PROGRESSING 4/5/2022  - Near point convergence will decrease to 20 cm or less to improve oculomotor coordination. MET 3/23/2022  - Pt will report he is able to read for 30 minutes without eye fatigue or double vision.  MET 3/23/2022     Goal added 3/23/2022; to be met by discharge  - Priyank will complete in clinic testing of skills needed for community mobility.      More goals to be added as appropriate      PLAN      Certification Period/Plan of care expiration: 2/23/2022 to 4/22/2022     Continue Outpatient Occupational Therapy 2 times weekly to include the following interventions: Neuromuscular Re-ed, Patient Education, Self Care, Therapeutic Activities and Therapeutic  Exercise.     Updates/Grading for next session: fine motor coordination, continue with vision    MARIA GUADALUPE Sommer, OTR/L, CEAS-I  4/5/2022

## 2022-04-05 NOTE — PATIENT INSTRUCTIONS
Clinical References    Band Walk: Side Stepping        Tie band around legs, just above knees. Step 20 feet to one side, then step back to start.  Repeat 3 times each way per session.       [email or web address]     Copyright © [employer]. All rights reserved.      Band Walk: Zig Zag        Tie band around legs, just above knees. Walk forward 20 feet in a zig zag pattern. Without turning walk backward to start for one zig zag.  Repeat 3 zig zags per session.  Note: Small towel between band and skin eases rubbing.     [email or web address]     Copyright © [employer]. All rights reserved.

## 2022-04-06 ENCOUNTER — CLINICAL SUPPORT (OUTPATIENT)
Dept: REHABILITATION | Facility: HOSPITAL | Age: 62
End: 2022-04-06
Payer: MEDICAID

## 2022-04-06 DIAGNOSIS — Z78.9 DEFICIT IN ACTIVITIES OF DAILY LIVING (ADL): ICD-10-CM

## 2022-04-06 DIAGNOSIS — Z78.9 ALTERATION IN INSTRUMENTAL ACTIVITIES OF DAILY LIVING (IADL): ICD-10-CM

## 2022-04-06 DIAGNOSIS — R29.898 DECREASED GRIP STRENGTH: Primary | ICD-10-CM

## 2022-04-06 DIAGNOSIS — H53.2 DOUBLE VISION WITH BOTH EYES OPEN: ICD-10-CM

## 2022-04-06 PROCEDURE — 97530 THERAPEUTIC ACTIVITIES: CPT | Mod: PN

## 2022-04-06 NOTE — PLAN OF CARE
"      Physical Therapy Daily Treatment Note      Name: Priyank Whartongesha  Clinic Number: [id number]     Therapy Diagnosis:   1. Decreased strength, endurance, and mobility        Physician: Atul Peter.*     Visit Date: 4/5/2022      Physician Orders: Atul Peter MD     Physician Orders: PT Eval and Treat neuro PT2  Medical Diagnosis from Referral: S06.5X9A (ICD-10-CM) - Subdural hematoma  Evaluation Date: 2/23/2022  Authorization Period Expiration: 12/31/2022  Plan of Care Expiration: 4/8/2022  Extended POC 4/15/2022  Visit # / Visits authorized: 8/ 25        Time In: 11:00  Time Out: 11:45  Total Billable Time: 23 minutes  Total treatment time: 42 minutes     Precautions: Standard     Subjective      Pt reports: Patient reports he is doing well and currently feels he has no limitations at home. Patient's main complaint continues to be his back tightness. Continues to walk 45 minutes a day.     He was compliant with home exercise program.  Response to previous treatment: Reports no adverse effects  Functional change: ongoing     Pain: 3/10  Location: left back    Description: tightness      Objective   Bold= new or progression     Priyakn received therapeutic exercises to develop strength, endurance, flexibility and core stabilization for 42 minutes including:     At MAT:               Child's pose - R, L - 3 x 30 ea              LTR 2 x 12                 Free motion:               Sit <>stand - 18" mat w/ foam under feet - 10lbs KB,  2 x 12                  Pallof press hold - side step (x3 steps) - 2 x 10 ea, black TB                 Side steps w/ GTB - x 2 laps (20ft)              Monster walks(fwd/bwd) w/ GTB - x 2 laps (20 ft)              Christiano, w/ [place] farmers carry 7.5# - 4 laps ea hand                 Half kneeling - wood chops - 10#, 2 x 10 ea      At stairs:               Step up to stability - 6in step, 2 x 10 ea        Home Exercises Provided and Patient Education Provided "      Education provided:   Patient was instructed to continue HEP and stretching at home to improve back tightness.     Written Home Exercises Provided: Patient instructed to cont prior HEP with emphasis on stretching to manage tightness. Exercises were reviewed and Priyank was able to demonstrate them prior to the end of the session.  Priyank demonstrated good  understanding of the education provided.      See EMR under Patient Instructions for exercises provided prior visit., updated on 3/8/22, updated on 4/5/22     Assessment   Priyank tolerated session well today. He continues to express that tightness in his low back continues to improve with improved pain levels reported each week. Patient continues to tolerate good compliance with HEP and walking program. Patient demonstrated improved trunk control with exercises today reports appropriate level of challenge with wood chops and marching walking with over head aggarwal carry. SPT discussed potential discharge with focus on progressing HEP and promoting independence in remaining 2 visits. Patient to continue to benefit from skilled physical therapy to progress HEP and improve core stability to return to prior level of functional and promote independence in self-management of back pain. POC extended x 1 week to allow pt to attend remaining scheduled visits and for finalization of HEP.      Priyank Is progressing well towards his goals.   Pt prognosis is Excellent.      Pt will continue to benefit from skilled outpatient physical therapy to address the deficits listed in the problem list box on initial evaluation, provide pt/family education and to maximize pt's level of independence in the home and community environment.      Pt's spiritual, cultural and educational needs considered and pt agreeable to plan of care and goals.     Anticipated barriers to physical therapy: none     Goals:   Short Term Goals: 3 weeks   1. Pt will report performing walking program at  least 15 minutes at a time for improving tolerance to gait to decrease reliance on SPC. MET 03/18/22  2. Pt will demonstrate 5 times sit<>stand test in 10 sec or less to demonstrate decreased fall risk and improved LE strength. MET 03/18/22     Long Term Goals: 8 weeks - Modified on 3/18/22  1. Pt will demonstrate a 25% improvement in gait velocity to ambulate at an average speed for his age by walking at 1.36 m/s. MET 3/15  2. Pt will demonstrate improved stability of LLE for improved tolerance to normal mobility and activities by performing SLS x 10 sec.MET, 3/15/22  3. Pt will deny pain in LLE or back x 1 week to demonstrate improved tolerance to his normal activities.  ongoing - patient continues to have pain in L side of back.   4. Patient will be able to ascend and descend stairs with reciprocal gait to demonstrate improved SL stability and community ambulation ongoing  5. Patient will report walking for 45 minutes at a time to demonstrate improved endurance and improved tolerance to gait. Met 3/31/22  6. Patient will be able to carry 20lbs 200 feet to demonstrate ability to demonstrate ability to perform work tasks. ongoing     Plan   Plan of care Certification: 2/23/2022 to 4/8/2022.  Extended POC 4/15/2022     Outpatient Physical Therapy 2 times weekly to include the following interventions: Moist Heat/ Ice, Patient Education, Therapeutic Activities and Therapeutic Exercise    Discussed completion of remaining 2 visits and possible discharge. Focus on HEP in next 2 sessions.      Indu Sandoval, SPT     I, Veronica Bosch, DPT, certify that I was present in the room directing the student in service delivery and guiding them using my skilled judgment. As the co-signing therapist I have reviewed the students documentation and am responsible for the treatment, assessment, and plan.      Veronica Bosch, PT, DPT, CBIS  Board-Certified Clinical Specialist in Neurologic Physical Therapy    4/5/2022

## 2022-04-06 NOTE — PROGRESS NOTES
"[facility]  Occupational Therapy Treatment Note and Discharge summary    Date: 4/6/2022  Name: Priyank Whittington  Clinic Number: [id number]    Therapy Diagnosis:   Encounter Diagnoses   Name Primary?    Decreased  strength Yes    Deficit in activities of daily living (ADL)     Double vision with both eyes open     Alteration in instrumental activities of daily living (IADL)      Physician: Fay, Provider    Physician Orders: eval and treat; Neuro program  Medical Diagnosis: S06.5X9A (ICD-10-CM) - Subdural hematoma   Evaluation Date: 2/23/2022  Plan of Care Expiration Period: 4/22/2022  Insurance Authorization period Expiration: 12/31/2022  Date of Return to MD: 5/17/2022 - neuro  Visit # / Visits Authorized: 10/20 (plus eval)  FOTO: 33% limitation at eval  4/6/2022: 2% limitation    Precautions: Standard, Diabetes and Fall     Time In: 11:52  Time Out: 12:32  Total Billable Time: 40 minutes    SUBJECTIVE     Pt reports: "I will keep doing these exercises at home. I am able to do the things I want to do".  He was compliant with home exercise program given last session.   Response to previous treatment: positive  Functional change: good functional return    Pain: 0/10 "just tight"  Location: left lateral upper trunk, near shoulder    OBJECTIVE     Objective Measures updated at progress report unless specified.    Visual/Perceptual:   Tracking: intact  Saccades: slightly jumpy with right eye; decreased alignment of movement  Acuity: NT  Convergence: during testing; difficulty maintaining gaze with right on target; no report of double vision.  Nystagmus: present on right  R/L discrimination: intact  Visual field: periferal vision normal both sides at 85°.  Motor Planning Praxis: intact  Perceptual testing:   · Line bisection: Within Normal Limits (WNL) at eval   · Maze Test - 27.73 seconds; 2 errors, compared to 54.59 seconds, 0 error(s) on eval. (Cut point score is 60 " seconds or less with 1 error or less).   · Trail Making Test A - 32.23 with one self corrected error, compared to 53.62 seconds, 0 error(s) at eval. (Average 29 seconds, Deficient > 78 seconds, rule of thumb: most in 90 seconds)  · Trail Making Test B - 72.53; no errors; an improvement from 140.06 seconds, 5 error(s), and didn't complete subsequently, at initial eval. (Average 75 seconds, Deficient > 273 seconds, rule of thumb: most in 3 minutes)    3/29/2022   MVPT-4 completed in 31.26 minutes, Raw score of 34/45. Primary misses in figure foreground and spatial relationships    Physical Exam:  Postural examination/scapula alignment: Rounded shoulder and Head forward; left scapula slightly abducted and upwardly rotated.  Joint integrity: Firm end feeling  Skin integrity: intact  Edema: none      (B)UE ROM WFL  Fist: normal  (B)UE strength WFL      Strength: (MELISSA Dynamometer in lbs.) Average 3 trials, Position II:       2/23/2022 2/23/2022 3/23/2022 3/23/2022 4/6/2022 4/6/2022     Left Right LEFT RIGHT LEFT RIGHT   Rung II 60.9# 69.5# 69.7# 70.2# 74# 73.6#    [norms for men aged 60-64: R=89.7 +/-20.4; L=76.8 +/-20.3 (Narendra et al, 1985)]     Fine Motor Coordination: 9 Hole Peg Test (in seconds)  Left 2/23/2022 Right 2/23/2022 3/23/2022   LEFT 3/23/202   RIGHT 4/6/2022   LEFT   4/6/2022   RIGHT   35.33  31.06 29.42  27.01 27.72  23.52   Comments: slip  left hand  [norms for men aged 56-60: R=20.90s +/-4.55s; L=21.64s +/-3.39s (Mile et al, 2003)]     Gross motor coordination:   · JOE (Rapid Alternating Movements): intact  · Finger to Nose (5 times): intact  · Finger Flicks (coordination moving from digit flexion to digit extension): intact     Sensation:  3/23/2022: Priyank reports numbness in left thumb.  · Sharp/dull: intact left  · Light touch: intact left  · 2pnt discrimination: intact at 3mm.  · Temperature: not tested, but Priyank reports no concerns  · Proprioception: intacts  · Kinesiology:  intact     Balance: normal     Endurance Deficit: normal        Treatment     Priyank received the treatments listed below:     Therapeutic activities to improve functional performance for 40 minutes, including:  - measures taken (see above)  - VOR 1 1x30 seconds at 185 BPM, vertical and horizontal  - VOR 2 1x 30 seconds, no metronome used.  - smooth pusuit horizontal and vertical 1x 30 seconds, both without metronome use, while standing on unstable surface. Slight sway with vertical  Saccades, horizontal and vertical, using words/shapes card 1x30 seconds  -pencil push ups    Patient Education and Home Exercises      Education provided:   - HEP vision; to perform 1 x daily  - HEP shoulder with use of dowel  - Progress towards goals     Written Home Exercises Provided: Patient instructed to cont prior HEP, but reprinted for patient.  Exercises were reviewed and Priyank was able to demonstrate them prior to the end of the session.  Priyank demonstrated good  understanding of the HEP provided. See EMR under Patient Instructions for exercises provided during therapy sessions.       Assessment     Pt tolerated all activities presented to him, with limitations noted with right eye during tracking and converging, however, patient reports no signs of dizziness and reports being able to attend to all his task, although not having returned to driving. He wishes to discharge from therapy at this time.     Priyank is progressing well towards his goals; status off goals can be seen below. He has met all short term goals and 4/5 long term goals.    Goals:  Short Term Goals: 4 weeks   - Pt will increase  strength by 3-5# with BUE to increase performance during daily tasks. MET for left hand 3/23/2022; DISCONTINUE  - Pt will carry plate 10 feet using BUE to improve ability to carry household objects safely. MET 3/23/2022  - Priyank is able to place can at shelve at shoulder height using either hand. MET 3/23/2022  - Pt to be  independent with oculomotor HEP. MET 4/6/2022  - Pt to be independent with UE strengthening and stretching HEP. MET 3/23/2022   - Pt will decrease time on 9HPT by 4 seconds with right hand to improve fine motor speed and coordination needed to perform money manipulation and ADLs. MET 3/23/2022  - Near point convergence will decrease to 25 cm or less to improve oculomotor coordination. MET 3/23/2022  - Pt will report he is able to read for 10 minutes without eye fatigue or double vision. MET 3/23/2022     Long Term Goals: 8 weeks   - Pt will increase  strength by 6-10# with BUE to increase performance during daily tasks. MET 3/23/2022 for left hand; DISCONTINUE  - Pt will carry plate 10 feet using either hand to improve ability to carry household objects safely. MET 3/23/2022  - Pt will decrease time on 9HPT by 8 seconds with right hand to improve fine motor speed and coordination needed to perform money manipulation and ADLs. NOT MET 4/6/2022; patient within norms for age group.  - Near point convergence will decrease to 20 cm or less to improve oculomotor coordination. MET 3/23/2022  - Pt will report he is able to read for 30 minutes without eye fatigue or double vision.  MET 3/23/2022     Goal added 3/23/2022; to be met by discharge  - Priyank will complete in clinic testing of skills needed for community mobility. MET 4/6/2022 (will need further vision and reaction time testing - unable to complete in this clinic; as well as SLUMS)     PLAN      Discharge from Occupational Therapy, as patient's needs have been met.    MARIA GUADALUPE Sommer, OTR/L, CEAS-I  4/6/2022

## 2022-04-12 ENCOUNTER — CLINICAL SUPPORT (OUTPATIENT)
Dept: REHABILITATION | Facility: HOSPITAL | Age: 62
End: 2022-04-12
Payer: MEDICAID

## 2022-04-12 DIAGNOSIS — R68.89 DECREASED STRENGTH, ENDURANCE, AND MOBILITY: Primary | ICD-10-CM

## 2022-04-12 DIAGNOSIS — Z74.09 DECREASED STRENGTH, ENDURANCE, AND MOBILITY: Primary | ICD-10-CM

## 2022-04-12 DIAGNOSIS — R53.1 DECREASED STRENGTH, ENDURANCE, AND MOBILITY: Primary | ICD-10-CM

## 2022-04-12 PROCEDURE — 97110 THERAPEUTIC EXERCISES: CPT | Mod: PN | Performed by: PHYSICAL THERAPIST

## 2022-04-12 NOTE — PROGRESS NOTES
Physical Therapy Daily Treatment Note     Name: Priyank Whartongesha  Clinic Number: [id number]    Therapy Diagnosis:   Encounter Diagnosis   Name Primary?    Decreased strength, endurance, and mobility Yes     Physician: Atul Peter.*    Visit Date: 4/12/2022    Physician Orders: Atul Peter MD     Physician Orders: PT Eval and Treat neuro PT2  Medical Diagnosis from Referral: S06.5X9A (ICD-10-CM) - Subdural hematoma  Evaluation Date: 2/23/2022  Authorization Period Expiration: 12/31/2022  Plan of Care Expiration: 4/15/2022  Visit # / Visits authorized: 9/ 25       Time In: 11:00  Time Out: 11:45  Total Billable Time: 45 minutes    Precautions: Standard    Subjective     Pt reports: Patient reports his back is about the same. Patient reports being able to walk between 50 minutes and 1 hour.   He was compliant with home exercise program.  Response to previous treatment: Reports no adverse effects  Functional change: ongoing    Pain: 3/10  Location: left back    Description: tightness     Objective   Bold= new or progression    Priyank received therapeutic exercises to develop strength, endurance, flexibility and core stabilization for 45 minutes including:     Recumbent stepper BUE/LE L6 x 8 min for improved endurance and strength     At MAT:               Child's pose - R, L - 3 x 30 ea              LTR 2 x 12              Supine Marching w/ abdominal drawing in 1 x 20 - alt   Supine marching w/ knee extension 2 x 10  alt     Free movement:    Standing open books - 2 x 10 ea                            Side steps w/ GTB - x 3 laps (20ft)              Monster walks(fwd/bwd) w/ GTB - x 3 laps (20 ft)              Marches, w/ [place] farmers carry 7.5# - 4 laps ea hand                 Half kneeling - wood chops - 10#, 2 x 10 ea     Moist heat applied throughout mat exercises x 10 min    Home Exercises Provided and Patient Education Provided     Education provided:   Patient was instructed to  continue HEP and stretching at home to improve back tightness. Discussed safety/strategy of riding stationary bike at home to continue to improve endurance.    Written Home Exercises Provided: Patient instructed to cont prior HEP with emphasis on stretching to manage tightness. Exercises were reviewed and Priyank was able to demonstrate them prior to the end of the session.  Priyank demonstrated good  understanding of the education provided.     See EMR under Patient Instructions for exercises provided prior visit., updated on 3/8/22. updated on 4/5/22 4/12/22    Assessment   Priyank tolerated session well today. Patient continues to report improved left sided back tightness with stretching, but reports increased tightness with strengthening exercises. Patient was educated on performing stretching before and after exercise to improve tightness in back. Patient continues to report compliance with HEP and notes improved tolerance for walking. Priyank demonstrated improved trunk control with mat exercises and half kneeling wood chop exercises. Patient to continue to benefit from skilled physical therapy to progress HEP and encourage patient independence.     Priyank Is progressing well towards his goals.   Pt prognosis is Excellent.     Pt will continue to benefit from skilled outpatient physical therapy to address the deficits listed in the problem list box on initial evaluation, provide pt/family education and to maximize pt's level of independence in the home and community environment.     Pt's spiritual, cultural and educational needs considered and pt agreeable to plan of care and goals.     Anticipated barriers to physical therapy: none    Goals:   Short Term Goals: 3 weeks   1. Pt will report performing walking program at least 15 minutes at a time for improving tolerance to gait to decrease reliance on SPC. MET 03/18/22  2. Pt will demonstrate 5 times sit<>stand test in 10 sec or less to demonstrate decreased  fall risk and improved LE strength. MET 03/18/22     Long Term Goals: 8 weeks - Modified on 3/18/22  1. Pt will demonstrate a 25% improvement in gait velocity to ambulate at an average speed for his age by walking at 1.36 m/s. MET 3/15  2. Pt will demonstrate improved stability of LLE for improved tolerance to normal mobility and activities by performing SLS x 10 sec.MET, 3/15/22  3. Pt will deny pain in LLE or back x 1 week to demonstrate improved tolerance to his normal activities.  ongoing - patient continues to have pain in L side of back.   4. Patient will be able to ascend and descend stairs with reciprocal gait to demonstrate improved SL stability and community ambulation ongoing  5. Patient will report walking for 45 minutes at a time to demonstrate improved endurance and improved tolerance to gait. Met 3/31/22  6. Patient will be able to carry 20lbs 200 feet to demonstrate ability to demonstrate ability to perform work tasks. ongoing    Plan   Plan of care Certification: 2/23/2022 to 4/8/2022.  Extended POC 4/15/2022     Outpatient Physical Therapy 2 times weekly to include the following interventions: Moist Heat/ Ice, Patient Education, Therapeutic Activities and Therapeutic Exercise    Discussed completion of remaining visits and possible discharge. Focus on HEP in next 1 session.     Indu Sandoval, SPT    I, Veronica Bosch, MARIO ALBERTOT, certify that I was present in the room directing the student in service delivery and guiding them using my skilled judgment. As the co-signing therapist I have reviewed the students documentation and am responsible for the treatment, assessment, and plan.     Veronica Bosch, PT, DPT, CBIS  Board-Certified Clinical Specialist in Neurologic Physical Therapy     4/12/2022

## 2022-04-12 NOTE — PATIENT INSTRUCTIONS
Clinical References    Knee to Chest: Sagittal Plane Stability        Bring one knee up, then return. Be sure pelvis does not rock backward or forward. Keep pelvis still. Lift knees 20 times, alternating each leg.   Do 2 sets, 1 times per day. 7 days a week     https://[employer]/4     Copyright © [employer]. All rights reserved.      Lower Abdominals With Leg Lower        Start with legs up, bent. Straighten one leg. Lower leg. Lift leg and bend. Keep back and pelvis neutral.  Do 10 times, each leg. Do 2 sets, 1 times per day. 7 days a week.     https://[employer]/34     Copyright © [employer]. All rights reserved.      - Standing open books

## 2022-04-13 NOTE — PROGRESS NOTES
See POC, pt d/c from PT    Veronica Bosch, PT, DPT, CBIS  Board-Certified Clinical Specialist in Neurologic Physical Therapy

## 2022-04-14 ENCOUNTER — CLINICAL SUPPORT (OUTPATIENT)
Dept: REHABILITATION | Facility: HOSPITAL | Age: 62
End: 2022-04-14
Payer: MEDICAID

## 2022-04-14 DIAGNOSIS — R53.1 DECREASED STRENGTH, ENDURANCE, AND MOBILITY: Primary | ICD-10-CM

## 2022-04-14 DIAGNOSIS — R68.89 DECREASED STRENGTH, ENDURANCE, AND MOBILITY: Primary | ICD-10-CM

## 2022-04-14 DIAGNOSIS — Z74.09 DECREASED STRENGTH, ENDURANCE, AND MOBILITY: Primary | ICD-10-CM

## 2022-04-14 PROCEDURE — 97110 THERAPEUTIC EXERCISES: CPT | Mod: PN | Performed by: PHYSICAL THERAPIST

## 2022-04-14 NOTE — PLAN OF CARE
Outpatient Therapy Discharge Summary     Name: Priyank Chavezha  Clinic Number: [id number]    Therapy Diagnosis:   Encounter Diagnosis   Name Primary?    Decreased strength, endurance, and mobility Yes     Physician: Atul Peter*    Physician Orders: PT Eval and Treat neuro PT2  Medical Diagnosis: S06.5X9A (ICD-10-CM) - Subdural hematoma  Evaluation Date: 2/23/22      Date of Last visit: 4/14/2022  Total Visits Received: 10  Cancelled Visits: 0  No Show Visits: 0    Time in: 11:00  Time out: 11:45  Total treatment time: 45 minutes    SUBJECTIVE      Pt reports: Patient reports he is doing well today and is excited for discharge day. Patient states he does not have any questions regarding the home exercise program and that it is addressing his back tightness. Patient continues to report L sided back tightness, but states he feels it is getting better.   Pt was compliant with home exercise program.     Pain: 3/10  Location: Back, left    OBJECTIVE     RANGE OF MOTION--LOWER EXTREMITIES  (R) LE Hip: WNL   Knee: WNL   Ankle: WNL    (L) LE: Hip: WNL   Knee: WNL   Ankle: WNL    Strength: manual muscle test grades below     Lower Extremity Strength   RLE LLE   Hip Flexion: 5/5 5/5   Hip Extension:  5/5 5/5   Hip Abduction: 5/5 5/5   Hip Adduction: 5/5 5/5   Knee Extension: 5/5 5/5   Knee Flexion: 5/5 5/5   Ankle Dorsiflexion: 5/5 5/5   Ankle Plantarflexion: 5/5 5/5     Abdominal Strength: NT     D/C   Single Limb Stance R LE 30 s  (<10 sec = HIGH FALL RISK)   Single Limb Stance L LE 30 s  (<10 sec = HIGH FALL RISK)   5 times sit-stand 8.95 seconds     Gait Assessment: No deficits.  - AD used: None  - Assistance: Independent  - Distance: 100 ft     D/C   Timed Up and Go NT   Self Selected Walking Speed 1.52 m/sec (6m/s) - based off 6 minute walk test   6 Minute Walk Test 1800 ft       Functional Mobility (Bed mobility, transfers)  Bed mobility: I  Supine to sit: I  Sit to supine: I  Rolling: I  Sit to  stand:  I    Priyank received therapeutic exercises to develop strength, endurance and mobility for 45 minutes including:    To include the above objective measures    Nustep L5 x 5 min - for UE/LE endurance and strengthening     At Mat:   LTR 2 x 12   Marching w/ knee extension - Transabdominal drawing in, 2 x 12 ea    Monster walks GTB x 3 laps (30ft)  Side stepping GTB x 3 laps (30 ft)     Standing open books 2 x 12 ea        Written Home Exercises Provided: Patient instructed to cont prior HEP.  Exercises were reviewed and Priyank was able to demonstrate them prior to the end of the session.  Priyank demonstrated good  understanding of the education provided.     See EMR under Patient Instructions for exercises provided prior visit. Most recently updated on 4/5/22.      Assessment    Priyank is a 62 year old male with diagnosis of subdural hematoma referred to [facility] received skilled outpatient PT 2/23/22 to 4/14/22. Patient attended a total of 10 visits and had 0 no shows or cacellations. Patient demonstrates significant improvements in single limb stability, left lower extremity strength, and endurance. Patient is independent in all ADLs, and only remaining limitation includes driving. Patient continually reports compliance to HEP and walking program.  Patient is no longer a fall risk as indicated by patients 5x sit <> stand assessment and single limb stance assessment. Patient reports he currently feels he has no limitations at home and feels he would be able to perform all physical work duties without issues. SPT discussed discharge with patient at prior 2 visits and patient consented.     Goals:   Formal status as of 4/14/22  Short Term Goals: 3 weeks   1. Pt will report performing walking program at least 15 minutes at a time for improving tolerance to gait to decrease reliance on SPC. MET 03/18/22  2. Pt will demonstrate 5 times sit<>stand test in 10 sec or less to demonstrate  decreased fall risk and improved LE strength. MET 03/18/22     Long Term Goals: 8 weeks - Modified on 3/18/22  1. Pt will demonstrate a 25% improvement in gait velocity to ambulate at an average speed for his age by walking at 1.36 m/s. MET 3/15  2. Pt will demonstrate improved stability of LLE for improved tolerance to normal mobility and activities by performing SLS x 10 sec.MET, 3/15/22  3. Pt will deny pain in LLE or back x 1 week to demonstrate improved tolerance to his normal activities. Not met - patient denies pain in LLE and has improved pain in back that continues to improve.   4. Patient will be able to ascend and descend stairs with reciprocal gait to demonstrate improved SL stability and community ambulation. Met 3/31/22  5. Patient will report walking for 45 minutes at a time to demonstrate improved endurance and improved tolerance to gait. Met 3/31/22  6. Patient will be able to carry 20lbs 200 feet to demonstrate ability to demonstrate ability to perform work tasks. Not tested - patient reports he does not have to perform this task at work.       Discharge reason: Patient has reached the maximum rehab potential for the present time.     Plan   This patient is discharged from Physical Therapy    Indu Sandoval, SHEILA    I, Veronica Bosch, MARIO ALBERTOT, certify that I was present in the room directing the student in service delivery and guiding them using my skilled judgment. As the co-signing therapist I have reviewed the students documentation and am responsible for the treatment, assessment, and plan.     Veronica Bosch, PT, DPT, CBIS  Board-Certified Clinical Specialist in Neurologic Physical Therapy    4/14/2022

## 2022-05-16 ENCOUNTER — TELEPHONE (OUTPATIENT)
Dept: NEUROSURGERY | Facility: CLINIC | Age: 62
End: 2022-05-16
Payer: MEDICAID

## 2022-05-16 NOTE — TELEPHONE ENCOUNTER
----- Message from Lucy Diallo sent at 5/16/2022 11:57 AM CDT -----  Contact: MADELIN GUDINOFABRENDA THOMSON [[id number]] [phone]  Type: Appointment Request    Name of Caller: SAUMYA GUDINO [[id number]]  When is the first available appointment? Do not have access  Reason for Visit:  Reschedule 5/17/22 CT and Neurosurgery appt  Best Call Back Number: [phone]  Additional Information:  Patient indicates he can make it tomorrow in the afternoon if there is availability. If not, he'd like to check next opening.

## 2022-05-16 NOTE — TELEPHONE ENCOUNTER
LVM with pt, appts rsld to 6-7-22 CT at 9 and FU with Wilson, this is the first avail CT with availability with this provider.

## 2022-06-07 ENCOUNTER — OFFICE VISIT (OUTPATIENT)
Dept: NEUROSURGERY | Facility: CLINIC | Age: 62
End: 2022-06-07
Payer: MEDICAID

## 2022-06-07 ENCOUNTER — HOSPITAL ENCOUNTER (OUTPATIENT)
Dept: RADIOLOGY | Facility: HOSPITAL | Age: 62
Discharge: HOME | End: 2022-06-07
Attending: PHYSICIAN ASSISTANT
Payer: MEDICAID

## 2022-06-07 VITALS
WEIGHT: 200 LBS | HEIGHT: 69 IN | SYSTOLIC BLOOD PRESSURE: 126 MMHG | HEART RATE: 68 BPM | DIASTOLIC BLOOD PRESSURE: 77 MMHG | BODY MASS INDEX: 29.62 KG/M2

## 2022-06-07 DIAGNOSIS — S06.5XAA SUBDURAL HEMATOMA: ICD-10-CM

## 2022-06-07 DIAGNOSIS — I62.00 NONTRAUMATIC SUBDURAL HEMORRHAGE, UNSPECIFIED: ICD-10-CM

## 2022-06-07 PROCEDURE — 3078F PR MOST RECENT DIASTOLIC BLOOD PRESSURE < 80 MM HG: ICD-10-PCS | Mod: CPTII,,, | Performed by: PHYSICIAN ASSISTANT

## 2022-06-07 PROCEDURE — 3051F HG A1C>EQUAL 7.0%<8.0%: CPT | Mod: CPTII,,, | Performed by: PHYSICIAN ASSISTANT

## 2022-06-07 PROCEDURE — 1160F PR REVIEW ALL MEDS BY PRESCRIBER/CLIN PHARMACIST DOCUMENTED: ICD-10-PCS | Mod: CPTII,,, | Performed by: PHYSICIAN ASSISTANT

## 2022-06-07 PROCEDURE — 99999 PR PBB SHADOW E&M-EST. PATIENT-LVL IV: CPT | Mod: PBBFAC,,, | Performed by: PHYSICIAN ASSISTANT

## 2022-06-07 PROCEDURE — 70450 CT HEAD/BRAIN W/O DYE: CPT | Mod: 26,,, | Performed by: RADIOLOGY

## 2022-06-07 PROCEDURE — 99214 OFFICE O/P EST MOD 30 MIN: CPT | Mod: PBBFAC,25 | Performed by: PHYSICIAN ASSISTANT

## 2022-06-07 PROCEDURE — 1159F PR MEDICATION LIST DOCUMENTED IN MEDICAL RECORD: ICD-10-PCS | Mod: CPTII,,, | Performed by: PHYSICIAN ASSISTANT

## 2022-06-07 PROCEDURE — 99213 OFFICE O/P EST LOW 20 MIN: CPT | Mod: S$PBB,,, | Performed by: PHYSICIAN ASSISTANT

## 2022-06-07 PROCEDURE — 1159F MED LIST DOCD IN RCRD: CPT | Mod: CPTII,,, | Performed by: PHYSICIAN ASSISTANT

## 2022-06-07 PROCEDURE — 3078F DIAST BP <80 MM HG: CPT | Mod: CPTII,,, | Performed by: PHYSICIAN ASSISTANT

## 2022-06-07 PROCEDURE — 3008F PR BODY MASS INDEX (BMI) DOCUMENTED: ICD-10-PCS | Mod: CPTII,,, | Performed by: PHYSICIAN ASSISTANT

## 2022-06-07 PROCEDURE — 70450 CT HEAD/BRAIN W/O DYE: CPT | Mod: TC

## 2022-06-07 PROCEDURE — 70450 CT HEAD WITHOUT CONTRAST: ICD-10-PCS | Mod: 26,,, | Performed by: RADIOLOGY

## 2022-06-07 PROCEDURE — 4010F PR ACE/ARB THEARPY RXD/TAKEN: ICD-10-PCS | Mod: CPTII,,, | Performed by: PHYSICIAN ASSISTANT

## 2022-06-07 PROCEDURE — 3008F BODY MASS INDEX DOCD: CPT | Mod: CPTII,,, | Performed by: PHYSICIAN ASSISTANT

## 2022-06-07 PROCEDURE — 4010F ACE/ARB THERAPY RXD/TAKEN: CPT | Mod: CPTII,,, | Performed by: PHYSICIAN ASSISTANT

## 2022-06-07 PROCEDURE — 3074F PR MOST RECENT SYSTOLIC BLOOD PRESSURE < 130 MM HG: ICD-10-PCS | Mod: CPTII,,, | Performed by: PHYSICIAN ASSISTANT

## 2022-06-07 PROCEDURE — 99213 PR OFFICE/OUTPT VISIT, EST, LEVL III, 20-29 MIN: ICD-10-PCS | Mod: S$PBB,,, | Performed by: PHYSICIAN ASSISTANT

## 2022-06-07 PROCEDURE — 1160F RVW MEDS BY RX/DR IN RCRD: CPT | Mod: CPTII,,, | Performed by: PHYSICIAN ASSISTANT

## 2022-06-07 PROCEDURE — 3051F PR MOST RECENT HEMOGLOBIN A1C LEVEL 7.0 - < 8.0%: ICD-10-PCS | Mod: CPTII,,, | Performed by: PHYSICIAN ASSISTANT

## 2022-06-07 PROCEDURE — 3074F SYST BP LT 130 MM HG: CPT | Mod: CPTII,,, | Performed by: PHYSICIAN ASSISTANT

## 2022-06-07 PROCEDURE — 99999 PR PBB SHADOW E&M-EST. PATIENT-LVL IV: ICD-10-PCS | Mod: PBBFAC,,, | Performed by: PHYSICIAN ASSISTANT

## 2022-06-07 RX ORDER — PANTOPRAZOLE SODIUM 40 MG/1
40 TABLET, DELAYED RELEASE ORAL DAILY
COMMUNITY
Start: 2022-04-30

## 2022-06-07 RX ORDER — LISINOPRIL 5 MG/1
5 TABLET ORAL DAILY
COMMUNITY
Start: 2022-05-25

## 2022-06-07 NOTE — PROGRESS NOTES
Neurosurgery  Established Patient    SUBJECTIVE:     History of Present Illness:  61 yo M s/p craniotomy for SDH on 1/28/22, patient here today for neurosurgical follow-up with repeat head CT the.  Patient reports overall has been doing well.  Denies headaches, visual changes, weakness, seizures.  Patient does report some residual left hand and foot numbness that has been going on since he was diagnosed with a subdural.  He denies any new issues today.  No recent falls.    Review of patient's allergies indicates:  No Known Allergies    Current Outpatient Medications   Medication Sig Dispense Refill    aspirin (ECOTRIN) 81 MG EC tablet Take 1 tablet (81 mg total) by mouth once daily.  0    atorvastatin (LIPITOR) 40 MG tablet Take 40 mg by mouth once daily.      lisinopriL (PRINIVIL,ZESTRIL) 5 MG tablet Take 5 mg by mouth once daily.      metFORMIN (GLUCOPHAGE) 1000 MG tablet Take 1,000 mg by mouth.      pantoprazole (PROTONIX) 40 MG tablet Take 40 mg by mouth once daily.      acetaminophen (TYLENOL) 325 MG tablet Take 2 tablets (650 mg total) by mouth every 6 (six) hours as needed for Pain. (Patient not taking: No sig reported)  0    baclofen (LIORESAL) 5 mg Tab tablet Take 1 tablet (5 mg total) by mouth 3 (three) times daily as needed (hiccups). (Patient not taking: Reported on 6/7/2022)      butalbital-acetaminophen-caffeine -40 mg (FIORICET, ESGIC) -40 mg per tablet Take 1 tablet by mouth every 4 (four) hours as needed for Pain.      heparin sodium,porcine (HEPARIN, PORCINE,) 5,000 unit/mL injection Inject 1 mL (5,000 Units total) into the skin every 8 (eight) hours. (Patient not taking: No sig reported)      HYDROcodone-acetaminophen (NORCO) 5-325 mg per tablet Take 1 tablet by mouth every 6 (six) hours as needed for Pain. (Patient not taking: No sig reported) 8 tablet 0    multivitamin Tab Take 1 tablet by mouth once daily. (Patient not taking: No sig reported)      polyethylene glycol  (GLYCOLAX) 17 gram PwPk Take 17 g by mouth once daily. (Patient not taking: No sig reported)  0    senna-docusate 8.6-50 mg (PERICOLACE) 8.6-50 mg per tablet Take 1 tablet by mouth 2 (two) times daily. (Patient not taking: No sig reported)      silodosin (RAPAFLO) 8 mg Cap capsule Take 1 capsule (8 mg total) by mouth once daily. (Patient not taking: Reported on 6/7/2022)      sodium chloride 1 gram tablet Take 1 tablet (1 g total) by mouth 3 (three) times daily. (Patient not taking: No sig reported)      white petrolatum-mineral oil 57.3-42.5% (REFRESH P.M.) 57.3-42.5 % Oint Place into the right eye every evening. (Patient not taking: No sig reported)  0     No current facility-administered medications for this visit.       Past Medical History:   Diagnosis Date    Cerebrovascular accident (CVA) of right pontine structure 01/23/2022    Diabetes mellitus     HLD (hyperlipidemia) 1/23/2022    HTN (hypertension) 1/25/2022    Subdural hematoma 01/23/2022     Past Surgical History:   Procedure Laterality Date    CRANIOTOMY FOR EVACUATION OF SUBDURAL HEMATOMA Right 1/28/2022    Procedure: CRANIOTOMY, FOR SUBDURAL HEMATOMA EVACUATION;  Surgeon: Ace Candelaria MD;  Location: [address];  Service: Neurosurgery;  Laterality: Right;  right, possible bilateral     Family History    None       Social History     Socioeconomic History    Marital status:    Tobacco Use    Smoking status: Never Smoker    Smokeless tobacco: Never Used   Substance and Sexual Activity    Alcohol use: Never    Drug use: Never    Sexual activity: Yes     Partners: Female       Review of Systems  Constitutional: no fever or chills  Eyes: no visual changes  ENT: no nasal congestion or sore throat  Respiratory: no cough or shortness of breath  Cardiovascular: no chest pain or palpitations  Gastrointestinal: no nausea or vomiting  Genitourinary: no hematuria or dysuria  Integument/Breast: no rash or  "pruritis  Hematologic/Lymphatic: no easy bruising or lymphadenopathy  Musculoskeletal: no arthralgias or myalgias  Neurological: no seizures or tremors    OBJECTIVE:     Vital Signs  Pulse: 68  BP: 126/77  Pain Score: 0-No pain  Height: 5' 9" (175.3 cm)  Weight: 90.7 kg (200 lb)  Body mass index is 29.53 kg/m².    Neurosurgery Physical Exam  General: no distress  Neurologic: Alert and oriented. Thought content appropriate.  Head: normocephalic  GCS: Motor: 6/Verbal: 5/Eyes: 4 GCS Total: 15  Cranial nerves: face symmetric, tongue midline, pupils equal, round, reactive to light with accomodation, extraocular muscles intact  Sensory: mild left hand foot numbness to light touch, otherwise response to light touch throughout  Motor Strength:full strength upper and lower extremities  Pronator Drift: no drift noted      Diagnostic Results: personally reviewed  EXAMINATION:  CT HEAD WITHOUT CONTRAST     CLINICAL HISTORY:  SDH s/p craniotomy; Traumatic subdural hemorrhage with loss of consciousness of unspecified duration, initial encounter     TECHNIQUE:  Low dose axial images were obtained through the head.  Coronal and sagittal reformations were also performed. Contrast was not administered.     COMPARISON:  Prior studies with the most recent CT 03/15/2022     FINDINGS:  Status post right frontal craniotomy.  Small low-density extra-axial collection beneath the craniotomy flap with adjacent dural thickening, decreased in size  from the prior study measuring 3 mm in width.  No acute dense blood products.  No acute intraparenchymal process is identified.     No midline shift or herniation.     The visualized sinuses and mastoid air cells are essentially clear.     Impression:     Decrease in size of small residual extra-axial collection beneath the craniotomy flap.  No new hemorrhage.        Electronically signed by: Hunter Robert  Date:                                            06/07/2022  Time:                            "                09:24    ASSESSMENT/PLAN:     61 yo M s/p craniotomy for SDH on 1/28/22, he is neurologically at baseline, head CT today shows decreased size of axial collection with continued small residual collection underneath craniotomy flap, no new hemorrhage or acute abnormality.  Follow-up with in 6 months with repeat head CT.  Patient was encouraged to call questions or concerns the meantime.     Malik Toure Jr. SHAYY  [facility]  Department of Neurosurgery      Note dictated with voice recognition software, please excuse any grammatical errors.

## 2022-12-06 ENCOUNTER — HOSPITAL ENCOUNTER (OUTPATIENT)
Dept: RADIOLOGY | Facility: HOSPITAL | Age: 62
Discharge: HOME | End: 2022-12-06
Attending: PHYSICIAN ASSISTANT
Payer: MEDICAID

## 2022-12-06 ENCOUNTER — OFFICE VISIT (OUTPATIENT)
Dept: NEUROSURGERY | Facility: CLINIC | Age: 62
End: 2022-12-06
Payer: MEDICAID

## 2022-12-06 VITALS
HEIGHT: 69 IN | SYSTOLIC BLOOD PRESSURE: 131 MMHG | DIASTOLIC BLOOD PRESSURE: 72 MMHG | BODY MASS INDEX: 29.62 KG/M2 | WEIGHT: 200 LBS | HEART RATE: 85 BPM

## 2022-12-06 DIAGNOSIS — M54.6 CHRONIC THORACIC BACK PAIN, UNSPECIFIED BACK PAIN LATERALITY: ICD-10-CM

## 2022-12-06 DIAGNOSIS — G89.29 CHRONIC THORACIC BACK PAIN, UNSPECIFIED BACK PAIN LATERALITY: Primary | ICD-10-CM

## 2022-12-06 DIAGNOSIS — G89.29 CHRONIC THORACIC BACK PAIN, UNSPECIFIED BACK PAIN LATERALITY: ICD-10-CM

## 2022-12-06 DIAGNOSIS — M54.6 CHRONIC THORACIC BACK PAIN, UNSPECIFIED BACK PAIN LATERALITY: Primary | ICD-10-CM

## 2022-12-06 DIAGNOSIS — I62.00 NONTRAUMATIC SUBDURAL HEMORRHAGE, UNSPECIFIED: ICD-10-CM

## 2022-12-06 PROCEDURE — 70450 CT HEAD/BRAIN W/O DYE: CPT | Mod: TC

## 2022-12-06 PROCEDURE — 4010F PR ACE/ARB THEARPY RXD/TAKEN: ICD-10-PCS | Mod: CPTII,,, | Performed by: PHYSICIAN ASSISTANT

## 2022-12-06 PROCEDURE — 99999 PR PBB SHADOW E&M-EST. PATIENT-LVL IV: ICD-10-PCS | Mod: PBBFAC,,, | Performed by: PHYSICIAN ASSISTANT

## 2022-12-06 PROCEDURE — 72080 XR THORACOLUMBAR SPINE AP LATERAL: ICD-10-PCS | Mod: 26,,, | Performed by: RADIOLOGY

## 2022-12-06 PROCEDURE — 3078F DIAST BP <80 MM HG: CPT | Mod: CPTII,,, | Performed by: PHYSICIAN ASSISTANT

## 2022-12-06 PROCEDURE — 3078F PR MOST RECENT DIASTOLIC BLOOD PRESSURE < 80 MM HG: ICD-10-PCS | Mod: CPTII,,, | Performed by: PHYSICIAN ASSISTANT

## 2022-12-06 PROCEDURE — 70450 CT HEAD/BRAIN W/O DYE: CPT | Mod: 26,,, | Performed by: RADIOLOGY

## 2022-12-06 PROCEDURE — 1160F PR REVIEW ALL MEDS BY PRESCRIBER/CLIN PHARMACIST DOCUMENTED: ICD-10-PCS | Mod: CPTII,,, | Performed by: PHYSICIAN ASSISTANT

## 2022-12-06 PROCEDURE — 4010F ACE/ARB THERAPY RXD/TAKEN: CPT | Mod: CPTII,,, | Performed by: PHYSICIAN ASSISTANT

## 2022-12-06 PROCEDURE — 99999 PR PBB SHADOW E&M-EST. PATIENT-LVL IV: CPT | Mod: PBBFAC,,, | Performed by: PHYSICIAN ASSISTANT

## 2022-12-06 PROCEDURE — 70450 CT HEAD WITHOUT CONTRAST: ICD-10-PCS | Mod: 26,,, | Performed by: RADIOLOGY

## 2022-12-06 PROCEDURE — 99214 OFFICE O/P EST MOD 30 MIN: CPT | Mod: PBBFAC,25 | Performed by: PHYSICIAN ASSISTANT

## 2022-12-06 PROCEDURE — 99213 OFFICE O/P EST LOW 20 MIN: CPT | Mod: S$PBB,,, | Performed by: PHYSICIAN ASSISTANT

## 2022-12-06 PROCEDURE — 3075F SYST BP GE 130 - 139MM HG: CPT | Mod: CPTII,,, | Performed by: PHYSICIAN ASSISTANT

## 2022-12-06 PROCEDURE — 3075F PR MOST RECENT SYSTOLIC BLOOD PRESS GE 130-139MM HG: ICD-10-PCS | Mod: CPTII,,, | Performed by: PHYSICIAN ASSISTANT

## 2022-12-06 PROCEDURE — 1160F RVW MEDS BY RX/DR IN RCRD: CPT | Mod: CPTII,,, | Performed by: PHYSICIAN ASSISTANT

## 2022-12-06 PROCEDURE — 1159F PR MEDICATION LIST DOCUMENTED IN MEDICAL RECORD: ICD-10-PCS | Mod: CPTII,,, | Performed by: PHYSICIAN ASSISTANT

## 2022-12-06 PROCEDURE — 99213 PR OFFICE/OUTPT VISIT, EST, LEVL III, 20-29 MIN: ICD-10-PCS | Mod: S$PBB,,, | Performed by: PHYSICIAN ASSISTANT

## 2022-12-06 PROCEDURE — 72080 X-RAY EXAM THORACOLMB 2/> VW: CPT | Mod: TC

## 2022-12-06 PROCEDURE — 3008F PR BODY MASS INDEX (BMI) DOCUMENTED: ICD-10-PCS | Mod: CPTII,,, | Performed by: PHYSICIAN ASSISTANT

## 2022-12-06 PROCEDURE — 3008F BODY MASS INDEX DOCD: CPT | Mod: CPTII,,, | Performed by: PHYSICIAN ASSISTANT

## 2022-12-06 PROCEDURE — 1159F MED LIST DOCD IN RCRD: CPT | Mod: CPTII,,, | Performed by: PHYSICIAN ASSISTANT

## 2022-12-06 PROCEDURE — 72080 X-RAY EXAM THORACOLMB 2/> VW: CPT | Mod: 26,,, | Performed by: RADIOLOGY

## 2022-12-06 RX ORDER — COVID-19 ANTIGEN TEST
KIT MISCELLANEOUS
COMMUNITY
Start: 2022-10-07

## 2022-12-06 NOTE — PROGRESS NOTES
Neurosurgery  Established Patient    SUBJECTIVE:     Interval History:  Mr. Whittington her follow up with updated head CT.  Overall states he has been denies any complaints of headaches, visual changes, weakness, speech difficulty seizures.  Denies any recent trauma.  He has been complaining of significant mid-low back pain that has been bothering him for several months now.  He denies any pain paresthesias or weakness in his lower extremities.  Denies bowel bladder dysfunction.  Denies balance difficulty.    History of Present Illness(6/722):  63 yo M s/p craniotomy for SDH on 1/28/22, patient here today for neurosurgical follow-up with repeat head CT the.  Patient reports overall has been doing well.  Denies headaches, visual changes, weakness, seizures.  Patient does report some residual left hand and foot numbness that has been going on since he was diagnosed with a subdural.  He denies any new issues today.  No recent falls.    Review of patient's allergies indicates:  No Known Allergies    Current Outpatient Medications   Medication Sig Dispense Refill    atorvastatin (LIPITOR) 40 MG tablet Take 40 mg by mouth once daily.      baclofen (LIORESAL) 5 mg Tab tablet Take 1 tablet (5 mg total) by mouth 3 (three) times daily as needed (hiccups).      metFORMIN (GLUCOPHAGE) 1000 MG tablet Take 1,000 mg by mouth.      acetaminophen (TYLENOL) 325 MG tablet Take 2 tablets (650 mg total) by mouth every 6 (six) hours as needed for Pain. (Patient not taking: Reported on 3/15/2022)  0    aspirin (ECOTRIN) 81 MG EC tablet Take 1 tablet (81 mg total) by mouth once daily. (Patient not taking: Reported on 12/6/2022)  0    butalbital-acetaminophen-caffeine -40 mg (FIORICET, ESGIC) -40 mg per tablet Take 1 tablet by mouth every 4 (four) hours as needed for Pain.      FLOWFLEX COVID-19 AG HOME TEST Kit       heparin sodium,porcine (HEPARIN, PORCINE,) 5,000 unit/mL injection Inject 1 mL (5,000 Units total) into the skin  every 8 (eight) hours. (Patient not taking: Reported on 3/15/2022)      HYDROcodone-acetaminophen (NORCO) 5-325 mg per tablet Take 1 tablet by mouth every 6 (six) hours as needed for Pain. (Patient not taking: Reported on 3/15/2022) 8 tablet 0    lisinopriL (PRINIVIL,ZESTRIL) 5 MG tablet Take 5 mg by mouth once daily.      multivitamin Tab Take 1 tablet by mouth once daily. (Patient not taking: Reported on 3/15/2022)      pantoprazole (PROTONIX) 40 MG tablet Take 40 mg by mouth once daily.      polyethylene glycol (GLYCOLAX) 17 gram PwPk Take 17 g by mouth once daily. (Patient not taking: Reported on 3/15/2022)  0    senna-docusate 8.6-50 mg (PERICOLACE) 8.6-50 mg per tablet Take 1 tablet by mouth 2 (two) times daily. (Patient not taking: Reported on 3/15/2022)      silodosin (RAPAFLO) 8 mg Cap capsule Take 1 capsule (8 mg total) by mouth once daily. (Patient not taking: Reported on 6/7/2022)      sodium chloride 1 gram tablet Take 1 tablet (1 g total) by mouth 3 (three) times daily. (Patient not taking: Reported on 3/15/2022)      white petrolatum-mineral oil 57.3-42.5% (REFRESH P.M.) 57.3-42.5 % Oint Place into the right eye every evening. (Patient not taking: Reported on 3/15/2022)  0     No current facility-administered medications for this visit.       Past Medical History:   Diagnosis Date    Cerebrovascular accident (CVA) of right pontine structure 01/23/2022    Diabetes mellitus     HLD (hyperlipidemia) 1/23/2022    HTN (hypertension) 1/25/2022    Subdural hematoma 01/23/2022     Past Surgical History:   Procedure Laterality Date    CRANIOTOMY FOR EVACUATION OF SUBDURAL HEMATOMA Right 1/28/2022    Procedure: CRANIOTOMY, FOR SUBDURAL HEMATOMA EVACUATION;  Surgeon: Ace Candelaria MD;  Location: [facility] OR [address] [place] [address];  Service: Neurosurgery;  Laterality: Right;  right, possible bilateral     Family History    None       Social History     Socioeconomic History    Marital status:    Tobacco Use    Smoking  "status: Never    Smokeless tobacco: Never   Substance and Sexual Activity    Alcohol use: Never    Drug use: Never    Sexual activity: Yes     Partners: Female       Review of Systems  Constitutional: no fever or chills  Eyes: no visual changes  ENT: no nasal congestion or sore throat  Respiratory: no cough or shortness of breath  Cardiovascular: no chest pain or palpitations  Gastrointestinal: no nausea or vomiting  Genitourinary: no hematuria or dysuria  Integument/Breast: no rash or pruritis  Hematologic/Lymphatic: no easy bruising or lymphadenopathy  Musculoskeletal: no arthralgias or myalgias  Neurological: no seizures or tremors    OBJECTIVE:     Vital Signs  Pulse: 85  BP: 131/72  Pain Score: 0-No pain  Height: 5' 9" (175.3 cm)  Weight: 90.7 kg (200 lb)  Body mass index is 29.53 kg/m².    Neurosurgery Physical Exam  General: no distress  Neurologic: Alert and oriented.   Head: normocephalic, atraumatic   GCS: Motor: 6/Verbal: 5/Eyes: 4 GCS Total: 15  Cranial nerves: face symmetric, tongue midline, pupils equal, round, reactive to light with accomodation, extraocular muscles intact  Sensory: response to light touch throughout  Motor Strength:full strength upper and lower extremities  Pronator Drift: no drift noted  Gait normal.  No difficulty with Tandem gait.      Diagnostic Results: personally reviewed  EXAMINATION:  CT HEAD WITHOUT CONTRAST     CLINICAL HISTORY:  Subdural hemorrhage; Nontraumatic subdural hemorrhage, unspecified     TECHNIQUE:  Low dose axial CT images obtained throughout the head without the use of intravenous contrast.  Axial, sagittal and coronal reconstructions were performed.     COMPARISON:  Multiple prior CT head, most recent 06/07/2022; MRI brain 01/31/22.     FINDINGS:  Postoperative changes from a right craniotomy for subdural hematoma evacuation.  Trace residual low-density extra-axial fluid collection subjacent to the craniotomy.  No new extra-axial blood or fluid collections " elsewhere.     Persistent rounded soft tissue fullness along the floor of the anterior cranial fossa in the midline in keeping with previously described extra-axial mass.  No new mass effect or subjacent parenchymal edema.     Ventricles are stable in size for age without evidence of hydrocephalus.     Known small area of prior infarction within the dorsal right efraín and midbrain.  No new parenchymal hemorrhage, edema or major vascular distribution infarct elsewhere.     Craniotomy in stable position.  No new fracture.     Moderate patchy mucosal thickening throughout the paranasal sinuses with superimposed fluid and secretions.     Impression:     Postoperative changes from right craniotomy for subdural hematoma evacuation.   No new extra-axial blood or fluid collections.     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     Sinusitis.     Electronically signed by resident: Bayron De Souza  Date:                                            12/06/2022  Time:                                           10:24     Electronically signed by: Rakan Pittman MD  Date:                                            12/06/2022  Time:                                           12:19    ASSESSMENT/PLAN:     63 yo M s/p craniotomy for SDH on 1/28/22. Head CT today shows stable postsurgical changes with stable small extra-axial collection at craniotomy site grossly unchanged.  No acute intracranial abnormality.  Small stable middle cranial fossa meningioma. Patient is approximately 1 year status post craniotomy for subdural evacuation, he is neurologically stable without any focal deficits, CT shows near complete resolution of prior subdural with overall small extra-axial collection unchanged from scan 6 months ago.  At this point in time can follow from a clinical standpoint.  Patient has been having issues with mid-low back pain for several  months now will get thoracolumbar x-rays and follow up after that.  He was encouraged to call clinic with questions or concerns the meantime.    Malik Toure Jr. SHAYY  [facility]  Department of Neurosurgery      Note dictated with voice recognition software, please excuse any grammatical errors.

## 2023-01-17 ENCOUNTER — OFFICE VISIT (OUTPATIENT)
Dept: NEUROSURGERY | Facility: CLINIC | Age: 63
End: 2023-01-17
Payer: MEDICAID

## 2023-01-17 VITALS
BODY MASS INDEX: 29.62 KG/M2 | HEART RATE: 79 BPM | WEIGHT: 200 LBS | SYSTOLIC BLOOD PRESSURE: 153 MMHG | DIASTOLIC BLOOD PRESSURE: 84 MMHG | HEIGHT: 69 IN

## 2023-01-17 DIAGNOSIS — S39.012D STRAIN OF LUMBAR REGION, SUBSEQUENT ENCOUNTER: Primary | ICD-10-CM

## 2023-01-17 PROCEDURE — 4010F ACE/ARB THERAPY RXD/TAKEN: CPT | Mod: CPTII,,, | Performed by: PHYSICIAN ASSISTANT

## 2023-01-17 PROCEDURE — 3077F SYST BP >= 140 MM HG: CPT | Mod: CPTII,,, | Performed by: PHYSICIAN ASSISTANT

## 2023-01-17 PROCEDURE — 99999 PR PBB SHADOW E&M-EST. PATIENT-LVL IV: CPT | Mod: PBBFAC,,, | Performed by: PHYSICIAN ASSISTANT

## 2023-01-17 PROCEDURE — 3079F DIAST BP 80-89 MM HG: CPT | Mod: CPTII,,, | Performed by: PHYSICIAN ASSISTANT

## 2023-01-17 PROCEDURE — 3008F BODY MASS INDEX DOCD: CPT | Mod: CPTII,,, | Performed by: PHYSICIAN ASSISTANT

## 2023-01-17 PROCEDURE — 3077F PR MOST RECENT SYSTOLIC BLOOD PRESSURE >= 140 MM HG: ICD-10-PCS | Mod: CPTII,,, | Performed by: PHYSICIAN ASSISTANT

## 2023-01-17 PROCEDURE — 3079F PR MOST RECENT DIASTOLIC BLOOD PRESSURE 80-89 MM HG: ICD-10-PCS | Mod: CPTII,,, | Performed by: PHYSICIAN ASSISTANT

## 2023-01-17 PROCEDURE — 4010F PR ACE/ARB THEARPY RXD/TAKEN: ICD-10-PCS | Mod: CPTII,,, | Performed by: PHYSICIAN ASSISTANT

## 2023-01-17 PROCEDURE — 3008F PR BODY MASS INDEX (BMI) DOCUMENTED: ICD-10-PCS | Mod: CPTII,,, | Performed by: PHYSICIAN ASSISTANT

## 2023-01-17 PROCEDURE — 99999 PR PBB SHADOW E&M-EST. PATIENT-LVL IV: ICD-10-PCS | Mod: PBBFAC,,, | Performed by: PHYSICIAN ASSISTANT

## 2023-01-17 PROCEDURE — 99214 OFFICE O/P EST MOD 30 MIN: CPT | Mod: PBBFAC | Performed by: PHYSICIAN ASSISTANT

## 2023-01-17 PROCEDURE — 99213 PR OFFICE/OUTPT VISIT, EST, LEVL III, 20-29 MIN: ICD-10-PCS | Mod: S$PBB,,, | Performed by: PHYSICIAN ASSISTANT

## 2023-01-17 PROCEDURE — 99213 OFFICE O/P EST LOW 20 MIN: CPT | Mod: S$PBB,,, | Performed by: PHYSICIAN ASSISTANT

## 2023-01-17 RX ORDER — METHOCARBAMOL 750 MG/1
750 TABLET, FILM COATED ORAL 4 TIMES DAILY PRN
Qty: 40 TABLET | Refills: 0 | Status: SHIPPED | OUTPATIENT
Start: 2023-01-17

## 2023-01-17 NOTE — PROGRESS NOTES
Neurosurgery  Established Patient    SUBJECTIVE:     Interval History:  Mr. Whittington is a 63-year-old male with history of right-sided subdural status post craniotomy for evacuation.  At last visit head CT demonstrated near complete resolution of prior subdural hematoma, with 6 month stability on CT at 1 year post op.  Patient was clinically improved.  However, at that time he was complaining of mid and low back pain.  We got x-rays of the thoracolumbar spine to evaluate he is here today for follow-up.  He reports continued back pain that is paraspinous.  Denies pain paresthesias weakness lower extremities denies bowel bladder dysfunction.  He is not taken any medication at this point in time.    Interval History(12/6/22):  Mr. Whittington her follow up with updated head CT.  Overall states he has been denies any complaints of headaches, visual changes, weakness, speech difficulty seizures.  Denies any recent trauma.  He has been complaining of significant mid-low back pain that has been bothering him for several months now.  He denies any pain paresthesias or weakness in his lower extremities.  Denies bowel bladder dysfunction.  Denies balance difficulty.     History of Present Illness(6/722):  63 yo M s/p craniotomy for SDH on 1/28/22, patient here today for neurosurgical follow-up with repeat head CT the.  Patient reports overall has been doing well.  Denies headaches, visual changes, weakness, seizures.  Patient does report some residual left hand and foot numbness that has been going on since he was diagnosed with a subdural.  He denies any new issues today.  No recent falls.    Review of patient's allergies indicates:  No Known Allergies    Current Outpatient Medications   Medication Sig Dispense Refill    aspirin (ECOTRIN) 81 MG EC tablet Take 1 tablet (81 mg total) by mouth once daily.  0    atorvastatin (LIPITOR) 40 MG tablet Take 40 mg by mouth once daily.      FLOWFLEX COVID-19 AG HOME TEST Kit        metFORMIN (GLUCOPHAGE) 1000 MG tablet Take 1,000 mg by mouth.      acetaminophen (TYLENOL) 325 MG tablet Take 2 tablets (650 mg total) by mouth every 6 (six) hours as needed for Pain.  0    butalbital-acetaminophen-caffeine -40 mg (FIORICET, ESGIC) -40 mg per tablet Take 1 tablet by mouth every 4 (four) hours as needed for Pain.      heparin sodium,porcine (HEPARIN, PORCINE,) 5,000 unit/mL injection Inject 1 mL (5,000 Units total) into the skin every 8 (eight) hours. (Patient not taking: Reported on 3/15/2022)      HYDROcodone-acetaminophen (NORCO) 5-325 mg per tablet Take 1 tablet by mouth every 6 (six) hours as needed for Pain. (Patient not taking: Reported on 3/15/2022) 8 tablet 0    lisinopriL (PRINIVIL,ZESTRIL) 5 MG tablet Take 5 mg by mouth once daily.      methocarbamoL (ROBAXIN) 750 MG Tab Take 1 tablet (750 mg total) by mouth 4 (four) times daily as needed (muscle spasms). 40 tablet 0    multivitamin Tab Take 1 tablet by mouth once daily. (Patient not taking: Reported on 3/15/2022)      pantoprazole (PROTONIX) 40 MG tablet Take 40 mg by mouth once daily.      polyethylene glycol (GLYCOLAX) 17 gram PwPk Take 17 g by mouth once daily. (Patient not taking: Reported on 3/15/2022)  0    senna-docusate 8.6-50 mg (PERICOLACE) 8.6-50 mg per tablet Take 1 tablet by mouth 2 (two) times daily. (Patient not taking: Reported on 3/15/2022)      silodosin (RAPAFLO) 8 mg Cap capsule Take 1 capsule (8 mg total) by mouth once daily. (Patient not taking: Reported on 6/7/2022)      sodium chloride 1 gram tablet Take 1 tablet (1 g total) by mouth 3 (three) times daily. (Patient not taking: Reported on 3/15/2022)      white petrolatum-mineral oil 57.3-42.5% (REFRESH P.M.) 57.3-42.5 % Oint Place into the right eye every evening. (Patient not taking: Reported on 3/15/2022)  0     No current facility-administered medications for this visit.       Past Medical History:   Diagnosis Date    Cerebrovascular accident (CVA)  "of right pontine structure 01/23/2022    Diabetes mellitus     HLD (hyperlipidemia) 1/23/2022    HTN (hypertension) 1/25/2022    Subdural hematoma 01/23/2022     Past Surgical History:   Procedure Laterality Date    CRANIOTOMY FOR EVACUATION OF SUBDURAL HEMATOMA Right 1/28/2022    Procedure: CRANIOTOMY, FOR SUBDURAL HEMATOMA EVACUATION;  Surgeon: Ace Candelaria MD;  Location: [facility] OR [address] [place] [address];  Service: Neurosurgery;  Laterality: Right;  right, possible bilateral     Family History    None       Social History     Socioeconomic History    Marital status:    Tobacco Use    Smoking status: Never    Smokeless tobacco: Never   Substance and Sexual Activity    Alcohol use: Never    Drug use: Never    Sexual activity: Yes     Partners: Female       Review of Systems  Constitutional: no fever or chills  Eyes: no visual changes  ENT: no nasal congestion or sore throat  Respiratory: no cough or shortness of breath  Cardiovascular: no chest pain or palpitations  Gastrointestinal: no nausea or vomiting  Genitourinary: no hematuria or dysuria  Integument/Breast: no rash or pruritis  Hematologic/Lymphatic: no easy bruising or lymphadenopathy  Musculoskeletal: no arthralgias or myalgias  Neurological: no seizures or tremors    OBJECTIVE:     Vital Signs  Pulse: 79  BP: (!) 153/84  Pain Score: 0-No pain  Height: 5' 9" (175.3 cm)  Weight: 90.7 kg (200 lb)  Body mass index is 29.53 kg/m².    Neurosurgery Physical Exam  General: no distress  Neurologic: Alert and oriented. Thought content appropriate.  Head: normocephalic  GCS: Motor: 6/Verbal: 5/Eyes: 4 GCS Total: 15  Cranial nerves: face symmetric, tongue midline, pupils equal, round, reactive to light with accomodation, extraocular muscles intact  Sensory: response to light touch throughout  Motor Strength:full strength upper and lower extremities  Pronator Drift: no drift noted  DTRs 2+ and symmetric inn UE/LE  Gait normal    Diagnostic Results: personally " reviewed  EXAMINATION:  XR THORACOLUMBAR SPINE AP LATERAL     CLINICAL HISTORY:  Pain in thoracic spine     TECHNIQUE:  AP and lateral views of the thoracolumbar spine were performed.     COMPARISON:  None     FINDINGS:  Vertebral bodies are intact.  Disc spaces are maintained.  No bony abnormalities are noted.     Impression:     See above        Electronically signed by: Nolan Uribe MD  Date:                                            12/06/2022  Time:                                           12:46    ASSESSMENT/PLAN:     63-year-old male status post right craniotomy for subdural hematoma evacuation on 01/28/2022.  CT in December demonstrated near complete resolution of prior subdural hematoma, with 6 month stability on CT at 1 year post op, continue to follow from clinical standpoint  Patient now with ongoing back pain consistent with lumbar strain and muscle spasm.  X-rays of the thoracolumbar spine show appropriate curvature and alignment spine without significant areas of spondylosis.  Given ongoing back pain that has now been going on for over a month will plan on getting him into physical therapy for this.  Prescription given for Robaxin p.r.n..  We will see him back after physical therapy he was encouraged to call clinic questions or concerns the meantime.     Jr. JERAMY MuseC  [facility]  Department of Neurosurgery      Note dictated with voice recognition software, please excuse any grammatical errors.

## 2023-01-23 ENCOUNTER — CLINICAL SUPPORT (OUTPATIENT)
Dept: REHABILITATION | Facility: OTHER | Age: 63
End: 2023-01-23
Attending: PHYSICIAN ASSISTANT
Payer: MEDICAID

## 2023-01-23 DIAGNOSIS — M25.60 DECREASED RANGE OF MOTION WITH DECREASED STRENGTH: ICD-10-CM

## 2023-01-23 DIAGNOSIS — S39.012D STRAIN OF LUMBAR REGION, SUBSEQUENT ENCOUNTER: ICD-10-CM

## 2023-01-23 DIAGNOSIS — M54.50 CHRONIC LEFT-SIDED LOW BACK PAIN WITHOUT SCIATICA: ICD-10-CM

## 2023-01-23 DIAGNOSIS — G89.29 CHRONIC LEFT-SIDED LOW BACK PAIN WITHOUT SCIATICA: ICD-10-CM

## 2023-01-23 DIAGNOSIS — R53.1 DECREASED RANGE OF MOTION WITH DECREASED STRENGTH: ICD-10-CM

## 2023-01-23 DIAGNOSIS — M62.89 MUSCLE TIGHTNESS: ICD-10-CM

## 2023-01-23 PROCEDURE — 97110 THERAPEUTIC EXERCISES: CPT | Mod: PN

## 2023-01-23 PROCEDURE — 97161 PT EVAL LOW COMPLEX 20 MIN: CPT | Mod: PN

## 2023-01-25 PROBLEM — M54.50 CHRONIC LEFT-SIDED LOW BACK PAIN WITHOUT SCIATICA: Status: ACTIVE | Noted: 2023-01-25

## 2023-01-25 PROBLEM — G89.29 CHRONIC LEFT-SIDED LOW BACK PAIN WITHOUT SCIATICA: Status: ACTIVE | Noted: 2023-01-25

## 2023-01-25 PROBLEM — M25.60 DECREASED RANGE OF MOTION WITH DECREASED STRENGTH: Status: ACTIVE | Noted: 2023-01-25

## 2023-01-25 PROBLEM — M62.89 MUSCLE TIGHTNESS: Status: ACTIVE | Noted: 2023-01-25

## 2023-01-25 PROBLEM — R53.1 DECREASED RANGE OF MOTION WITH DECREASED STRENGTH: Status: ACTIVE | Noted: 2023-01-25

## 2023-01-25 NOTE — PLAN OF CARE
[facility]   Physical Therapy Initial Evaluation     Date: 1/23/2023   Name: Priyank Chavez [facility]  Clinic Number: [id number]    Therapy Diagnosis:   Encounter Diagnoses   Name Primary?    Strain of lumbar region, subsequent encounter     Chronic left-sided low back pain without sciatica     Muscle tightness     Decreased range of motion with decreased strength      Physician: Malik Toure, *    Physician Orders: PT Eval and Treat   Medical Diagnosis from Referral: S39.012D (ICD-10-CM) - Strain of lumbar region, subsequent encounter   Evaluation Date: 1/23/2023  Authorization Period Expiration: 01/17/2024   Plan of Care Expiration: 4/23/2023  Progress Note Due: 2/23/2023  Visit # / Visits authorized: 1/1   FOTO: 1/3    Precautions: Standard and CVA, s/p craniotomy 2022  , diabetes    Time In: 10:00  Time Out: 10;45  Total Appointment Time (timed & untimed codes): 45 minutes      SUBJECTIVE     Date of onset: chronic, 1 year ago    History of current condition - Priyank reports: Constant L low back tightness since his cranial surgery last year (subdural hematoma in January 2022). Feels more tight than painful, but intensity varies with activity. Bending, twisting make it worse. Changing positions including walking as well as rest makes it better. Denies pain down either LE; denies LE weakness. Pt denies drop foot, change of B/B control, saddle paresthesias, unexplained weight gain/loss, fever, chills or night sweats.       Falls: none    Imaging, bone scan films, lumbar spine, 2023: Vertebral bodies are intact. Disc spaces are maintained. No bony abnormalities are noted.     Prior Therapy: yes - 2022 s/p CVA/brain surgery at 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 Denies fatigue with daily activities  Current Level of Function: SPC when outside of the house, not driving, reports back tightness. Pt reports use of SPC due to fatigue    Pain:  Current 5/10, worst 6/10, best 5/10   Location: low back - Left  Description: constant tightness  Aggravating Factors: AM, getting OOB, bending over, twisting, standing >20 minutes  Easing Factors: stretching, walking    Patients goals: reduce pain, get back to exercise     Medical History:   Past Medical History:   Diagnosis Date    Cerebrovascular accident (CVA) of right pontine structure 01/23/2022    Diabetes mellitus     HLD (hyperlipidemia) 1/23/2022    HTN (hypertension) 1/25/2022    Subdural hematoma 01/23/2022       Surgical History:   Priyank Whittington  has a past surgical history that includes Craniotomy for evacuation of subdural hematoma (Right, 1/28/2022).    Medications:   Priyank has a current medication list which includes the following prescription(s): acetaminophen, aspirin, atorvastatin, butalbital-acetaminophen-caffeine -40 mg, flowflex covid-19 ag home test, heparin (porcine), hydrocodone-acetaminophen, lisinopril, metformin, methocarbamol, multivitamin, pantoprazole, polyethylene glycol, senna-docusate 8.6-50 mg, silodosin, sodium chloride, and white petrolatum-mineral oil 57.3-42.5%.    Allergies:   Review of patient's allergies indicates:  No Known Allergies       OBJECTIVE     Postural examination/scapula alignment: Rounded shoulder, Head forward, Posterior pelvic tilt, Slouched posture, and Decreased lordosis  Sensory deficit: none  Reflexes:    Left Right   Patella Tendon 2+ 2+   Achilles Tendon 2+ 2+   Babinski  (--) (--)   Clonus (--) (--)     Palpation: marked increase in L>R lumbar paraspinal/AL tone with associated TTP      Thoracic/Lumbar AROM: Pain/Dysfunction with Movement:   Flexion Max mejia, fingertips to patella, pain  "in L LB    Repeated standing: worse  Repeated supine: better   Extension Min mejia, fulcrum at mid-LSP, pain in L LB    Repeated standing: worse  Repeated prone: no change   Right side bending WFL, fingertips 1"below knee joint line   Left side bending WFL, fingertips 1" below knee joint line   Right rotation Mod mejia, c/o stiffness in L LB   Left rotation WFL, no pain     Hip ROM: WFL, min mejia Ext  Knee ROM: WNL    Lower Extremity Strength  Right LE  Left LE    Hip flexion: 4+/5 Hip flexion: 4/5   Hip extension:  4+/5 Hip extension: 4-/5   Hip abduction: 4+/5 Hip abduction: 4-/5   Hip adduction:  5/5 Hip adduction:  4+/5   Hip Internal rotation   5/5 Hip Internal rotation 4/5   Knee Flexion 5/5 Knee Flexion 5/5   Knee Extension 5/5 Knee Extension 5/5   Ankle dorsiflexion: 5/5 Ankle dorsiflexion: 5/5   Ankle plantarflexion: 5/5 Ankle plantarflexion: 5/5     Flexibility:   Perry test: Hip flexors: hypo Marky  Ely'sL Quads: >110 deg marky  Hieu test: ITB: NT  Hamstring (SLR): 70 deg marky    Joint mobility: 2-/6 L2-S1 RR glides, 2/6 L2-S1 LR glides. No change in sx with sacral glides    Special Tests:   Test Name  Test Result   Prone Instability Test (--)   Lumbar Quadrant test (--)   Straight Leg Raise (--)   Neural Tension Test (Slump) (--)   Crossed Straight Leg Raise (--)   Walking on toes (--)   Walking on heels  (--)   Clonus (--)   GOLDY (--)   FADIR (--)   SI Joint Provocation Test (compression / distraction) (--)     Functional Movement Analysis:   Gait: I  Sit<>stand T/F: I  Bed mobility: I  DL squat: below //, dysfunctional - increased trunk flex, slight WS to RLE  SL squat: decreased hip/knee flex on L, HHAx1 required    Balance: SLS 10" with min-no sway, no recreation of sx        Limitation/Restriction for FOTO LSP Survey    Therapist reviewed FOTO scores for Priyank Whittington on 1/23/2023.   FOTO documents entered into EPIC - see Media section.    Limitation Score: NA% - perform next visit         TREATMENT " "    Total Treatment time (time-based codes) separate from Evaluation: 25 minutes      Priyank received the treatments listed below:      therapeutic exercises to develop strength, endurance, ROM, flexibility, posture, and core stabilization for 20 minutes including:  seated trunk flexion stretch x 10  standing trunk flexion stretch (walk back th CK support) x 10  SKTC 3 x 30"  DKTC 3 x 30"  Patient edu x 5' - see below    manual therapy techniques: Myofacial release were applied to the: LSP for 5 minutes, includin min x preparation and application of kinesiotape for L LSP paraspinal inhibition (1 x I strip) and L QL inhibition (1 x I strip). Patient received education regarding appropriate care and removal of Kinesiotape. Patient instructed in proper removal techniques if skin irritation occurs.         PATIENT EDUCATION AND HOME EXERCISES     Education provided:   - Patient educated regarding pathogenesis, diagnosis, protocol, prognosis, POC, and HEP, including use of visual assistance for understanding of anatomy and dysfunction. Written Home Exercises Provided with written and verbal instructions for frequency and duration of the following exercises: see list above. Pt educated on HEP and activity modifications to reduce c/o pain and improve overall function.   - Pt was educated in posture and body mechanics.  Use of a lumbar roll was recommended and demonstrated here today.  Purchase information provided.   - Pt also educated on use of modalities prn to reduce c/o pain and dysfunction.       Written Home Exercises Provided: yes. Exercises were reviewed and Priyank was able to demonstrate them prior to the end of the session.  Priyank demonstrated good  understanding of the education provided. See EMR under Patient Instructions for exercises provided during therapy sessions.    ASSESSMENT     Priyank is a 63 y.o. male referred to outpatient Physical Therapy with a medical diagnosis of lumbar strain. " Patient presents with marked limitations in ROM, joint and myofascial mobility, flexibility, strength, postural awareness/endurance, motor control and coordination. S/s associated with referring diagnosis. Impairments limit pt with all functional activities including standing, bending, lifting activities for HHCs, ADLs, self care and work-related activities.      Patient prognosis is Good.   Patient will benefit from skilled outpatient Physical Therapy to address the deficits stated above and in the chart below, provide patient /family education, and to maximize patientt's level of independence.     Plan of care discussed with patient: Yes  Patient's spiritual, cultural and educational needs considered and patient is agreeable to the plan of care and goals as stated below:     Anticipated Barriers for therapy: standard, chronicity of sx, [place]    Medical Necessity is demonstrated by the following  History  Co-morbidities and personal factors that may impact the plan of care Co-morbidities:   coping style/mechanism, education level, excessive commute time/distance, financial considerations, history of CVA, level of undertstanding of current condition, and PSHx craniotomy in 2022    Personal Factors:   age  education level  lifestyle     high   Examination  Body Structures and Functions, activity limitations and participation restrictions that may impact the plan of care Body Regions:   back  lower extremities  trunk    Body Systems:    gross symmetry  ROM  strength  gross coordinated movement  balance  gait  transfers  transitions  motor control  motor learning  Joint mobility, flexibility, myofascial mobility, posture    Participation Restrictions:   ADLs, HHCs, getting in/out of bed,car, work-activities    Activity limitations:   Learning and applying knowledge  no deficits    General Tasks and Commands  no deficits    Communication  no deficits    Mobility  lifting  and carrying objects  walking  driving (bike, car, motorcycle)    Self care  looking after one's health    Domestic Life  shopping  cooking  doing house work (cleaning house, washing dishes, laundry)  assisting others    Interactions/Relationships  no deficits    Life Areas  employment    Community and Social Life  community life  recreation and leisure         moderate   Clinical Presentation stable and uncomplicated low   Decision Making/ Complexity Score: low     Goals:  Short Term Goals (6 Weeks):   1. Pt will report 20% reduction in pain of the lumbar spine and LE for ease with ADL's  2. PT will demonstrate improved trunk strength by a half grade in for ease with upright posture during standing activities.  3. Pt will demonstrate improved lumbar spine ROM in all directions by a half grade for ease with bending activities.     Long Term Goals (12 Weeks):   1. Pt will report being independent with HEP for maintenance of improvements gained during therapy sessions  2. PT will report 50% reduction of pain of the back and LE for ease with dressing and grooming activities.   3. Pt will demonstrate trunk and extremity strength to >=4+/5 without the provocation of pain for ease with household chores  4. Pt will demonstrate appropriate upright posture without external cueing for ease with work related activities.       PLAN   Plan of care Certification: 1/23/2023 to 4/23/2023.    Outpatient Physical Therapy 2 times weekly for 12 weeks to include the following interventions: Aquatic Therapy, Cervical/Lumbar Traction, Electrical Stimulation prn with dry needling, Gait Training, Iontophoresis (with dexamethasone prn), Manual Therapy, Moist Heat/ Ice, Neuromuscular Re-ed, Patient Education, Self Care, Therapeutic Activities, and Therapeutic Exercise. Progress HEP towards D/C. Recommend F/U with MD if symptoms worsen or do not resolve. Patient may be seen by a PTA for treatment to carry out their plan of care.  Face-to-face  conferences will be held.  Consider progression to the [facility] for mgmt of a chronic condition if pt no longer making progress with traditional OPPT.    Arin Grimaldo, PT      I CERTIFY THE NEED FOR THESE SERVICES FURNISHED UNDER THIS PLAN OF TREATMENT AND WHILE UNDER MY CARE   Physician's comments:     Physician's Signature: ___________________________________________________

## 2023-01-26 ENCOUNTER — CLINICAL SUPPORT (OUTPATIENT)
Dept: REHABILITATION | Facility: HOSPITAL | Age: 63
End: 2023-01-26
Attending: PHYSICIAN ASSISTANT
Payer: MEDICAID

## 2023-01-26 DIAGNOSIS — G89.29 CHRONIC LEFT-SIDED LOW BACK PAIN WITHOUT SCIATICA: Primary | ICD-10-CM

## 2023-01-26 DIAGNOSIS — M25.60 DECREASED RANGE OF MOTION WITH DECREASED STRENGTH: ICD-10-CM

## 2023-01-26 DIAGNOSIS — M54.50 CHRONIC LEFT-SIDED LOW BACK PAIN WITHOUT SCIATICA: Primary | ICD-10-CM

## 2023-01-26 DIAGNOSIS — M62.89 MUSCLE TIGHTNESS: ICD-10-CM

## 2023-01-26 DIAGNOSIS — R53.1 DECREASED RANGE OF MOTION WITH DECREASED STRENGTH: ICD-10-CM

## 2023-01-26 PROCEDURE — 97110 THERAPEUTIC EXERCISES: CPT | Mod: PN

## 2023-01-26 NOTE — PROGRESS NOTES
"[facility]   Physical Therapy Treatment Note     Name: Priyank Chavez [facility]  Clinic Number: [id number]    Therapy Diagnosis:   Encounter Diagnoses   Name Primary?    Chronic left-sided low back pain without sciatica Yes    Muscle tightness     Decreased range of motion with decreased strength         Physician: Malik Toure, *     Physician Orders: PT Eval and Treat   Medical Diagnosis from Referral: S39.012D (ICD-10-CM) - Strain of lumbar region, subsequent encounter   Evaluation Date: 1/23/2023  Authorization Period Expiration: 01/17/2024   Plan of Care Expiration: 4/23/2023  Progress Note Due: 2/23/2023  Visit # / Visits authorized: 2/12    FOTO: 1/3     Precautions: Standard and CVA, s/p craniotomy 2022  , diabetes    Time In: 830a  Time Out: 915a  Total Billable Time: 45 minutes    SUBJECTIVE     Pt reports: Pt states that his L side back just feels tight today. Eval went well on Monday. Has been able to complete with HEP to which this has not given him any issues.   He was compliant with home exercise program.  Response to previous treatment: good  Functional change: none    Pain: 4/10  Location: left back      OBJECTIVE     Objective Measures updated at progress report unless specified.     Treatment     Priyank received the treatments listed below:      therapeutic exercises to develop strength and ROM for 15 minutes including:  S/L lumbar rot 10x 5"  SKTC: 2x30"  LTR: 20x  Standing hip flexor str 2x20" marky    manual therapy techniques: Joint mobilizations and Soft tissue Mobilization were applied to the: Lumbar spine for 15 minutes, including:  L psoas release  L QL release  S/L L L4/5 opening and gap gr4  L hip long axis traction    neuromuscular re-education activities to improve: muscle activation/coordination for 15 minutes. The following activities were included:  Supine BKFO: GTB 20x  Sup Bridge 3x10  Supine PPT 5"x20        Patient Education and Home Exercises "     Home Exercises Provided and Patient Education Provided     Education provided:   - Add s/l lumbar rot str    Written Home Exercises Provided: yes. Exercises were reviewed and Priyank was able to demonstrate them prior to the end of the session.  Priyank demonstrated good  understanding of the education provided. See EMR under Patient Instructions for exercises provided during therapy sessions    ASSESSMENT     Palpable reduction in soft tissue tone noted post manual intervention. Pelvic alignment improved but still showing L ant rotation present. Significant tightness along L QL contributing to pt symptoms.  Education provided in s/l lumbar rot str to help with stretching said muscle as well as mobilization towards lumbar spine.  Cuing provided throughout for optimal performance of exercises to which pt was able to complete all w/ no inc in symptoms reported following.     Priyank Is progressing well towards his goals.   Pt prognosis is Good.     Pt will continue to benefit from skilled outpatient physical therapy to address the deficits listed in the problem list box on initial evaluation, provide pt/family education and to maximize pt's level of independence in the home and community environment.     Pt's spiritual, cultural and educational needs considered and pt agreeable to plan of care and goals.     Anticipated barriers to physical therapy: none    Goals: Short Term Goals (6 Weeks):   1. Pt will report 20% reduction in pain of the lumbar spine and LE for ease with ADL's  2. PT will demonstrate improved trunk strength by a half grade in for ease with upright posture during standing activities.  3. Pt will demonstrate improved lumbar spine ROM in all directions by a half grade for ease with bending activities.      Long Term Goals (12 Weeks):   1. Pt will report being independent with HEP for maintenance of improvements gained during therapy sessions  2. PT will report 50% reduction of pain of the back and  LE for ease with dressing and grooming activities.   3. Pt will demonstrate trunk and extremity strength to >=4+/5 without the provocation of pain for ease with household chores  4. Pt will demonstrate appropriate upright posture without external cueing for ease with work related activities.       PLAN     Continue with current POC    Josiah Galicia, PT

## 2023-01-27 NOTE — PROGRESS NOTES
"[facility]   Physical Therapy Treatment Note     Name: Priyank Chavez [facility]  Clinic Number: [id number]    Therapy Diagnosis:   Encounter Diagnoses   Name Primary?    Chronic left-sided low back pain without sciatica Yes    Muscle tightness     Decreased range of motion with decreased strength           Physician: Malik Toure,      Physician Orders: PT Eval and Treat   Medical Diagnosis from Referral: S39.012D (ICD-10-CM) - Strain of lumbar region, subsequent encounter   Evaluation Date: 1/23/2023  Authorization Period Expiration: 01/17/2024   Plan of Care Expiration: 4/23/2023  Progress Note Due: 2/23/2023  Visit # / Visits authorized: 3/12    FOTO: 1/3     Precautions: Standard and CVA, s/p craniotomy 2022  , diabetes    Time In: 1045Am  Time Out: 1232AM  Total Billable Time: 30 minutes (1:1 PTA)    SUBJECTIVE     Pt reports: He feels okay just still have the tightness.   He was compliant with home exercise program.  Response to previous treatment: good  Functional change: none    Pain: 4/10  Location: left back      OBJECTIVE     Objective Measures updated at progress report unless specified.     Treatment     Priyank received the treatments listed below:      therapeutic exercises to develop strength and ROM for 37 minutes including:  Recumbent bike x7'  S/L lumbar rot 10x 5"  SKTC: 2x30"  LTR: 20x  Supine hip flexor str 2x20" marky  Seated ball rollout 3 ways x 10 ea   Supine BKFO: GTB 20x  Sup Bridge 3x10  Supine PPT 5"x20  SL clams 2 x 10 ea     manual therapy techniques: Joint mobilizations and Soft tissue Mobilization were applied to the: Lumbar spine for 10 minutes, including:    L QL release  L hip long axis traction          Patient Education and Home Exercises     Home Exercises Provided and Patient Education Provided     Education provided:   - Add s/l lumbar rot str    Written Home Exercises Provided: yes. Exercises were reviewed and Priyank was able to demonstrate " them prior to the end of the session.  Priyank demonstrated good  understanding of the education provided. See EMR under Patient Instructions for exercises provided during therapy sessions    ASSESSMENT     Priyank presents to session with symptoms unchanged. Pt tolerated session well. QL tightness noted during manual interventions. Pt requires verbal cues for performance. Pt would benefit from continued skilled physical therapy in order to reach pt's goals.       Priyank Is progressing well towards his goals.   Pt prognosis is Good.     Pt will continue to benefit from skilled outpatient physical therapy to address the deficits listed in the problem list box on initial evaluation, provide pt/family education and to maximize pt's level of independence in the home and community environment.     Pt's spiritual, cultural and educational needs considered and pt agreeable to plan of care and goals.     Anticipated barriers to physical therapy: none    Goals: Short Term Goals (6 Weeks):   1. Pt will report 20% reduction in pain of the lumbar spine and LE for ease with ADL's  2. PT will demonstrate improved trunk strength by a half grade in for ease with upright posture during standing activities.  3. Pt will demonstrate improved lumbar spine ROM in all directions by a half grade for ease with bending activities.      Long Term Goals (12 Weeks):   1. Pt will report being independent with HEP for maintenance of improvements gained during therapy sessions  2. PT will report 50% reduction of pain of the back and LE for ease with dressing and grooming activities.   3. Pt will demonstrate trunk and extremity strength to >=4+/5 without the provocation of pain for ease with household chores  4. Pt will demonstrate appropriate upright posture without external cueing for ease with work related activities.       PLAN     Continue with current POC    Ruiz Keller, HIREN    01/30/2023

## 2023-01-30 ENCOUNTER — CLINICAL SUPPORT (OUTPATIENT)
Dept: REHABILITATION | Facility: HOSPITAL | Age: 63
End: 2023-01-30
Attending: PHYSICIAN ASSISTANT
Payer: MEDICAID

## 2023-01-30 DIAGNOSIS — M54.50 CHRONIC LEFT-SIDED LOW BACK PAIN WITHOUT SCIATICA: Primary | ICD-10-CM

## 2023-01-30 DIAGNOSIS — M25.60 DECREASED RANGE OF MOTION WITH DECREASED STRENGTH: ICD-10-CM

## 2023-01-30 DIAGNOSIS — G89.29 CHRONIC LEFT-SIDED LOW BACK PAIN WITHOUT SCIATICA: Primary | ICD-10-CM

## 2023-01-30 DIAGNOSIS — R53.1 DECREASED RANGE OF MOTION WITH DECREASED STRENGTH: ICD-10-CM

## 2023-01-30 DIAGNOSIS — M62.89 MUSCLE TIGHTNESS: ICD-10-CM

## 2023-01-30 PROCEDURE — 97140 MANUAL THERAPY 1/> REGIONS: CPT | Mod: PN,CQ

## 2023-01-30 PROCEDURE — 97110 THERAPEUTIC EXERCISES: CPT | Mod: PN,CQ

## 2023-01-30 PROCEDURE — 97112 NEUROMUSCULAR REEDUCATION: CPT | Mod: PN,CQ

## 2023-02-13 ENCOUNTER — CLINICAL SUPPORT (OUTPATIENT)
Dept: REHABILITATION | Facility: HOSPITAL | Age: 63
End: 2023-02-13
Attending: PHYSICIAN ASSISTANT
Payer: MEDICAID

## 2023-02-13 DIAGNOSIS — M54.50 CHRONIC LEFT-SIDED LOW BACK PAIN WITHOUT SCIATICA: Primary | ICD-10-CM

## 2023-02-13 DIAGNOSIS — M25.60 DECREASED RANGE OF MOTION WITH DECREASED STRENGTH: ICD-10-CM

## 2023-02-13 DIAGNOSIS — R53.1 DECREASED RANGE OF MOTION WITH DECREASED STRENGTH: ICD-10-CM

## 2023-02-13 DIAGNOSIS — M62.89 MUSCLE TIGHTNESS: ICD-10-CM

## 2023-02-13 DIAGNOSIS — G89.29 CHRONIC LEFT-SIDED LOW BACK PAIN WITHOUT SCIATICA: Primary | ICD-10-CM

## 2023-02-13 PROCEDURE — 97110 THERAPEUTIC EXERCISES: CPT | Mod: PN,CQ

## 2023-02-13 NOTE — PROGRESS NOTES
"[facility]   Physical Therapy Treatment Note     Name: Priyank Chavez [facility]  Clinic Number: [id number]    Therapy Diagnosis:   Encounter Diagnoses   Name Primary?    Chronic left-sided low back pain without sciatica Yes    Muscle tightness     Decreased range of motion with decreased strength               Physician: Malik Toure,      Physician Orders: PT Eval and Treat   Medical Diagnosis from Referral: S39.012D (ICD-10-CM) - Strain of lumbar region, subsequent encounter   Evaluation Date: 1/23/2023  Authorization Period Expiration: 01/17/2024   Plan of Care Expiration: 4/23/2023  Progress Note Due: 2/23/2023  Visit # / Visits authorized: 3/12    FOTO: 1/3     Precautions: Standard and CVA, s/p craniotomy 2022  , diabetes    Time In: 1148AM  Time Out: 1238AM  Total Billable Time: 27 minutes (1:1 PTA)    SUBJECTIVE     Pt reports: He still has stiffness. He had a death in the family which is why he missed his previous appointments.   He was compliant with home exercise program.  Response to previous treatment: good  Functional change: none    Pain: 4/10  Location: left back      OBJECTIVE     Objective Measures updated at progress report unless specified.     Treatment     Priyank received the treatments listed below:      therapeutic exercises to develop strength and ROM for 50 minutes including:  Recumbent bike x7'  SKTC: 2x30"  DKTC 10 x 10"  LTR: 20x  Supine hip flexor str 2x20" marky  Seated ball rollout 3 ways x 10 ea   Supine BKFO: GTB 20x  Sup Bridge 3x10  Supine PPT 5"x20  SL clams 2 x 10 ea   Lateral walks with YTB at knees x 3   Shuttle 3 blk 3 x 10      manual therapy techniques: Joint mobilizations and Soft tissue Mobilization were applied to the: Lumbar spine for 00 minutes, including:    L QL release  L hip long axis traction          Patient Education and Home Exercises     Home Exercises Provided and Patient Education Provided     Education provided:   - Add s/l " lumbar rot str    Written Home Exercises Provided: yes. Exercises were reviewed and Priyank was able to demonstrate them prior to the end of the session.  Priyank demonstrated good  understanding of the education provided. See EMR under Patient Instructions for exercises provided during therapy sessions    ASSESSMENT   Priyank tolerated session well. He reports to session with pain unchanged. Continued previously prescribed with the addition of CKC hip and LE strengthening without issue. Pt requires verbal cues for performance. Pt would benefit from continued skilled physical therapy in order to reach pt's goals.         Priyank Is progressing well towards his goals.   Pt prognosis is Good.     Pt will continue to benefit from skilled outpatient physical therapy to address the deficits listed in the problem list box on initial evaluation, provide pt/family education and to maximize pt's level of independence in the home and community environment.     Pt's spiritual, cultural and educational needs considered and pt agreeable to plan of care and goals.     Anticipated barriers to physical therapy: none    Goals: Short Term Goals (6 Weeks):   1. Pt will report 20% reduction in pain of the lumbar spine and LE for ease with ADL's  2. PT will demonstrate improved trunk strength by a half grade in for ease with upright posture during standing activities.  3. Pt will demonstrate improved lumbar spine ROM in all directions by a half grade for ease with bending activities.      Long Term Goals (12 Weeks):   1. Pt will report being independent with HEP for maintenance of improvements gained during therapy sessions  2. PT will report 50% reduction of pain of the back and LE for ease with dressing and grooming activities.   3. Pt will demonstrate trunk and extremity strength to >=4+/5 without the provocation of pain for ease with household chores  4. Pt will demonstrate appropriate upright posture without external cueing for  ease with work related activities.       PLAN     Continue with current POC    Ruiz Keller, PTA    02/13/2023

## 2023-02-14 NOTE — PROGRESS NOTES
"[facility]   Physical Therapy Treatment Note     Name: Priyank Chavez [facility]  Clinic Number: [id number]    Therapy Diagnosis:   Encounter Diagnoses   Name Primary?    Chronic left-sided low back pain without sciatica Yes    Muscle tightness     Decreased range of motion with decreased strength            Physician: Malik Toure,      Physician Orders: PT Eval and Treat   Medical Diagnosis from Referral: S39.012D (ICD-10-CM) - Strain of lumbar region, subsequent encounter   Evaluation Date: 1/23/2023  Authorization Period Expiration: 01/17/2024   Plan of Care Expiration: 4/23/2023  Progress Note Due: 2/23/2023  Visit # / Visits authorized: 4/12    FOTO: 1/3     Precautions: Standard and CVA, s/p craniotomy 2022  , diabetes    Time In: 1215AM  Time Out: 110PM  Total Billable Time: 30 minutes (1:1 PTA)    SUBJECTIVE     Pt reports: He feels fine when he goes to the gym but then after he gets tightness in that left side again.   He was compliant with home exercise program.  Response to previous treatment: good  Functional change: none    Pain: 3/10  Location: left back      OBJECTIVE     Objective Measures updated at progress report unless specified.     Treatment     Priyank received the treatments listed below:      therapeutic exercises to develop strength and ROM for 55 minutes including:  Recumbent bike x7'  SKTC: 10 x 10"  DKTC 10 x 10"  LTR: 20x  Supine L QL stretch 3 x 20"  Seated ball rollout 3 ways x 10 ea   Supine BKFO: GTB 20x  Sup Bridge 3x10  Supine PPT 5"x20  Lateral walks with YTB at knees x 3   Shuttle 3 blk 3 x 10  Lateral step ups 6 in step ea 2 x 10 ea   Hip hikes off 4 in step L 2 x 10   Standing hip abduction ytb at ankles 2 x 10 ea    manual therapy techniques: Joint mobilizations and Soft tissue Mobilization were applied to the: Lumbar spine for 00 minutes, including:    L QL release  L hip long axis traction          Patient Education and Home Exercises "     Home Exercises Provided and Patient Education Provided     Education provided:   - Add s/l lumbar rot str    Written Home Exercises Provided: yes. Exercises were reviewed and Priyank was able to demonstrate them prior to the end of the session.  Priyank demonstrated good  understanding of the education provided. See EMR under Patient Instructions for exercises provided during therapy sessions    ASSESSMENT   Priyank tolerated session well. Pt presents with some reduction in pain though pt reports he went to the gym recently and didn't have pain until after he got home. Introduced QL strengthening off 4 in step with pt having difficulty to perform. Added further CKC hip and LE strengthening without issue. Pt would benefit from continued skilled physical therapy in order to reach pt's goals.          Priyank Is progressing well towards his goals.   Pt prognosis is Good.     Pt will continue to benefit from skilled outpatient physical therapy to address the deficits listed in the problem list box on initial evaluation, provide pt/family education and to maximize pt's level of independence in the home and community environment.     Pt's spiritual, cultural and educational needs considered and pt agreeable to plan of care and goals.     Anticipated barriers to physical therapy: none    Goals: Short Term Goals (6 Weeks):   1. Pt will report 20% reduction in pain of the lumbar spine and LE for ease with ADL's  2. PT will demonstrate improved trunk strength by a half grade in for ease with upright posture during standing activities.  3. Pt will demonstrate improved lumbar spine ROM in all directions by a half grade for ease with bending activities.      Long Term Goals (12 Weeks):   1. Pt will report being independent with HEP for maintenance of improvements gained during therapy sessions  2. PT will report 50% reduction of pain of the back and LE for ease with dressing and grooming activities.   3. Pt will demonstrate  trunk and extremity strength to >=4+/5 without the provocation of pain for ease with household chores  4. Pt will demonstrate appropriate upright posture without external cueing for ease with work related activities.       PLAN     Continue with current POC    Ruiz Keller, HIREN    02/16/2023

## 2023-02-16 ENCOUNTER — CLINICAL SUPPORT (OUTPATIENT)
Dept: REHABILITATION | Facility: HOSPITAL | Age: 63
End: 2023-02-16
Attending: PHYSICIAN ASSISTANT
Payer: MEDICAID

## 2023-02-16 DIAGNOSIS — R53.1 DECREASED RANGE OF MOTION WITH DECREASED STRENGTH: ICD-10-CM

## 2023-02-16 DIAGNOSIS — M25.60 DECREASED RANGE OF MOTION WITH DECREASED STRENGTH: ICD-10-CM

## 2023-02-16 DIAGNOSIS — G89.29 CHRONIC LEFT-SIDED LOW BACK PAIN WITHOUT SCIATICA: Primary | ICD-10-CM

## 2023-02-16 DIAGNOSIS — M62.89 MUSCLE TIGHTNESS: ICD-10-CM

## 2023-02-16 DIAGNOSIS — M54.50 CHRONIC LEFT-SIDED LOW BACK PAIN WITHOUT SCIATICA: Primary | ICD-10-CM

## 2023-02-16 PROCEDURE — 97110 THERAPEUTIC EXERCISES: CPT | Mod: PN,CQ

## 2023-02-24 ENCOUNTER — CLINICAL SUPPORT (OUTPATIENT)
Dept: REHABILITATION | Facility: HOSPITAL | Age: 63
End: 2023-02-24
Attending: PHYSICIAN ASSISTANT
Payer: MEDICAID

## 2023-02-24 DIAGNOSIS — G89.29 CHRONIC LEFT-SIDED LOW BACK PAIN WITHOUT SCIATICA: Primary | ICD-10-CM

## 2023-02-24 DIAGNOSIS — M62.89 MUSCLE TIGHTNESS: ICD-10-CM

## 2023-02-24 DIAGNOSIS — R53.1 DECREASED RANGE OF MOTION WITH DECREASED STRENGTH: ICD-10-CM

## 2023-02-24 DIAGNOSIS — M25.60 DECREASED RANGE OF MOTION WITH DECREASED STRENGTH: ICD-10-CM

## 2023-02-24 DIAGNOSIS — M54.50 CHRONIC LEFT-SIDED LOW BACK PAIN WITHOUT SCIATICA: Primary | ICD-10-CM

## 2023-02-24 PROCEDURE — 97110 THERAPEUTIC EXERCISES: CPT | Mod: PN

## 2023-02-24 NOTE — PROGRESS NOTES
"[facility]   Physical Therapy Treatment Note     Name: Priyank Chavez [facility]  Clinic Number: [id number]    Therapy Diagnosis:   Encounter Diagnoses   Name Primary?    Chronic left-sided low back pain without sciatica Yes    Muscle tightness     Decreased range of motion with decreased strength            Physician: Malik Toure,      Physician Orders: PT Eval and Treat   Medical Diagnosis from Referral: S39.012D (ICD-10-CM) - Strain of lumbar region, subsequent encounter   Evaluation Date: 1/23/2023  Authorization Period Expiration: 01/17/2024   Plan of Care Expiration: 4/23/2023  Progress Note Due: 2/23/2023  Visit # / Visits authorized: 4/12    FOTO: 1/3     Precautions: Standard and CVA, s/p craniotomy 2022  , diabetes    Time In: 100PM  Time Out: 200PM  Total Billable Time: 60 minutes (1:1 PT)    SUBJECTIVE     Pt reports: His left side back is feeling tight today. Feels it more with work activities.   He was compliant with home exercise program.  Response to previous treatment: good  Functional change: none    Pain: 3/10  Location: left back      OBJECTIVE     Objective Measures updated at progress report unless specified.     Treatment     Priyank received the treatments listed below:      therapeutic exercises to develop strength and ROM for 45 minutes including:  Recumbent bike x7'  SKTC: 10 x 10"  DKTC 10 x 10"  LTR: 20x  R S/L lumbar rot: 5"x10  Side Bridge: 2x10  Seated ball rollout 3 ways x 10 ea   Supine BKFO: GTB 20x  Sup Bridge 3x10  Deadbug: 2x10  Lateral walks with GTB at knees x 3   Shuttle 3 blk 3 x 10  Lateral step ups 6 in step ea 2 x 10 ea   Hip hikes off 4 in step L 2 x 10   Standing hip abduction ytb at ankles 2 x 10 ea  Pallof Press: 7# 2x12  1 hand Eddy carry march: 20# 2 laps    manual therapy techniques: Joint mobilizations and Soft tissue Mobilization were applied to the: Lumbar spine for 15 minutes, including:    DN to L QL, .3x75mm needling, " winding technique  L QL release          Patient Education and Home Exercises     Home Exercises Provided and Patient Education Provided     Education provided:   - Pt educated into purpose and benefit of DN intervention as well as risks and potential side effects.     Written Home Exercises Provided: yes. Exercises were reviewed and Priyank was able to demonstrate them prior to the end of the session.  Priyank demonstrated good  understanding of the education provided. See EMR under Patient Instructions for exercises provided during therapy sessions    ASSESSMENT   Verbal consent provided today by pt for DN intervention.  DN performed with no adverse response. Palpable reduction in L QL tone noted following.  Followed up with active treatment aimed at improving QL strength and stability as well as glute strength to help take strain off QL.  Cuing provided throughout for optimal performance to which pt was able to complete all with no inc in symptoms reported following.       Priyank Is progressing well towards his goals.   Pt prognosis is Good.     Pt will continue to benefit from skilled outpatient physical therapy to address the deficits listed in the problem list box on initial evaluation, provide pt/family education and to maximize pt's level of independence in the home and community environment.     Pt's spiritual, cultural and educational needs considered and pt agreeable to plan of care and goals.     Anticipated barriers to physical therapy: none    Goals: Short Term Goals (6 Weeks):   1. Pt will report 20% reduction in pain of the lumbar spine and LE for ease with ADL's  2. PT will demonstrate improved trunk strength by a half grade in for ease with upright posture during standing activities.  3. Pt will demonstrate improved lumbar spine ROM in all directions by a half grade for ease with bending activities.      Long Term Goals (12 Weeks):   1. Pt will report being independent with HEP for maintenance of  improvements gained during therapy sessions  2. PT will report 50% reduction of pain of the back and LE for ease with dressing and grooming activities.   3. Pt will demonstrate trunk and extremity strength to >=4+/5 without the provocation of pain for ease with household chores  4. Pt will demonstrate appropriate upright posture without external cueing for ease with work related activities.       PLAN     Continue with current POC    Josiah Galicia, PT, DPT, OCS    02/24/2023

## 2023-02-27 ENCOUNTER — CLINICAL SUPPORT (OUTPATIENT)
Dept: REHABILITATION | Facility: HOSPITAL | Age: 63
End: 2023-02-27
Attending: PHYSICIAN ASSISTANT
Payer: MEDICAID

## 2023-02-27 DIAGNOSIS — G89.29 CHRONIC LEFT-SIDED LOW BACK PAIN WITHOUT SCIATICA: Primary | ICD-10-CM

## 2023-02-27 DIAGNOSIS — M62.89 MUSCLE TIGHTNESS: ICD-10-CM

## 2023-02-27 DIAGNOSIS — M25.60 DECREASED RANGE OF MOTION WITH DECREASED STRENGTH: ICD-10-CM

## 2023-02-27 DIAGNOSIS — M54.50 CHRONIC LEFT-SIDED LOW BACK PAIN WITHOUT SCIATICA: Primary | ICD-10-CM

## 2023-02-27 DIAGNOSIS — R53.1 DECREASED RANGE OF MOTION WITH DECREASED STRENGTH: ICD-10-CM

## 2023-02-27 PROCEDURE — 97110 THERAPEUTIC EXERCISES: CPT | Mod: PN

## 2023-02-27 NOTE — PROGRESS NOTES
"[facility]   Physical Therapy Treatment Note     Name: Priyank Chavez [facility]  Clinic Number: [id number]    Therapy Diagnosis:   Encounter Diagnoses   Name Primary?    Chronic left-sided low back pain without sciatica Yes    Muscle tightness     Decreased range of motion with decreased strength              Physician: Malik Toure,      Physician Orders: PT Eval and Treat   Medical Diagnosis from Referral: S39.012D (ICD-10-CM) - Strain of lumbar region, subsequent encounter   Evaluation Date: 1/23/2023  Authorization Period Expiration: 01/17/2024   Plan of Care Expiration: 4/23/2023  Progress Note Due: 2/23/2023  Visit # / Visits authorized: 5/12    FOTO: 1/3     Precautions: Standard and CVA, s/p craniotomy 2022  , diabetes    Time In: 1145AM  Time Out: 1243PM  Total Billable Time: 25 minutes (1:1 w/ PTA)    SUBJECTIVE     Pt reports: The needling felt good.    He was compliant with home exercise program.  Response to previous treatment: good  Functional change: none    Pain: 0/10  Location: left back      OBJECTIVE     Objective Measures updated at progress report unless specified.     Treatment     Priyank received the treatments listed below:      therapeutic exercises to develop strength and ROM for 50 minutes including:  Recumbent bike x7'  LTR: 20x  R S/L lumbar rot: 5"x10  Side Bridge: 2x10  Seated ball rollout 3 ways x 10 ea   Deadbug: 2x10  Lateral walks with GTB at knees x 3   Hip hikes off 4 in step L 2 x 10   Pallof Press: +13# 2x12  1 hand Farmer carry march: 20# 2 laps  +Standing hip abduction with ball on wall 2 x 10 ea   +sled push/pull 45# x 3 laps  +STS from 18 in box 10# 2 x 10    manual therapy techniques: Joint mobilizations and Soft tissue Mobilization were applied to the: Lumbar spine for 08 minutes, including:  DN to L QL, .3x75mm needling, winding technique  L QL release     DN intervention performed by Josiah Galicia PT, DPT          Patient Education and " Home Exercises     Home Exercises Provided and Patient Education Provided     Education provided:   - Pt educated into purpose and benefit of DN intervention as well as risks and potential side effects.     Written Home Exercises Provided: yes. Exercises were reviewed and Priyank was able to demonstrate them prior to the end of the session.  Priyank demonstrated good  understanding of the education provided. See EMR under Patient Instructions for exercises provided during therapy sessions    ASSESSMENT   Pt presents to session without c/o pain. Pt tolerated progression of hip, core and LE strengthening without exacerbation of symptoms. Pt required min verbal cues for performance. Will continue to progress as tolerated. DN intervention performed by PT with no adverse response. Improved soft tissue pliability noted post intervention.       Priyank Is progressing well towards his goals.   Pt prognosis is Good.     Pt will continue to benefit from skilled outpatient physical therapy to address the deficits listed in the problem list box on initial evaluation, provide pt/family education and to maximize pt's level of independence in the home and community environment.     Pt's spiritual, cultural and educational needs considered and pt agreeable to plan of care and goals.     Anticipated barriers to physical therapy: none    Goals: Short Term Goals (6 Weeks):   1. Pt will report 20% reduction in pain of the lumbar spine and LE for ease with ADL's  2. PT will demonstrate improved trunk strength by a half grade in for ease with upright posture during standing activities.  3. Pt will demonstrate improved lumbar spine ROM in all directions by a half grade for ease with bending activities.      Long Term Goals (12 Weeks):   1. Pt will report being independent with HEP for maintenance of improvements gained during therapy sessions  2. PT will report 50% reduction of pain of the back and LE for ease with dressing and grooming  activities.   3. Pt will demonstrate trunk and extremity strength to >=4+/5 without the provocation of pain for ease with household chores  4. Pt will demonstrate appropriate upright posture without external cueing for ease with work related activities.       PLAN     Continue with current POC    Ruiz Keller PTA,   02/27/2023

## 2023-03-06 ENCOUNTER — CLINICAL SUPPORT (OUTPATIENT)
Dept: REHABILITATION | Facility: HOSPITAL | Age: 63
End: 2023-03-06
Attending: PHYSICIAN ASSISTANT
Payer: MEDICAID

## 2023-03-06 DIAGNOSIS — M25.60 DECREASED RANGE OF MOTION WITH DECREASED STRENGTH: ICD-10-CM

## 2023-03-06 DIAGNOSIS — G89.29 CHRONIC LEFT-SIDED LOW BACK PAIN WITHOUT SCIATICA: Primary | ICD-10-CM

## 2023-03-06 DIAGNOSIS — M54.50 CHRONIC LEFT-SIDED LOW BACK PAIN WITHOUT SCIATICA: Primary | ICD-10-CM

## 2023-03-06 DIAGNOSIS — M62.89 MUSCLE TIGHTNESS: ICD-10-CM

## 2023-03-06 DIAGNOSIS — R53.1 DECREASED RANGE OF MOTION WITH DECREASED STRENGTH: ICD-10-CM

## 2023-03-06 PROCEDURE — 97110 THERAPEUTIC EXERCISES: CPT | Mod: PN,CQ

## 2023-03-06 NOTE — PROGRESS NOTES
"[facility]   Physical Therapy Treatment Note     Name: Priyank Chavez [facility]  Clinic Number: [id number]    Therapy Diagnosis:   Encounter Diagnoses   Name Primary?    Chronic left-sided low back pain without sciatica Yes    Muscle tightness     Decreased range of motion with decreased strength            Physician: Malik Toure,      Physician Orders: PT Eval and Treat   Medical Diagnosis from Referral: S39.012D (ICD-10-CM) - Strain of lumbar region, subsequent encounter   Evaluation Date: 1/23/2023  Authorization Period Expiration: 01/17/2024   Plan of Care Expiration: 4/23/2023  Progress Note Due: 2/23/2023  Visit # / Visits authorized: 5/12    FOTO: 1/3     Precautions: Standard and CVA, s/p craniotomy 2022  , diabetes    Time In: 1145AM  Time Out: 1225AM  Total Billable Time: 15 mins     SUBJECTIVE     Pt reports: He is doing good. Can't recall the last time he had pain. No issues following last session.   He was compliant with home exercise program.  Response to previous treatment: good  Functional change: less pain    Pain: 0/10  Location: left back      OBJECTIVE     Objective Measures updated at progress report unless specified.     Treatment     Priyank received the treatments listed below:      therapeutic exercises to develop strength and ROM for 40 minutes including:  Recumbent bike x7' lvl 2   LTR: 20x  R S/L lumbar rot: 5"x10  Side Bridge: 2x10  Seated ball rollout 3 ways x 10 ea   Deadbug: 2x10  Lateral walks with GTB at knees x 3   Hip hikes off 4 in step L 2 x 10   Pallof Press: 13# 2x12  1 hand Eddy carry march: 20# 2 laps  Standing hip abduction with ball on wall 2 x 10 ea   sled push/pull 45# x 3 laps  STS from 18 in box 10# 2 x 10    manual therapy techniques: Joint mobilizations and Soft tissue Mobilization were applied to the: Lumbar spine for 00 minutes, including:  DN to L QL, .3x75mm needling, winding technique  L QL release            Patient Education " and Home Exercises     Home Exercises Provided and Patient Education Provided     Education provided:   - Pt educated into purpose and benefit of DN intervention as well as risks and potential side effects.     Written Home Exercises Provided: yes. Exercises were reviewed and Priyank was able to demonstrate them prior to the end of the session.  Priyank demonstrated good  understanding of the education provided. See EMR under Patient Instructions for exercises provided during therapy sessions    ASSESSMENT   Pt presents to session without c/o pain. No issues following previous session. Pt requires verbal cues for performance. Pt to be reassessed by PT next visit. Will continue to progress as tolerated.       Priyank Is progressing well towards his goals.   Pt prognosis is Good.     Pt will continue to benefit from skilled outpatient physical therapy to address the deficits listed in the problem list box on initial evaluation, provide pt/family education and to maximize pt's level of independence in the home and community environment.     Pt's spiritual, cultural and educational needs considered and pt agreeable to plan of care and goals.     Anticipated barriers to physical therapy: none    Goals: Short Term Goals (6 Weeks):   1. Pt will report 20% reduction in pain of the lumbar spine and LE for ease with ADL's  2. PT will demonstrate improved trunk strength by a half grade in for ease with upright posture during standing activities.  3. Pt will demonstrate improved lumbar spine ROM in all directions by a half grade for ease with bending activities.      Long Term Goals (12 Weeks):   1. Pt will report being independent with HEP for maintenance of improvements gained during therapy sessions  2. PT will report 50% reduction of pain of the back and LE for ease with dressing and grooming activities.   3. Pt will demonstrate trunk and extremity strength to >=4+/5 without the provocation of pain for ease with household  chores  4. Pt will demonstrate appropriate upright posture without external cueing for ease with work related activities.       PLAN     Continue with current POC    Ruiz Keller PTA,   03/06/2023

## 2023-03-13 ENCOUNTER — CLINICAL SUPPORT (OUTPATIENT)
Dept: REHABILITATION | Facility: HOSPITAL | Age: 63
End: 2023-03-13
Attending: PHYSICIAN ASSISTANT
Payer: MEDICAID

## 2023-03-13 DIAGNOSIS — R53.1 DECREASED RANGE OF MOTION WITH DECREASED STRENGTH: ICD-10-CM

## 2023-03-13 DIAGNOSIS — G89.29 CHRONIC LEFT-SIDED LOW BACK PAIN WITHOUT SCIATICA: Primary | ICD-10-CM

## 2023-03-13 DIAGNOSIS — M54.50 CHRONIC LEFT-SIDED LOW BACK PAIN WITHOUT SCIATICA: Primary | ICD-10-CM

## 2023-03-13 DIAGNOSIS — M25.60 DECREASED RANGE OF MOTION WITH DECREASED STRENGTH: ICD-10-CM

## 2023-03-13 DIAGNOSIS — M62.89 MUSCLE TIGHTNESS: ICD-10-CM

## 2023-03-13 PROCEDURE — 97110 THERAPEUTIC EXERCISES: CPT | Mod: PN

## 2023-03-13 NOTE — PROGRESS NOTES
"[facility]   Physical Therapy Treatment Note     Name: Priyank Whartongesha  Clinic Number: [id number]    Therapy Diagnosis:   Encounter Diagnoses   Name Primary?    Chronic left-sided low back pain without sciatica Yes    Muscle tightness     Decreased range of motion with decreased strength          Physician: Malik Toure,      Physician Orders: PT Eval and Treat   Medical Diagnosis from Referral: S39.012D (ICD-10-CM) - Strain of lumbar region, subsequent encounter   Evaluation Date: 1/23/2023  Authorization Period Expiration: 01/17/2024   Plan of Care Expiration: 4/23/2023  Progress Note Due: 4/13/2023  Visit # / Visits authorized: 7/12    FOTO: 1/3     Precautions: Standard and CVA, s/p craniotomy 2022  , diabetes    Time In: 3:00 pm  Time Out: 3:55 pm  Total Billable Time: 55 mins     SUBJECTIVE     Pt reports: he states he has not pain, but he has tightness in the lower back. Pt states he has minor difficult when he tried to get up in the morning because of lower back tightness.   He was compliant with home exercise program.  Response to previous treatment: good  Functional change: less pain    Pain: 0/10  Location: left back      OBJECTIVE     Objective Measures updated at progress report unless specified.         Thoracic/Lumbar AROM: Pain/Dysfunction with Movement:   Flexion Min mejia, fingertips to patella, tightness in L LB     Repeated standing: no change  Repeated supine: better   Extension Min mejia, fulcrum at mid-LSP, tightness in L LB     Repeated standing: no worse   Repeated prone: no change   Right side bending WFL, fingertips 1"below knee joint line   Left side bending WFL, fingertips 1" below knee joint line   Right rotation Mod mejia, c/o stiffness in L LB   Left rotation WFL, no pain        Lower Extremity Strength  Right LE   Left LE     Hip flexion: 4+/5 Hip flexion: 4+/5   Hip extension:  4+/5 Hip extension: 4+/5   Hip abduction: 4+/5 Hip abduction: 4+/5 " "  Hip adduction:  5/5 Hip adduction:  4+/5   Hip Internal rotation    5/5 Hip Internal rotation 4+/5   Knee Flexion 5/5 Knee Flexion 5/5   Knee Extension 5/5 Knee Extension 5/5   Ankle dorsiflexion: 5/5 Ankle dorsiflexion: 5/5   Ankle plantarflexion: 5/5 Ankle plantarflexion: 5/5        Treatment     Priyank received the treatments listed below:      therapeutic exercises to develop strength and ROM for 55 minutes including:    Recumbent bike x7' lvl 2   LTR: 30x  Open book 3x10   R S/L lumbar rot: 5"x10 NP   Bridge: 3x10  Deadbug: 3x10  Cat camel 3x10   Treading the needles exercises 1x15 hold 3 sec  Bird dog 2x10   Seated ball rollout 3 ways x 10 ea   Lateral walks with GTB at knees x 3 laps  Hip hikes off 4 in step L 2 x 10   Pallof Press: 13# 3x10  1 hand Farmer carry march: 20# 2 laps  Standing hip abduction with ball on wall 2 x 10 ea   sled push/pull 45# x 3 laps  STS from 18 in box 10# 2 x 10    manual therapy techniques: Joint mobilizations and Soft tissue Mobilization were applied to the: Lumbar spine for 00 minutes, including:  DN to L QL, .3x75mm needling, winding technique  L QL release            Patient Education and Home Exercises     Home Exercises Provided and Patient Education Provided     Education provided:   - Pt educated into purpose and benefit of DN intervention as well as risks and potential side effects.     Written Home Exercises Provided: yes. Exercises were reviewed and Priyank was able to demonstrate them prior to the end of the session.  Priyank demonstrated good  understanding of the education provided. See EMR under Patient Instructions for exercises provided during therapy sessions    ASSESSMENT     Pt presents to session without c/o pain. Pt was re-assessed today. Pt demonstrated improvement in lumbar AROM in all planes, but he has L lower back stiffness specially in the morning. Pt has improved B LE muscles strength. Pt has difficult in the morning due to L lower back stiffness, " but stiffness subsided as pt cont his daily activities. Pt has met 3/3 StGs. Overall, pt has been improving in therapy. Plan to cont with lumbar mobility exercises.       Priyank Is progressing well towards his goals.   Pt prognosis is Good.     Pt will continue to benefit from skilled outpatient physical therapy to address the deficits listed in the problem list box on initial evaluation, provide pt/family education and to maximize pt's level of independence in the home and community environment.     Pt's spiritual, cultural and educational needs considered and pt agreeable to plan of care and goals.     Anticipated barriers to physical therapy: none    Goals: Short Term Goals (6 Weeks):   1. Pt will report 20% reduction in pain of the lumbar spine and LE for ease with ADL's. Goal met 3-13-23  2. PT will demonstrate improved trunk strength by a half grade in for ease with upright posture during standing activities. Goal met 3-13-23  3. Pt will demonstrate improved lumbar spine ROM in all directions by a half grade for ease with bending activities. Goal met 3-13-23     Long Term Goals (12 Weeks):   1. Pt will report being independent with HEP for maintenance of improvements gained during therapy sessions  2. PT will report 50% reduction of pain of the back and LE for ease with dressing and grooming activities.   3. Pt will demonstrate trunk and extremity strength to >=4+/5 without the provocation of pain for ease with household chores  4. Pt will demonstrate appropriate upright posture without external cueing for ease with work related activities.       PLAN     Continue with current POC    Shorty Crawford, PT, DPT  03/13/2023

## 2023-03-17 NOTE — PROGRESS NOTES
"[facility]   Physical Therapy Treatment Note     Name: Priyank Chavez [facility]  Clinic Number: [id number]    Therapy Diagnosis:   Encounter Diagnoses   Name Primary?    Chronic left-sided low back pain without sciatica Yes    Muscle tightness     Decreased range of motion with decreased strength            Physician: Malik Toure,      Physician Orders: PT Eval and Treat   Medical Diagnosis from Referral: S39.012D (ICD-10-CM) - Strain of lumbar region, subsequent encounter   Evaluation Date: 1/23/2023  Authorization Period Expiration: 01/17/2024   Plan of Care Expiration: 4/23/2023  Progress Note Due: 4/13/2023  Visit # / Visits authorized: 7/12    FOTO: 1/3     Precautions: Standard and CVA, s/p craniotomy 2022  , diabetes    Time In: 1145AM  Time Out: 1240PM  Total Billable Time: 55 mins     SUBJECTIVE     Pt reports: No pain to reports today, Continued morning stiffness that improves with movement.  He was compliant with home exercise program.  Response to previous treatment: good  Functional change: less pain    Pain: 0/10  Location: left back      OBJECTIVE     Objective Measures updated at progress report unless specified.          Treatment     Priyank received the treatments listed below:      therapeutic exercises to develop strength and ROM for 55 minutes including:    Recumbent bike x7' lvl 2   LTR: 30x  Open book 2 x 10 x 5"  Bridge: 3x10 +#10  Deadbug: 3x10  Cat camel 3x10   Treading the needles exercises 1x15 hold 3 sec  Bird dog 2x10   Seated ball rollout 3 ways x 10 ea   Lateral walks with RTB at ankles x 3 laps  Pallof Press: Freemotion 13# 3x10  1 hand Farmer carry march: 20# 2 laps ea   sled push/pull 45# x 3 laps  STS from 18 in box 10# 2 x 10  +Shuttle hip extension 1 blk 2 x 10 ea   +MedX core rotation 20# x 20 ea  +Matrix hip abd 45# 3 x 10    manual therapy techniques: Joint mobilizations and Soft tissue Mobilization were applied to the: Lumbar spine for 00 " minutes, including:  DN to L QL, .3x75mm needling, winding technique  L QL release      Patient Education and Home Exercises     Home Exercises Provided and Patient Education Provided     Education provided:   - Pt educated into purpose and benefit of DN intervention as well as risks and potential side effects.     Written Home Exercises Provided: yes. Exercises were reviewed and Priyank was able to demonstrate them prior to the end of the session.  Priyank demonstrated good  understanding of the education provided. See EMR under Patient Instructions for exercises provided during therapy sessions    ASSESSMENT     Priyank presnts to session without c/o pain though he reports low back stiffness first thing in the morning that resolves with activity. Continued hip and core strengthening without issue. Conitnue progressing pt to tolerance to address deficits and return to PLOF. Continues pain in L QL with R side rotation. Encouraged pt to perform the supine QL stretch at home to address that tightness.          Priyank Is progressing well towards his goals.   Pt prognosis is Good.     Pt will continue to benefit from skilled outpatient physical therapy to address the deficits listed in the problem list box on initial evaluation, provide pt/family education and to maximize pt's level of independence in the home and community environment.     Pt's spiritual, cultural and educational needs considered and pt agreeable to plan of care and goals.     Anticipated barriers to physical therapy: none    Goals: Short Term Goals (6 Weeks):   1. Pt will report 20% reduction in pain of the lumbar spine and LE for ease with ADL's. Goal met 3-13-23  2. PT will demonstrate improved trunk strength by a half grade in for ease with upright posture during standing activities. Goal met 3-13-23  3. Pt will demonstrate improved lumbar spine ROM in all directions by a half grade for ease with bending activities. Goal met 3-13-23     Long Term  Goals (12 Weeks):   1. Pt will report being independent with HEP for maintenance of improvements gained during therapy sessions  2. PT will report 50% reduction of pain of the back and LE for ease with dressing and grooming activities.   3. Pt will demonstrate trunk and extremity strength to >=4+/5 without the provocation of pain for ease with household chores  4. Pt will demonstrate appropriate upright posture without external cueing for ease with work related activities.       PLAN     Continue with current POC    Ruiz Keller PTA,   03/20/2023

## 2023-03-20 ENCOUNTER — CLINICAL SUPPORT (OUTPATIENT)
Dept: REHABILITATION | Facility: HOSPITAL | Age: 63
End: 2023-03-20
Attending: PHYSICIAN ASSISTANT
Payer: MEDICAID

## 2023-03-20 DIAGNOSIS — M54.50 CHRONIC LEFT-SIDED LOW BACK PAIN WITHOUT SCIATICA: Primary | ICD-10-CM

## 2023-03-20 DIAGNOSIS — M62.89 MUSCLE TIGHTNESS: ICD-10-CM

## 2023-03-20 DIAGNOSIS — G89.29 CHRONIC LEFT-SIDED LOW BACK PAIN WITHOUT SCIATICA: Primary | ICD-10-CM

## 2023-03-20 DIAGNOSIS — R53.1 DECREASED RANGE OF MOTION WITH DECREASED STRENGTH: ICD-10-CM

## 2023-03-20 DIAGNOSIS — M25.60 DECREASED RANGE OF MOTION WITH DECREASED STRENGTH: ICD-10-CM

## 2023-03-20 PROCEDURE — 97110 THERAPEUTIC EXERCISES: CPT | Mod: PN,CQ

## 2023-03-28 ENCOUNTER — OFFICE VISIT (OUTPATIENT)
Dept: NEUROSURGERY | Facility: CLINIC | Age: 63
End: 2023-03-28
Payer: MEDICAID

## 2023-03-28 ENCOUNTER — CLINICAL SUPPORT (OUTPATIENT)
Dept: REHABILITATION | Facility: HOSPITAL | Age: 63
End: 2023-03-28
Attending: PHYSICIAN ASSISTANT
Payer: MEDICAID

## 2023-03-28 VITALS
HEART RATE: 65 BPM | HEIGHT: 69 IN | SYSTOLIC BLOOD PRESSURE: 146 MMHG | DIASTOLIC BLOOD PRESSURE: 74 MMHG | WEIGHT: 200 LBS | BODY MASS INDEX: 29.62 KG/M2

## 2023-03-28 DIAGNOSIS — M54.6 CHRONIC THORACIC BACK PAIN, UNSPECIFIED BACK PAIN LATERALITY: ICD-10-CM

## 2023-03-28 DIAGNOSIS — G89.29 CHRONIC THORACIC BACK PAIN, UNSPECIFIED BACK PAIN LATERALITY: ICD-10-CM

## 2023-03-28 DIAGNOSIS — M62.89 MUSCLE TIGHTNESS: ICD-10-CM

## 2023-03-28 DIAGNOSIS — M25.60 DECREASED RANGE OF MOTION WITH DECREASED STRENGTH: ICD-10-CM

## 2023-03-28 DIAGNOSIS — M54.50 CHRONIC LEFT-SIDED LOW BACK PAIN WITHOUT SCIATICA: Primary | ICD-10-CM

## 2023-03-28 DIAGNOSIS — S06.5XAA SUBDURAL HEMATOMA: Primary | ICD-10-CM

## 2023-03-28 DIAGNOSIS — R53.1 DECREASED RANGE OF MOTION WITH DECREASED STRENGTH: ICD-10-CM

## 2023-03-28 DIAGNOSIS — G89.29 CHRONIC LEFT-SIDED LOW BACK PAIN WITHOUT SCIATICA: Primary | ICD-10-CM

## 2023-03-28 PROCEDURE — 99213 PR OFFICE/OUTPT VISIT, EST, LEVL III, 20-29 MIN: ICD-10-PCS | Mod: S$PBB,,, | Performed by: PHYSICIAN ASSISTANT

## 2023-03-28 PROCEDURE — 3078F DIAST BP <80 MM HG: CPT | Mod: CPTII,,, | Performed by: PHYSICIAN ASSISTANT

## 2023-03-28 PROCEDURE — 4010F ACE/ARB THERAPY RXD/TAKEN: CPT | Mod: CPTII,,, | Performed by: PHYSICIAN ASSISTANT

## 2023-03-28 PROCEDURE — 99999 PR PBB SHADOW E&M-EST. PATIENT-LVL IV: ICD-10-PCS | Mod: PBBFAC,,, | Performed by: PHYSICIAN ASSISTANT

## 2023-03-28 PROCEDURE — 3077F PR MOST RECENT SYSTOLIC BLOOD PRESSURE >= 140 MM HG: ICD-10-PCS | Mod: CPTII,,, | Performed by: PHYSICIAN ASSISTANT

## 2023-03-28 PROCEDURE — 3077F SYST BP >= 140 MM HG: CPT | Mod: CPTII,,, | Performed by: PHYSICIAN ASSISTANT

## 2023-03-28 PROCEDURE — 99213 OFFICE O/P EST LOW 20 MIN: CPT | Mod: S$PBB,,, | Performed by: PHYSICIAN ASSISTANT

## 2023-03-28 PROCEDURE — 3008F PR BODY MASS INDEX (BMI) DOCUMENTED: ICD-10-PCS | Mod: CPTII,,, | Performed by: PHYSICIAN ASSISTANT

## 2023-03-28 PROCEDURE — 4010F PR ACE/ARB THEARPY RXD/TAKEN: ICD-10-PCS | Mod: CPTII,,, | Performed by: PHYSICIAN ASSISTANT

## 2023-03-28 PROCEDURE — 97110 THERAPEUTIC EXERCISES: CPT | Mod: PN,CQ

## 2023-03-28 PROCEDURE — 99999 PR PBB SHADOW E&M-EST. PATIENT-LVL IV: CPT | Mod: PBBFAC,,, | Performed by: PHYSICIAN ASSISTANT

## 2023-03-28 PROCEDURE — 3078F PR MOST RECENT DIASTOLIC BLOOD PRESSURE < 80 MM HG: ICD-10-PCS | Mod: CPTII,,, | Performed by: PHYSICIAN ASSISTANT

## 2023-03-28 PROCEDURE — 3008F BODY MASS INDEX DOCD: CPT | Mod: CPTII,,, | Performed by: PHYSICIAN ASSISTANT

## 2023-03-28 PROCEDURE — 99214 OFFICE O/P EST MOD 30 MIN: CPT | Mod: PBBFAC | Performed by: PHYSICIAN ASSISTANT

## 2023-03-28 NOTE — PROGRESS NOTES
"[facility]   Physical Therapy Treatment Note     Name: Priyank Chavez [facility]  Clinic Number: [id number]    Therapy Diagnosis:   Encounter Diagnosis   Name Primary?    Decreased strength, endurance, and mobility Yes       Physician: Malik Toure,      Physician Orders: PT Eval and Treat   Medical Diagnosis from Referral: S39.012D (ICD-10-CM) - Strain of lumbar region, subsequent encounter   Evaluation Date: 1/23/2023  Authorization Period Expiration: 01/17/2024   Plan of Care Expiration: 4/23/2023  Progress Note Due: 4/13/2023  Visit # / Visits authorized: 8/12    FOTO: 1/3     Precautions: Standard and CVA, s/p craniotomy 2022  , diabetes    Time In: 8:35 AM  Time Out: 9:30 AM  Total Billable Time: 30 mins (1:1 PTA)    SUBJECTIVE     Pt reports: his back and shoulder are still sore.  He was compliant with home exercise program.  Response to previous treatment: good  Functional change: less pain    Pain: 0/10  Location: left back      OBJECTIVE     Objective Measures updated at progress report unless specified.          Treatment     Priyank received the treatments listed below:      therapeutic exercises to develop strength and ROM for 55 minutes including:    Recumbent bike x7' lvl 2   LTR: 30x  Open book 2 x 10 x 5"  Bridge: 3x10 #10  Cat camel 3x10   Bird dog 2x10   Seated ball rollout 3 ways x 10 ea   Lateral walks with RTB at ankles x 3 laps  Pallof Press: Freemotion 13# 3x10  1 hand Farmer carry march: 20# + 10KB on opp shoulder 1 lap ea around gym  sled push/pull  x 3 laps  STS from 18 in box 10# 2 x 10  Shuttle hip extension 1 blk 2 x 10 ea   MedX core rotation +24# x 20 ea  Matrix hip abd 45# 3 x 10    manual therapy techniques: Soft tissue Mobilization were applied to the: low back for 5 minutes, including:  STM to L QL    Patient Education and Home Exercises     Home Exercises Provided and Patient Education Provided     Education provided:   - Pt educated into purpose " and benefit of DN intervention as well as risks and potential side effects.     Written Home Exercises Provided: yes. Exercises were reviewed and Priyank was able to demonstrate them prior to the end of the session.  Priyank demonstrated good  understanding of the education provided. See EMR under Patient Instructions for exercises provided during therapy sessions    ASSESSMENT     Priyank arrived to therapy reporting his usual pain in his low back and shoulder. Started today's session with manual therapy to reduce tenderness in his low back. Continued with hip and core strengthening, and pt tolerated all exercises well with no reports of increased symptoms at the end of today's session. Will continue to progress as tolerated.          Priyank Is progressing well towards his goals.   Pt prognosis is Good.     Pt will continue to benefit from skilled outpatient physical therapy to address the deficits listed in the problem list box on initial evaluation, provide pt/family education and to maximize pt's level of independence in the home and community environment.     Pt's spiritual, cultural and educational needs considered and pt agreeable to plan of care and goals.     Anticipated barriers to physical therapy: none    Goals: Short Term Goals (6 Weeks):   1. Pt will report 20% reduction in pain of the lumbar spine and LE for ease with ADL's. Goal met 3-13-23  2. PT will demonstrate improved trunk strength by a half grade in for ease with upright posture during standing activities. Goal met 3-13-23  3. Pt will demonstrate improved lumbar spine ROM in all directions by a half grade for ease with bending activities. Goal met 3-13-23     Long Term Goals (12 Weeks):   1. Pt will report being independent with HEP for maintenance of improvements gained during therapy sessions  2. PT will report 50% reduction of pain of the back and LE for ease with dressing and grooming activities.   3. Pt will demonstrate trunk and  extremity strength to >=4+/5 without the provocation of pain for ease with household chores  4. Pt will demonstrate appropriate upright posture without external cueing for ease with work related activities.       PLAN     Continue with current POC    Gabe Amador, PT,   03/31/2023

## 2023-03-28 NOTE — PROGRESS NOTES
Neurosurgery  Established Patient    SUBJECTIVE:     Interval History:  Mr. Whittington is here today for f/u for his back pain. He reports improvement with robaxin and PT.  No new issues, baseline left hand numbness and mild weakness unchanged since hospitalized with SDH.  Denies headaches, visual changes, new weakness seizures.    Interval History(1/17/23):  Mr. Whittington is a 63-year-old male with history of right-sided subdural status post craniotomy for evacuation.  At last visit head CT demonstrated near complete resolution of prior subdural hematoma, with 6 month stability on CT at 1 year post op.  Patient was clinically improved.  However, at that time he was complaining of mid and low back pain.  We got x-rays of the thoracolumbar spine to evaluate he is here today for follow-up.  He reports continued back pain that is paraspinous.  Denies pain paresthesias weakness lower extremities denies bowel bladder dysfunction.  He is not taken any medication at this point in time.     Interval History(12/6/22):  Mr. Whittington her follow up with updated head CT.  Overall states he has been denies any complaints of headaches, visual changes, weakness, speech difficulty seizures.  Denies any recent trauma.  He has been complaining of significant mid-low back pain that has been bothering him for several months now.  He denies any pain paresthesias or weakness in his lower extremities.  Denies bowel bladder dysfunction.  Denies balance difficulty.     History of Present Illness(6/722):  63 yo M s/p craniotomy for SDH on 1/28/22, patient here today for neurosurgical follow-up with repeat head CT the.  Patient reports overall has been doing well.  Denies headaches, visual changes, weakness, seizures.  Patient does report some residual left hand and foot numbness that has been going on since he was diagnosed with a subdural.  He denies any new issues today.  No recent falls.    Review of patient's allergies indicates:  No Known  Allergies    Current Outpatient Medications   Medication Sig Dispense Refill    butalbital-acetaminophen-caffeine -40 mg (FIORICET, ESGIC) -40 mg per tablet Take 1 tablet by mouth every 4 (four) hours as needed for Pain.      metFORMIN (GLUCOPHAGE) 1000 MG tablet Take 1,000 mg by mouth.      aspirin (ECOTRIN) 81 MG EC tablet Take 1 tablet (81 mg total) by mouth once daily.  0    atorvastatin (LIPITOR) 40 MG tablet Take 40 mg by mouth once daily.      FLOWFLEX COVID-19 AG HOME TEST Kit       heparin sodium,porcine (HEPARIN, PORCINE,) 5,000 unit/mL injection Inject 1 mL (5,000 Units total) into the skin every 8 (eight) hours.      HYDROcodone-acetaminophen (NORCO) 5-325 mg per tablet Take 1 tablet by mouth every 6 (six) hours as needed for Pain. (Patient not taking: Reported on 3/15/2022) 8 tablet 0    lisinopriL (PRINIVIL,ZESTRIL) 5 MG tablet Take 5 mg by mouth once daily.      methocarbamoL (ROBAXIN) 750 MG Tab Take 1 tablet (750 mg total) by mouth 4 (four) times daily as needed (muscle spasms). (Patient not taking: Reported on 3/28/2023) 40 tablet 0    multivitamin Tab Take 1 tablet by mouth once daily. (Patient not taking: Reported on 3/15/2022)      pantoprazole (PROTONIX) 40 MG tablet Take 40 mg by mouth once daily.      polyethylene glycol (GLYCOLAX) 17 gram PwPk Take 17 g by mouth once daily. (Patient not taking: Reported on 3/15/2022)  0    senna-docusate 8.6-50 mg (PERICOLACE) 8.6-50 mg per tablet Take 1 tablet by mouth 2 (two) times daily. (Patient not taking: Reported on 3/15/2022)      silodosin (RAPAFLO) 8 mg Cap capsule Take 1 capsule (8 mg total) by mouth once daily. (Patient not taking: Reported on 6/7/2022)      sodium chloride 1 gram tablet Take 1 tablet (1 g total) by mouth 3 (three) times daily. (Patient not taking: Reported on 3/15/2022)      white petrolatum-mineral oil 57.3-42.5% (REFRESH P.M.) 57.3-42.5 % Oint Place into the right eye every evening. (Patient not taking: Reported on  "3/15/2022)  0     No current facility-administered medications for this visit.       Past Medical History:   Diagnosis Date    Cerebrovascular accident (CVA) of right pontine structure 01/23/2022    Diabetes mellitus     HLD (hyperlipidemia) 1/23/2022    HTN (hypertension) 1/25/2022    Subdural hematoma 01/23/2022     Past Surgical History:   Procedure Laterality Date    CRANIOTOMY FOR EVACUATION OF SUBDURAL HEMATOMA Right 1/28/2022    Procedure: CRANIOTOMY, FOR SUBDURAL HEMATOMA EVACUATION;  Surgeon: Ace Candelaria MD;  Location: [facility] OR [address] [place] [address];  Service: Neurosurgery;  Laterality: Right;  right, possible bilateral     Family History    None       Social History     Socioeconomic History    Marital status:    Tobacco Use    Smoking status: Never    Smokeless tobacco: Never   Substance and Sexual Activity    Alcohol use: Never    Drug use: Never    Sexual activity: Yes     Partners: Female       Review of Systems  Constitutional: no fever or chills  Eyes: no visual changes  ENT: no nasal congestion or sore throat  Respiratory: no cough or shortness of breath  Cardiovascular: no chest pain or palpitations  Gastrointestinal: no nausea or vomiting  Genitourinary: no hematuria or dysuria  Integument/Breast: no rash or pruritis  Hematologic/Lymphatic: no easy bruising or lymphadenopathy  Musculoskeletal: no arthralgias or myalgias  Neurological: no seizures or tremors    OBJECTIVE:     Vital Signs  Pulse: 65  BP: (!) 146/74  Pain Score:   4  Height: 5' 9" (175.3 cm)  Weight: 90.7 kg (200 lb)  Body mass index is 29.53 kg/m².    Neurosurgery Physical Exam  General: no distress  Neurologic: Alert and oriented. Thought content appropriate.  Head: normocephalic  GCS: Motor: 6/Verbal: 5/Eyes: 4 GCS Total: 15  Cranial nerves: face symmetric, tongue midline, pupils equal, round, reactive to light with accomodation, extraocular muscles intact  Sensory: response to light touch throughout  Motor Strength:full strength upper " and lower extremities  Pronator Drift: no drift noted  Finger to nose normal  Extremities: no cyanosis or edema, or clubbing  Gait normal  Negative wen's.  Negative ankle clonus.   Negative tinel's. Negative phalens.       Diagnostic Results:  No new studies    ASSESSMENT/PLAN:     63-year-old male status post right craniotomy for subdural hematoma evacuation on 01/28/2022.  last head CT in December showed near complete resolution of prior subdural hematoma with 6 month stability at 1 year post op.  He does not have any concern subdural, he does have some standing subjective left hand weakness since he was hospitalized for this subdural.  As far as his back pain, he does feel as though he is making improvement with PT and Robaxin p.r.n. Continue PT of low back, will add PT on LUE strengthening.  F/u with pcp for continued management.  F/u with neurosurgery prn.  He is encouraged with any new issues or questions or concerns.      Malik Toure Jr. PA-C  [facility]  Department of Neurosurgery      Note dictated with voice recognition software, please excuse any grammatical errors.

## 2023-03-28 NOTE — PROGRESS NOTES
"[facility]   Physical Therapy Treatment Note     Name: Priyank Chavez [facility]  Clinic Number: [id number]    Therapy Diagnosis:   Encounter Diagnoses   Name Primary?    Chronic left-sided low back pain without sciatica Yes    Muscle tightness     Decreased range of motion with decreased strength              Physician: Malik Toure,      Physician Orders: PT Eval and Treat   Medical Diagnosis from Referral: S39.012D (ICD-10-CM) - Strain of lumbar region, subsequent encounter   Evaluation Date: 1/23/2023  Authorization Period Expiration: 01/17/2024   Plan of Care Expiration: 4/23/2023  Progress Note Due: 4/13/2023  Visit # / Visits authorized: 7/12    FOTO: 1/3     Precautions: Standard and CVA, s/p craniotomy 2022  , diabetes    Time In: 815AM  Time Out: 915AM  Total Billable Time: 30 mins (1:1 PTA)    SUBJECTIVE     Pt reports: He has stiffness when he gets out of bed. He does a little stretching before he gets up.   He was compliant with home exercise program.  Response to previous treatment: good  Functional change: less pain    Pain: 0/10  Location: left back      OBJECTIVE     Objective Measures updated at progress report unless specified.          Treatment     Priyank received the treatments listed below:      therapeutic exercises to develop strength and ROM for 60 minutes including:    Recumbent bike x7' lvl 2   LTR: 30x  Open book 2 x 10 x 5"  Bridge: 3x10 #10  Cat camel 3x10   Bird dog 2x10   Seated ball rollout 3 ways x 10 ea   Lateral walks with RTB at ankles x 3 laps  Pallof Press: [employer] 13# 3x10  1 hand Farmer carry march: 20# + 10KB on opp shoulder 1 lap ea around gym  sled push/pull  x 3 laps  STS from 18 in box 10# 2 x 10  Shuttle hip extension 1 blk 2 x 10 ea   MedX core rotation +24# x 20 ea  Matrix hip abd 45# 3 x 10        Patient Education and Home Exercises     Home Exercises Provided and Patient Education Provided     Education provided:   - Pt educated " into purpose and benefit of DN intervention as well as risks and potential side effects.     Written Home Exercises Provided: yes. Exercises were reviewed and Priyank was able to demonstrate them prior to the end of the session.  Priyank demonstrated good  understanding of the education provided. See EMR under Patient Instructions for exercises provided during therapy sessions    ASSESSMENT     Priyank presnts to session without c/o pain though he reports low back stiffness first thing in the morning that resolves with activity. Continued hip and core strengthening without issue. Continues pain in L QL with R side rotation. Progressed core strengthening without issues.          Priyank Is progressing well towards his goals.   Pt prognosis is Good.     Pt will continue to benefit from skilled outpatient physical therapy to address the deficits listed in the problem list box on initial evaluation, provide pt/family education and to maximize pt's level of independence in the home and community environment.     Pt's spiritual, cultural and educational needs considered and pt agreeable to plan of care and goals.     Anticipated barriers to physical therapy: none    Goals: Short Term Goals (6 Weeks):   1. Pt will report 20% reduction in pain of the lumbar spine and LE for ease with ADL's. Goal met 3-13-23  2. PT will demonstrate improved trunk strength by a half grade in for ease with upright posture during standing activities. Goal met 3-13-23  3. Pt will demonstrate improved lumbar spine ROM in all directions by a half grade for ease with bending activities. Goal met 3-13-23     Long Term Goals (12 Weeks):   1. Pt will report being independent with HEP for maintenance of improvements gained during therapy sessions  2. PT will report 50% reduction of pain of the back and LE for ease with dressing and grooming activities.   3. Pt will demonstrate trunk and extremity strength to >=4+/5 without the provocation of pain for  ease with household chores  4. Pt will demonstrate appropriate upright posture without external cueing for ease with work related activities.       PLAN     Continue with current POC    Ruiz Keller PTA,   03/28/2023

## 2023-03-31 ENCOUNTER — CLINICAL SUPPORT (OUTPATIENT)
Dept: REHABILITATION | Facility: HOSPITAL | Age: 63
End: 2023-03-31
Attending: PHYSICIAN ASSISTANT
Payer: MEDICAID

## 2023-03-31 DIAGNOSIS — R68.89 DECREASED STRENGTH, ENDURANCE, AND MOBILITY: Primary | ICD-10-CM

## 2023-03-31 DIAGNOSIS — R53.1 DECREASED STRENGTH, ENDURANCE, AND MOBILITY: Primary | ICD-10-CM

## 2023-03-31 DIAGNOSIS — Z74.09 DECREASED STRENGTH, ENDURANCE, AND MOBILITY: Primary | ICD-10-CM

## 2023-03-31 PROCEDURE — 97110 THERAPEUTIC EXERCISES: CPT | Mod: PN

## 2023-04-03 ENCOUNTER — CLINICAL SUPPORT (OUTPATIENT)
Dept: REHABILITATION | Facility: HOSPITAL | Age: 63
End: 2023-04-03
Attending: PHYSICIAN ASSISTANT
Payer: MEDICAID

## 2023-04-03 DIAGNOSIS — R53.1 DECREASED RANGE OF MOTION WITH DECREASED STRENGTH: ICD-10-CM

## 2023-04-03 DIAGNOSIS — M62.89 MUSCLE TIGHTNESS: ICD-10-CM

## 2023-04-03 DIAGNOSIS — G89.29 CHRONIC LEFT-SIDED LOW BACK PAIN WITHOUT SCIATICA: Primary | ICD-10-CM

## 2023-04-03 DIAGNOSIS — M25.60 DECREASED RANGE OF MOTION WITH DECREASED STRENGTH: ICD-10-CM

## 2023-04-03 DIAGNOSIS — M54.50 CHRONIC LEFT-SIDED LOW BACK PAIN WITHOUT SCIATICA: Primary | ICD-10-CM

## 2023-04-03 PROCEDURE — 97110 THERAPEUTIC EXERCISES: CPT | Mod: PN,CQ

## 2023-04-03 NOTE — PROGRESS NOTES
[facility]   Physical Therapy Treatment Note     Name: Priyank Chavez [facility]  Clinic Number: [id number]    Therapy Diagnosis:   Encounter Diagnoses   Name Primary?    Chronic left-sided low back pain without sciatica Yes    Muscle tightness     Decreased range of motion with decreased strength          Physician: Malik Toure,      Physician Orders: PT Eval and Treat   Medical Diagnosis from Referral: S39.012D (ICD-10-CM) - Strain of lumbar region, subsequent encounter   Evaluation Date: 1/23/2023  Authorization Period Expiration: 01/17/2024   Plan of Care Expiration: 4/23/2023  Progress Note Due: 4/13/2023  Visit # / Visits authorized: 8/12    FOTO: 1/3     Precautions: Standard and CVA, s/p craniotomy 2022  , diabetes    Time In: 919AM  Time Out: 1010AM  Total Billable Time: 51 mins (1:1 PTA)    SUBJECTIVE     Pt reports: He has stiffness in his low back. His L shoulder is bothering him when he lifts overhead. He already told his doctor and he wants to do therapy for that.  He was compliant with home exercise program.  Response to previous treatment: good  Functional change: less pain    Pain: 0/10  Location: left back      OBJECTIVE     Objective Measures updated at progress report unless specified.          Treatment     Priyank received the treatments listed below:      therapeutic exercises to develop strength and ROM for 51 minutes including:    Recumbent bike x6' lvl 2   LTR: 30x  Bridge: 3x10 #10- np  Seated ball rollout 3 ways x 10 ea   Lateral walks with +GTB at ankles x 3 laps  Pallof Press: [employer] 13# 3x10  1 hand Farmer carry march: 20# + 15KB at opposite hand 1 lap ea around gym  sled push/pull  x 3 laps  STS from 18 in box 10# 3x 10  Shuttle hip extension 1 blk 2 x 15 ea   MedX core rotation +26# x 20 ea  Matrix hip abd +50# 2 x 10    manual therapy techniques: Soft tissue Mobilization were applied to the: low back for 00 minutes, including:  STM to L QL    Not  "performed 4/3/2023 :  Open book 2 x 10 x 5" -  Cat camel 3x10   +Nga pose 10 x 10" 3 ways (shoulder pain)  Bird dog 2x10 - np        Patient Education and Home Exercises     Home Exercises Provided and Patient Education Provided     Education provided:   - Pt educated into purpose and benefit of DN intervention as well as risks and potential side effects.     Written Home Exercises Provided: yes. Exercises were reviewed and Priyank was able to demonstrate them prior to the end of the session.  Priyank demonstrated good  understanding of the education provided. See EMR under Patient Instructions for exercises provided during therapy sessions    ASSESSMENT     Priyank presents to session without c/o pain. Pt reports he has stiffness in low back though no pain. Pt was encouraged to perform his stretches first thing in the morning to address stiffness. Pts primary complaint is now L shoulder pain which prevented pt from performing any exercise today involving his shoulder. Pt anticipates discharging from low back to shoulder in coming visits as the shoulder is more painful and limiting.          Priyank Is progressing well towards his goals.   Pt prognosis is Good.     Pt will continue to benefit from skilled outpatient physical therapy to address the deficits listed in the problem list box on initial evaluation, provide pt/family education and to maximize pt's level of independence in the home and community environment.     Pt's spiritual, cultural and educational needs considered and pt agreeable to plan of care and goals.     Anticipated barriers to physical therapy: none    Goals: Short Term Goals (6 Weeks):   1. Pt will report 20% reduction in pain of the lumbar spine and LE for ease with ADL's. Goal met 3-13-23  2. PT will demonstrate improved trunk strength by a half grade in for ease with upright posture during standing activities. Goal met 3-13-23  3. Pt will demonstrate improved lumbar spine ROM in all " directions by a half grade for ease with bending activities. Goal met 3-13-23     Long Term Goals (12 Weeks):   1. Pt will report being independent with HEP for maintenance of improvements gained during therapy sessions  2. PT will report 50% reduction of pain of the back and LE for ease with dressing and grooming activities.   3. Pt will demonstrate trunk and extremity strength to >=4+/5 without the provocation of pain for ease with household chores  4. Pt will demonstrate appropriate upright posture without external cueing for ease with work related activities.       PLAN     Continue with current POC    Ruiz Keller PTA,   04/03/2023

## 2023-05-03 ENCOUNTER — CLINICAL SUPPORT (OUTPATIENT)
Dept: REHABILITATION | Facility: HOSPITAL | Age: 63
End: 2023-05-03
Attending: PHYSICIAN ASSISTANT
Payer: MEDICAID

## 2023-05-03 DIAGNOSIS — R53.1 DECREASED RANGE OF MOTION WITH DECREASED STRENGTH: ICD-10-CM

## 2023-05-03 DIAGNOSIS — M25.60 DECREASED RANGE OF MOTION WITH DECREASED STRENGTH: ICD-10-CM

## 2023-05-03 DIAGNOSIS — M54.50 CHRONIC LEFT-SIDED LOW BACK PAIN WITHOUT SCIATICA: Primary | ICD-10-CM

## 2023-05-03 DIAGNOSIS — M62.89 MUSCLE TIGHTNESS: ICD-10-CM

## 2023-05-03 DIAGNOSIS — G89.29 CHRONIC LEFT-SIDED LOW BACK PAIN WITHOUT SCIATICA: Primary | ICD-10-CM

## 2023-05-03 PROCEDURE — 97110 THERAPEUTIC EXERCISES: CPT | Mod: PN

## 2023-05-03 PROCEDURE — 97164 PT RE-EVAL EST PLAN CARE: CPT | Mod: PN

## 2023-05-03 NOTE — PROGRESS NOTES
"[facility]   Physical Therapy Treatment Note     Name: Priyank Chavezha  Clinic Number: [id number]    Therapy Diagnosis:   Encounter Diagnoses   Name Primary?    Chronic left-sided low back pain without sciatica Yes    Muscle tightness     Decreased range of motion with decreased strength          Physician: Malik Toure,      Physician Orders: PT Eval and Treat   Medical Diagnosis from Referral: S39.012D (ICD-10-CM) - Strain of lumbar region, subsequent encounter   Evaluation Date: 1/23/2023  Authorization Period Expiration: 01/17/2024   Plan of Care Expiration: 4/23/2023  Progress Note Due: 4/13/2023  Visit # / Visits authorized: 8/12    FOTO: 1/3     Precautions: Standard and CVA, s/p craniotomy 2022  , diabetes    Time In: 12:30  Time Out: 1:25AM  Total Billable Time: 55 mins    SUBJECTIVE     Pt reports: He reports low back is stiff but not painful. He has been on vacation the last 2 weeks and has not completed his exercises in that time. Most pain is in L shoulder which he reports having a referral for.     He was not compliant with home exercise program.  Response to previous treatment: good  Functional change: less pain    Pain: 0/10  Location: left back      OBJECTIVE   5/3/23     Thoracic/Lumbar AROM: Pain/Dysfunction with Movement:   Flexion WFL, finger tips to toes, no pain (much improved)   Extension WFL w/o pain, no pain   Right side bending WFL, fingertips 1"below knee joint line, no pain   Left side bending WFL, fingertips 1" below knee joint line, no pain   Right rotation WFL, no pain (improved)   Left rotation WFL, no pain      Hip ROM: WFL in all directions w/o pain  Knee ROM: WNL in all directions w/o pain     Lower Extremity Strength  Right LE   Left LE     Hip flexion: 5/5 Hip flexion: 5/5   Hip extension:  5/5 Hip extension: 5/5   Hip abduction: 5/5 Hip abduction: 5/5   Hip adduction:  5/5 Hip adduction:  5/5   Hip Internal rotation    5/5 Hip Internal " rotation 5/5   Knee Flexion 5/5 Knee Flexion 5/5   Knee Extension 5/5 Knee Extension 5/5   Ankle dorsiflexion: 5/5 Ankle dorsiflexion: 5/5   Ankle plantarflexion: 5/5 Ankle plantarflexion: 5/5             Treatment     Priyank received the treatments listed below:      therapeutic exercises to develop strength and ROM for 45 minutes including:    Recumbent bike x10' lvl 2   LTR: 3x20  Bridge: 3x10 w/ 5sec hold  Bird dog alternating in quadriped 3x10  Pallof Press, 3x10 w/ 5sec hold 10lbs  Lateral walks w/ BTB around ankles. 3 laps down and back  1 hand Farmer carry: 25lb KB changing hands after each lap, 3 laps    Not today:  Seated ball rollout 3 ways x 10 ea   sled push/pull  x 3 laps  STS from 18 in box 10# 3x 10  Shuttle hip extension 1 blk 2 x 15 ea   MedX core rotation +26# x 20 ea  Matrix hip abd +50# 2 x 10        Patient Education and Home Exercises     Home Exercises Provided and Patient Education Provided     Education provided:   - HEP     Written Home Exercises Provided: yes. Exercises were reviewed and Priyank was able to demonstrate them prior to the end of the session.  Priyank demonstrated good  understanding of the education provided. See EMR under Patient Instructions for exercises provided during therapy sessions    ASSESSMENT     Priyank presents to session without c/o pain just some mild tightness to L side of low back that was alleviated after low trunk rotations.  No pain or return of stiffness throughout rest of treatment session. Pt able to complete all lumbar and hip ROM without pain or tightness this date. We transitioned him to daily mobility routine and added some cores stability exercises to his normal workout routine. Since no longer having back pain we will d/c from PT at this time. He is going to schedule evaluation for L shoulder.        Goals: Short Term Goals (6 Weeks):   1. Pt will report 20% reduction in pain of the lumbar spine and LE for ease with ADL's. Goal met 3-13-23  2.  PT will demonstrate improved trunk strength by a half grade in for ease with upright posture during standing activities. Goal met 3-13-23  3. Pt will demonstrate improved lumbar spine ROM in all directions by a half grade for ease with bending activities. Goal met 3-13-23     Long Term Goals (12 Weeks):   1. Pt will report being independent with HEP for maintenance of improvements gained during therapy sessions -MET  2. PT will report 50% reduction of pain of the back and LE for ease with dressing and grooming activities. -MET  3. Pt will demonstrate trunk and extremity strength to >=4+/5 without the provocation of pain for ease with household chores-MET  4. Pt will demonstrate appropriate upright posture without external cueing for ease with work related activities. -MET      PLAN     D/c from PT as pt has met goals at this time    Jasper Payne, PT, DPT  05/03/2023

## 2023-06-02 ENCOUNTER — CLINICAL SUPPORT (OUTPATIENT)
Dept: REHABILITATION | Facility: HOSPITAL | Age: 63
End: 2023-06-02
Payer: MEDICAID

## 2023-06-02 DIAGNOSIS — G89.29 CHRONIC LEFT SHOULDER PAIN: ICD-10-CM

## 2023-06-02 DIAGNOSIS — M25.512 CHRONIC LEFT SHOULDER PAIN: ICD-10-CM

## 2023-06-02 PROCEDURE — 97140 MANUAL THERAPY 1/> REGIONS: CPT | Mod: PN

## 2023-06-02 PROCEDURE — 97110 THERAPEUTIC EXERCISES: CPT | Mod: PN

## 2023-06-02 PROCEDURE — 97161 PT EVAL LOW COMPLEX 20 MIN: CPT | Mod: PN

## 2023-06-02 NOTE — PLAN OF CARE
[facility]   Physical Therapy Initial Evaluation       Name: Priyank Whittington  Clinic Number: [id number]    Therapy Diagnosis:   Encounter Diagnosis   Name Primary?    Chronic left shoulder pain         Physician: Malik Toure, *    Physician Orders: PT Eval and Treat   Medical Diagnosis from Referral: Subdural hematoma   Evaluation Date: 6/2/2023  Authorization Period Expiration: 3/27/24  Plan of Care Expiration: 7/28/23  Progress Note Due: 6/30/28  Visit # / Visits authorized: 1/ 1   FOTO: 0/3 (to be done at first follow up)    Precautions: Standard     Time In: 2:45  Time Out: 3:35  Total Appointment Time (timed & untimed codes): 60 minutes      SUBJECTIVE     Date of onset: 6 months    History of current condition - Priyank reports: insidious onset about 6 months ago. Overhead activities aggravate it. Doesn't feel like it's getting worse but not getting any better.    Pain:  Current 0/10, worst 6/10, best 0/10   Location:   back of L shoulder  Description: achy  Aggravating Factors: moving arm overhead  Easing Factors: taking arm out of overhead, rest    Falls: None    Imaging: None    Prior Therapy: for back but not shoulder  Occupation: not working  Prior Level of Function: indpt  Current Level of Function: indpt but now having shoulder pain that is making ADLs difficulty      Patients goals: be able to move shoulder without pain     Medical History:   Past Medical History:   Diagnosis Date    Cerebrovascular accident (CVA) of right pontine structure 01/23/2022    Diabetes mellitus     HLD (hyperlipidemia) 1/23/2022    HTN (hypertension) 1/25/2022    Subdural hematoma 01/23/2022       Surgical History:   Priyank Whittington  has a past surgical history that includes Craniotomy for evacuation of subdural hematoma (Right, 1/28/2022).    Medications:   Priyank has a current medication list which includes the following prescription(s): aspirin, atorvastatin,  butalbital-acetaminophen-caffeine -40 mg, flowflex covid-19 ag home test, heparin (porcine), hydrocodone-acetaminophen, lisinopril, metformin, methocarbamol, multivitamin, pantoprazole, polyethylene glycol, senna-docusate 8.6-50 mg, silodosin, sodium chloride, and white petrolatum-mineral oil 57.3-42.5%.    Allergies:   Review of patient's allergies indicates:  No Known Allergies       OBJECTIVE     Observation: normal gait pattern    Posture: b/l rounded shoulders    Passive Range of Motion:   Shoulder Right Left   Flexion WFL WFL but painful   Abduction WFL WFL but painful   ER at 0 WFL WFL but painful   ER at 90 WFL WFL but painful   IR WFL WFL      Active Range of Motion:   Shoulder Right Left   Flexion  painful   Abduction  painful                  Reach behind head T1 T1 mildly painful   Reach behind back  L4 L4 mildly painful     Strength:  Shoulder Right Left   Flexion 5/5 4/5 painful   Abduction 5/5 4/5 painful   ER 5/5 3/5 very painful   IR 5/5 5/5   Serratus Anterior 4/5 3/5   Middle Trap 5/5 3/5 painful   Low Trap 5/5 3/5 painful       Special Tests:   Right Left   Hawkin's [place] neg neg   Painful Arc neg pos   Resisted ER neg pos     Drop Arm test neg pos          Anterior Apprehension Test neg pos   Relocation test neg neg        Sulcus Sign neg neg       Joint Mobility: WFL bilaterally, L with mild anterior placement of humeral head    Palpation: mild TTP to posterior shoulder at RTC insertion    Sensation: intact throughout upper extremities      Limitation/Restriction for FOTO shoulder Survey    Therapist reviewed FOTO scores for Priyank Whittington on 6/2/2023.   FOTO documents entered into [employer] - see Media section.    Current Limitation Score: -%  Predicted Limitation Score: -%       TREATMENT     Total Treatment time (time-based codes) separate from Evaluation: 25 minutes      Priyank received the treatments listed below:      therapeutic exercises to develop strength,  endurance, ROM, flexibility, and posture for 15 minutes including:    No money, 3x20, RTB  Full cans (scaption), 3x20, RTB  Scap squeezes, 3x20 RTB, palms up    manual therapy techniques: Joint mobilizations, Myofacial release, and Soft tissue Mobilization were applied to the: L shoulder for 10 minutes, including:    AP mobs grade 2 to 3  Inf mobs grade 2 to 3      PATIENT EDUCATION AND HOME EXERCISES     Education provided:   - HEP  - shoulder biomechanics  - role of RTC    Written Home Exercises Provided: yes. Exercises were reviewed and Priyank was able to demonstrate them prior to the end of the session.  Priyank demonstrated good  understanding of the education provided. See EMR under Patient Instructions for exercises provided during therapy sessions.    ASSESSMENT     Priyank is a 63 y.o. male referred to outpatient Physical Therapy with a medical diagnosis of Subdural hematoma. Patient presents with L shoulder pain of unknown origin. He reports pain is keeping him from completing ADLs without pain. On evaluation he displays decreased L shoulder AROM with pain during both A/PROM, tests positive on ER test, painful arc, and apprehension test, along with showing weakness in ER, mid/low traps, flex, abd on L side. 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 He would benefit from L shoulder strengthening routine with focus on RTC, serratus anterior, and mid/low traps in order to return to prior level of function. After trial of HEP he was able to display increased AROM with significantly less pain.    Patient prognosis is Good.   Patient will benefit from skilled outpatient Physical Therapy to address the deficits stated above and in the chart below, provide patient /family education, and to maximize patientt's level of independence.     Plan of care discussed with patient: Yes  Patient's spiritual, cultural and educational needs considered and patient is agreeable to the plan of care and goals as stated  below:     Anticipated Barriers for therapy: prior subdural hematoma     Medical Necessity is demonstrated by the following  History  Co-morbidities and personal factors that may impact the plan of care Co-morbidities:   diabetes and history of CVA    Personal Factors:   no deficits     low   Examination  Body Structures and Functions, activity limitations and participation restrictions that may impact the plan of care Body Regions:   upper extremities  trunk    Body Systems:    ROM  strength  motor control    Participation Restrictions:   None    Activity limitations:   Learning and applying knowledge  no deficits    General Tasks and Commands  no deficits    Communication  no deficits    Mobility  lifting and carrying objects  moving around using equipment (WC)    Self care  dressing    Domestic Life  no deficits    Interactions/Relationships  no deficits    Life Areas  no deficits    Community and Social Life  no deficits         low   Clinical Presentation stable and uncomplicated low   Decision Making/ Complexity Score: low     GOALS:   Short Term Goals:  4 weeks  1.Report decreased L shoulder pain < / =  2/10  to increase tolerance for upper body workouts  2. Increase PROM of L shoulder to WFL w/o pain   3. Increased strength by 1/3 MMT grade in mid/low traps to increase tolerance for ADL and work activities.  4. Pt to tolerate HEP to improve ROM and independence with ADL's    Long Term Goals: 8 weeks  1.Report decreased L shoulder pain  < / =  0 /10  to increase tolerance for upper body workouts  2.Increase AROM to WFL w/o pain  3.Increase strength to >/= 4+/5 in L shoulder ER to increase tolerance for ADL and work activities.  4. Pt goal: raise L arm above head without pain  5. Pt will show (20-40% limited) on FOTO shoulder in order to demonstrate true functional improvement.     PLAN   Plan of care Certification: 6/2/2023 to 7/28/23.    Outpatient Physical Therapy 2 times weekly for 8 weeks to include the  following interventions: Manual Therapy, Moist Heat/ Ice, Neuromuscular Re-ed, Therapeutic Activities, Therapeutic Exercise, and Ultrasound.     Jasper Payne, PT, DPT      I CERTIFY THE NEED FOR THESE SERVICES FURNISHED UNDER THIS PLAN OF TREATMENT AND WHILE UNDER MY CARE   Physician's comments:     Physician's Signature: ___________________________________________________

## 2023-06-08 ENCOUNTER — CLINICAL SUPPORT (OUTPATIENT)
Dept: REHABILITATION | Facility: HOSPITAL | Age: 63
End: 2023-06-08
Payer: MEDICAID

## 2023-06-08 DIAGNOSIS — G89.29 CHRONIC LEFT SHOULDER PAIN: Primary | ICD-10-CM

## 2023-06-08 DIAGNOSIS — M25.60 DECREASED RANGE OF MOTION WITH DECREASED STRENGTH: ICD-10-CM

## 2023-06-08 DIAGNOSIS — M62.89 MUSCLE TIGHTNESS: ICD-10-CM

## 2023-06-08 DIAGNOSIS — M25.512 CHRONIC LEFT SHOULDER PAIN: Primary | ICD-10-CM

## 2023-06-08 DIAGNOSIS — R53.1 DECREASED RANGE OF MOTION WITH DECREASED STRENGTH: ICD-10-CM

## 2023-06-08 PROCEDURE — 97110 THERAPEUTIC EXERCISES: CPT | Mod: PN

## 2023-06-08 NOTE — PROGRESS NOTES
Physical Therapy Daily Treatment Note     Name: Priyank Chavezha  Clinic Number: [id number]    Therapy Diagnosis:   Encounter Diagnoses   Name Primary?    Chronic left shoulder pain Yes    Decreased range of motion with decreased strength     Muscle tightness      Physician: Malik Toure, *    Visit Date: 6/8/2023       Physician Orders: PT Eval and Treat   Medical Diagnosis from Referral: Subdural hematoma   Evaluation Date: 6/2/2023  Authorization Period Expiration: 3/27/24  Plan of Care Expiration: 7/28/23  Progress Note Due: 6/30/28  Visit # / Visits authorized: 1/ 1   FOTO: 0/3 (to be done at first follow up)    Time In: 1500  Time Out: 1555    Total Billable Time: 55 minutes    Precautions: Standard    Subjective     Pt reports: the shoulder is doing a little better.  He was compliant with home exercise program.  Response to previous treatment: good  Functional change: none    Pain: 2/10  Location: left shoulder    Objective     Priyank received therapeutic exercises to develop strength, endurance, ROM, flexibility, and posture for 50 minutes including:    Standing rows 3 x 10 RTB  Bilateral Shoulder extensions 3 x 10 RTB  Standing ER/IR  3 x 10 red tubing  Seated thoracic extension with towel x 20  Resisted scapular retraction 3 x 10  RTB   Supine wand flexion x 15 on 1/2 roll  Supine horizontal abduction 3 x 10 RTB  sidelying ER 3 x 10 2lbs    manual therapy techniques: Joint mobilizations, Myofacial release, and Soft tissue Mobilization were applied to the: L shoulder for 5  minutes, including:  PROM , GH oscillation    Education provided:   - HEP compliance    Written Home Exercises Provided: Patient instructed to cont prior HEP.  Exercises were reviewed and Priyank was able to demonstrate them prior to the end of the session.  Priyank demonstrated good  understanding of the education provided.     See EMR under Patient Instructions for exercises provided 6/8/2023.    Assessment     Pt  requires min cueing with postural correction with prescribed therex.  No c/o increased discomfort with prescribed activities.  Progressed therex consisting of advanced of RTC and scapular stabilization therex. Priyank is progressing well towards his goals.     Pt prognosis is Good.     Pt will continue to benefit from skilled outpatient physical therapy to address the deficits listed in the problem list box on initial evaluation, provide pt/family education and to maximize pt's level of independence in the home and community environment.     Pt's spiritual, cultural and educational needs considered and pt agreeable to plan of care and goals.     Anticipated barriers to physical therapy: none  GOALS:   Short Term Goals:  4 weeks  1.Report decreased L shoulder pain < / =  2/10  to increase tolerance for upper body workouts  2. Increase PROM of L shoulder to WFL w/o pain   3. Increased strength by 1/3 MMT grade in mid/low traps to increase tolerance for ADL and work activities.  4. Pt to tolerate HEP to improve ROM and independence with ADL's     Long Term Goals: 8 weeks  1.Report decreased L shoulder pain  < / =  0 /10  to increase tolerance for upper body workouts  2.Increase AROM to WFL w/o pain  3.Increase strength to >/= 4+/5 in L shoulder ER to increase tolerance for ADL and work activities.  4. Pt goal: raise L arm above head without pain  5. Pt will show (20-40% limited) on FOTO shoulder in order to demonstrate true functional improvement.     Plan     Continue with established  PT Plan of Care towards patient goals.      Conor Thomas, PT

## 2023-06-08 NOTE — PROGRESS NOTES
"[facility]   Physical Therapy Treatment Note      Name: Priyank Whartongesha  Clinic Number: [id number]    Therapy Diagnosis:   Encounter Diagnosis   Name Primary?    Chronic left shoulder pain Yes     Physician: Malik Toure, *    Visit Date: 6/8/2023    [copy and paste header from allen here]    PTA Visit #: ***/5     Time In: ***  Time Out: ***  Total Billable Time: *** minutes    Subjective     Pt reports: ***.  He {Actions; was/was not:92531} compliant with home exercise program.  Response to previous treatment: ***  Functional change: ***    Pain: {0-10:20507::"0"}/10  Location: {RIGHT/LEFT/BILATERAL:03576} {LOCATION ON BODY:69329}     Objective      Objective Measures updated at progress report unless specified.     Treatment     Priyank received the treatments listed below:       Priyank received the treatments listed below:       therapeutic exercises to develop strength, endurance, ROM, flexibility, and posture for 15 minutes including:     No money, 3x20, RTB  Full cans (scaption), 3x20, RTB  Scap squeezes, 3x20 RTB, palms up     manual therapy techniques: Joint mobilizations, Myofacial release, and Soft tissue Mobilization were applied to the: L shoulder for 10 minutes, including:     AP mobs grade 2 to 3  Inf mobs grade 2 to 3           Patient Education and Home Exercises       Education provided:   - ***    Written Home Exercises Provided: {Blank single:10906::"yes","Patient instructed to cont prior HEP"}. Exercises were reviewed and Priyank was able to demonstrate them prior to the end of the session.  Priyank demonstrated {Desc; good/fair/poor:12699} understanding of the education provided. See EMR under Patient Instructions for exercises provided during therapy sessions    Assessment     ***    Priyank {IS/IS NOT:35256} progressing well towards his goals.   Pt prognosis is {REHAB PROGNOSIS OHS:12913}.     Pt will continue to benefit from skilled outpatient " physical therapy to address the deficits listed in the problem list box on initial evaluation, provide pt/family education and to maximize pt's level of independence in the home and community environment.     Pt's spiritual, cultural and educational needs considered and pt agreeable to plan of care and goals.     Anticipated barriers to physical therapy: ***    Goals: ***    Plan     ***    Emy Manjula, PTA

## 2023-06-16 ENCOUNTER — CLINICAL SUPPORT (OUTPATIENT)
Dept: REHABILITATION | Facility: HOSPITAL | Age: 63
End: 2023-06-16
Payer: MEDICAID

## 2023-06-16 DIAGNOSIS — M25.512 CHRONIC LEFT SHOULDER PAIN: Primary | ICD-10-CM

## 2023-06-16 DIAGNOSIS — G89.29 CHRONIC LEFT SHOULDER PAIN: Primary | ICD-10-CM

## 2023-06-16 PROCEDURE — 97110 THERAPEUTIC EXERCISES: CPT | Mod: PN,CQ

## 2023-06-16 NOTE — PROGRESS NOTES
Physical Therapy Daily Treatment Note     Name: Priyank Chavezha  Clinic Number: [id number]    Therapy Diagnosis:   Encounter Diagnosis   Name Primary?    Chronic left shoulder pain Yes     Physician: Malik Toure, *    Visit Date: 6/16/2023      Physician Orders: PT Eval and Treat   Medical Diagnosis from Referral: Subdural hematoma   Evaluation Date: 6/2/2023  Authorization Period Expiration: 3/27/24  Plan of Care Expiration: 7/28/23  Progress Note Due: 6/30/28  Visit # / Visits authorized: 2/50  FOTO: 0/3 (to be done at first follow up)    Time In: 1220PM  Time Out: 1320PM    Total Billable Time: 60 minutes    Precautions: Standard    Subjective     Pt reports: the shoulder is doing a little better, but he has mild pain when trying lift arm.    He was compliant with home exercise program.  Response to previous treatment: good  Functional change: none    Pain: 2/10  Location: left shoulder    Objective     Priyank received therapeutic exercises to develop strength, endurance, ROM, flexibility, and posture for 50 minutes including:    Standing rows 3 x 10 RTB  Bilateral Shoulder extensions 3 x 10 RTB  Standing ER/IR  3 x 10 red tubing  Seated thoracic extension with towel x 20  Resisted scapular retraction 3 x 10  RTB   Supine wand flexion x 15 on 1/2 roll  Supine horizontal abduction 3 x 10 RTB  sidelying ER 3 x 10 2lbs    manual therapy techniques: Joint mobilizations, Myofacial release, and Soft tissue Mobilization were applied to the: L shoulder for 5 minutes, including:  PROM , GH oscillation    Education provided:   - HEP compliance    Written Home Exercises Provided: Patient instructed to cont prior HEP.  Exercises were reviewed and Priyank was able to demonstrate them prior to the end of the session.  Priyank demonstrated good  understanding of the education provided.     See EMR under Patient Instructions for exercises provided 6/16/2023.    Assessment     Priyank tolerated the above session  well with minimal c/o pain. Continues to require min cueing with postural correction with prescribed therex. Continued with progressions to therex consisting of advanced of RTC and scapular stabilization therex.No c/o increased discomfort with prescribed activities.  Priyank is progressing well towards his goals.     Pt prognosis is Good.     Pt will continue to benefit from skilled outpatient physical therapy to address the deficits listed in the problem list box on initial evaluation, provide pt/family education and to maximize pt's level of independence in the home and community environment.     Pt's spiritual, cultural and educational needs considered and pt agreeable to plan of care and goals.     Anticipated barriers to physical therapy: none  GOALS:   Short Term Goals:  4 weeks  1.Report decreased L shoulder pain < / =  2/10  to increase tolerance for upper body workouts  2. Increase PROM of L shoulder to WFL w/o pain   3. Increased strength by 1/3 MMT grade in mid/low traps to increase tolerance for ADL and work activities.  4. Pt to tolerate HEP to improve ROM and independence with ADL's     Long Term Goals: 8 weeks  1.Report decreased L shoulder pain  < / =  0 /10  to increase tolerance for upper body workouts  2.Increase AROM to WFL w/o pain  3.Increase strength to >/= 4+/5 in L shoulder ER to increase tolerance for ADL and work activities.  4. Pt goal: raise L arm above head without pain  5. Pt will show (20-40% limited) on FOTO shoulder in order to demonstrate true functional improvement.     Plan     Continue with established  PT Plan of Care towards patient goals.      Lina Anne, PTA    06/16/2023

## 2023-07-10 ENCOUNTER — CLINICAL SUPPORT (OUTPATIENT)
Dept: REHABILITATION | Facility: HOSPITAL | Age: 63
End: 2023-07-10
Payer: MEDICAID

## 2023-07-10 DIAGNOSIS — M25.512 CHRONIC LEFT SHOULDER PAIN: Primary | ICD-10-CM

## 2023-07-10 DIAGNOSIS — G89.29 CHRONIC LEFT SHOULDER PAIN: Primary | ICD-10-CM

## 2023-07-10 PROCEDURE — 97110 THERAPEUTIC EXERCISES: CPT | Mod: PN

## 2023-07-10 NOTE — PROGRESS NOTES
Physical Therapy Daily Progress Note     Name: Priyank Whittington  Clinic Number: [id number]    Therapy Diagnosis:   Encounter Diagnosis   Name Primary?    Chronic left shoulder pain Yes     Physician: Malik Toure, *    Visit Date: 7/10/2023      Physician Orders: PT Eval and Treat   Medical Diagnosis from Referral: Subdural hematoma   Evaluation Date: 6/2/2023  Authorization Period Expiration: 3/27/24  Plan of Care Expiration: 7/28/23  Progress Note Due: 8/9/28  Visit # / Visits authorized: 3/50 (+1 eval)  FOTO: 0/3 (to be done at first follow up)    Time In: 1500  Time Out: 1600    Total Billable Time: 55 minutes    Precautions: Standard    Subjective   Pt reports: the he was feeling pretty well when he was attending therapy, but then had to travel with his daughter and noted increased pain as his compliance decreased.   He was compliant with home exercise program.  Response to previous treatment: good  Functional change: better AROM, but still decreased strength  Pain: 5/10  Location: left shoulder    Objective     Shoulder AROM Right - 7/10/23 Left - 7/10/23   Flexion WFL WNL but painful   Abduction WFL WNL but painful   ER at 0 WFL WNL but painful   ER at 90 WFL WNL but painful   IR WFL WNL       Strength: * denotes pain  Shoulder Right - eval Left - eval Left - 7/10/23   Flexion 5/5 4/5 painful 4+/5*   Abduction 5/5 4/5 painful 4+/5*   ER 5/5 3/5 very painful 4/5*   IR 5/5 5/5 4+/5*   Serratus Anterior 4/5 3/5 4-/5   Middle Trap 5/5 3/5 painful    Low Trap 5/5 3/5 painful    Biceps   4+/5   Triceps   4/5             Priyank received therapeutic exercises to develop strength, endurance, ROM, flexibility, and posture for 55 minutes including:  Time spent for above reassessment and goal updating    UBE, x3' fwd, x3' retro, Lv. 1.5  PPUs, 2x8  Prone rows, 2x10, 3#  -cues for scapular retraction  Prone rows+ triceps kickback, 2x10, 2#  -cues for scapular depression  Horizontal ABDuction with red band,  "3x10  LTERs/no money, red band, 3x10  Seated lat pulldowns, Cable-column, x10#, 3x10  Pec Doorway stretch, 3x30"  8# Medicine Ball up wall, x10    Postural re-ed: neutral pelvis, tall, extended thoracic spine, retracted/depressed scapulae, and cervical retraction.    NP at this date:  Standing rows 3 x 10 RTB  Bilateral Shoulder extensions 3 x 10 RTB  Standing ER/IR  3 x 10 red tubing  Seated thoracic extension with towel x 20  Resisted scapular retraction 3 x 10  RTB   Supine wand flexion x 15 on 1/2 roll  Supine horizontal abduction 3 x 10 RTB  sidelying ER 3 x 10 2lbs    manual therapy techniques: Joint mobilizations, Myofacial release, and Soft tissue Mobilization were applied to the: L shoulder for 5 minutes, including:  STM to L UT, thoracic paraspinals and posterior cuff musculature  PROM thoracic extension and Grades I-II central PAs    Education provided:   - HEP compliance    Written Home Exercises Provided: Patient instructed to cont prior HEP.  Exercises were reviewed and Priyank was able to demonstrate them prior to the end of the session.  Priyank demonstrated good  understanding of the education provided.     See EMR under Patient Instructions for exercises provided 7/10/2023.    Assessment   Patient presents with continued L shoulder pain. Patient has made fair subjective and objective progress over 4 visits of skilled PT as noted above; however, continues to demo decreased strength and postural deficits, leading to decreased function and pain. Patient tolerated the above manual therapy and therex progression well, noting improved scapular mechanics and HEP understanding following. Should benefit from continued skilled PT to address remaining deficits and meet the goals from evaluation.      Priyank is progressing well towards his goals.     Pt prognosis is Good.     Pt will continue to benefit from skilled outpatient physical therapy to address the deficits listed in the problem list box on initial " evaluation, provide pt/family education and to maximize pt's level of independence in the home and community environment.     Pt's spiritual, cultural and educational needs considered and pt agreeable to plan of care and goals.     Anticipated barriers to physical therapy: none  GOALS:   Short Term Goals:  4 weeks  1.Report decreased L shoulder pain < / =  2/10  to increase tolerance for upper body workouts  In Progress, 5/10  2. Increase PROM of L shoulder to WFL w/o pain WNL, but pain and end-range  3. Increased strength by 1/3 MMT grade in mid/low traps to increase tolerance for ADL and work activities.  Met  4. Pt to tolerate HEP to improve ROM and independence with ADL's In Progress; has been travelling and did not have bands for HEP     Long Term Goals: 8 weeks  1.Report decreased L shoulder pain  < / =  0 /10  to increase tolerance for upper body workouts In Progress  2.Increase AROM to WFL w/o pain In Progress  3.Increase strength to >/= 4+/5 in L shoulder ER to increase tolerance for ADL and work activities. In Progress  4. Pt goal: raise L arm above head without pain. In Progress  5. Pt will show (20-40% limited) on FOTO shoulder in order to demonstrate true functional improvement. Not formally assessed at this date    Plan     Continue with established  PT Plan of Care towards patient goals.      BRANDY GALLOWAY, PT    07/10/2023

## 2023-07-17 ENCOUNTER — CLINICAL SUPPORT (OUTPATIENT)
Dept: REHABILITATION | Facility: HOSPITAL | Age: 63
End: 2023-07-17
Payer: MEDICAID

## 2023-07-17 DIAGNOSIS — M25.512 CHRONIC LEFT SHOULDER PAIN: Primary | ICD-10-CM

## 2023-07-17 DIAGNOSIS — G89.29 CHRONIC LEFT SHOULDER PAIN: Primary | ICD-10-CM

## 2023-07-17 PROCEDURE — 97110 THERAPEUTIC EXERCISES: CPT | Mod: PN,CQ

## 2023-07-17 NOTE — PROGRESS NOTES
Physical Therapy Daily Progress Note     Name: Priyank Chavezha  Clinic Number: [id number]    Therapy Diagnosis:   Encounter Diagnosis   Name Primary?    Chronic left shoulder pain Yes       Physician: Malik Toure, *    Visit Date: 7/17/2023      Physician Orders: PT Eval and Treat   Medical Diagnosis from Referral: Subdural hematoma   Evaluation Date: 6/2/2023  Authorization Period Expiration: 3/27/24  Plan of Care Expiration: 7/28/23  Progress Note Due: 8/9/28  Visit # / Visits authorized: 3/50 (+1 eval)  FOTO: 0/3 (to be done at first follow up)    Time In: 3:00 pm  Time Out:4:00 pm     Total Billable Time: 55 minutes    Precautions: Standard    Subjective   Pt reports: Shoulder still hurts.   He was compliant with home exercise program.  Response to previous treatment: good  Functional change: better AROM, but still decreased strength  Pain: 5/10  Location: left shoulder    Objective     Shoulder AROM Right - 7/10/23 Left - 7/10/23   Flexion WFL WNL but painful   Abduction WFL WNL but painful   ER at 0 WFL WNL but painful   ER at 90 WFL WNL but painful   IR WFL WNL       Strength: * denotes pain  Shoulder Right - eval Left - eval Left - 7/10/23   Flexion 5/5 4/5 painful 4+/5*   Abduction 5/5 4/5 painful 4+/5*   ER 5/5 3/5 very painful 4/5*   IR 5/5 5/5 4+/5*   Serratus Anterior 4/5 3/5 4-/5   Middle Trap 5/5 3/5 painful    Low Trap 5/5 3/5 painful    Biceps   4+/5   Triceps   4/5             Priyank received therapeutic exercises to develop strength, endurance, ROM, flexibility, and posture for 55 minutes including:    UBE, x3' fwd, x3' retro, Lv. 1.5  PPUs, 2x8  Prone rows, 2x10, 3#  -cues for scapular retraction  Prone rows+ triceps kickback, 2x10, 2#  -cues for scapular depression  Horizontal ABDuction with red band, 3x10  LTERs/no money, red band, 3x12  Lat pulldowns, Cable-column, x10#, 3x10  +Standing ER at cable column, 3# 2x8  +Supine Serratus Punch Out 5# dowel 3x10  Pec Doorway stretch,  "3x30"  8# Medicine Ball up wall, x10    Postural re-ed: neutral pelvis, tall, extended thoracic spine, retracted/depressed scapulae, and cervical retraction.    NP at this date:  Standing rows 3 x 10 RTB  Bilateral Shoulder extensions 3 x 10 RTB  Standing ER/IR  3 x 10 red tubing  Seated thoracic extension with towel x 20  Resisted scapular retraction 3 x 10  RTB   Supine wand flexion x 15 on 1/2 roll  Supine horizontal abduction 3 x 10 RTB  sidelying ER 3 x 10 2lbs    manual therapy techniques: Joint mobilizations, Myofacial release, and Soft tissue Mobilization were applied to the: L shoulder for 00minutes, including:  STM to L UT, thoracic paraspinals and posterior cuff musculature  PROM thoracic extension and Grades I-II central PAs    Education provided:   - HEP compliance    Written Home Exercises Provided: Patient instructed to cont prior HEP.  Exercises were reviewed and Priyank was able to demonstrate them prior to the end of the session.  Priyank demonstrated good  understanding of the education provided.     See EMR under Patient Instructions for exercises provided 7/17/2023.    Assessment   Mr. Whittington presented to PT with reports of continued left shoulder pain. Continued with exercises for improved left shoulder strength and scapulothoracic mechanics. Introduced exercises for improved serratus strength, and L RTC strength.  Mr Lambert required verbal and tactile cues to perform exercises without with correct form and mm activation, and was appropriately fatigued following today's session.  Will continue to progress to meet PT POC goals as tolerated.       Priyank is progressing well towards his goals.     Pt prognosis is Good.     Pt will continue to benefit from skilled outpatient physical therapy to address the deficits listed in the problem list box on initial evaluation, provide pt/family education and to maximize pt's level of independence in the home and community environment.     Pt's " spiritual, cultural and educational needs considered and pt agreeable to plan of care and goals.     Anticipated barriers to physical therapy: none  GOALS:   Short Term Goals:  4 weeks  1.Report decreased L shoulder pain < / =  2/10  to increase tolerance for upper body workouts  In Progress, 5/10  2. Increase PROM of L shoulder to WFL w/o pain WNL, but pain and end-range  3. Increased strength by 1/3 MMT grade in mid/low traps to increase tolerance for ADL and work activities.  Met  4. Pt to tolerate HEP to improve ROM and independence with ADL's In Progress; has been travelling and did not have bands for HEP     Long Term Goals: 8 weeks  1.Report decreased L shoulder pain  < / =  0 /10  to increase tolerance for upper body workouts In Progress  2.Increase AROM to WFL w/o pain In Progress  3.Increase strength to >/= 4+/5 in L shoulder ER to increase tolerance for ADL and work activities. In Progress  4. Pt goal: raise L arm above head without pain. In Progress  5. Pt will show (20-40% limited) on FOTO shoulder in order to demonstrate true functional improvement. Not formally assessed at this date    Plan     Continue with established  PT Plan of Care towards patient goals.      Emy Fair, PTA    07/17/2023         No

## 2023-07-20 ENCOUNTER — CLINICAL SUPPORT (OUTPATIENT)
Dept: REHABILITATION | Facility: HOSPITAL | Age: 63
End: 2023-07-20
Payer: MEDICAID

## 2023-07-20 DIAGNOSIS — G89.29 CHRONIC LEFT SHOULDER PAIN: Primary | ICD-10-CM

## 2023-07-20 DIAGNOSIS — M25.512 CHRONIC LEFT SHOULDER PAIN: Primary | ICD-10-CM

## 2023-07-20 PROCEDURE — 97110 THERAPEUTIC EXERCISES: CPT | Mod: PN,CQ

## 2023-07-20 NOTE — PROGRESS NOTES
Physical Therapy Daily Progress Note     Name: Priyank Chavezha  Clinic Number: [id number]    Therapy Diagnosis:   Encounter Diagnosis   Name Primary?    Chronic left shoulder pain Yes         Physician: Malik Toure, *    Visit Date: 7/20/2023      Physician Orders: PT Eval and Treat   Medical Diagnosis from Referral: Subdural hematoma   Evaluation Date: 6/2/2023  Authorization Period Expiration: 3/27/24  Plan of Care Expiration: 7/28/23  Progress Note Due: 8/9/28  Visit # / Visits authorized: 3/50 (+1 eval)  FOTO: 0/3 (to be done at first follow up)    Time In: 3:00 pm  Time Out:4:00 pm     Total Billable Time: 53minutes (4TE)    Precautions: Standard    Subjective   Pt reports: Shoulder is a little sore but not too bad.   He was compliant with home exercise program.  Response to previous treatment: good  Functional change: better AROM, but still decreased strength  Pain: 5/10  Location: left shoulder    Objective     Shoulder AROM Right - 7/10/23 Left - 7/10/23   Flexion WFL WNL but painful   Abduction WFL WNL but painful   ER at 0 WFL WNL but painful   ER at 90 WFL WNL but painful   IR WFL WNL       Strength: * denotes pain  Shoulder Right - eval Left - eval Left - 7/10/23   Flexion 5/5 4/5 painful 4+/5*   Abduction 5/5 4/5 painful 4+/5*   ER 5/5 3/5 very painful 4/5*   IR 5/5 5/5 4+/5*   Serratus Anterior 4/5 3/5 4-/5   Middle Trap 5/5 3/5 painful    Low Trap 5/5 3/5 painful    Biceps   4+/5   Triceps   4/5             Priyank received therapeutic exercises to develop strength, endurance, ROM, flexibility, and posture for 53 minutes including:    UBE, x3' fwd, x3' retro, Lv. 2  PPUs, 2x8  Prone rows, 3x10, +5#  (-cues for scapular retraction)  Prone rows+ triceps kickback, 3, 5# (cues for scapular depression)  Horizontal ABDuction with +green band, 3x10  LTERs/no money, +green band, 3x12  Lat pulldowns, Cable-column, x10#, 3x10  Standing ER/IR RTB 3x10  +Standing Rows BTB 3x10  Supine Serratus  "Punch Out 5# dowel 3x10  Pec Doorway stretch, 3x30"  +Supine Pec Stretch on long half foam 2 minutes  8# Medicine Ball up wall, x10    Postural re-ed: neutral pelvis, tall, extended thoracic spine, retracted/depressed scapulae, and cervical retraction.    NP at this date:  Standing rows 3 x 10 RTB  Bilateral Shoulder extensions 3 x 10 RTB  Standing ER/IR  3 x 10 red tubing  Seated thoracic extension with towel x 20  Resisted scapular retraction 3 x 10  RTB   Supine wand flexion x 15 on 1/2 roll  Supine horizontal abduction 3 x 10 RTB  sidelying ER 3 x 10 2lbs  +Standing ER at cable column, 3# 2x8    manual therapy techniques: Joint mobilizations, Myofacial release, and Soft tissue Mobilization were applied to the: L shoulder for 00 minutes, including:  STM to L UT, thoracic paraspinals and posterior cuff musculature  PROM thoracic extension and Grades I-II central PAs     Education provided:   - HEP compliance    Written Home Exercises Provided: Patient instructed to cont prior HEP.  Exercises were reviewed and Priyank was able to demonstrate them prior to the end of the session.  Priyank demonstrated good  understanding of the education provided.     See EMR under Patient Instructions for exercises provided 7/20/2023.    Assessment   Mr. Whittington presented to PT with reports of left shoulder soreness, but decreased pain. Introduced exercises for improved left shoulder strength and correct scapulothoracic mechanics.He continues to require moderate verbal/tactile cueing to avoid upper trap compensation. Introduced supine pec stretch, as well as Shoulder Rows for improved posture and strength.  Mr. Whittington was able to complete today's session without reports of increased left shoulder pain or discomfort. Will continue to address deficits to meet POC goals.         Priyank is progressing well towards his goals.     Pt prognosis is Good.     Pt will continue to benefit from skilled outpatient physical therapy to " address the deficits listed in the problem list box on initial evaluation, provide pt/family education and to maximize pt's level of independence in the home and community environment.     Pt's spiritual, cultural and educational needs considered and pt agreeable to plan of care and goals.     Anticipated barriers to physical therapy: none  GOALS:   Short Term Goals:  4 weeks  1.Report decreased L shoulder pain < / =  2/10  to increase tolerance for upper body workouts  In Progress, 5/10  2. Increase PROM of L shoulder to WFL w/o pain WNL, but pain and end-range  3. Increased strength by 1/3 MMT grade in mid/low traps to increase tolerance for ADL and work activities.  Met  4. Pt to tolerate HEP to improve ROM and independence with ADL's In Progress; has been travelling and did not have bands for HEP     Long Term Goals: 8 weeks  1.Report decreased L shoulder pain  < / =  0 /10  to increase tolerance for upper body workouts In Progress  2.Increase AROM to WFL w/o pain In Progress  3.Increase strength to >/= 4+/5 in L shoulder ER to increase tolerance for ADL and work activities. In Progress  4. Pt goal: raise L arm above head without pain. In Progress  5. Pt will show (20-40% limited) on FOTO shoulder in order to demonstrate true functional improvement. Not formally assessed at this date    Plan     Continue with established  PT Plan of Care towards patient goals.      Emy Fair, PTA    07/21/2023

## 2023-07-24 ENCOUNTER — CLINICAL SUPPORT (OUTPATIENT)
Dept: REHABILITATION | Facility: HOSPITAL | Age: 63
End: 2023-07-24
Payer: MEDICAID

## 2023-07-24 DIAGNOSIS — G89.29 CHRONIC LEFT SHOULDER PAIN: Primary | ICD-10-CM

## 2023-07-24 DIAGNOSIS — M25.512 CHRONIC LEFT SHOULDER PAIN: Primary | ICD-10-CM

## 2023-07-24 PROCEDURE — 97110 THERAPEUTIC EXERCISES: CPT | Mod: PN,CQ

## 2023-07-24 NOTE — PROGRESS NOTES
Physical Therapy Daily Progress Note     Name: Priyank Chavezha  Clinic Number: [id number]    Therapy Diagnosis:   Encounter Diagnosis   Name Primary?    Chronic left shoulder pain Yes           Physician: Malik Toure, *    Visit Date: 7/24/2023      Physician Orders: PT Eval and Treat   Medical Diagnosis from Referral: Subdural hematoma   Evaluation Date: 6/2/2023  Authorization Period Expiration: 3/27/24  Plan of Care Expiration: 7/28/23  Progress Note Due: 8/9/28  Visit # / Visits authorized: 3/50 (+1 eval)  FOTO: 0/3 (to be done at first follow up)    Time In: 3:00 pm  Time Out:4:00 pm     Total Billable Time: 53minutes (4TE)    Precautions: Standard    Subjective   Pt reports: He can tell that there's been improvement since he started PT.   He was compliant with home exercise program.  Response to previous treatment: good  Functional change: better AROM, but still decreased strength  Pain: 5/10  Location: left shoulder    Objective     Shoulder AROM Right - 7/10/23 Left - 7/10/23   Flexion WFL WNL but painful   Abduction WFL WNL but painful   ER at 0 WFL WNL but painful   ER at 90 WFL WNL but painful   IR WFL WNL       Strength: * denotes pain  Shoulder Right - eval Left - eval Left - 7/10/23   Flexion 5/5 4/5 painful 4+/5*   Abduction 5/5 4/5 painful 4+/5*   ER 5/5 3/5 very painful 4/5*   IR 5/5 5/5 4+/5*   Serratus Anterior 4/5 3/5 4-/5   Middle Trap 5/5 3/5 painful    Low Trap 5/5 3/5 painful    Biceps   4+/5   Triceps   4/5             Priyank received therapeutic exercises to develop strength, endurance, ROM, flexibility, and posture for 53 minutes including:    UBE, x3' fwd, x3' retro, Lv. 2  PPUs, 2x8  Prone rows, 3x10, +6#  (-cues for scapular retraction)  Prone rows+ triceps kickback, 3, 5# (cues for scapular depression)  Horizontal ABDuction with +green band, 3x10  LTERs/no money, +green band, 3x12  Lat pulldowns, Cable-column, x10#, 3x10  Standing ER/IR RTB 3x10  Standing Rows BTB  "3x10  Supine Serratus Punch Out 5# dowel 3x10  Pec Doorway stretch, 3x30"  Supine Pec Stretch on long half foam 2 minutes  8# Medicine Ball up wall, x10    Postural re-ed: neutral pelvis, tall, extended thoracic spine, retracted/depressed scapulae, and cervical retraction.    NP at this date:  Standing rows 3 x 10 RTB  Bilateral Shoulder extensions 3 x 10 RTB  Standing ER/IR  3 x 10 red tubing  Seated thoracic extension with towel x 20  Resisted scapular retraction 3 x 10  RTB   Supine wand flexion x 15 on 1/2 roll  Supine horizontal abduction 3 x 10 RTB  sidelying ER 3 x 10 2lbs  +Standing ER at cable column, 3# 2x8    manual therapy techniques: Joint mobilizations, Myofacial release, and Soft tissue Mobilization were applied to the: L shoulder for 00 minutes, including:  STM to L UT, thoracic paraspinals and posterior cuff musculature  PROM thoracic extension and Grades I-II central PAs     Education provided:   - HEP compliance    Written Home Exercises Provided: Patient instructed to cont prior HEP.  Exercises were reviewed and Priyank was able to demonstrate them prior to the end of the session.  Priyank demonstrated good  understanding of the education provided.     See EMR under Patient Instructions for exercises provided 7/24/2023.    Assessment   Mr. Whittington presented to PT with reports of decreased pain and improved functional mobility. Continued with exercises to improve middle/lower trap, as well as marky shoulder strength. Mr Whittington is slowly improving form, requiring decreased verbal and tactile cues.  No progressions were made today, as Mr. Whittington was challenged with current level of difficulty.         Priyank is progressing well towards his goals.     Pt prognosis is Good.     Pt will continue to benefit from skilled outpatient physical therapy to address the deficits listed in the problem list box on initial evaluation, provide pt/family education and to maximize pt's level of independence in " the home and community environment.     Pt's spiritual, cultural and educational needs considered and pt agreeable to plan of care and goals.     Anticipated barriers to physical therapy: none  GOALS:   Short Term Goals:  4 weeks  1.Report decreased L shoulder pain < / =  2/10  to increase tolerance for upper body workouts  In Progress, 5/10  2. Increase PROM of L shoulder to WFL w/o pain WNL, but pain and end-range  3. Increased strength by 1/3 MMT grade in mid/low traps to increase tolerance for ADL and work activities.  Met  4. Pt to tolerate HEP to improve ROM and independence with ADL's In Progress; has been travelling and did not have bands for HEP     Long Term Goals: 8 weeks  1.Report decreased L shoulder pain  < / =  0 /10  to increase tolerance for upper body workouts In Progress  2.Increase AROM to WFL w/o pain In Progress  3.Increase strength to >/= 4+/5 in L shoulder ER to increase tolerance for ADL and work activities. In Progress  4. Pt goal: raise L arm above head without pain. In Progress  5. Pt will show (20-40% limited) on FOTO shoulder in order to demonstrate true functional improvement. Not formally assessed at this date    Plan     Continue with established  PT Plan of Care towards patient goals.      Emy Fair, PTA    07/27/2023

## 2023-08-22 ENCOUNTER — TELEPHONE (OUTPATIENT)
Dept: NEUROSURGERY | Facility: CLINIC | Age: 63
End: 2023-08-22
Payer: MEDICAID

## 2023-08-22 NOTE — TELEPHONE ENCOUNTER
----- Message from Briseyda De La Cruz sent at 8/22/2023 10:06 AM CDT -----  Regarding: Urgent for pt, needs visit to verify ability to drive by [employer], before September 2nd.  Contact: @[phone]  Last seen in 2023, license will be suspended without a note from , must make an appt.    Urgent for pt, needs visit to verify ability to drive by [employer], before September 2nd. @[phone]

## 2023-08-28 NOTE — ASSESSMENT & PLAN NOTE
Speech Therapy      Visit Type: Treatment  -  Communication/cognition  Reason for consult: 8/5 I and D left wrist, arthroscopy right knee, septic joints, intubated post op, 8/8 L4-5 Decompressive laminectomy with I and D of abcess.  Remained intubated post op, extubated 8/13.  Speech therapy consult for swallow evaluation.  Off bipap and anxious to eat. ICU delirium per chart review  SUBJECTIVE  Patient agreed to participate in therapy this date.    Pt states that he recalls this writer.  Pt NPO for left knee arthroscopic lavage, swallow not addressed.  Affect/Behavior: cooperative, alert and pleasant  Pain at onset of session:   Patient reports pain is not an issue/concern.    OBJECTIVE        - Dentition: intact  - Feeding: feeds self      Communication/Cognition  Orientation Level:    - Person: oriented    - Place: oriented    - Month: oriented    - Year: oriented    - Date: oriented with cues    - Day of week: oriented    - Reason for hospitalization: oriented  Observation: Pt oriented without use of environmental cues, able to self correct date when told of error.  Attention:      - Able to maintain attention to conversation  Memory:       - Pt able to report NPO and reason for it being procedure.  Pt needed therapist cue for exact procedure and body part (stated ankle when scheduled for knee lavage).  Pt unable to recall/report any exact food items from meals yesterday.  Responses non specific,  Problem Solving:      - Able to discuss possible discharge plan needs, etc with therapist directing conversation via cues.               Education:   - Present and ready to learn: patient  Education provided during session:  - dysphagia, cognition and communication  - Results of above outlined education: Verbalizes understanding, Demonstrates understanding and Needs reinforcement    ASSESSMENT  Progress: Slow progress  Interferring components: current medical conditions    Discharge needs based on today's  Takes metformin at home    -f/u A1c   -while NPO, accu checks with PRN SSI q6h    assessment:  - Current level of function: significantly below baseline level of function  - Therapy needs at discharge: therapy 5 or more times per week  - ADL / IADLs (activities / instrumental activities of daily living) requiring support at discharge: nutrition management (eating/drinking), communication and community interaction  - Impairments that require further therapy intervention: dysphagia, cognition and communication    Comm/cog therapy addressed as pt NPO for procedure later today.  Pt with improved orientation, needing less cue and use of environmental aides.  Recall of hospital planned and past events at approx 75% with therapist cue.  Will need to monitor for future video swallow need and/or readiness for diet advancement post-op (baseline is regular/thin).       PLAN (while hospitalized)      SLP Frequency: 6-7 x per week  Frequency Comments: (SW/CC) 1 by 9/3/23 AM, AF, EG continue to work on assessing diet tolerance of IDDSI 6 and thin liquids post-op, possible upgrade, is video swallow still needed? Cont. processing, recall, orientation, attention    SLP Identified Barriers to Discharge: none        RECOMMENDATIONS     -Diet:          *Liquid- Thin and IDDSI 6 Soft & Bite-Sized    -Medication Administration:         *patient preference    -Feeding Guidelines:          *constant supervision, feed patient/total feeding assistance, eat slowly only when alert, periodic liquid wash, set up tray, sit fully upright for all po intake, sit up for 30 minutes after meal, small bites/sips and oral care after all meals and medications    -Additional Swallow Recommendations:         *frequent oral care   -Instrumental Assessments:         *Videofluoroscopic swallow study (VFSS)    -Speech Reviewed Swallow:         *with patient/family, with clinical caregivers and feeding guidelines posted in room    -Communication Cognition:          *Patient continues to demonstrate acute communication and cognition deficits,  will continue to follow.      GOALS  Review Date: 9/3/2023  Long Term Goals:   PROGRESS BEING MADE WITH ALL GOALS BUT NOT MET 8/28  Swallow   1.  Patient will manage Liquid- Thin and Regular diet advance from altered diet with optimum safety and efficiency of swallow function without aspiration related [place].    2.  Patient will verbalize and demonstrate understanding of swallowing guidelines with min cues.     Cognitive-Communication  1. Patient/family/care team will perform teach back of cognitive-communication profile across 1-3 sessions to understand strengths and limitations and promote safety and independence.  2. Patient will participate in goal-directed conversation re: medical situation, goals of care, therapy guidelines, or discharge disposition with 80% accuracy and min cueing to promote safety and independence.       Documented in the chart in the following areas: Assessment.    Patient at End of Session:   Location: in bed  Safety measures: alarm system in place/re-engaged and call light within reach      Therapy procedure time and total treatment time can be found documented on the Time Entry flowsheet

## 2023-08-29 ENCOUNTER — OFFICE VISIT (OUTPATIENT)
Dept: NEUROSURGERY | Facility: CLINIC | Age: 63
End: 2023-08-29
Payer: MEDICAID

## 2023-08-29 VITALS
HEART RATE: 78 BPM | DIASTOLIC BLOOD PRESSURE: 80 MMHG | HEIGHT: 69 IN | BODY MASS INDEX: 29.62 KG/M2 | SYSTOLIC BLOOD PRESSURE: 154 MMHG | WEIGHT: 200 LBS

## 2023-08-29 DIAGNOSIS — S06.5XAA SUBDURAL HEMATOMA: Primary | ICD-10-CM

## 2023-08-29 PROCEDURE — 3077F PR MOST RECENT SYSTOLIC BLOOD PRESSURE >= 140 MM HG: ICD-10-PCS | Mod: CPTII,,, | Performed by: PHYSICIAN ASSISTANT

## 2023-08-29 PROCEDURE — 99999 PR PBB SHADOW E&M-EST. PATIENT-LVL IV: ICD-10-PCS | Mod: PBBFAC,,, | Performed by: PHYSICIAN ASSISTANT

## 2023-08-29 PROCEDURE — 4010F PR ACE/ARB THEARPY RXD/TAKEN: ICD-10-PCS | Mod: CPTII,,, | Performed by: PHYSICIAN ASSISTANT

## 2023-08-29 PROCEDURE — 3079F DIAST BP 80-89 MM HG: CPT | Mod: CPTII,,, | Performed by: PHYSICIAN ASSISTANT

## 2023-08-29 PROCEDURE — 99999 PR PBB SHADOW E&M-EST. PATIENT-LVL IV: CPT | Mod: PBBFAC,,, | Performed by: PHYSICIAN ASSISTANT

## 2023-08-29 PROCEDURE — 3008F PR BODY MASS INDEX (BMI) DOCUMENTED: ICD-10-PCS | Mod: CPTII,,, | Performed by: PHYSICIAN ASSISTANT

## 2023-08-29 PROCEDURE — 99213 PR OFFICE/OUTPT VISIT, EST, LEVL III, 20-29 MIN: ICD-10-PCS | Mod: S$PBB,,, | Performed by: PHYSICIAN ASSISTANT

## 2023-08-29 PROCEDURE — 99213 OFFICE O/P EST LOW 20 MIN: CPT | Mod: S$PBB,,, | Performed by: PHYSICIAN ASSISTANT

## 2023-08-29 PROCEDURE — 99214 OFFICE O/P EST MOD 30 MIN: CPT | Mod: PBBFAC | Performed by: PHYSICIAN ASSISTANT

## 2023-08-29 PROCEDURE — 3077F SYST BP >= 140 MM HG: CPT | Mod: CPTII,,, | Performed by: PHYSICIAN ASSISTANT

## 2023-08-29 PROCEDURE — 4010F ACE/ARB THERAPY RXD/TAKEN: CPT | Mod: CPTII,,, | Performed by: PHYSICIAN ASSISTANT

## 2023-08-29 PROCEDURE — 3008F BODY MASS INDEX DOCD: CPT | Mod: CPTII,,, | Performed by: PHYSICIAN ASSISTANT

## 2023-08-29 PROCEDURE — 3079F PR MOST RECENT DIASTOLIC BLOOD PRESSURE 80-89 MM HG: ICD-10-PCS | Mod: CPTII,,, | Performed by: PHYSICIAN ASSISTANT

## 2023-08-29 NOTE — PROGRESS NOTES
Neurosurgery  Established Patient    SUBJECTIVE:       History of Present Illness:  Patient here today for neurosurgical follow-up, he states that since last time he has been doing significantly better denies any new or worsening headaches.  His strength has improved since going to physical therapy denies any new complaints today.  He states that he is here today for evaluation for work, he is a .  Denies pain, paraesthesias, weakness in extremities.  Denies bowel/bladder dysfunction.  Denies clumsiness or loss of dexterity of hands.  Denies HA, blurred vision or diplopia, speech difficulty, weakness, numbness, seizures, or balance difficulty.     Interval History(3/28/23):  Mr. Whittington is here today for f/u for his back pain. He reports improvement with robaxin and PT.  No new issues, baseline left hand numbness and mild weakness unchanged since hospitalized with SDH.  Denies headaches, visual changes, new weakness seizures.     Interval History(1/17/23):  Mr. Whittington is a 63-year-old male with history of right-sided subdural status post craniotomy for evacuation.  At last visit head CT demonstrated near complete resolution of prior subdural hematoma, with 6 month stability on CT at 1 year post op.  Patient was clinically improved.  However, at that time he was complaining of mid and low back pain.  We got x-rays of the thoracolumbar spine to evaluate he is here today for follow-up.  He reports continued back pain that is paraspinous.  Denies pain paresthesias weakness lower extremities denies bowel bladder dysfunction.  He is not taken any medication at this point in time.     Interval History(12/6/22):  Mr. Whittington her follow up with updated head CT.  Overall states he has been denies any complaints of headaches, visual changes, weakness, speech difficulty seizures.  Denies any recent trauma.  He has been complaining of significant mid-low back pain that has been bothering him for several months  now.  He denies any pain paresthesias or weakness in his lower extremities.  Denies bowel bladder dysfunction.  Denies balance difficulty.     History of Present Illness(6/722):  63 yo M s/p craniotomy for SDH on 1/28/22, patient here today for neurosurgical follow-up with repeat head CT the.  Patient reports overall has been doing well.  Denies headaches, visual changes, weakness, seizures.  Patient does report some residual left hand and foot numbness that has been going on since he was diagnosed with a subdural.  He denies any new issues today.  No recent falls.      Review of patient's allergies indicates:  No Known Allergies    Current Outpatient Medications   Medication Sig Dispense Refill    atorvastatin (LIPITOR) 40 MG tablet Take 40 mg by mouth once daily.      FLOWFLEX COVID-19 AG HOME TEST Kit       aspirin (ECOTRIN) 81 MG EC tablet Take 1 tablet (81 mg total) by mouth once daily. (Patient not taking: Reported on 8/29/2023)  0    butalbital-acetaminophen-caffeine -40 mg (FIORICET, ESGIC) -40 mg per tablet Take 1 tablet by mouth every 4 (four) hours as needed for Pain.      heparin sodium,porcine (HEPARIN, PORCINE,) 5,000 unit/mL injection Inject 1 mL (5,000 Units total) into the skin every 8 (eight) hours. (Patient not taking: Reported on 8/29/2023)      HYDROcodone-acetaminophen (NORCO) 5-325 mg per tablet Take 1 tablet by mouth every 6 (six) hours as needed for Pain. (Patient not taking: Reported on 3/15/2022) 8 tablet 0    lisinopriL (PRINIVIL,ZESTRIL) 5 MG tablet Take 5 mg by mouth once daily.      metFORMIN (GLUCOPHAGE) 1000 MG tablet Take 1,000 mg by mouth.      methocarbamoL (ROBAXIN) 750 MG Tab Take 1 tablet (750 mg total) by mouth 4 (four) times daily as needed (muscle spasms). (Patient not taking: Reported on 3/28/2023) 40 tablet 0    multivitamin Tab Take 1 tablet by mouth once daily. (Patient not taking: Reported on 3/15/2022)      pantoprazole (PROTONIX) 40 MG tablet Take 40 mg by  mouth once daily.      polyethylene glycol (GLYCOLAX) 17 gram PwPk Take 17 g by mouth once daily. (Patient not taking: Reported on 3/15/2022)  0    senna-docusate 8.6-50 mg (PERICOLACE) 8.6-50 mg per tablet Take 1 tablet by mouth 2 (two) times daily. (Patient not taking: Reported on 3/15/2022)      silodosin (RAPAFLO) 8 mg Cap capsule Take 1 capsule (8 mg total) by mouth once daily. (Patient not taking: Reported on 6/7/2022)      sodium chloride 1 gram tablet Take 1 tablet (1 g total) by mouth 3 (three) times daily. (Patient not taking: Reported on 3/15/2022)      white petrolatum-mineral oil 57.3-42.5% (REFRESH P.M.) 57.3-42.5 % Oint Place into the right eye every evening. (Patient not taking: Reported on 3/15/2022)  0     No current facility-administered medications for this visit.       Past Medical History:   Diagnosis Date    Cerebrovascular accident (CVA) of right pontine structure 01/23/2022    Diabetes mellitus     HLD (hyperlipidemia) 1/23/2022    HTN (hypertension) 1/25/2022    Subdural hematoma 01/23/2022     Past Surgical History:   Procedure Laterality Date    CRANIOTOMY FOR EVACUATION OF SUBDURAL HEMATOMA Right 1/28/2022    Procedure: CRANIOTOMY, FOR SUBDURAL HEMATOMA EVACUATION;  Surgeon: Ace Candelaria MD;  Location: [facility] OR [address] [place] [address];  Service: Neurosurgery;  Laterality: Right;  right, possible bilateral     Family History    None       Social History     Socioeconomic History    Marital status:    Tobacco Use    Smoking status: Never    Smokeless tobacco: Never   Substance and Sexual Activity    Alcohol use: Never    Drug use: Never    Sexual activity: Yes     Partners: Female       Review of Systems  Constitutional: no fever or chills  Eyes: no visual changes  ENT: no nasal congestion or sore throat  Respiratory: no cough or shortness of breath  Cardiovascular: no chest pain or palpitations  Gastrointestinal: no nausea or vomiting  Genitourinary: no hematuria or dysuria  Integument/Breast: no  "rash or pruritis  Hematologic/Lymphatic: no easy bruising or lymphadenopathy  Musculoskeletal: no arthralgias or myalgias  Neurological: no seizures or tremors    OBJECTIVE:     Vital Signs  Pulse: 78  BP: (!) 154/80  Pain Score: 0-No pain  Height: 5' 9" (175.3 cm)  Weight: 90.7 kg (200 lb)  Body mass index is 29.53 kg/m².      Neurosurgery Physical Exam   General: no distress  Neurologic: Alert and oriented. Thought content appropriate.  Head: normocephalic  Cranial nerves: face symmetric, tongue midline, pupils equal, round, reactive to light with accomodation, extraocular muscles intact  Sensory: response to light touch throughout  Motor Strength:full strength upper and lower extremities  Pronator Drift: no drift noted        ASSESSMENT/PLAN:     63-year-old male status post right craniotomy for subdural hematoma evacuation on 01/28/2022.  At last visit patient had small residual subdural, given patient's profession and and job requirements will get updated head CT to make sure there is no concern for ongoing significant subdural collection.  If that looks good there is no contraindications from a neurosurgical perspective.  Encouraged to call clinic questions or concerns the meantime.      Malik Toure Jr. SHAYY  [facility]  Department of Neurosurgery     Note dictated with voice recognition software, please excuse any grammatical errors.       "

## 2023-09-01 ENCOUNTER — HOSPITAL ENCOUNTER (OUTPATIENT)
Dept: RADIOLOGY | Facility: HOSPITAL | Age: 63
Discharge: HOME | End: 2023-09-01
Attending: PHYSICIAN ASSISTANT
Payer: MEDICAID

## 2023-09-01 DIAGNOSIS — S06.5XAA SUBDURAL HEMATOMA: ICD-10-CM

## 2023-09-01 PROCEDURE — 70450 CT HEAD/BRAIN W/O DYE: CPT | Mod: 26,,, | Performed by: RADIOLOGY

## 2023-09-01 PROCEDURE — 70450 CT HEAD WITHOUT CONTRAST: ICD-10-PCS | Mod: 26,,, | Performed by: RADIOLOGY

## 2023-09-01 PROCEDURE — 70450 CT HEAD/BRAIN W/O DYE: CPT | Mod: TC

## 2023-09-05 ENCOUNTER — OFFICE VISIT (OUTPATIENT)
Dept: NEUROSURGERY | Facility: CLINIC | Age: 63
End: 2023-09-05
Payer: MEDICAID

## 2023-09-05 VITALS
WEIGHT: 200 LBS | DIASTOLIC BLOOD PRESSURE: 80 MMHG | BODY MASS INDEX: 29.62 KG/M2 | HEART RATE: 72 BPM | SYSTOLIC BLOOD PRESSURE: 147 MMHG | HEIGHT: 69 IN

## 2023-09-05 DIAGNOSIS — D32.9 MENINGIOMA: ICD-10-CM

## 2023-09-05 DIAGNOSIS — S06.5XAA SUBDURAL HEMATOMA: Primary | ICD-10-CM

## 2023-09-05 PROCEDURE — 1159F PR MEDICATION LIST DOCUMENTED IN MEDICAL RECORD: ICD-10-PCS | Mod: CPTII,,, | Performed by: PHYSICIAN ASSISTANT

## 2023-09-05 PROCEDURE — 1159F MED LIST DOCD IN RCRD: CPT | Mod: CPTII,,, | Performed by: PHYSICIAN ASSISTANT

## 2023-09-05 PROCEDURE — 3008F PR BODY MASS INDEX (BMI) DOCUMENTED: ICD-10-PCS | Mod: CPTII,,, | Performed by: PHYSICIAN ASSISTANT

## 2023-09-05 PROCEDURE — 1160F PR REVIEW ALL MEDS BY PRESCRIBER/CLIN PHARMACIST DOCUMENTED: ICD-10-PCS | Mod: CPTII,,, | Performed by: PHYSICIAN ASSISTANT

## 2023-09-05 PROCEDURE — 1160F RVW MEDS BY RX/DR IN RCRD: CPT | Mod: CPTII,,, | Performed by: PHYSICIAN ASSISTANT

## 2023-09-05 PROCEDURE — 99213 OFFICE O/P EST LOW 20 MIN: CPT | Mod: S$PBB,,, | Performed by: PHYSICIAN ASSISTANT

## 2023-09-05 PROCEDURE — 99214 OFFICE O/P EST MOD 30 MIN: CPT | Mod: PBBFAC | Performed by: PHYSICIAN ASSISTANT

## 2023-09-05 PROCEDURE — 99999 PR PBB SHADOW E&M-EST. PATIENT-LVL IV: CPT | Mod: PBBFAC,,, | Performed by: PHYSICIAN ASSISTANT

## 2023-09-05 PROCEDURE — 99999 PR PBB SHADOW E&M-EST. PATIENT-LVL IV: ICD-10-PCS | Mod: PBBFAC,,, | Performed by: PHYSICIAN ASSISTANT

## 2023-09-05 PROCEDURE — 3077F SYST BP >= 140 MM HG: CPT | Mod: CPTII,,, | Performed by: PHYSICIAN ASSISTANT

## 2023-09-05 PROCEDURE — 4010F ACE/ARB THERAPY RXD/TAKEN: CPT | Mod: CPTII,,, | Performed by: PHYSICIAN ASSISTANT

## 2023-09-05 PROCEDURE — 3079F PR MOST RECENT DIASTOLIC BLOOD PRESSURE 80-89 MM HG: ICD-10-PCS | Mod: CPTII,,, | Performed by: PHYSICIAN ASSISTANT

## 2023-09-05 PROCEDURE — 99213 PR OFFICE/OUTPT VISIT, EST, LEVL III, 20-29 MIN: ICD-10-PCS | Mod: S$PBB,,, | Performed by: PHYSICIAN ASSISTANT

## 2023-09-05 PROCEDURE — 4010F PR ACE/ARB THEARPY RXD/TAKEN: ICD-10-PCS | Mod: CPTII,,, | Performed by: PHYSICIAN ASSISTANT

## 2023-09-05 PROCEDURE — 3008F BODY MASS INDEX DOCD: CPT | Mod: CPTII,,, | Performed by: PHYSICIAN ASSISTANT

## 2023-09-05 PROCEDURE — 3077F PR MOST RECENT SYSTOLIC BLOOD PRESSURE >= 140 MM HG: ICD-10-PCS | Mod: CPTII,,, | Performed by: PHYSICIAN ASSISTANT

## 2023-09-05 PROCEDURE — 3079F DIAST BP 80-89 MM HG: CPT | Mod: CPTII,,, | Performed by: PHYSICIAN ASSISTANT

## 2023-09-05 NOTE — PROGRESS NOTES
Neurosurgery  Established Patient    SUBJECTIVE:     Interval History (9/5/23):  Mr. Whittington is here today for follow-up with repeat head CT.  He states that he is doing well without any complaints today.  Continued improvement of back pain. Denies HA, blurred vision or diplopia, speech difficulty, weakness, numbness, seizures, or balance difficulty.  Denies pain, paraesthesias, weakness in extremities.  Denies bowel/bladder dysfunction.     Interval History (8/29/23):  Patient here today for neurosurgical follow-up, he states that since last time he has been doing significantly better denies any new or worsening headaches.  His strength has improved since going to physical therapy denies any new complaints today.  He states that he is here today for evaluation for work, he is a .  Denies pain, paraesthesias, weakness in extremities.  Denies bowel/bladder dysfunction.  Denies clumsiness or loss of dexterity of hands.  Denies HA, blurred vision or diplopia, speech difficulty, weakness, numbness, seizures, or balance difficulty.    Interval History (3/28/23):  Mr. Whittington is here today for f/u for his back pain. He reports improvement with robaxin and PT.  No new issues, baseline left hand numbness and mild weakness unchanged since hospitalized with SDH.  Denies headaches, visual changes, new weakness seizures.     Interval History (1/17/23):  Mr. Whittington is a 63-year-old male with history of right-sided subdural status post craniotomy for evacuation.  At last visit head CT demonstrated near complete resolution of prior subdural hematoma, with 6 month stability on CT at 1 year post op.  Patient was clinically improved.  However, at that time he was complaining of mid and low back pain.  We got x-rays of the thoracolumbar spine to evaluate he is here today for follow-up.  He reports continued back pain that is paraspinous.  Denies pain paresthesias weakness lower extremities denies bowel bladder  dysfunction.  He is not taken any medication at this point in time.     Interval History (12/6/22):  Mr. Whittington her follow up with updated head CT.  Overall states he has been denies any complaints of headaches, visual changes, weakness, speech difficulty seizures.  Denies any recent trauma.  He has been complaining of significant mid-low back pain that has been bothering him for several months now.  He denies any pain paresthesias or weakness in his lower extremities.  Denies bowel bladder dysfunction.  Denies balance difficulty.     History of Present Illness (6/722):  61 yo M s/p craniotomy for SDH on 1/28/22, patient here today for neurosurgical follow-up with repeat head CT the.  Patient reports overall has been doing well.  Denies headaches, visual changes, weakness, seizures.  Patient does report some residual left hand and foot numbness that has been going on since he was diagnosed with a subdural.  He denies any new issues today.  No recent falls.      Review of patient's allergies indicates:  No Known Allergies    Current Outpatient Medications   Medication Sig Dispense Refill    atorvastatin (LIPITOR) 40 MG tablet Take 40 mg by mouth once daily.      FLOWFLEX COVID-19 AG HOME TEST Kit       aspirin (ECOTRIN) 81 MG EC tablet Take 1 tablet (81 mg total) by mouth once daily. (Patient not taking: Reported on 8/29/2023)  0    butalbital-acetaminophen-caffeine -40 mg (FIORICET, ESGIC) -40 mg per tablet Take 1 tablet by mouth every 4 (four) hours as needed for Pain.      heparin sodium,porcine (HEPARIN, PORCINE,) 5,000 unit/mL injection Inject 1 mL (5,000 Units total) into the skin every 8 (eight) hours. (Patient not taking: Reported on 8/29/2023)      HYDROcodone-acetaminophen (NORCO) 5-325 mg per tablet Take 1 tablet by mouth every 6 (six) hours as needed for Pain. (Patient not taking: Reported on 3/15/2022) 8 tablet 0    lisinopriL (PRINIVIL,ZESTRIL) 5 MG tablet Take 5 mg by mouth once daily.       metFORMIN (GLUCOPHAGE) 1000 MG tablet Take 1,000 mg by mouth.      methocarbamoL (ROBAXIN) 750 MG Tab Take 1 tablet (750 mg total) by mouth 4 (four) times daily as needed (muscle spasms). (Patient not taking: Reported on 3/28/2023) 40 tablet 0    multivitamin Tab Take 1 tablet by mouth once daily. (Patient not taking: Reported on 3/15/2022)      pantoprazole (PROTONIX) 40 MG tablet Take 40 mg by mouth once daily.      polyethylene glycol (GLYCOLAX) 17 gram PwPk Take 17 g by mouth once daily. (Patient not taking: Reported on 3/15/2022)  0    senna-docusate 8.6-50 mg (PERICOLACE) 8.6-50 mg per tablet Take 1 tablet by mouth 2 (two) times daily. (Patient not taking: Reported on 3/15/2022)      silodosin (RAPAFLO) 8 mg Cap capsule Take 1 capsule (8 mg total) by mouth once daily. (Patient not taking: Reported on 6/7/2022)      sodium chloride 1 gram tablet Take 1 tablet (1 g total) by mouth 3 (three) times daily. (Patient not taking: Reported on 3/15/2022)      white petrolatum-mineral oil 57.3-42.5% (REFRESH P.M.) 57.3-42.5 % Oint Place into the right eye every evening. (Patient not taking: Reported on 3/15/2022)  0     No current facility-administered medications for this visit.       Past Medical History:   Diagnosis Date    Cerebrovascular accident (CVA) of right pontine structure 01/23/2022    Diabetes mellitus     HLD (hyperlipidemia) 1/23/2022    HTN (hypertension) 1/25/2022    Subdural hematoma 01/23/2022     Past Surgical History:   Procedure Laterality Date    CRANIOTOMY FOR EVACUATION OF SUBDURAL HEMATOMA Right 1/28/2022    Procedure: CRANIOTOMY, FOR SUBDURAL HEMATOMA EVACUATION;  Surgeon: Ace Candelaria MD;  Location: [facility] OR [address] [place] [address];  Service: Neurosurgery;  Laterality: Right;  right, possible bilateral     Family History    None       Social History     Socioeconomic History    Marital status:    Tobacco Use    Smoking status: Never    Smokeless tobacco: Never   Substance and Sexual Activity     "Alcohol use: Never    Drug use: Never    Sexual activity: Yes     Partners: Female       Review of Systems  Constitutional: no fever or chills  Eyes: no visual changes  ENT: no nasal congestion or sore throat  Respiratory: no cough or shortness of breath  Cardiovascular: no chest pain or palpitations  Gastrointestinal: no nausea or vomiting  Genitourinary: no hematuria or dysuria  Integument/Breast: no rash or pruritis  Hematologic/Lymphatic: no easy bruising or lymphadenopathy  Musculoskeletal: no arthralgias or myalgias  Neurological: no seizures or tremors    OBJECTIVE:     Vital Signs  Pulse: 72  BP: (!) 147/80  Pain Score: 0-No pain  Height: 5' 9" (175.3 cm)  Weight: 90.7 kg (200 lb)  Body mass index is 29.53 kg/m².      Neurosurgery Physical Exam     General: well developed, well nourished, no distress  Neurologic: Alert and oriented. Thought content appropriate.  Cranial nerves: II-XII grossly intact  Skin: grossly intact in all 4 extremities without obvious rashes or lesions  Gait - normal  Tandem Gait - No difficulty        Motor Strength:   Strength  Deltoids Triceps Biceps Wrist Extension Wrist Flexion Hand    Upper: R 5/5 5/5 5/5 5/5 5/5 5/5    L 5/5 5/5 5/5 5/5 5/5 5/5     Iliopsoas Quadriceps Knee  Flexion Tibialis  anterior Gastro- cnemius EHL   Lower: R 5/5 5/5 5/5 5/5 5/5 5/5    L 5/5 5/5 5/5 5/5 5/5 5/5   Sensory: intact to light touch B/L UE and LE  DTR's - 2 + and symmetric in UE and LE  Williamson's - Negative           Ankle Clonus - Negative         Babinski  - Negative         Diagnostic Results: personally reviewed  EXAMINATION:  CT HEAD WITHOUT CONTRAST     CLINICAL HISTORY:  h/o sdh; Traumatic subdural hemorrhage with loss of consciousness status unknown, initial encounter     TECHNIQUE:  Low dose axial CT images obtained throughout the head without the use of intravenous contrast.  Axial, sagittal and coronal reconstructions were performed.     COMPARISON:  12/06/2022   "   FINDINGS:  Intracranial compartment:     Ventricles and sulci are normal in size for age without evidence of hydrocephalus.     The brain parenchyma appears within normal limits.  No parenchymal  hemorrhage, edema, mass effect or major vascular distribution infarct.     There is a known extra-axial mass along the floor of the anterior cranial fossa in the midline.  Lesion now measuring on the order of 2.7 cm maximal diameter (sequence 602 image 60).  No appreciable change in mass effect or new parenchymal edema.  No extra-axial blood or fluid collections.     Skull/extracranial contents (limited evaluation):     Prior right-sided craniotomy.  No displaced calvarial fracture.     Mastoid air cells are clear.  Mild patchy fluid and mucosal thickening in the visualized paranasal sinuses.     Impression:     Remote postsurgical change without evidence of residual subdural hematoma.     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  This could be better characterized with a contrast enhanced MRI of the brain.     This report was flagged in Epic as abnormal.        Electronically signed by: Rakan Pittman MD  Date:                                            09/01/2023  Time:                                           16:09    ASSESSMENT/PLAN:     62 yo male with h/o low back pain with muscle spasms, craniotomy for evacuation of SDH on 1/28/22 and known planum sphenoidale meningioma.  Repeat head CT done on September 1st shows interval resolution of subdural collection.  There is a small increase in size of patient's known meningioma compared to prior MRI scan in January of 2022.  Patient works as , there are no absolute contraindications to him returning to this at this time.  Patient is neurologically intact without any focal deficits.  Patient does not have a history of seizures.  Discussed that it is possible to  have seizures in the setting of meningioma, but that these do tend to be slow growing lesions.  We will get updated MRI of the brain with and without to better evaluate and continue to follow this on an outpatient basis. He is doing better from his back pain perspective.  He does have a prescription for Robaxin, which he states he is requiring minimally, discussed that he is not able to take this while driving.  We will see him back to discuss MRI results. He was encouraged to call the clinic with any questions or concerns in the meantime.      Malik Toure Jr. PA-C  [facility]  Department of Neurosurgery     Note dictated with voice recognition software, please excuse any grammatical errors.

## 2023-10-10 ENCOUNTER — OFFICE VISIT (OUTPATIENT)
Dept: NEUROSURGERY | Facility: CLINIC | Age: 63
End: 2023-10-10
Payer: MEDICAID

## 2023-10-10 ENCOUNTER — HOSPITAL ENCOUNTER (OUTPATIENT)
Dept: RADIOLOGY | Facility: HOSPITAL | Age: 63
Discharge: HOME | End: 2023-10-10
Attending: PHYSICIAN ASSISTANT
Payer: MEDICAID

## 2023-10-10 DIAGNOSIS — D32.9 MENINGIOMA: ICD-10-CM

## 2023-10-10 DIAGNOSIS — D32.9 MENINGIOMA: Primary | ICD-10-CM

## 2023-10-10 PROCEDURE — 99999 PR PBB SHADOW E&M-EST. PATIENT-LVL III: CPT | Mod: PBBFAC,,, | Performed by: PHYSICIAN ASSISTANT

## 2023-10-10 PROCEDURE — 4010F PR ACE/ARB THEARPY RXD/TAKEN: ICD-10-PCS | Mod: CPTII,,, | Performed by: PHYSICIAN ASSISTANT

## 2023-10-10 PROCEDURE — 99213 PR OFFICE/OUTPT VISIT, EST, LEVL III, 20-29 MIN: ICD-10-PCS | Mod: S$PBB,,, | Performed by: PHYSICIAN ASSISTANT

## 2023-10-10 PROCEDURE — 1159F PR MEDICATION LIST DOCUMENTED IN MEDICAL RECORD: ICD-10-PCS | Mod: CPTII,,, | Performed by: PHYSICIAN ASSISTANT

## 2023-10-10 PROCEDURE — A9585 GADOBUTROL INJECTION: HCPCS | Performed by: PHYSICIAN ASSISTANT

## 2023-10-10 PROCEDURE — 99213 OFFICE O/P EST LOW 20 MIN: CPT | Mod: S$PBB,,, | Performed by: PHYSICIAN ASSISTANT

## 2023-10-10 PROCEDURE — 99213 OFFICE O/P EST LOW 20 MIN: CPT | Mod: PBBFAC,25 | Performed by: PHYSICIAN ASSISTANT

## 2023-10-10 PROCEDURE — 99999 PR PBB SHADOW E&M-EST. PATIENT-LVL III: ICD-10-PCS | Mod: PBBFAC,,, | Performed by: PHYSICIAN ASSISTANT

## 2023-10-10 PROCEDURE — 25500020 PHARM REV CODE 255: Performed by: PHYSICIAN ASSISTANT

## 2023-10-10 PROCEDURE — 70553 MRI BRAIN STEM W/O & W/DYE: CPT | Mod: 26,,, | Performed by: RADIOLOGY

## 2023-10-10 PROCEDURE — 70553 MRI BRAIN STEM W/O & W/DYE: CPT | Mod: TC

## 2023-10-10 PROCEDURE — 1159F MED LIST DOCD IN RCRD: CPT | Mod: CPTII,,, | Performed by: PHYSICIAN ASSISTANT

## 2023-10-10 PROCEDURE — 1160F PR REVIEW ALL MEDS BY PRESCRIBER/CLIN PHARMACIST DOCUMENTED: ICD-10-PCS | Mod: CPTII,,, | Performed by: PHYSICIAN ASSISTANT

## 2023-10-10 PROCEDURE — 70553 MRI BRAIN W WO CONTRAST: ICD-10-PCS | Mod: 26,,, | Performed by: RADIOLOGY

## 2023-10-10 PROCEDURE — 4010F ACE/ARB THERAPY RXD/TAKEN: CPT | Mod: CPTII,,, | Performed by: PHYSICIAN ASSISTANT

## 2023-10-10 PROCEDURE — 1160F RVW MEDS BY RX/DR IN RCRD: CPT | Mod: CPTII,,, | Performed by: PHYSICIAN ASSISTANT

## 2023-10-10 RX ORDER — GADOBUTROL 604.72 MG/ML
9 INJECTION INTRAVENOUS
Status: COMPLETED | OUTPATIENT
Start: 2023-10-10 | End: 2023-10-10

## 2023-10-10 RX ADMIN — GADOBUTROL 9 ML: 604.72 INJECTION INTRAVENOUS at 01:10

## 2023-10-10 NOTE — PROGRESS NOTES
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 Here today for follow-up MRI scan for meningioma surveillance. He reports overall doing well.  Denies any new complaints since last visit.  Denies changes in taste or smell.  Denies HA, blurred vision or diplopia, speech difficulty, weakness, numbness, seizures, or balance difficulty.       Review of patient's allergies indicates:  No Known Allergies    Current Outpatient Medications   Medication Sig Dispense Refill    butalbital-acetaminophen-caffeine -40 mg (FIORICET, ESGIC) -40 mg per tablet Take 1 tablet by mouth every 4 (four) hours as needed for Pain.      heparin sodium,porcine (HEPARIN, PORCINE,) 5,000 unit/mL injection Inject 1 mL (5,000 Units total) into the skin every 8 (eight) hours.      metFORMIN (GLUCOPHAGE) 1000 MG tablet Take 1,000 mg by mouth.      methocarbamoL (ROBAXIN) 750 MG Tab Take 1 tablet (750 mg total) by mouth 4 (four) times daily as needed (muscle spasms). 40 tablet 0    aspirin (ECOTRIN) 81 MG EC tablet Take 1 tablet (81 mg total) by mouth once daily. (Patient not taking: Reported on 8/29/2023)  0    atorvastatin (LIPITOR) 40 MG tablet Take 40 mg by mouth once daily.      FLOWFLEX COVID-19 AG HOME TEST Kit       HYDROcodone-acetaminophen (NORCO) 5-325 mg per tablet Take 1 tablet by mouth every 6 (six) hours as needed for Pain. (Patient not taking: Reported on 3/15/2022) 8 tablet 0    lisinopriL (PRINIVIL,ZESTRIL) 5 MG tablet Take 5 mg by mouth once daily.      multivitamin Tab Take 1 tablet by mouth once daily. (Patient not taking: Reported on 3/15/2022)      pantoprazole (PROTONIX) 40 MG tablet Take 40 mg by mouth once daily.      polyethylene glycol (GLYCOLAX) 17 gram PwPk Take 17 g by mouth once daily. (Patient not taking: Reported on 3/15/2022)  0    senna-docusate 8.6-50 mg (PERICOLACE)  8.6-50 mg per tablet Take 1 tablet by mouth 2 (two) times daily. (Patient not taking: Reported on 3/15/2022)      silodosin (RAPAFLO) 8 mg Cap capsule Take 1 capsule (8 mg total) by mouth once daily. (Patient not taking: Reported on 6/7/2022)      sodium chloride 1 gram tablet Take 1 tablet (1 g total) by mouth 3 (three) times daily. (Patient not taking: Reported on 3/15/2022)      white petrolatum-mineral oil 57.3-42.5% (REFRESH P.M.) 57.3-42.5 % Oint Place into the right eye every evening. (Patient not taking: Reported on 3/15/2022)  0     No current facility-administered medications for this visit.       Past Medical History:   Diagnosis Date    Cerebrovascular accident (CVA) of right pontine structure 01/23/2022    Diabetes mellitus     HLD (hyperlipidemia) 1/23/2022    HTN (hypertension) 1/25/2022    Subdural hematoma 01/23/2022     Past Surgical History:   Procedure Laterality Date    CRANIOTOMY FOR EVACUATION OF SUBDURAL HEMATOMA Right 1/28/2022    Procedure: CRANIOTOMY, FOR SUBDURAL HEMATOMA EVACUATION;  Surgeon: Ace Candelaria MD;  Location: [facility] OR [address] [place] [address];  Service: Neurosurgery;  Laterality: Right;  right, possible bilateral     Family History    None       Social History     Socioeconomic History    Marital status:    Tobacco Use    Smoking status: Never    Smokeless tobacco: Never   Substance and Sexual Activity    Alcohol use: Never    Drug use: Never    Sexual activity: Yes     Partners: Female       Review of Systems  Constitutional: no fever or chills  Eyes: no visual changes  ENT: no nasal congestion or sore throat  Respiratory: no cough or shortness of breath  Cardiovascular: no chest pain or palpitations  Gastrointestinal: no nausea or vomiting  Genitourinary: no hematuria or dysuria  Integument/Breast: no rash or pruritis  Hematologic/Lymphatic: no easy bruising or lymphadenopathy  Musculoskeletal: no arthralgias or myalgias  Neurological: no seizures or tremors    OBJECTIVE:     Vital  Signs  Pain Score: 0-No pain  There is no height or weight on file to calculate BMI.      Neurosurgery Physical Exam   General: well developed, well nourished, no distress  Neurologic: Alert and oriented. Thought content appropriate.  Cranial nerves: II-XII grossly intact  Gait - normal               Tandem Gait - No difficulty            Motor Strength:   Strength   Deltoids Triceps Biceps Wrist Extension Wrist Flexion Hand    Upper: R 5/5 5/5 5/5 5/5 5/5 5/5     L 5/5 5/5 5/5 5/5 5/5 5/5       Iliopsoas Quadriceps Knee  Flexion Tibialis  anterior Gastro- cnemius EHL   Lower: R 5/5 5/5 5/5 5/5 5/5 5/5     L 5/5 5/5 5/5 5/5 5/5 5/5   Sensory: intact to light touch B/L UE and LE  DTR's - 2 + and symmetric in UE and LE  Williamson's - Negative           Ankle Clonus - Negative             Diagnostic Results: personally reviewed  EXAMINATION:  MRI BRAIN W WO CONTRAST     CLINICAL HISTORY:  meningioma; Benign neoplasm of meninges, unspecified     TECHNIQUE:  Multiplanar multisequence MR imaging of the brain was performed before and after the administration of 9 mL Gadavist intravenous contrast.     COMPARISON:  01/31/2022     FINDINGS:  Dome-shaped enhancing extra-axial mass centered along the midline floor of the anterior cranial fossa, the level of the olfactory groove and planum sphenoidale.  This measures up to 2.8 cm in AP diameter, 2.5 cm in transverse dimension and 1.5 cm in craniocaudal.  Mass effect on the inferomedial frontal lobes bilaterally but no associated parenchymal edema.     No additional enhancing extra-axial mass elsewhere.     Prior right-sided craniotomy.  Trace residual dural thickening and enhancement over the right cerebral hemisphere.  No significant extra-axial blood or fluid collections.     Stable ventricles are in size.  No hydrocephalus.     Small remote lacunar type infarcts in the right efraín.  Modest chronic small-vessel ischemic change in the supratentorial white matter.  No new  vascular distribution infarct. No recent or remote hemorrhage. No mass effect or midline shift.     No abnormal parenchymal or leptomeningeal enhancement.     No extra-axial blood or fluid collections.     The T2 skull base flow voids are preserved.     Bone marrow signal intensity is unremarkable.     Bilateral pseudophakia.     Impression:     Mild enlargement of the known extra-axial mass (presumed meningioma) along floor the anterior cranial fossa as above.     Postsurgical change with resolution of the prior subdural collections.     Mild chronic ischemic change.     This report was flagged in Epic as abnormal.        Electronically signed by: Rakan Pittman MD  Date:                                            10/10/2023  Time:                                           14:31    ASSESSMENT/PLAN:     64 yo male with h/o craniotomy for evacuation of SDH on 1/28/22 with subsequent resolution of SDH, and known olfactory groove meningioma.  Update MRI of the brain today shows known olfactory groove meningioma with slight enlargement compared to prior MRI study.  No associated vasogenic edema.  Patient is neurologically intact and asymptomatic at this time.  We will refer to Dr. Sim for consideration of gamma knife.  Encouraged to call the clinic with any questions or concerns in the meantime.      Malik Toure Jr. SHAYY  [facility]  Department of Neurosurgery     Note dictated with voice recognition software, please excuse any grammatical errors.

## 2023-10-13 ENCOUNTER — TELEPHONE (OUTPATIENT)
Dept: NEUROSURGERY | Facility: CLINIC | Age: 63
End: 2023-10-13
Payer: COMMERCIAL

## 2023-10-13 NOTE — TELEPHONE ENCOUNTER
Called pt and scheduled for 12/5 @ 2:40 at [place] for Gamma knife consult with Dr. Sim.    ----- Message from Hany Sim, DO sent at 10/10/2023  5:42 PM CDT -----  Can we get this abdiel scheduled in [place] next available?

## 2023-12-01 ENCOUNTER — HOSPITAL ENCOUNTER (OUTPATIENT)
Dept: RADIATION THERAPY | Facility: HOSPITAL | Age: 63
Discharge: HOME | End: 2023-12-01
Attending: RADIOLOGY
Payer: COMMERCIAL

## 2023-12-05 ENCOUNTER — OFFICE VISIT (OUTPATIENT)
Dept: RADIATION ONCOLOGY | Facility: CLINIC | Age: 63
End: 2023-12-05
Payer: COMMERCIAL

## 2023-12-05 ENCOUNTER — OFFICE VISIT (OUTPATIENT)
Dept: NEUROSURGERY | Facility: CLINIC | Age: 63
End: 2023-12-05
Payer: COMMERCIAL

## 2023-12-05 VITALS
RESPIRATION RATE: 17 BRPM | BODY MASS INDEX: 29.95 KG/M2 | OXYGEN SATURATION: 97 % | TEMPERATURE: 97 F | WEIGHT: 202.19 LBS | HEART RATE: 73 BPM | SYSTOLIC BLOOD PRESSURE: 146 MMHG | HEIGHT: 69 IN | DIASTOLIC BLOOD PRESSURE: 82 MMHG

## 2023-12-05 DIAGNOSIS — D32.9 MENINGIOMA: Primary | ICD-10-CM

## 2023-12-05 DIAGNOSIS — D32.0 INTRACRANIAL MENINGIOMA: Primary | ICD-10-CM

## 2023-12-05 DIAGNOSIS — S06.5XAA SUBDURAL HEMATOMA: ICD-10-CM

## 2023-12-05 DIAGNOSIS — Z98.890 H/O CRANIOTOMY: ICD-10-CM

## 2023-12-05 PROCEDURE — 99999 PR PBB SHADOW E&M-EST. PATIENT-LVL I: CPT | Mod: PBBFAC,,, | Performed by: NEUROLOGICAL SURGERY

## 2023-12-05 PROCEDURE — 99999 PR PBB SHADOW E&M-EST. PATIENT-LVL IV: ICD-10-PCS | Mod: PBBFAC,,, | Performed by: RADIOLOGY

## 2023-12-05 PROCEDURE — 99214 OFFICE O/P EST MOD 30 MIN: CPT | Mod: PBBFAC,27 | Performed by: RADIOLOGY

## 2023-12-05 PROCEDURE — 99999 PR PBB SHADOW E&M-EST. PATIENT-LVL IV: CPT | Mod: PBBFAC,,, | Performed by: RADIOLOGY

## 2023-12-05 PROCEDURE — 99211 OFF/OP EST MAY X REQ PHY/QHP: CPT | Mod: PBBFAC | Performed by: NEUROLOGICAL SURGERY

## 2023-12-05 PROCEDURE — 77263 THER RADIOLOGY TX PLNG CPLX: CPT | Mod: ,,, | Performed by: RADIOLOGY

## 2023-12-05 PROCEDURE — 99204 OFFICE O/P NEW MOD 45 MIN: CPT | Mod: 25,S$GLB,, | Performed by: RADIOLOGY

## 2023-12-05 PROCEDURE — 77263 PR  RADIATION THERAPY PLAN COMPLEX: ICD-10-PCS | Mod: ,,, | Performed by: RADIOLOGY

## 2023-12-05 PROCEDURE — 99204 PR OFFICE/OUTPT VISIT, NEW, LEVL IV, 45-59 MIN: ICD-10-PCS | Mod: 25,S$GLB,, | Performed by: RADIOLOGY

## 2023-12-05 PROCEDURE — 99999 PR PBB SHADOW E&M-EST. PATIENT-LVL I: ICD-10-PCS | Mod: PBBFAC,,, | Performed by: NEUROLOGICAL SURGERY

## 2023-12-05 NOTE — PROGRESS NOTES
CHIEF COMPLAINT:  Olfactory groove meningioma    HPI:  Priyank Whittington is a 63 y.o.  male with below PMH, who is referred to me by PA for evaluation of olfactory groove meningioma.  He underwent right sided craniotomy for SDH on 1/28/22 and although SDH is resolved, an olfactory groove meningioma was discovered. This was confirmed by MRI and he was referred for radiation.  He reports overall doing well.  Denies any new complaints since last visit.  Denies changes in taste or smell.  Denies HA, blurred vision or diplopia, speech difficulty, weakness, numbness, seizures, or balance difficulty.      Review of patient's allergies indicates:  No Known Allergies    Past Medical History:   Diagnosis Date    Cerebrovascular accident (CVA) of right pontine structure 01/23/2022    Diabetes mellitus     HLD (hyperlipidemia) 1/23/2022    HTN (hypertension) 1/25/2022    Subdural hematoma 01/23/2022     Past Surgical History:   Procedure Laterality Date    CRANIOTOMY FOR EVACUATION OF SUBDURAL HEMATOMA Right 1/28/2022    Procedure: CRANIOTOMY, FOR SUBDURAL HEMATOMA EVACUATION;  Surgeon: Ace Candelaria MD;  Location: [facility] OR [address] [place] [address];  Service: Neurosurgery;  Laterality: Right;  right, possible bilateral     History reviewed. No pertinent family history.  Social History     Tobacco Use    Smoking status: Never    Smokeless tobacco: Never   Substance Use Topics    Alcohol use: Never    Drug use: Never        Review of Systems   Constitutional: Negative.    HENT:  Negative for congestion, ear discharge, ear pain, hearing loss, nosebleeds, sinus pain and tinnitus.    Eyes: Negative.    Respiratory: Negative.     Cardiovascular:  Negative for chest pain, palpitations, claudication and leg swelling.   Gastrointestinal:  Negative for abdominal pain, blood in stool, constipation, diarrhea, melena and vomiting.   Genitourinary:  Negative for flank pain, frequency and urgency.   Musculoskeletal:  Negative for falls.   Skin: Negative.     Neurological: Negative.    Endo/Heme/Allergies:  Does not bruise/bleed easily.   Psychiatric/Behavioral: Negative.         OBJECTIVE:   Physical Exam:  Constitutional: Patient sitting comfortably in chair. Appears well developed and well nourished.  Skin: Exposed areas are intact without abnormal markings, rashes or other lesions.  HEENT: Normocephalic. Normal conjunctivae.  Cardiovascular: Normal rate and regular rhythm.  Respiratory: Chest wall rises and falls symmetrically, without signs of respiratory distress.  Abdomen: Soft and non-tender.  Extremities: Warm and without edema. Calves supple, non-tender.  Psych/Behavior: Normal affect.    Neurological:    Mental status: Alert and oriented. Conversational and appropriate.       Cranial Nerves: VFF to confrontation. PERRL. EOMI without nystagmus. Facial STLT normal and symmetric. Strong, symmetric muscles of mastication. Facial strength full and symmetric. Hearing equal bilaterally to finger rub. Palate and uvula rise and fall normally in midline. Shoulder shrug 5/5 strength. Tongue midline.     Motor:    Upper:  Deltoids Triceps Biceps WE WF     R 5/5 5/5 5/5 5/5 5/5 5/5    L 5/5 5/5 5/5 5/5 5/5 5/5      Lower:  HF KE KF DF PF EHL    R 5/5 5/5 5/5 5/5 5/5 5/5    L 5/5 5/5 5/5 5/5 5/5 5/5     Sensory: Intact sensation to light touch in all extremities. Romberg negative.    Reflexes:          DTR: 2+ symmetrically throughout.     Williamson's: Negative.     Babinski's: Negative.     Clonus: Negative.    Cerebellar: Finger-to-nose and rapid alternating movements normal.     Gait stable    Diagnostic Results:  All imaging was independently reviewed by me.    MRI brain, dated 10/10/23:  1. 2.7 x 2.7 x 1.6 cm olfactory groove meningioma    MRI brain, dated 1/31/22:  2.2 x 2.2 x 1.5 cm    ASSESSMENT/PLAN:     Problem List Items Addressed This Visit          Neuro    H/O craniotomy    RESOLVED: Subdural hematoma       Oncology    Meningioma - Primary       Patient  has an olfactory groove meningioma that appears to have slightly grown since his previous MRI (approx 0.5cm).  Patient is asymptomatic.  I explained that his options for management include continued observation, surgical resection, or radiation.  He states that he is interested in pursuing radiation.  He met earlier with Dr. Jasso of Radiation Oncology today and would like to proceed with radiation.  He is scheduled to undergo a planning MRI on 12/16/23 with treatment starting on 12/22/23.    - MRI on 12/16/23  - Radiation starts on 12/22/23    The patient understands and agrees with the plan of care. All questions were answered.    Time spent on this encounter: 30 minutes. This includes face-to-face time and non-face to face time preparing to see the patient (eg, review of tests), obtaining and/or reviewing separately obtained history, documenting clinical information in the electronic or other health record, independently interpreting results and communicating results to the patient/family/caregiver, or care coordinator.          .

## 2023-12-06 ENCOUNTER — PATIENT MESSAGE (OUTPATIENT)
Dept: RADIATION ONCOLOGY | Facility: CLINIC | Age: 63
End: 2023-12-06
Payer: COMMERCIAL

## 2023-12-06 NOTE — PROGRESS NOTES
12/5/2023    Radiation Oncology Consultation    Assessment   This is a 63 y.o. 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 He is referred for consideration of definitive radiation.     I discussed the management of intracranial meningioma including surgical resection and radiation. He has been recommended by Dr. Sim to undergo treatment with radiosurgery. Given proximity to the optic chiasm, I recommend treating his tumor to 25 Gy in 5 fractions with hypofractionated SRT on the Gamma Knife. Potential side effects including vasogenic edema and radiation necrosis that could potentially impact his vision (very low risk) or cause other neurologic deficit were reviewed. At the end of our discussion, he was in agreement with proceeding with the recommended treatment.        Plan   1) Schedule MRI Brain stealth and Gamma Knife radiosurgery.          CHIEF COMPLAINT: Orbitofrontal meningioma    HPI/Focused ROS: Mr. Whittington is a 62 y/o male who presented to ED on 1/23/22 with severe HA and nausea x10 days. CT Head demonstrated multiple subdural hematomas. He was noted on CTA Head 1/24/22 to have a 2.2 cm planum sphenoidale meningioma. He underwent evacuation of subdural hematoma on 1/28/22 by Dr. Candelaria. Since that time he has been followed for meningioma. MRI Brain 10/10/23 demonstrated interval growth of meningioma to 2.8 cm, not yet abutting optic chiasm but approaching within 5 mm. He is referred for consideration of treatment options.     In clinic today, he is unaccompanied. He denies visual change, HA, focal numbness or weakness, or gait imbalance.     Is the patient female between ages 15-55:  no    Does the patient have a CIED:  no    Prior Radiation History: None      Past Medical History:   Diagnosis Date    Cerebrovascular accident (CVA) of right pontine structure 01/23/2022    Diabetes mellitus     HLD (hyperlipidemia)  1/23/2022    HTN (hypertension) 1/25/2022    Subdural hematoma 01/23/2022       Past Surgical History:   Procedure Laterality Date    CRANIOTOMY FOR EVACUATION OF SUBDURAL HEMATOMA Right 1/28/2022    Procedure: CRANIOTOMY, FOR SUBDURAL HEMATOMA EVACUATION;  Surgeon: Ace Candelaria MD;  Location: [facility] OR [address] [place] [address];  Service: Neurosurgery;  Laterality: Right;  right, possible bilateral       Social History     Tobacco Use    Smoking status: Never    Smokeless tobacco: Never   Substance Use Topics    Alcohol use: Never    Drug use: Never       Cancer-related family history is not on file.    Current Outpatient Medications on File Prior to Visit   Medication Sig Dispense Refill    aspirin (ECOTRIN) 81 MG EC tablet Take 1 tablet (81 mg total) by mouth once daily. (Patient not taking: Reported on 8/29/2023)  0    atorvastatin (LIPITOR) 40 MG tablet Take 40 mg by mouth once daily.      butalbital-acetaminophen-caffeine -40 mg (FIORICET, ESGIC) -40 mg per tablet Take 1 tablet by mouth every 4 (four) hours as needed for Pain.      FLOWFLEX COVID-19 AG HOME TEST Kit       heparin sodium,porcine (HEPARIN, PORCINE,) 5,000 unit/mL injection Inject 1 mL (5,000 Units total) into the skin every 8 (eight) hours.      HYDROcodone-acetaminophen (NORCO) 5-325 mg per tablet Take 1 tablet by mouth every 6 (six) hours as needed for Pain. (Patient not taking: Reported on 3/15/2022) 8 tablet 0    lisinopriL (PRINIVIL,ZESTRIL) 5 MG tablet Take 5 mg by mouth once daily.      metFORMIN (GLUCOPHAGE) 1000 MG tablet Take 1,000 mg by mouth.      methocarbamoL (ROBAXIN) 750 MG Tab Take 1 tablet (750 mg total) by mouth 4 (four) times daily as needed (muscle spasms). 40 tablet 0    multivitamin Tab Take 1 tablet by mouth once daily. (Patient not taking: Reported on 3/15/2022)      pantoprazole (PROTONIX) 40 MG tablet Take 40 mg by mouth once daily.      polyethylene glycol (GLYCOLAX) 17 gram PwPk Take 17 g by mouth once daily. (Patient not  "taking: Reported on 3/15/2022)  0    senna-docusate 8.6-50 mg (PERICOLACE) 8.6-50 mg per tablet Take 1 tablet by mouth 2 (two) times daily. (Patient not taking: Reported on 3/15/2022)      silodosin (RAPAFLO) 8 mg Cap capsule Take 1 capsule (8 mg total) by mouth once daily. (Patient not taking: Reported on 6/7/2022)      sodium chloride 1 gram tablet Take 1 tablet (1 g total) by mouth 3 (three) times daily. (Patient not taking: Reported on 3/15/2022)      white petrolatum-mineral oil 57.3-42.5% (REFRESH P.M.) 57.3-42.5 % Oint Place into the right eye every evening. (Patient not taking: Reported on 3/15/2022)  0     No current facility-administered medications on file prior to visit.       Review of patient's allergies indicates:  No Known Allergies      Vital Signs: BP (!) 146/82 (BP Location: Right arm, Patient Position: Sitting)   Pulse 73   Temp 96.9 °F (36.1 °C)   Resp 17   Ht 5' 9" (1.753 m)   Wt 91.7 kg (202 lb 2.6 oz)   SpO2 97%   BMI 29.85 kg/m²     ECOG Performance Status: (0) Fully active, able to carry on all predisease performance without restriction    Physical Exam  Constitutional:       Appearance: Normal appearance.   HENT:      Head: Normocephalic and atraumatic.   Eyes:      General: No scleral icterus.     Extraocular Movements: Extraocular movements intact.   Pulmonary:      Effort: No accessory muscle usage or respiratory distress.   Musculoskeletal:      Cervical back: Normal range of motion.   Neurological:      Mental Status: He is alert and oriented to person, place, and time.   Psychiatric:         Mood and Affect: Mood and affect normal.         Judgment: Judgment normal.          Labs:    Imaging: I have personally reviewed the patient's available images and reports and summarized pertinent findings above in HPI.     Pathology: No pathology      - Thank you for allowing me to participate in the care of your patient.    Juanjose Jasso MD, PhD    "

## 2023-12-16 ENCOUNTER — HOSPITAL ENCOUNTER (OUTPATIENT)
Dept: RADIOLOGY | Facility: HOSPITAL | Age: 63
Discharge: HOME | End: 2023-12-16
Attending: RADIOLOGY
Payer: MEDICAID

## 2023-12-16 DIAGNOSIS — D32.0 INTRACRANIAL MENINGIOMA: ICD-10-CM

## 2023-12-16 PROCEDURE — 70552 MRI BRAIN STEALTH W/O FIDUCIALS WITH CONTRAST: ICD-10-PCS | Mod: 26,,, | Performed by: RADIOLOGY

## 2023-12-16 PROCEDURE — 70552 MRI BRAIN STEM W/DYE: CPT | Mod: 26,,, | Performed by: RADIOLOGY

## 2023-12-16 PROCEDURE — 70552 MRI BRAIN STEM W/DYE: CPT | Mod: TC

## 2023-12-16 PROCEDURE — 25500020 PHARM REV CODE 255: Performed by: RADIOLOGY

## 2023-12-16 PROCEDURE — A9585 GADOBUTROL INJECTION: HCPCS | Performed by: RADIOLOGY

## 2023-12-16 RX ORDER — GADOBUTROL 604.72 MG/ML
10 INJECTION INTRAVENOUS
Status: COMPLETED | OUTPATIENT
Start: 2023-12-16 | End: 2023-12-16

## 2023-12-16 RX ADMIN — GADOBUTROL 10 ML: 604.72 INJECTION INTRAVENOUS at 02:12

## 2023-12-21 PROCEDURE — 77334 RADIATION TREATMENT AID(S): CPT | Mod: TC | Performed by: RADIOLOGY

## 2023-12-21 PROCEDURE — 77290 PR  SET RADN THERAPY FIELD COMPLEX: ICD-10-PCS | Mod: 26,,, | Performed by: RADIOLOGY

## 2023-12-21 PROCEDURE — 77290 THER RAD SIMULAJ FIELD CPLX: CPT | Mod: 26,,, | Performed by: RADIOLOGY

## 2023-12-21 PROCEDURE — 77470 SPECIAL RADIATION TREATMENT: CPT | Mod: 26,59,, | Performed by: RADIOLOGY

## 2023-12-21 PROCEDURE — 77470 PR  SPECIAL RADIATION TREATMENT: ICD-10-PCS | Mod: 26,59,, | Performed by: RADIOLOGY

## 2023-12-21 PROCEDURE — 77334 RADIATION TREATMENT AID(S): CPT | Mod: 26,,, | Performed by: RADIOLOGY

## 2023-12-21 PROCEDURE — 77290 THER RAD SIMULAJ FIELD CPLX: CPT | Mod: TC | Performed by: RADIOLOGY

## 2023-12-21 PROCEDURE — 77470 SPECIAL RADIATION TREATMENT: CPT | Mod: 59,TC | Performed by: RADIOLOGY

## 2023-12-21 PROCEDURE — 77334 PR  RADN TREATMENT AID(S) COMPLX: ICD-10-PCS | Mod: 26,,, | Performed by: RADIOLOGY

## 2023-12-22 ENCOUNTER — PROCEDURE VISIT (OUTPATIENT)
Dept: RADIATION THERAPY | Facility: HOSPITAL | Age: 63
End: 2023-12-22
Payer: COMMERCIAL

## 2023-12-22 DIAGNOSIS — D32.0 INTRACRANIAL MENINGIOMA: Primary | ICD-10-CM

## 2023-12-22 PROCEDURE — 77370 RADIATION PHYSICS CONSULT: CPT | Performed by: RADIOLOGY

## 2023-12-22 PROCEDURE — 77300 PR RADIATION THERAPY,DOSIMETRY PLAN: ICD-10-PCS | Mod: 26,,, | Performed by: RADIOLOGY

## 2023-12-22 PROCEDURE — 77435 SBRT MANAGEMENT: CPT | Mod: ,,, | Performed by: RADIOLOGY

## 2023-12-22 PROCEDURE — 77334 RADIATION TREATMENT AID(S): CPT | Mod: TC | Performed by: RADIOLOGY

## 2023-12-22 PROCEDURE — 61796 PR STEREOTACTIC RADIOSURGERY, CRANIAL,SIMPLE,SINGLE: ICD-10-PCS | Mod: S$PBB,,, | Performed by: NEUROLOGICAL SURGERY

## 2023-12-22 PROCEDURE — 61796 SRS CRANIAL LESION SIMPLE: CPT | Mod: S$PBB,,, | Performed by: NEUROLOGICAL SURGERY

## 2023-12-22 PROCEDURE — 77295 3-D RADIOTHERAPY PLAN: CPT | Mod: 26,,, | Performed by: RADIOLOGY

## 2023-12-22 PROCEDURE — 77300 RADIATION THERAPY DOSE PLAN: CPT | Mod: TC | Performed by: RADIOLOGY

## 2023-12-22 PROCEDURE — 77373 STRTCTC BDY RAD THER TX DLVR: CPT | Performed by: RADIOLOGY

## 2023-12-22 PROCEDURE — 77435 PR  RADN RX DELIV,BODY, MANAGEMENT, PER COURSE: ICD-10-PCS | Mod: ,,, | Performed by: RADIOLOGY

## 2023-12-22 PROCEDURE — 77300 RADIATION THERAPY DOSE PLAN: CPT | Mod: 26,,, | Performed by: RADIOLOGY

## 2023-12-22 PROCEDURE — 77334 RADIATION TREATMENT AID(S): CPT | Mod: 26,,, | Performed by: RADIOLOGY

## 2023-12-22 PROCEDURE — 77334 PR  RADN TREATMENT AID(S) COMPLX: ICD-10-PCS | Mod: 26,,, | Performed by: RADIOLOGY

## 2023-12-22 PROCEDURE — 77295 3-D RADIOTHERAPY PLAN: CPT | Mod: TC | Performed by: RADIOLOGY

## 2023-12-22 PROCEDURE — 77295 PR 3D RADIOTHERAPY PLAN: ICD-10-PCS | Mod: 26,,, | Performed by: RADIOLOGY

## 2023-12-22 NOTE — PROCEDURES
GAMMA KNIFE TREATMENT SUMMARY     NAME OF PATIENT:              Mr. Priyank Whittington     DATE:                                    12/22/2023     Neurosurgeon:                      Constantine Saldana M.D., Ph.D.     Radiation Oncologist:           Juanjose Jasso M.D.     DIAGNOSIS:                           Meningioma                                                                                                      Operative Procedure:    Planning of gamma radiosurgery.  Delivery of gamma radiosurgery.       Indications in Detail:    Mr. Priyank Whittington is a 63 y.o. 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 He is referred for consideration of definitive radiation.    The patient has been recommended by Dr. Sim to undergo treatment with radiosurgery. Given proximity to the optic chiasm, Dr. Jasso recommend treating his tumor to 25 Gy in 5 fractions with hypofractionated SRT on the Gamma Knife. Potential side effects including vasogenic edema and radiation necrosis that could potentially impact his vision (very low risk) or cause other neurologic deficit were reviewed with Dr. Jasso.     Procedure in Detail:  The patient was seen in the pretreatment area and the risks, benefits, and alternatives were discussed. The patient understood and wished to proceed. Dr. Saldana, Dr. Jasso and Dr. Gray planned the radiosurgery based on a recent MRI that had been performed.  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  The tumor was targeted using Gamma Plan (Lightning planned).  The treatment consisted of 5 shots using various collimators. Dr. Jasso prescribed a dose of 25 Gy to the 51% isodose line to be delivered in 5 fractions. The patient had a mask that was made in used to immobilize him during the treatment.  The patient was immobilized using this mask.  The cone beam CT and plan were coregistered.  The patient  was placed in the unit and the shots were delivered sequentially. he total beam on-time was 21.2 minutes.  The patient tolerated the treatment well.      COMPLICATIONS:  None.  ESTIMATED BLOOD LOSS:  None.  DISPOSITION:  The patient is to followup with Dr. Juanjose Jasso in 3 months.           Dr. Constantine Saldana MD, Ph.D.

## 2023-12-22 NOTE — PROCEDURES
Patient: Priyank Whittington    MRN: [id number]    : 1960    DATE OF PROCEDURE: 2023      PRE-OPERATIVE DIAGNOSIS:  Olfactory groove meningioma       POST-OPERATIVE DIAGNOSIS:  Same         PROCEDURE PERFORMED:                 1)         Gamma Knife stereotactic radiosurgery to olfactory groove meningioma.       RADIATION ONCOLOGIST: Juanjose Jasso      NEUROSURGEON:  LEYDI Neurosurgeon: Constantine Saldana       MEDICAL PHYSICIST: LEYDI Medical Physicist: Gage Gray       PROCEDURE SUMMARY:    Target # Site Dose per Fraction (Gy) Cumulative Dose % Isodose Line Fraction # Total Fractions   1 Olfactory groove meningioma 5 5 51 1 5       INDICATIONS AND CONSENT:  Priyank Whittington is a 63 y.o. 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 He is referred for consideration of definitive radiation. It was recommended that he undergo Gamma Knife stereotactic radiosurgery.  The risks of this procedure as well as possible complications were discussed with the patient pre-operatively.       DESCRIPTION OF PROCEDURE:  On the day of the procedure, the patient was positioned on the table in a custom-shaped immobilization mask.  Cone-beam CT was performed for stereotactic reference, and the images were imported into the Gamma Knife planning station. Images were co-registered with pre-planning MRI, which was used to define the target volume(s) and organs at risk. Under direct physician supervision, acceptable localization of target and normal tissue was achieved with image guidance.  We targeted the lesion(s) with the dose(s) prescribed above to the margin of (each) lesion.  Treatment was carried out without difficulty using automated positioning.  Upon completion of the Gamma Knife procedure, the immobilization mask was removed.

## 2023-12-26 ENCOUNTER — PROCEDURE VISIT (OUTPATIENT)
Dept: RADIATION THERAPY | Facility: HOSPITAL | Age: 63
End: 2023-12-26
Payer: COMMERCIAL

## 2023-12-26 DIAGNOSIS — D32.0 INTRACRANIAL MENINGIOMA: Primary | ICD-10-CM

## 2023-12-26 PROCEDURE — 77373 STRTCTC BDY RAD THER TX DLVR: CPT | Performed by: RADIOLOGY

## 2023-12-26 NOTE — PROCEDURES
Patient: Priyank Whittington    MRN: [id number]    : 1960    DATE OF PROCEDURE: 2023      PRE-OPERATIVE DIAGNOSIS:  Olfactory groove meningioma       POST-OPERATIVE DIAGNOSIS:  Same         PROCEDURE PERFORMED:                 1)         Gamma Knife stereotactic radiosurgery to olfactory groove meningioma.       RADIATION ONCOLOGIST: Juanjose Jasso      NEUROSURGEON:  LEYDI Neurosurgeon: Constantine Saldana       MEDICAL PHYSICIST: LEYDI Medical Physicist: Gage Gray       PROCEDURE SUMMARY:    Target # Site Dose per Fraction (Gy) Cumulative Dose % Isodose Line Fraction # Total Fractions   1 Olfactory groove meningioma 5 10 51 2 5       INDICATIONS AND CONSENT:  Priyank Whittington is a 63 y.o. 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 He is referred for consideration of definitive radiation. It was recommended that he undergo Gamma Knife stereotactic radiosurgery.  The risks of this procedure as well as possible complications were discussed with the patient pre-operatively.       DESCRIPTION OF PROCEDURE:  On the day of the procedure, the patient was positioned on the table in a custom-shaped immobilization mask.  Cone-beam CT was performed for stereotactic reference, and the images were imported into the Gamma Knife planning station. Images were co-registered with pre-planning MRI, which was used to define the target volume(s) and organs at risk. Under direct physician supervision, acceptable localization of target and normal tissue was achieved with image guidance.  We targeted the lesion(s) with the dose(s) prescribed above to the margin of (each) lesion.  Treatment was carried out without difficulty using automated positioning.  Upon completion of the Gamma Knife procedure, the immobilization mask was removed.

## 2023-12-27 ENCOUNTER — PROCEDURE VISIT (OUTPATIENT)
Dept: RADIATION THERAPY | Facility: HOSPITAL | Age: 63
End: 2023-12-27
Payer: COMMERCIAL

## 2023-12-27 DIAGNOSIS — D32.0 INTRACRANIAL MENINGIOMA: Primary | ICD-10-CM

## 2023-12-27 PROCEDURE — 77373 STRTCTC BDY RAD THER TX DLVR: CPT | Performed by: RADIOLOGY

## 2023-12-27 NOTE — PROCEDURES
Patient: Priyank Whittington    MRN: [id number]    : 1960    DATE OF PROCEDURE: 2023      PRE-OPERATIVE DIAGNOSIS:  Olfactory groove meningioma       POST-OPERATIVE DIAGNOSIS:  Same         PROCEDURE PERFORMED:                 1)         Gamma Knife stereotactic radiosurgery to olfactory groove meningioma.       RADIATION ONCOLOGIST: Juanjose Jasso      NEUROSURGEON:  LEYDI Neurosurgeon: Constantine Saldana       MEDICAL PHYSICIST: LEYDI Medical Physicist: Gage Gray       PROCEDURE SUMMARY:    Target # Site Dose per Fraction (Gy) Cumulative Dose % Isodose Line Fraction # Total Fractions   1 Olfactory groove meningioma 5 15 51 3 5       INDICATIONS AND CONSENT:  Priyank Whittington is a 63 y.o. 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 He is referred for consideration of definitive radiation. It was recommended that he undergo Gamma Knife stereotactic radiosurgery.  The risks of this procedure as well as possible complications were discussed with the patient pre-operatively.       DESCRIPTION OF PROCEDURE:  On the day of the procedure, the patient was positioned on the table in a custom-shaped immobilization mask.  Cone-beam CT was performed for stereotactic reference, and the images were imported into the Gamma Knife planning station. Images were co-registered with pre-planning MRI, which was used to define the target volume(s) and organs at risk. Under direct physician supervision, acceptable localization of target and normal tissue was achieved with image guidance.  We targeted the lesion(s) with the dose(s) prescribed above to the margin of (each) lesion.  Treatment was carried out without difficulty using automated positioning.  Upon completion of the Gamma Knife procedure, the immobilization mask was removed.

## 2023-12-28 ENCOUNTER — PROCEDURE VISIT (OUTPATIENT)
Dept: RADIATION THERAPY | Facility: HOSPITAL | Age: 63
End: 2023-12-28
Attending: STUDENT IN AN ORGANIZED HEALTH CARE EDUCATION/TRAINING PROGRAM
Payer: COMMERCIAL

## 2023-12-28 DIAGNOSIS — D32.0 INTRACRANIAL MENINGIOMA: Primary | ICD-10-CM

## 2023-12-28 PROCEDURE — 77373 STRTCTC BDY RAD THER TX DLVR: CPT | Performed by: RADIOLOGY

## 2023-12-28 NOTE — PATIENT INSTRUCTIONS
After Gamma Knife Treatment    You may return to your normal activities as you feel up to doing them.   Do not drive the day of your treatment.    Feeling tired is normal after therapy: Rest as needed, eat healthy and drink plenty fluids     Some patients get headaches from wearing the mask:  you can take Tylenol or Advil when you need to     When should I call the doctor?    Call your doctor right away if you have any of the following:   Seizures   A persistent severe headache: may be caused by swelling in the treated area of your brain. We often treat with a medication called Decadron to reduce the swelling    Vision changes  Nausea and/or vomiting    Numbness in your arms and/or legs   Weakness      What follow-up care will I have?       The results of the Gamma Knife treatment do not happen right away.   They may show up over a period of time. You will have scans (CT, MRI   or angiography) done again to measure the success of the treatment.     The first scan is normally done about 2- 3 months after the treatment.   How often these scans will be done depends on your diagnosis.     It is very important to keep your follow up appointments with the   radiation oncologist and neurosurgeon. You will be given information   with the date, time and location for these appointments.  Who do I call if I have questions?     If you have questions about your Gamma Knife treatment :     The [facility] Radiation Oncology Department- Gamma Knife Suite at [phone].     To reach the neurosurgeon in the evenings or weekends, call the   hospital  at [phone] and ask for the neurosurgeon on   call.

## 2023-12-28 NOTE — PROCEDURES
Patient: Priyank Whittington    MRN: [id number]    : 1960    DATE OF PROCEDURE: 2023      PRE-OPERATIVE DIAGNOSIS:  Olfactory groove meningioma       POST-OPERATIVE DIAGNOSIS:  Same         PROCEDURE PERFORMED:                 1)         Gamma Knife stereotactic radiosurgery to olfactory groove meningioma.       RADIATION ONCOLOGIST: Juanjose Jasso      NEUROSURGEON:  LEYDI Neurosurgeon: Constantine Saldana       MEDICAL PHYSICIST: LEYDI Medical Physicist: Gage Gray       PROCEDURE SUMMARY:    Target # Site Dose per Fraction (Gy) Cumulative Dose % Isodose Line Fraction # Total Fractions   1 Olfactory groove meningioma 5 20 51 4 5       INDICATIONS AND CONSENT:  Priyank Whittington is a 63 y.o. 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 He is referred for consideration of definitive radiation. It was recommended that he undergo Gamma Knife stereotactic radiosurgery.  The risks of this procedure as well as possible complications were discussed with the patient pre-operatively.       DESCRIPTION OF PROCEDURE:  On the day of the procedure, the patient was positioned on the table in a custom-shaped immobilization mask.  Cone-beam CT was performed for stereotactic reference, and the images were imported into the Gamma Knife planning station. Images were co-registered with pre-planning MRI, which was used to define the target volume(s) and organs at risk. Under direct physician supervision, acceptable localization of target and normal tissue was achieved with image guidance.  We targeted the lesion(s) with the dose(s) prescribed above to the margin of (each) lesion.  Treatment was carried out without difficulty using automated positioning.  Upon completion of the Gamma Knife procedure, the immobilization mask was removed.

## 2023-12-29 ENCOUNTER — PROCEDURE VISIT (OUTPATIENT)
Dept: RADIATION THERAPY | Facility: HOSPITAL | Age: 63
End: 2023-12-29
Payer: COMMERCIAL

## 2023-12-29 DIAGNOSIS — D32.0 INTRACRANIAL MENINGIOMA: Primary | ICD-10-CM

## 2023-12-29 PROCEDURE — 77373 STRTCTC BDY RAD THER TX DLVR: CPT | Performed by: RADIOLOGY

## 2023-12-29 NOTE — PROCEDURES
Patient: Priyank Whittington    MRN: [id number]    : 1960    DATE OF PROCEDURE: 2023      PRE-OPERATIVE DIAGNOSIS:  Olfactory groove meningioma       POST-OPERATIVE DIAGNOSIS:  Same         PROCEDURE PERFORMED:                 1)         Gamma Knife stereotactic radiosurgery to olfactory groove meningioma.       RADIATION ONCOLOGIST: Juanjose Jasso      NEUROSURGEON:  LEYDI Neurosurgeon: Constantine Saldana       MEDICAL PHYSICIST: LEYDI Medical Physicist: Gage Gray       PROCEDURE SUMMARY:    Target # Site Dose per Fraction (Gy) Cumulative Dose % Isodose Line Fraction # Total Fractions   1 Olfactory groove meningioma 5 25 51 5 5       INDICATIONS AND CONSENT:  Priyank Whittington is a 63 y.o. 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 He is referred for consideration of definitive radiation. It was recommended that he undergo Gamma Knife stereotactic radiosurgery.  The risks of this procedure as well as possible complications were discussed with the patient pre-operatively.       DESCRIPTION OF PROCEDURE:  On the day of the procedure, the patient was positioned on the table in a custom-shaped immobilization mask.  Cone-beam CT was performed for stereotactic reference, and the images were imported into the Gamma Knife planning station. Images were co-registered with pre-planning MRI, which was used to define the target volume(s) and organs at risk. Under direct physician supervision, acceptable localization of target and normal tissue was achieved with image guidance.  We targeted the lesion(s) with the dose(s) prescribed above to the margin of (each) lesion.  Treatment was carried out without difficulty using automated positioning.  Upon completion of the Gamma Knife procedure, the immobilization mask was removed.

## 2024-01-01 PROCEDURE — 77336 RADIATION PHYSICS CONSULT: CPT | Performed by: RADIOLOGY

## 2024-01-02 ENCOUNTER — TELEPHONE (OUTPATIENT)
Dept: RADIATION THERAPY | Facility: HOSPITAL | Age: 64
End: 2024-01-02
Payer: COMMERCIAL

## 2024-01-02 ENCOUNTER — HOSPITAL ENCOUNTER (OUTPATIENT)
Dept: RADIATION THERAPY | Facility: HOSPITAL | Age: 64
Discharge: HOME | End: 2024-01-02
Attending: RADIOLOGY
Payer: COMMERCIAL

## 2024-01-02 NOTE — TELEPHONE ENCOUNTER
Priyank Whittington called post gk procedure, states doing fine, no complaints or concerns voiced

## 2024-01-12 ENCOUNTER — TELEPHONE (OUTPATIENT)
Dept: RADIATION THERAPY | Facility: HOSPITAL | Age: 64
End: 2024-01-12
Payer: COMMERCIAL

## 2024-03-18 ENCOUNTER — LAB VISIT (OUTPATIENT)
Dept: LAB | Facility: HOSPITAL | Age: 64
End: 2024-03-18
Attending: RADIOLOGY
Payer: MEDICAID

## 2024-03-18 DIAGNOSIS — D32.0 INTRACRANIAL MENINGIOMA: ICD-10-CM

## 2024-03-18 LAB
CREAT SERPL-MCNC: 0.8 MG/DL (ref 0.5–1.4)
EST. GFR  (NO RACE VARIABLE): >60 ML/MIN/1.73 M^2

## 2024-03-18 PROCEDURE — 82565 ASSAY OF CREATININE: CPT | Performed by: RADIOLOGY

## 2024-03-18 PROCEDURE — 36415 COLL VENOUS BLD VENIPUNCTURE: CPT | Performed by: RADIOLOGY

## 2024-03-20 ENCOUNTER — HOSPITAL ENCOUNTER (OUTPATIENT)
Dept: RADIOLOGY | Facility: HOSPITAL | Age: 64
Discharge: HOME | End: 2024-03-20
Attending: RADIOLOGY
Payer: MEDICAID

## 2024-03-20 DIAGNOSIS — D32.0 INTRACRANIAL MENINGIOMA: ICD-10-CM

## 2024-03-20 PROCEDURE — 70553 MRI BRAIN STEM W/O & W/DYE: CPT | Mod: 26,,, | Performed by: STUDENT IN AN ORGANIZED HEALTH CARE EDUCATION/TRAINING PROGRAM

## 2024-03-20 PROCEDURE — 25500020 PHARM REV CODE 255: Performed by: RADIOLOGY

## 2024-03-20 PROCEDURE — 70553 MRI BRAIN STEM W/O & W/DYE: CPT | Mod: TC

## 2024-03-20 PROCEDURE — A9585 GADOBUTROL INJECTION: HCPCS | Performed by: RADIOLOGY

## 2024-03-20 RX ORDER — GADOBUTROL 604.72 MG/ML
10 INJECTION INTRAVENOUS
Status: COMPLETED | OUTPATIENT
Start: 2024-03-20 | End: 2024-03-20

## 2024-03-20 RX ADMIN — GADOBUTROL 10 ML: 604.72 INJECTION INTRAVENOUS at 04:03

## 2024-03-26 ENCOUNTER — OFFICE VISIT (OUTPATIENT)
Dept: RADIATION ONCOLOGY | Facility: CLINIC | Age: 64
End: 2024-03-26
Payer: MEDICAID

## 2024-03-26 VITALS
DIASTOLIC BLOOD PRESSURE: 72 MMHG | BODY MASS INDEX: 28.41 KG/M2 | OXYGEN SATURATION: 98 % | SYSTOLIC BLOOD PRESSURE: 137 MMHG | RESPIRATION RATE: 17 BRPM | HEIGHT: 69 IN | TEMPERATURE: 97 F | HEART RATE: 79 BPM | WEIGHT: 191.81 LBS

## 2024-03-26 DIAGNOSIS — D32.0 INTRACRANIAL MENINGIOMA: Primary | ICD-10-CM

## 2024-03-26 PROCEDURE — 99999 PR PBB SHADOW E&M-EST. PATIENT-LVL IV: CPT | Mod: PBBFAC,,, | Performed by: RADIOLOGY

## 2024-03-26 PROCEDURE — 99024 POSTOP FOLLOW-UP VISIT: CPT | Mod: ,,, | Performed by: RADIOLOGY

## 2024-03-26 PROCEDURE — 3078F DIAST BP <80 MM HG: CPT | Mod: CPTII,,, | Performed by: RADIOLOGY

## 2024-03-26 PROCEDURE — 1159F MED LIST DOCD IN RCRD: CPT | Mod: CPTII,,, | Performed by: RADIOLOGY

## 2024-03-26 PROCEDURE — 3075F SYST BP GE 130 - 139MM HG: CPT | Mod: CPTII,,, | Performed by: RADIOLOGY

## 2024-03-26 PROCEDURE — 4010F ACE/ARB THERAPY RXD/TAKEN: CPT | Mod: CPTII,,, | Performed by: RADIOLOGY

## 2024-03-26 PROCEDURE — 99214 OFFICE O/P EST MOD 30 MIN: CPT | Mod: PBBFAC | Performed by: RADIOLOGY

## 2024-03-26 NOTE — PROGRESS NOTES
3/26/2024    Radiation Oncology Follow-Up Visit      Prior Radiation History:   Course 1: GK SRT 12/22/23 - 12/29/23  Target # Site Dose per Fraction (Gy) Cumulative Dose % Isodose Line Fraction # Total Fractions   1 Olfactory groove meningioma 5 25 51 5 5       Is the patient female between ages 15-55:  No    Does the patient have a CIED:  No      Assessment   This is a 64 y.o. 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 He completed definitive SRT 25 Gy in 5 fx on 12/29/23.    He is doing well now almost 3 months since treatment. Denies any issues after treatment. Today denies visual change, weakness or HA. MRI Brain 3/19/24 demonstrated stable size of the treated olfactory groove meningioma.           Plan   1) I will see him back in 6 months with MRI Brain prior for re-staging.            CHIEF COMPLAINT: Follow up after SRT for meningioma    HPI/Focused ROS:       Past Medical History:   Diagnosis Date    Cerebrovascular accident (CVA) of right pontine structure 01/23/2022    Diabetes mellitus     HLD (hyperlipidemia) 1/23/2022    HTN (hypertension) 1/25/2022    Subdural hematoma 01/23/2022       Past Surgical History:   Procedure Laterality Date    CRANIOTOMY FOR EVACUATION OF SUBDURAL HEMATOMA Right 1/28/2022    Procedure: CRANIOTOMY, FOR SUBDURAL HEMATOMA EVACUATION;  Surgeon: Ace Candelaria MD;  Location: [facility] OR [address] [place] [address];  Service: Neurosurgery;  Laterality: Right;  right, possible bilateral       Social History     Tobacco Use    Smoking status: Never    Smokeless tobacco: Never   Substance Use Topics    Alcohol use: Never    Drug use: Never       Cancer-related family history is not on file.    Current Outpatient Medications on File Prior to Visit   Medication Sig Dispense Refill    atorvastatin (LIPITOR) 40 MG tablet Take 40 mg by mouth once daily.      butalbital-acetaminophen-caffeine -40 mg (FIORICET,  ESGIC) -40 mg per tablet Take 1 tablet by mouth every 4 (four) hours as needed for Pain.      FLOWFLEX COVID-19 AG HOME TEST Kit       heparin sodium,porcine (HEPARIN, PORCINE,) 5,000 unit/mL injection Inject 1 mL (5,000 Units total) into the skin every 8 (eight) hours.      lisinopriL (PRINIVIL,ZESTRIL) 5 MG tablet Take 5 mg by mouth once daily.      metFORMIN (GLUCOPHAGE) 1000 MG tablet Take 1,000 mg by mouth.      methocarbamoL (ROBAXIN) 750 MG Tab Take 1 tablet (750 mg total) by mouth 4 (four) times daily as needed (muscle spasms). 40 tablet 0    pantoprazole (PROTONIX) 40 MG tablet Take 40 mg by mouth once daily.      aspirin (ECOTRIN) 81 MG EC tablet Take 1 tablet (81 mg total) by mouth once daily. (Patient not taking: Reported on 8/29/2023)  0    HYDROcodone-acetaminophen (NORCO) 5-325 mg per tablet Take 1 tablet by mouth every 6 (six) hours as needed for Pain. (Patient not taking: Reported on 3/15/2022) 8 tablet 0    multivitamin Tab Take 1 tablet by mouth once daily. (Patient not taking: Reported on 3/15/2022)      polyethylene glycol (GLYCOLAX) 17 gram PwPk Take 17 g by mouth once daily. (Patient not taking: Reported on 3/15/2022)  0    senna-docusate 8.6-50 mg (PERICOLACE) 8.6-50 mg per tablet Take 1 tablet by mouth 2 (two) times daily. (Patient not taking: Reported on 3/15/2022)      silodosin (RAPAFLO) 8 mg Cap capsule Take 1 capsule (8 mg total) by mouth once daily. (Patient not taking: Reported on 6/7/2022)      sodium chloride 1 gram tablet Take 1 tablet (1 g total) by mouth 3 (three) times daily. (Patient not taking: Reported on 3/15/2022)      white petrolatum-mineral oil 57.3-42.5% (REFRESH P.M.) 57.3-42.5 % Oint Place into the right eye every evening. (Patient not taking: Reported on 3/15/2022)  0     No current facility-administered medications on file prior to visit.       Review of patient's allergies indicates:  No Known Allergies      Vital Signs: /72 (BP Location: Right arm,  "Patient Position: Sitting)   Pulse 79   Temp 97 °F (36.1 °C)   Resp 17   Ht 5' 9" (1.753 m)   Wt 87 kg (191 lb 12.8 oz)   SpO2 98%   BMI 28.32 kg/m²     ECOG Performance Status: 0 - Fully Active    Physical Exam  Constitutional:       Appearance: Normal appearance.   HENT:      Head: Normocephalic and atraumatic.   Eyes:      General: No scleral icterus.     Extraocular Movements: Extraocular movements intact.   Pulmonary:      Effort: No accessory muscle usage or respiratory distress.   Musculoskeletal:      Cervical back: Normal range of motion.   Neurological:      Mental Status: He is alert and oriented to person, place, and time.   Psychiatric:         Mood and Affect: Mood and affect normal.         Judgment: Judgment normal.          Labs:    Imaging: I have personally reviewed the patient's available images and reports and summarized pertinent findings above in HPI.     Pathology: No new path      "

## 2024-05-02 ENCOUNTER — PATIENT MESSAGE (OUTPATIENT)
Dept: RADIATION ONCOLOGY | Facility: CLINIC | Age: 64
End: 2024-05-02
Payer: MEDICAID

## 2024-05-06 ENCOUNTER — TELEPHONE (OUTPATIENT)
Dept: RADIATION ONCOLOGY | Facility: CLINIC | Age: 64
End: 2024-05-06

## 2024-05-22 ENCOUNTER — PATIENT MESSAGE (OUTPATIENT)
Dept: RADIOLOGY | Facility: HOSPITAL | Age: 64
End: 2024-05-22
Payer: MEDICAID

## 2024-06-06 ENCOUNTER — OFFICE VISIT (OUTPATIENT)
Dept: RADIATION ONCOLOGY | Facility: CLINIC | Age: 64
End: 2024-06-06
Payer: MEDICAID

## 2024-06-06 VITALS
HEIGHT: 69 IN | HEART RATE: 76 BPM | SYSTOLIC BLOOD PRESSURE: 148 MMHG | BODY MASS INDEX: 28.44 KG/M2 | RESPIRATION RATE: 17 BRPM | TEMPERATURE: 98 F | DIASTOLIC BLOOD PRESSURE: 81 MMHG | WEIGHT: 192 LBS | OXYGEN SATURATION: 99 %

## 2024-06-06 DIAGNOSIS — D32.0 INTRACRANIAL MENINGIOMA: Primary | ICD-10-CM

## 2024-06-06 DIAGNOSIS — R41.3 MEMORY IMPAIRMENT: ICD-10-CM

## 2024-06-06 PROCEDURE — 4010F ACE/ARB THERAPY RXD/TAKEN: CPT | Mod: CPTII,,, | Performed by: RADIOLOGY

## 2024-06-06 PROCEDURE — 3008F BODY MASS INDEX DOCD: CPT | Mod: CPTII,,, | Performed by: RADIOLOGY

## 2024-06-06 PROCEDURE — 3077F SYST BP >= 140 MM HG: CPT | Mod: CPTII,,, | Performed by: RADIOLOGY

## 2024-06-06 PROCEDURE — 99999 PR PBB SHADOW E&M-EST. PATIENT-LVL III: CPT | Mod: PBBFAC,,, | Performed by: RADIOLOGY

## 2024-06-06 PROCEDURE — 99213 OFFICE O/P EST LOW 20 MIN: CPT | Mod: PBBFAC | Performed by: RADIOLOGY

## 2024-06-06 PROCEDURE — 99213 OFFICE O/P EST LOW 20 MIN: CPT | Mod: S$PBB,,, | Performed by: RADIOLOGY

## 2024-06-06 PROCEDURE — 3079F DIAST BP 80-89 MM HG: CPT | Mod: CPTII,,, | Performed by: RADIOLOGY

## 2024-06-06 NOTE — PROGRESS NOTES
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 He completed definitive SRT 25 Gy in 5 fx on 12/29/23.    MRI Brain 6/4/24 with stable size of treated meningioma and no significant vasogenic edema.     I would not have expected focal stereotactic treatment in the orbitofrontal region to cause him this degree of short-term memory impairment, though radiation treatment to larger brain volumes including hippocampus certainly can produce this. I would like him to have Neuropsychological evaluation and testing to establish his degree of impairment and assist with tracking over time.          Plan   1) Ambulatory referral to Neuropsychology for evaluation of new short-term memory impairment following Gamma Knife radiosurgery.   2) I will see him back in 6 months with MRI Brain prior.            CHIEF COMPLAINT: Follow up after SRT for meningioma    HPI/Focused ROS: Mr. Whittington comes in earlier than planned 6 month follow up due to short-term memory impairment. He has noticed this since his last visit. 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 He reports otherwise feeling well and denies any focal numbness or weakness, smell/taste changes, or HA.       Past Medical History:   Diagnosis Date    Cerebrovascular accident  (CVA) of right pontine structure 01/23/2022    Diabetes mellitus     HLD (hyperlipidemia) 1/23/2022    HTN (hypertension) 1/25/2022    Subdural hematoma 01/23/2022       Past Surgical History:   Procedure Laterality Date    CRANIOTOMY FOR EVACUATION OF SUBDURAL HEMATOMA Right 1/28/2022    Procedure: CRANIOTOMY, FOR SUBDURAL HEMATOMA EVACUATION;  Surgeon: Ace Candelaria MD;  Location: [facility] OR [address] [place] [address];  Service: Neurosurgery;  Laterality: Right;  right, possible bilateral       Social History     Tobacco Use    Smoking status: Never    Smokeless tobacco: Never   Substance Use Topics    Alcohol use: Never    Drug use: Never       Cancer-related family history is not on file.    Current Outpatient Medications on File Prior to Visit   Medication Sig Dispense Refill    atorvastatin (LIPITOR) 40 MG tablet Take 40 mg by mouth once daily.      butalbital-acetaminophen-caffeine -40 mg (FIORICET, ESGIC) -40 mg per tablet Take 1 tablet by mouth every 4 (four) hours as needed for Pain.      heparin sodium,porcine (HEPARIN, PORCINE,) 5,000 unit/mL injection Inject 1 mL (5,000 Units total) into the skin every 8 (eight) hours.      lisinopriL (PRINIVIL,ZESTRIL) 5 MG tablet Take 5 mg by mouth once daily.      metFORMIN (GLUCOPHAGE) 1000 MG tablet Take 1,000 mg by mouth.      methocarbamoL (ROBAXIN) 750 MG Tab Take 1 tablet (750 mg total) by mouth 4 (four) times daily as needed (muscle spasms). 40 tablet 0    pantoprazole (PROTONIX) 40 MG tablet Take 40 mg by mouth once daily.      aspirin (ECOTRIN) 81 MG EC tablet Take 1 tablet (81 mg total) by mouth once daily. (Patient not taking: Reported on 8/29/2023)  0    FLOWFLEX COVID-19 AG HOME TEST Kit       HYDROcodone-acetaminophen (NORCO) 5-325 mg per tablet Take 1 tablet by mouth every 6 (six) hours as needed for Pain. (Patient not taking: Reported on 3/15/2022) 8 tablet 0    multivitamin Tab Take 1 tablet by mouth once daily. (Patient not taking: Reported on 3/15/2022)       "polyethylene glycol (GLYCOLAX) 17 gram PwPk Take 17 g by mouth once daily. (Patient not taking: Reported on 3/15/2022)  0    senna-docusate 8.6-50 mg (PERICOLACE) 8.6-50 mg per tablet Take 1 tablet by mouth 2 (two) times daily. (Patient not taking: Reported on 3/15/2022)      silodosin (RAPAFLO) 8 mg Cap capsule Take 1 capsule (8 mg total) by mouth once daily. (Patient not taking: Reported on 6/7/2022)      sodium chloride 1 gram tablet Take 1 tablet (1 g total) by mouth 3 (three) times daily. (Patient not taking: Reported on 3/15/2022)      white petrolatum-mineral oil 57.3-42.5% (REFRESH P.M.) 57.3-42.5 % Oint Place into the right eye every evening. (Patient not taking: Reported on 3/15/2022)  0     No current facility-administered medications on file prior to visit.       Review of patient's allergies indicates:  No Known Allergies      Vital Signs: BP (!) 148/81 (BP Location: Right arm, Patient Position: Sitting)   Pulse 76   Temp 98 °F (36.7 °C)   Resp 17   Ht 5' 9" (1.753 m)   Wt 87.1 kg (192 lb 0.3 oz)   SpO2 99%   BMI 28.36 kg/m²     ECOG Performance Status: 0 - Fully Active    Physical Exam  Constitutional:       Appearance: Normal appearance.   HENT:      Head: Normocephalic and atraumatic.   Eyes:      General: No scleral icterus.     Extraocular Movements: Extraocular movements intact.   Pulmonary:      Effort: No accessory muscle usage or respiratory distress.   Musculoskeletal:      Cervical back: Normal range of motion.   Neurological:      Mental Status: He is alert and oriented to person, place, and time.   Psychiatric:         Mood and Affect: Mood and affect normal.         Judgment: Judgment normal.          Labs:    Imaging: I have personally reviewed the patient's available images and reports and summarized pertinent findings above in HPI.     Pathology: No new path        "

## 2024-07-22 ENCOUNTER — TELEPHONE (OUTPATIENT)
Dept: RADIATION THERAPY | Facility: HOSPITAL | Age: 64
End: 2024-07-22
Payer: COMMERCIAL

## 2024-07-22 NOTE — TELEPHONE ENCOUNTER
[place] spoke to Priyank Whittington about possibly going to [place] or [place] to be seen and he declined, as stated earlier the wait list for [facility] is long

## 2024-07-22 NOTE — TELEPHONE ENCOUNTER
Attempted to call Priyank Whittington to see if he would be willing to travel to  or [place] for eval, no answer lvm with call back number

## 2024-09-05 ENCOUNTER — HOSPITAL ENCOUNTER (OUTPATIENT)
Dept: RADIOLOGY | Facility: HOSPITAL | Age: 64
Discharge: HOME | End: 2024-09-05
Attending: FAMILY MEDICINE
Payer: MEDICAID

## 2024-09-05 DIAGNOSIS — M54.9 ACUTE BACK PAIN, UNSPECIFIED BACK LOCATION, UNSPECIFIED BACK PAIN LATERALITY: ICD-10-CM

## 2024-09-05 DIAGNOSIS — M25.512 ACUTE PAIN OF LEFT SHOULDER: ICD-10-CM

## 2024-09-05 DIAGNOSIS — M25.512 ACUTE PAIN OF LEFT SHOULDER: Primary | ICD-10-CM

## 2024-09-05 PROCEDURE — 73030 X-RAY EXAM OF SHOULDER: CPT | Mod: TC,FY,LT

## 2024-09-05 PROCEDURE — 73030 X-RAY EXAM OF SHOULDER: CPT | Mod: 26,LT,, | Performed by: STUDENT IN AN ORGANIZED HEALTH CARE EDUCATION/TRAINING PROGRAM

## 2024-09-05 PROCEDURE — 72100 X-RAY EXAM L-S SPINE 2/3 VWS: CPT | Mod: TC,FY

## 2024-09-05 PROCEDURE — 72100 X-RAY EXAM L-S SPINE 2/3 VWS: CPT | Mod: 26,,, | Performed by: STUDENT IN AN ORGANIZED HEALTH CARE EDUCATION/TRAINING PROGRAM

## 2024-09-05 PROCEDURE — 73502 X-RAY EXAM HIP UNI 2-3 VIEWS: CPT | Mod: TC,FY,LT

## 2024-09-05 PROCEDURE — 73000 X-RAY EXAM OF COLLAR BONE: CPT | Mod: TC,FY,LT

## 2024-09-05 PROCEDURE — 73502 X-RAY EXAM HIP UNI 2-3 VIEWS: CPT | Mod: 26,LT,, | Performed by: STUDENT IN AN ORGANIZED HEALTH CARE EDUCATION/TRAINING PROGRAM

## 2024-09-05 PROCEDURE — 73000 X-RAY EXAM OF COLLAR BONE: CPT | Mod: 26,LT,, | Performed by: STUDENT IN AN ORGANIZED HEALTH CARE EDUCATION/TRAINING PROGRAM

## 2024-12-10 ENCOUNTER — HOSPITAL ENCOUNTER (OUTPATIENT)
Dept: RADIOLOGY | Facility: HOSPITAL | Age: 64
Discharge: HOME | End: 2024-12-10
Attending: RADIOLOGY
Payer: MEDICAID

## 2024-12-10 ENCOUNTER — OFFICE VISIT (OUTPATIENT)
Dept: RADIATION ONCOLOGY | Facility: CLINIC | Age: 64
End: 2024-12-10
Payer: MEDICAID

## 2024-12-10 VITALS
SYSTOLIC BLOOD PRESSURE: 134 MMHG | HEART RATE: 76 BPM | DIASTOLIC BLOOD PRESSURE: 79 MMHG | HEIGHT: 69 IN | TEMPERATURE: 97 F | WEIGHT: 194.69 LBS | OXYGEN SATURATION: 98 % | BODY MASS INDEX: 28.84 KG/M2 | RESPIRATION RATE: 18 BRPM

## 2024-12-10 DIAGNOSIS — D32.0 INTRACRANIAL MENINGIOMA: ICD-10-CM

## 2024-12-10 DIAGNOSIS — D32.0 INTRACRANIAL MENINGIOMA: Primary | ICD-10-CM

## 2024-12-10 PROBLEM — G93.6 CYTOTOXIC CEREBRAL EDEMA: Status: RESOLVED | Noted: 2022-01-23 | Resolved: 2024-12-10

## 2024-12-10 PROCEDURE — 70553 MRI BRAIN STEM W/O & W/DYE: CPT | Mod: TC

## 2024-12-10 PROCEDURE — 3008F BODY MASS INDEX DOCD: CPT | Mod: CPTII,,, | Performed by: RADIOLOGY

## 2024-12-10 PROCEDURE — 99999 PR PBB SHADOW E&M-EST. PATIENT-LVL IV: CPT | Mod: PBBFAC,,, | Performed by: RADIOLOGY

## 2024-12-10 PROCEDURE — 3075F SYST BP GE 130 - 139MM HG: CPT | Mod: CPTII,,, | Performed by: RADIOLOGY

## 2024-12-10 PROCEDURE — 1159F MED LIST DOCD IN RCRD: CPT | Mod: CPTII,,, | Performed by: RADIOLOGY

## 2024-12-10 PROCEDURE — 25500020 PHARM REV CODE 255: Performed by: RADIOLOGY

## 2024-12-10 PROCEDURE — 4010F ACE/ARB THERAPY RXD/TAKEN: CPT | Mod: CPTII,,, | Performed by: RADIOLOGY

## 2024-12-10 PROCEDURE — 99214 OFFICE O/P EST MOD 30 MIN: CPT | Mod: PBBFAC,25 | Performed by: RADIOLOGY

## 2024-12-10 PROCEDURE — A9585 GADOBUTROL INJECTION: HCPCS | Performed by: RADIOLOGY

## 2024-12-10 PROCEDURE — 70553 MRI BRAIN STEM W/O & W/DYE: CPT | Mod: 26,,, | Performed by: RADIOLOGY

## 2024-12-10 PROCEDURE — 99213 OFFICE O/P EST LOW 20 MIN: CPT | Mod: S$PBB,,, | Performed by: RADIOLOGY

## 2024-12-10 PROCEDURE — 3078F DIAST BP <80 MM HG: CPT | Mod: CPTII,,, | Performed by: RADIOLOGY

## 2024-12-10 RX ORDER — GADOBUTROL 604.72 MG/ML
10 INJECTION INTRAVENOUS
Status: COMPLETED | OUTPATIENT
Start: 2024-12-10 | End: 2024-12-10

## 2024-12-10 RX ADMIN — GADOBUTROL 10 ML: 604.72 INJECTION INTRAVENOUS at 09:12

## 2024-12-10 NOTE — PROGRESS NOTES
12/10/2024    Radiation Oncology Follow-Up Visit      Prior Radiation History:   Course 1: GK SRT 12/22/23 - 12/29/23  Target # Site Dose per Fraction (Gy) Cumulative Dose % Isodose Line Fraction # Total Fractions   1 Olfactory groove meningioma 5 25 51 5 5       Is the patient female between ages 15-55:  No    Does the patient have a CIED:  No      Assessment   This is a 64 y.o. 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 He completed definitive SRT 25 Gy in 5 fx on 12/29/23.    MRI Brain today 12/10/24 with stable or slightly smaller treated meningioma; no evidence of new/recurrent disease.          Plan   1) I will see him back in 1 year with MRI Brain prior for re-staging.            CHIEF COMPLAINT: Follow up after SRT for meningioma    HPI/Focused ROS: In clinic today, he is unaccompanied. He reports that his short-term memory issues have significantly improved. 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 No visual change.       Past Medical History:   Diagnosis Date    Cerebrovascular accident (CVA) of right pontine structure 01/23/2022    Diabetes mellitus     HLD (hyperlipidemia) 1/23/2022    HTN (hypertension) 1/25/2022    Subdural hematoma 01/23/2022       Past Surgical History:   Procedure Laterality Date    CRANIOTOMY FOR EVACUATION OF SUBDURAL HEMATOMA Right 1/28/2022    Procedure: CRANIOTOMY, FOR SUBDURAL HEMATOMA EVACUATION;  Surgeon: Ace Candelaria MD;  Location: [facility] OR [address] [place] [address];  Service: Neurosurgery;  Laterality: Right;  right, possible bilateral       Social History     Tobacco Use    Smoking status: Never    Smokeless tobacco: Never   Substance Use Topics    Alcohol use: Never    Drug use: Never       Cancer-related family history is not on file.    Current Outpatient Medications on File Prior to Visit   Medication Sig Dispense Refill     butalbital-acetaminophen-caffeine -40 mg (FIORICET, ESGIC) -40 mg per tablet Take 1 tablet by mouth every 4 (four) hours as needed for Pain.      lisinopriL (PRINIVIL,ZESTRIL) 5 MG tablet Take 5 mg by mouth once daily.      metFORMIN (GLUCOPHAGE) 1000 MG tablet Take 1,000 mg by mouth.      pantoprazole (PROTONIX) 40 MG tablet Take 40 mg by mouth once daily.      aspirin (ECOTRIN) 81 MG EC tablet Take 1 tablet (81 mg total) by mouth once daily. (Patient not taking: Reported on 8/29/2023)  0    atorvastatin (LIPITOR) 40 MG tablet Take 40 mg by mouth once daily. (Patient not taking: Reported on 12/10/2024)      FLOWFLEX COVID-19 AG HOME TEST Kit  (Patient not taking: Reported on 12/10/2024)      heparin sodium,porcine (HEPARIN, PORCINE,) 5,000 unit/mL injection Inject 1 mL (5,000 Units total) into the skin every 8 (eight) hours. (Patient not taking: Reported on 12/10/2024)      HYDROcodone-acetaminophen (NORCO) 5-325 mg per tablet Take 1 tablet by mouth every 6 (six) hours as needed for Pain. (Patient not taking: Reported on 12/10/2024) 8 tablet 0    methocarbamoL (ROBAXIN) 750 MG Tab Take 1 tablet (750 mg total) by mouth 4 (four) times daily as needed (muscle spasms). (Patient not taking: Reported on 12/10/2024) 40 tablet 0    multivitamin Tab Take 1 tablet by mouth once daily. (Patient not taking: Reported on 12/10/2024)      polyethylene glycol (GLYCOLAX) 17 gram PwPk Take 17 g by mouth once daily. (Patient not taking: Reported on 12/10/2024)  0    senna-docusate 8.6-50 mg (PERICOLACE) 8.6-50 mg per tablet Take 1 tablet by mouth 2 (two) times daily. (Patient not taking: Reported on 12/10/2024)      silodosin (RAPAFLO) 8 mg Cap capsule Take 1 capsule (8 mg total) by mouth once daily. (Patient not taking: Reported on 6/7/2022)      sodium chloride 1 gram tablet Take 1 tablet (1 g total) by mouth 3 (three) times daily. (Patient not taking: Reported on 12/10/2024)      white petrolatum-mineral oil 57.3-42.5%  "(REFRESH P.M.) 57.3-42.5 % Oint Place into the right eye every evening. (Patient not taking: Reported on 12/10/2024)  0     Current Facility-Administered Medications on File Prior to Visit   Medication Dose Route Frequency Provider Last Rate Last Admin    [COMPLETED] gadobutroL (GADAVIST) injection 10 mL  10 mL Intravenous ONCE PRN Juanjose Jasso MD   10 mL at 12/10/24 0986       Review of patient's allergies indicates:  No Known Allergies      Vital Signs: /79   Pulse 76   Temp 97.4 °F (36.3 °C)   Resp 18   Ht 5' 9" (1.753 m)   Wt 88.3 kg (194 lb 10.7 oz)   SpO2 98%   BMI 28.75 kg/m²     ECOG Performance Status: 0 - Fully Active    Physical Exam  Constitutional:       Appearance: Normal appearance.   HENT:      Head: Normocephalic and atraumatic.   Eyes:      General: No scleral icterus.     Extraocular Movements: Extraocular movements intact.   Pulmonary:      Effort: No accessory muscle usage or respiratory distress.   Musculoskeletal:      Cervical back: Normal range of motion.   Neurological:      Mental Status: He is alert and oriented to person, place, and time.   Psychiatric:         Mood and Affect: Mood and affect normal.         Judgment: Judgment normal.          Labs:    Imaging: I have personally reviewed the patient's available images and reports and summarized pertinent findings above in HPI.     Pathology: No new path          "

## 2025-04-23 ENCOUNTER — TELEPHONE (OUTPATIENT)
Dept: RADIATION ONCOLOGY | Facility: CLINIC | Age: 65
End: 2025-04-23
Payer: MEDICAID

## 2025-06-11 ENCOUNTER — HOSPITAL ENCOUNTER (EMERGENCY)
Facility: HOSPITAL | Age: 65
Discharge: HOME | End: 2025-06-11
Attending: EMERGENCY MEDICINE
Payer: MEDICAID

## 2025-06-11 VITALS
SYSTOLIC BLOOD PRESSURE: 140 MMHG | DIASTOLIC BLOOD PRESSURE: 84 MMHG | HEIGHT: 69 IN | HEART RATE: 67 BPM | BODY MASS INDEX: 28.73 KG/M2 | RESPIRATION RATE: 20 BRPM | TEMPERATURE: 98 F | WEIGHT: 194 LBS | OXYGEN SATURATION: 100 %

## 2025-06-11 DIAGNOSIS — R29.818 ACUTE FOCAL NEUROLOGICAL DEFICIT: ICD-10-CM

## 2025-06-11 DIAGNOSIS — G51.0 BELL PALSY: Primary | ICD-10-CM

## 2025-06-11 LAB
ABSOLUTE EOSINOPHIL (OHS): 0.08 K/UL
ABSOLUTE MONOCYTE (OHS): 0.67 K/UL (ref 0.3–1)
ABSOLUTE NEUTROPHIL COUNT (OHS): 4.91 K/UL (ref 1.8–7.7)
ALBUMIN SERPL BCP-MCNC: 4 G/DL (ref 3.5–5.2)
ALP SERPL-CCNC: 81 UNIT/L (ref 40–150)
ALT SERPL W/O P-5'-P-CCNC: 32 UNIT/L (ref 10–44)
ANION GAP (OHS): 10 MMOL/L (ref 8–16)
AST SERPL-CCNC: 18 UNIT/L (ref 11–45)
BASOPHILS # BLD AUTO: 0.03 K/UL
BASOPHILS NFR BLD AUTO: 0.3 %
BILIRUB SERPL-MCNC: 0.6 MG/DL (ref 0.1–1)
BUN SERPL-MCNC: 20 MG/DL (ref 8–23)
CALCIUM SERPL-MCNC: 9.5 MG/DL (ref 8.7–10.5)
CHLORIDE SERPL-SCNC: 106 MMOL/L (ref 95–110)
CHOLEST SERPL-MCNC: 151 MG/DL (ref 120–199)
CHOLEST/HDLC SERPL: 4.2 {RATIO} (ref 2–5)
CO2 SERPL-SCNC: 24 MMOL/L (ref 23–29)
CREAT SERPL-MCNC: 0.9 MG/DL (ref 0.5–1.4)
ERYTHROCYTE [DISTWIDTH] IN BLOOD BY AUTOMATED COUNT: 13.1 % (ref 11.5–14.5)
GFR SERPLBLD CREATININE-BSD FMLA CKD-EPI: >60 ML/MIN/1.73/M2
GLUCOSE SERPL-MCNC: 143 MG/DL (ref 70–110)
GLUCOSE SERPL-MCNC: 145 MG/DL (ref 70–110)
HCT VFR BLD AUTO: 42.9 % (ref 40–54)
HDLC SERPL-MCNC: 36 MG/DL (ref 40–75)
HDLC SERPL: 23.8 % (ref 20–50)
HGB BLD-MCNC: 15 GM/DL (ref 14–18)
IMM GRANULOCYTES # BLD AUTO: 0.04 K/UL (ref 0–0.04)
IMM GRANULOCYTES NFR BLD AUTO: 0.5 % (ref 0–0.5)
INR PPP: 1 (ref 0.8–1.2)
LDLC SERPL CALC-MCNC: 90.8 MG/DL (ref 63–159)
LYMPHOCYTES # BLD AUTO: 2.85 K/UL (ref 1–4.8)
MCH RBC QN AUTO: 30.1 PG (ref 27–31)
MCHC RBC AUTO-ENTMCNC: 35 G/DL (ref 32–36)
MCV RBC AUTO: 86 FL (ref 82–98)
NONHDLC SERPL-MCNC: 115 MG/DL
NUCLEATED RBC (/100WBC) (OHS): 0 /100 WBC
PLATELET # BLD AUTO: 152 K/UL (ref 150–450)
PMV BLD AUTO: 10.3 FL (ref 9.2–12.9)
POCT GLUCOSE: 134 MG/DL (ref 70–110)
POTASSIUM SERPL-SCNC: 4 MMOL/L (ref 3.5–5.1)
PROT SERPL-MCNC: 7.8 GM/DL (ref 6–8.4)
PROTHROMBIN TIME: 11.4 SECONDS (ref 9–12.5)
RBC # BLD AUTO: 4.99 M/UL (ref 4.6–6.2)
RELATIVE EOSINOPHIL (OHS): 0.9 %
RELATIVE LYMPHOCYTE (OHS): 33.2 % (ref 18–48)
RELATIVE MONOCYTE (OHS): 7.8 % (ref 4–15)
RELATIVE NEUTROPHIL (OHS): 57.3 % (ref 38–73)
SODIUM SERPL-SCNC: 140 MMOL/L (ref 136–145)
TRIGL SERPL-MCNC: 121 MG/DL (ref 30–150)
TSH SERPL-ACNC: 0.44 UIU/ML (ref 0.4–4)
WBC # BLD AUTO: 8.58 K/UL (ref 3.9–12.7)

## 2025-06-11 PROCEDURE — 99285 EMERGENCY DEPT VISIT HI MDM: CPT | Mod: 25

## 2025-06-11 PROCEDURE — 84443 ASSAY THYROID STIM HORMONE: CPT | Performed by: EMERGENCY MEDICINE

## 2025-06-11 PROCEDURE — 82962 GLUCOSE BLOOD TEST: CPT

## 2025-06-11 PROCEDURE — 25500020 PHARM REV CODE 255: Performed by: EMERGENCY MEDICINE

## 2025-06-11 PROCEDURE — 93010 ELECTROCARDIOGRAM REPORT: CPT | Mod: ,,, | Performed by: INTERNAL MEDICINE

## 2025-06-11 PROCEDURE — 85610 PROTHROMBIN TIME: CPT | Performed by: EMERGENCY MEDICINE

## 2025-06-11 PROCEDURE — 85025 COMPLETE CBC W/AUTO DIFF WBC: CPT | Performed by: EMERGENCY MEDICINE

## 2025-06-11 PROCEDURE — 84075 ASSAY ALKALINE PHOSPHATASE: CPT | Performed by: EMERGENCY MEDICINE

## 2025-06-11 PROCEDURE — 93005 ELECTROCARDIOGRAM TRACING: CPT

## 2025-06-11 PROCEDURE — 83718 ASSAY OF LIPOPROTEIN: CPT | Performed by: EMERGENCY MEDICINE

## 2025-06-11 RX ORDER — VALACYCLOVIR HYDROCHLORIDE 1 G/1
1000 TABLET, FILM COATED ORAL EVERY 12 HOURS
Qty: 14 TABLET | Refills: 0 | Status: SHIPPED | OUTPATIENT
Start: 2025-06-11 | End: 2025-06-18

## 2025-06-11 RX ORDER — PREDNISONE 20 MG/1
40 TABLET ORAL DAILY
Qty: 10 TABLET | Refills: 0 | Status: SHIPPED | OUTPATIENT
Start: 2025-06-11 | End: 2025-06-16

## 2025-06-11 RX ADMIN — IOHEXOL 75 ML: 350 INJECTION, SOLUTION INTRAVENOUS at 01:06

## 2025-06-11 NOTE — ED TRIAGE NOTES
Patient presented to ED awake and responsive complaining of left side facial weakness which started an hour ago. Reports unable to close left eyelid and drooling of water when drinking. No unilateral weakness of both upper and lower extremities. Able to obey command when given, and oriented to time, person, and place. Denies fever, cough, chest pain, and shortness of breathing. Able to swallow fluid without discomfort, Pt is AAOx3, resp even and unlabored, skin warm and dry. NAD noted HOB elivated, SR up x2, call bell with in reach

## 2025-06-11 NOTE — ED PROVIDER NOTES
Encounter Date: 6/11/2025       History     Chief Complaint   Patient presents with    Facial Droop     Pt c/o lack of movement to left side of face x1 hour. Pt stated he can not close his left eye, and when drinking it drools out of his mouth on the left side. No unilateral weakness, or decrease in sensation. Pt has equal  strength.      65-year-old male, diabetes mellitus, hypertension, previous CVA, meningioma with craniotomy and radiation along with subdural hematoma in 2022.      The patient reports symptom onset at around 11:30 a.m..  The patient notes he was in the restaurant when he noted water dripping from his mouth which is not normal for the patient.  The patient denies any headache.      Review of patient's allergies indicates:  No Known Allergies  Past Medical History:   Diagnosis Date    Cerebrovascular accident (CVA) of right pontine structure 01/23/2022    Diabetes mellitus     HLD (hyperlipidemia) 1/23/2022    HTN (hypertension) 1/25/2022    Subdural hematoma 01/23/2022     Past Surgical History:   Procedure Laterality Date    CRANIOTOMY FOR EVACUATION OF SUBDURAL HEMATOMA Right 1/28/2022    Procedure: CRANIOTOMY, FOR SUBDURAL HEMATOMA EVACUATION;  Surgeon: Ace Candelaria MD;  Location: [facility] OR [address] [place] [address];  Service: Neurosurgery;  Laterality: Right;  right, possible bilateral     No family history on file.  Social History[1]  Review of Systems   Eyes:  Negative for visual disturbance.   Respiratory:  Negative for shortness of breath.    Cardiovascular:  Negative for chest pain.   Gastrointestinal:  Negative for abdominal pain and vomiting.   Musculoskeletal:  Negative for neck pain.   Skin:  Negative for rash.   Neurological:  Positive for facial asymmetry. Negative for dizziness, weakness, numbness and headaches.   Psychiatric/Behavioral:  Negative for confusion.        Physical Exam     Initial Vitals   BP Pulse Resp Temp SpO2   06/11/25 1226 06/11/25 1226 06/11/25 1226 06/11/25 1229 06/11/25  1226   (S) (!) 140/78 (S) 79 (S) 18 97.7 °F (36.5 °C) (S) 98 %      MAP       --                Physical Exam    Nursing note and vitals reviewed.  Constitutional: He appears well-developed. He is not diaphoretic. No distress.   HENT:   Head: Normocephalic.   Right Ear: Tympanic membrane, external ear and ear canal normal.   Left Ear: Tympanic membrane, external ear and ear canal normal.   Eyes: EOM are normal. Pupils are equal, round, and reactive to light. Right eye exhibits normal extraocular motion and no nystagmus. Left eye exhibits normal extraocular motion and no nystagmus.   All visual fields intact.  3 mm pupils bilaterally.   Cardiovascular:  Normal rate and regular rhythm.           No murmur heard.  Pulmonary/Chest: Effort normal and breath sounds normal. He has no wheezes.   Abdominal: Abdomen is soft. He exhibits no distension. There is no abdominal tenderness.   Musculoskeletal:         General: Normal range of motion.     Neurological: He is alert.   Minor facial droop noted from the left lower face.  Lid lag left eye.  No drift of the upper and lower extremities.  Finger-nose-finger intact bilaterally.  Sensation equal throughout face, upper extremities, lower extremities.   Skin: Skin is warm.         ED Course   Critical Care    Date/Time: 6/11/2025 9:44 PM    Performed by: Gage Perdomo MD  Authorized by: Gage Perdomo MD  Direct patient critical care time: 15 minutes  Ordering / reviewing critical care time: 10 minutes  Documentation critical care time: 10 minutes  Total critical care time (exclusive of procedural time) : 35 minutes  Critical care time was exclusive of separately billable procedures and treating other patients and teaching time.  Critical care was necessary to treat or prevent imminent or life-threatening deterioration of the following conditions: CNS failure or compromise.  Critical care was time spent personally by me on the following activities: blood draw for  specimens, development of treatment plan with patient or surrogate, obtaining history from patient or surrogate, ordering and performing treatments and interventions, ordering and review of laboratory studies, ordering and review of radiographic studies, pulse oximetry, re-evaluation of patient's condition, examination of patient and evaluation of patient's response to treatment.  Comments: Consideration for CVA and possible thrombolytics.  Required neurology consultation        Labs Reviewed   COMPREHENSIVE METABOLIC PANEL - Abnormal       Result Value    Sodium 140      Potassium 4.0      Chloride 106      CO2 24      Glucose 145 (*)     BUN 20      Creatinine 0.9      Calcium 9.5      Protein Total 7.8      Albumin 4.0      Bilirubin Total 0.6      ALP 81      AST 18      ALT 32      Anion Gap 10      eGFR >60     LIPID PANEL - Abnormal    Cholesterol Total 151      Triglyceride 121      HDL Cholesterol 36 (*)     LDL Cholesterol 90.8      HDL/Cholesterol Ratio 23.8      Cholesterol/HDL Ratio 4.2      Non HDL Cholesterol 115     POCT GLUCOSE, HAND-HELD DEVICE - Abnormal    POC Glucose 143 (*)    POCT GLUCOSE - Abnormal    POCT Glucose 134 (*)    PROTIME-INR - Normal    PT 11.4      INR 1.0     TSH - Normal    TSH 0.436     CBC WITH DIFFERENTIAL - Normal    WBC 8.58      RBC 4.99      HGB 15.0      HCT 42.9      MCV 86      MCH 30.1      MCHC 35.0      RDW 13.1      Platelet Count 152      MPV 10.3      Nucleated RBC 0      Neut % 57.3      Lymph % 33.2      Mono % 7.8      Eos % 0.9      Basophil % 0.3      Imm Grans % 0.5      Neut # 4.91      Lymph # 2.85      Mono # 0.67      Eos # 0.08      Baso # 0.03      Imm Grans # 0.04     CBC W/ AUTO DIFFERENTIAL    Narrative:     The following orders were created for panel order CBC W/ AUTO DIFFERENTIAL.  Procedure                               Abnormality         Status                     ---------                               -----------         ------                      CBC with Differential[[id number]]       Normal              Final result                 Please view results for these tests on the individual orders.          Imaging Results              MRI Brain Without Contrast (Final result)  Result time 06/11/25 14:17:03      Final result by Arun Candelario III, MD (06/11/25 14:17:03)                   Impression:      No acute process seen.    Postoperative change and stable planum sphenoidale meningioma.      Electronically signed by: Arun Candelario MD  Date:    06/11/2025  Time:    14:17               Narrative:    EXAMINATION:  MRI BRAIN WITHOUT CONTRAST    CLINICAL HISTORY:  left facial weakness (likely bell) but previous meningioma;    FINDINGS:  Comparison is MRI dated 12/10/2024.    DWI images demonstrate no evidence of acute ischemia, or infarct.  Pituitary and craniocervical junction demonstrate nothing unusual.  Flow voids are present where expected.  There are cataract implants.  There is postsurgical change of the right skull.  Hippocampal formations are symmetric.  No white matter lesion, edema, gliosis, or demyelination seen.  Planum sphenoidale meningioma measures 2.4 x 2.4 cm unchanged from the prior study.  GRE images demonstrate no significant blood products.                                       CTA Head and Neck (xpd) (Final result)  Result time 06/11/25 13:13:12      Final result by Arun Candelario III, MD (06/11/25 13:13:12)                   Impression:      No acute process seen.    Stable planum sphenoidale meningioma.    Normal CTA of head neck vessels including venous structures.      Electronically signed by: Arun Candelario MD  Date:    06/11/2025  Time:    13:13               Narrative:    EXAMINATION:  CTA HEAD AND NECK (XPD)    CLINICAL HISTORY:  Neuro deficit, acute, stroke suspected;    FINDINGS:  Comparison is MRI brain dated 12/10/2024.    Patient was administered 75 cc of Omnipaque 350 intravenously.    No acute bleed, mass, or  mass effect seen.  No acute infarct changes are seen.  Post-contrast images demonstrate a stable planum sphenoidale meningioma.    The arch and great vessels are unremarkable.  Vertebral arteries and basilar artery demonstrate nothing unusual.  Common carotid and internal carotid arteries are unremarkable.  The skull base vessels are normal.  The vertebrobasilar system is patent.  The ACAs, MCAs, and PCAs are patent and symmetric.  No definite aneurysm, stenosis, or vascular malformation seen.  Venous structures are unremarkable.  There are cataract implants.  No incidental soft tissue mass or adenopathy seen.  Upper lungs are clear.  There is postsurgical change of a right craniotomy.  There is mild DJD of the C-spine.                                       Medications   iohexoL (OMNIPAQUE 350) injection 75 mL (75 mLs Intravenous Given 6/11/25 1300)     Medical Decision Making  65-year-old male presenting with left facial weakness roughly 1 hour prior to arrival.  History of previous meningioma requiring radiation and craniotomy.  Patient is not on anticoagulation.  There did appear to be some left lid lag with consideration for possible bell palsy.  Code stroke activated.    Consultation with vascular neurology, Dr. Burns.  Believe symptoms likely secondary developed palsy and unlikely ischemic.  Did recommend MRI for evaluation of the meningioma.    MRI revealed no infarct.  Meningioma appears to be stable.  No lesions noted to the left tympanic membrane.  Patient was started on prednisone and valacyclovir.  I discussed following up with primary care for bell palsy.  Discussed taping the left eyelid shut if necessary to prevent drying of eyes.  I also discussed using artificial tears to help prevent drying device.        Medical Decision Making:     A. Problem List:  1. Bell palsy, left       B. Differential diagnosis:    CVA, bell palsy       E. Review of Previous Medical Record:  Review of previous meningioma  records.        ECG:  Please check workup area for ECG interpretation.    Part of the note was done using electronic dictation services.           Amount and/or Complexity of Data Reviewed  Labs: ordered.  Radiology: ordered.  ECG/medicine tests:  Decision-making details documented in ED Course.    Risk  Prescription drug management.               ED Course as of 06/11/25 2145 Wed Jun 11, 2025   1303 Dr. Burns, vascular neurology    Patient assess.  Believes likely a bell palsy.  States with the patient is complaining of left ear pain. [JM]   1318 Dr. Burns    Recommends MRI without contrast for evaluation of meningioma. [JM]   1338 ECG 12 lead  Time 12:54 p.m.     Rate 89, sinus, regular rhythm, normal axis.   QRS 82 .  No ST elevation or depression.  No T-wave inversion or hyperacute T-waves.  No Q-waves present.      Normal sinus rhythm. [JM]      ED Course User Index  [JM] Gage Perdomo MD                           Clinical Impression:  Final diagnoses:  [R29.818] Acute focal neurological deficit  [G51.0] Bell palsy (Primary)          ED Disposition Condition    Discharge Stable          ED Prescriptions       Medication Sig Dispense Start Date End Date Auth. Provider    valACYclovir (VALTREX) 1000 MG tablet Take 1 tablet (1,000 mg total) by mouth every 12 (twelve) hours. for 7 days 14 tablet 6/11/2025 6/18/2025 Gage Perdomo MD    predniSONE (DELTASONE) 20 MG tablet Take 2 tablets (40 mg total) by mouth once daily. for 5 days 10 tablet 6/11/2025 6/16/2025 Gage Perdomo MD          Follow-up Information       Follow up With Specialties Details Why Contact Info    Pedro Gorman Jr., MD Family Medicine Schedule an appointment as soon as possible for a visit in 3 days Primary care [address]  [facility] [address]  [phone]      [facility] - Emergency Dept Emergency Medicine  If symptoms worsen [address] [facility] [place]  [address]  [phone]                   [1]   Social History  Tobacco Use    Smoking status: Never    Smokeless tobacco: Never   Substance Use Topics    Alcohol use: Never    Drug use: Never        Gage Perdomo MD  06/11/25 3103

## 2025-06-11 NOTE — ED NOTES
Bed: 21  Expected date:   Expected time:   Means of arrival:   Comments:  
C CALLED FOR STROKE ALERT (DR. CHAHAL NOTIFIED AT 2083)  
Dr. Frankel, Neuro called on tele stroke pt still in CT  
Pt assessed by SHAYY Lim in triage for stroke rule out.  
Stroke activation per Dr. Perdomo in triage.  
Stroke rule out to triage  
LACERATION

## 2025-06-11 NOTE — DISCHARGE INSTRUCTIONS
Seek medical care if symptoms worsen or any concerns.    Follow up with the primary care provider for re-evaluation within the next 2-3 days.

## 2025-06-11 NOTE — SUBJECTIVE & OBJECTIVE
HPI:  65 y.o. male h/o T2DM, HTN, meningioma, b/l SDH c/b pontine stroke, presenting with facial droop.  Patient reports symptoms first noted around 11:30 pm when he had drooling of liquids from the L side of his mouth.  Denies any limb weakness.  He also reports L ear pain.     Images personally reviewed and interpreted:  Study: Head CT and CTA Head & Neck  Study Interpretation: no acute findings     Assessment and plan:  Exam consistent with Whitt Palsy (lower motor neuro CN7 palsy), possibly associated with ipsilateral ear infection.  However given history of brainstem stroke recommend MRI brain to r/o vascular process.      Lytics recommendation: {THROMBOLYTIC THERAPY RECOMMENDATION:09095}    Thrombectomy recommendation: {INTERVENTIONAL REVASCULARIZATION CANDIDATE:39581}  Placement recommendation: {TELESTROKE DISPOSITION RECOMMENDATION:02288}

## 2025-06-12 LAB
OHS QRS DURATION: 82 MS
OHS QTC CALCULATION: 401 MS

## 2025-06-13 LAB — POCT GLUCOSE: 143 MG/DL (ref 70–110)

## (undated) DEVICE — DRESSING SURGICAL 3/4X3/4

## (undated) DEVICE — MARKER SKIN STND TIP BLUE BARR

## (undated) DEVICE — ROUTER TAPERED 2.3MM

## (undated) DEVICE — DRESSING TELFA STRL 4X3 LF

## (undated) DEVICE — SEE MEDLINE ITEM 156905

## (undated) DEVICE — STAPLER SKIN PROXIMATE WIDE

## (undated) DEVICE — BLADE SURG CARBON STEEL SZ11

## (undated) DEVICE — SEE MEDLINE ITEM 157131

## (undated) DEVICE — TAPE SURG MEDIPORE 6X72IN

## (undated) DEVICE — SEE MEDLINE ITEM 146292

## (undated) DEVICE — CORD BIPOLAR 12 FOOT

## (undated) DEVICE — ELECTRODE REM PLYHSV RETURN 9

## (undated) DEVICE — ELECTRODE BLADE INSULATED 1 IN

## (undated) DEVICE — DIFFUSER

## (undated) DEVICE — BIT DRILL WIRE PASS 1.0MM

## (undated) DEVICE — SUT VICRYL PLUS 2-0 CT1 18

## (undated) DEVICE — KIT EVACUATOR FULL PERF 100CC

## (undated) DEVICE — Device

## (undated) DEVICE — DRESSING SURGICAL 1/2X1/2

## (undated) DEVICE — BUR BONE CUT MICRO TPS 3X3.8MM

## (undated) DEVICE — BURR ROUTER TAPERED

## (undated) DEVICE — SUT D SPECIAL VICRYL 2-0

## (undated) DEVICE — TUBE FRAZIER 5MM 2FT SOFT TIP

## (undated) DEVICE — DRAPE CORETEMP FLD WRM 56X62IN

## (undated) DEVICE — DURAPREP SURG SCRUB 26ML

## (undated) DEVICE — DRESSING SURGICAL 1X3

## (undated) DEVICE — SUT VICRYL PLUS 3-0 SH 18IN

## (undated) DEVICE — CARTRIDGE OIL

## (undated) DEVICE — DRAPE THYROID WITH ARMBOARD

## (undated) DEVICE — HOOK LONE STAR BLUNT 12MM

## (undated) DEVICE — SUT 4/0 18IN NUROLON BLK B